# Patient Record
Sex: MALE | Race: WHITE | NOT HISPANIC OR LATINO | Employment: OTHER | URBAN - METROPOLITAN AREA
[De-identification: names, ages, dates, MRNs, and addresses within clinical notes are randomized per-mention and may not be internally consistent; named-entity substitution may affect disease eponyms.]

---

## 2018-04-04 LAB
BILIRUB UR QL STRIP: NEGATIVE
CLARITY UR: CLEAR
COLOR UR: YELLOW
GLUCOSE UR STRIP-MCNC: NEGATIVE MG/DL
HGB UR QL STRIP.AUTO: ABNORMAL
KETONES UR STRIP-MCNC: NEGATIVE MG/DL
LEUKOCYTE ESTERASE UR QL STRIP: NEGATIVE
NITRITE UR QL STRIP: NEGATIVE
PH UR STRIP.AUTO: 7.5 [PH] (ref 4.5–8)
PROT UR STRIP-MCNC: NEGATIVE MG/DL
PSA (HISTORICAL): 0.08 NG/ML (ref 0–4)
RBC #/AREA URNS AUTO: ABNORMAL /HPF
SP GR UR STRIP.AUTO: 1.01 (ref 1–1.03)
UROBILINOGEN UR QL STRIP.AUTO: 0.2 EU/DL (ref 0.2–8)
WBC #/AREA URNS AUTO: ABNORMAL /HPF

## 2019-04-03 ENCOUNTER — TRANSCRIBE ORDERS (OUTPATIENT)
Dept: ADMINISTRATIVE | Facility: HOSPITAL | Age: 84
End: 2019-04-03

## 2019-04-03 ENCOUNTER — LAB (OUTPATIENT)
Dept: LAB | Facility: HOSPITAL | Age: 84
End: 2019-04-03
Attending: UROLOGY
Payer: MEDICARE

## 2019-04-03 DIAGNOSIS — C61 PROSTATE CANCER (HCC): Primary | ICD-10-CM

## 2019-04-03 DIAGNOSIS — C61 PROSTATE CANCER (HCC): ICD-10-CM

## 2019-04-03 LAB
BACTERIA UR QL AUTO: ABNORMAL /HPF
BILIRUB UR QL STRIP: NEGATIVE
CLARITY UR: CLEAR
COLOR UR: ABNORMAL
GLUCOSE UR STRIP-MCNC: NEGATIVE MG/DL
HGB UR QL STRIP.AUTO: NEGATIVE
KETONES UR STRIP-MCNC: NEGATIVE MG/DL
LEUKOCYTE ESTERASE UR QL STRIP: NEGATIVE
NITRITE UR QL STRIP: NEGATIVE
NON-SQ EPI CELLS URNS QL MICRO: ABNORMAL /HPF
PH UR STRIP.AUTO: 7 [PH]
PROT UR STRIP-MCNC: NEGATIVE MG/DL
PSA SERPL-MCNC: <0.1 NG/ML (ref 0–4)
RBC #/AREA URNS AUTO: ABNORMAL /HPF
SP GR UR STRIP.AUTO: 1.01 (ref 1–1.03)
UROBILINOGEN UR QL STRIP.AUTO: 0.2 E.U./DL
WBC #/AREA URNS AUTO: ABNORMAL /HPF

## 2019-04-03 PROCEDURE — 81001 URINALYSIS AUTO W/SCOPE: CPT | Performed by: UROLOGY

## 2019-04-03 PROCEDURE — 84153 ASSAY OF PSA TOTAL: CPT

## 2019-12-14 ENCOUNTER — HOSPITAL ENCOUNTER (OUTPATIENT)
Facility: HOSPITAL | Age: 84
Setting detail: OBSERVATION
Discharge: HOME | End: 2019-12-15
Attending: EMERGENCY MEDICINE | Admitting: INTERNAL MEDICINE
Payer: MEDICARE

## 2019-12-14 ENCOUNTER — APPOINTMENT (OUTPATIENT)
Dept: RADIOLOGY | Facility: HOSPITAL | Age: 84
End: 2019-12-14
Payer: MEDICARE

## 2019-12-14 ENCOUNTER — OFFICE VISIT (OUTPATIENT)
Dept: URGENT CARE | Facility: HOSPITAL | Age: 84
End: 2019-12-14
Payer: MEDICARE

## 2019-12-14 ENCOUNTER — APPOINTMENT (EMERGENCY)
Dept: CT IMAGING | Facility: HOSPITAL | Age: 84
End: 2019-12-14
Payer: MEDICARE

## 2019-12-14 VITALS
HEART RATE: 71 BPM | OXYGEN SATURATION: 97 % | SYSTOLIC BLOOD PRESSURE: 202 MMHG | BODY MASS INDEX: 26.49 KG/M2 | DIASTOLIC BLOOD PRESSURE: 98 MMHG | HEIGHT: 75 IN | WEIGHT: 213 LBS | TEMPERATURE: 98.4 F | RESPIRATION RATE: 18 BRPM

## 2019-12-14 DIAGNOSIS — R53.1 WEAKNESS: ICD-10-CM

## 2019-12-14 DIAGNOSIS — R03.0 ELEVATED BLOOD PRESSURE READING: ICD-10-CM

## 2019-12-14 DIAGNOSIS — R50.9 FEVER, UNSPECIFIED FEVER CAUSE: ICD-10-CM

## 2019-12-14 DIAGNOSIS — R50.9 FEVER, UNSPECIFIED FEVER CAUSE: Primary | ICD-10-CM

## 2019-12-14 DIAGNOSIS — R50.9 FEVER: ICD-10-CM

## 2019-12-14 DIAGNOSIS — I16.0 HYPERTENSIVE URGENCY: Primary | ICD-10-CM

## 2019-12-14 LAB
ALBUMIN SERPL BCP-MCNC: 4.4 G/DL (ref 3.5–5.7)
ALP SERPL-CCNC: 42 U/L (ref 55–165)
ALT SERPL W P-5'-P-CCNC: 21 U/L (ref 7–52)
ANION GAP SERPL CALCULATED.3IONS-SCNC: 8 MMOL/L (ref 4–13)
APTT PPP: 34 SECONDS (ref 23–37)
AST SERPL W P-5'-P-CCNC: 31 U/L (ref 13–39)
BASOPHILS # BLD AUTO: 0.1 THOUSANDS/ΜL (ref 0–0.1)
BASOPHILS NFR BLD AUTO: 2 % (ref 0–2)
BILIRUB SERPL-MCNC: 0.7 MG/DL (ref 0.2–1)
BILIRUB UR QL STRIP: NEGATIVE
BUN SERPL-MCNC: 12 MG/DL (ref 7–25)
CALCIUM SERPL-MCNC: 9.6 MG/DL (ref 8.6–10.5)
CHLORIDE SERPL-SCNC: 99 MMOL/L (ref 98–107)
CLARITY UR: CLEAR
CO2 SERPL-SCNC: 27 MMOL/L (ref 21–31)
COLOR UR: YELLOW
CREAT SERPL-MCNC: 1.01 MG/DL (ref 0.7–1.3)
EOSINOPHIL # BLD AUTO: 0 THOUSAND/ΜL (ref 0–0.61)
EOSINOPHIL NFR BLD AUTO: 1 % (ref 0–5)
ERYTHROCYTE [DISTWIDTH] IN BLOOD BY AUTOMATED COUNT: 13 % (ref 11.5–14.5)
FLUAV RNA NPH QL NAA+PROBE: NORMAL
FLUBV RNA NPH QL NAA+PROBE: NORMAL
GFR SERPL CREATININE-BSD FRML MDRD: 69 ML/MIN/1.73SQ M
GLUCOSE SERPL-MCNC: 115 MG/DL (ref 65–99)
GLUCOSE UR STRIP-MCNC: NEGATIVE MG/DL
HCT VFR BLD AUTO: 39.1 % (ref 42–47)
HGB BLD-MCNC: 14.1 G/DL (ref 14–18)
HGB UR QL STRIP.AUTO: NEGATIVE
INR PPP: 0.98 (ref 0.9–1.5)
KETONES UR STRIP-MCNC: NEGATIVE MG/DL
LACTATE SERPL-SCNC: 1.2 MMOL/L (ref 0.5–2)
LEUKOCYTE ESTERASE UR QL STRIP: NEGATIVE
LYMPHOCYTES # BLD AUTO: 0.4 THOUSANDS/ΜL (ref 0.6–4.47)
LYMPHOCYTES NFR BLD AUTO: 13 % (ref 21–51)
MAGNESIUM SERPL-MCNC: 2.1 MG/DL (ref 1.9–2.7)
MCH RBC QN AUTO: 33.3 PG (ref 26–34)
MCHC RBC AUTO-ENTMCNC: 36 G/DL (ref 31–37)
MCV RBC AUTO: 93 FL (ref 81–99)
MONOCYTES # BLD AUTO: 0.3 THOUSAND/ΜL (ref 0.17–1.22)
MONOCYTES NFR BLD AUTO: 10 % (ref 2–12)
NEUTROPHILS # BLD AUTO: 2.3 THOUSANDS/ΜL (ref 1.4–6.5)
NEUTS SEG NFR BLD AUTO: 75 % (ref 42–75)
NITRITE UR QL STRIP: NEGATIVE
PH UR STRIP.AUTO: 6.5 [PH]
PLATELET # BLD AUTO: 212 THOUSANDS/UL (ref 149–390)
PMV BLD AUTO: 6.7 FL (ref 8.6–11.7)
POTASSIUM SERPL-SCNC: 3.9 MMOL/L (ref 3.5–5.5)
PROT SERPL-MCNC: 6.6 G/DL (ref 6.4–8.9)
PROT UR STRIP-MCNC: NEGATIVE MG/DL
PROTHROMBIN TIME: 11.4 SECONDS (ref 10.2–13)
RBC # BLD AUTO: 4.22 MILLION/UL (ref 4.3–5.9)
RSV RNA NPH QL NAA+PROBE: NORMAL
SODIUM SERPL-SCNC: 134 MMOL/L (ref 134–143)
SP GR UR STRIP.AUTO: 1.01 (ref 1–1.03)
TROPONIN I SERPL-MCNC: <0.03 NG/ML
TSH SERPL DL<=0.05 MIU/L-ACNC: 2.56 UIU/ML (ref 0.45–5.33)
UROBILINOGEN UR QL STRIP.AUTO: 0.2 E.U./DL
WBC # BLD AUTO: 3.1 THOUSAND/UL (ref 4.8–10.8)

## 2019-12-14 PROCEDURE — 85610 PROTHROMBIN TIME: CPT | Performed by: NURSE PRACTITIONER

## 2019-12-14 PROCEDURE — 84484 ASSAY OF TROPONIN QUANT: CPT | Performed by: NURSE PRACTITIONER

## 2019-12-14 PROCEDURE — 80053 COMPREHEN METABOLIC PANEL: CPT | Performed by: NURSE PRACTITIONER

## 2019-12-14 PROCEDURE — 99220 PR INITIAL OBSERVATION CARE/DAY 70 MINUTES: CPT | Performed by: NURSE PRACTITIONER

## 2019-12-14 PROCEDURE — 85730 THROMBOPLASTIN TIME PARTIAL: CPT | Performed by: NURSE PRACTITIONER

## 2019-12-14 PROCEDURE — 96374 THER/PROPH/DIAG INJ IV PUSH: CPT

## 2019-12-14 PROCEDURE — 83735 ASSAY OF MAGNESIUM: CPT | Performed by: NURSE PRACTITIONER

## 2019-12-14 PROCEDURE — 84443 ASSAY THYROID STIM HORMONE: CPT | Performed by: NURSE PRACTITIONER

## 2019-12-14 PROCEDURE — 85025 COMPLETE CBC W/AUTO DIFF WBC: CPT | Performed by: NURSE PRACTITIONER

## 2019-12-14 PROCEDURE — 99203 OFFICE O/P NEW LOW 30 MIN: CPT | Performed by: NURSE PRACTITIONER

## 2019-12-14 PROCEDURE — 83605 ASSAY OF LACTIC ACID: CPT | Performed by: NURSE PRACTITIONER

## 2019-12-14 PROCEDURE — 99285 EMERGENCY DEPT VISIT HI MDM: CPT | Performed by: NURSE PRACTITIONER

## 2019-12-14 PROCEDURE — 71046 X-RAY EXAM CHEST 2 VIEWS: CPT

## 2019-12-14 PROCEDURE — 93005 ELECTROCARDIOGRAM TRACING: CPT

## 2019-12-14 PROCEDURE — 81003 URINALYSIS AUTO W/O SCOPE: CPT | Performed by: NURSE PRACTITIONER

## 2019-12-14 PROCEDURE — 87631 RESP VIRUS 3-5 TARGETS: CPT | Performed by: NURSE PRACTITIONER

## 2019-12-14 PROCEDURE — 87040 BLOOD CULTURE FOR BACTERIA: CPT | Performed by: NURSE PRACTITIONER

## 2019-12-14 PROCEDURE — 99285 EMERGENCY DEPT VISIT HI MDM: CPT

## 2019-12-14 PROCEDURE — 1123F ACP DISCUSS/DSCN MKR DOCD: CPT | Performed by: NURSE PRACTITIONER

## 2019-12-14 PROCEDURE — G0463 HOSPITAL OUTPT CLINIC VISIT: HCPCS | Performed by: NURSE PRACTITIONER

## 2019-12-14 PROCEDURE — 71250 CT THORAX DX C-: CPT

## 2019-12-14 PROCEDURE — 36415 COLL VENOUS BLD VENIPUNCTURE: CPT | Performed by: NURSE PRACTITIONER

## 2019-12-14 RX ORDER — HYDRALAZINE HYDROCHLORIDE 20 MG/ML
5 INJECTION INTRAMUSCULAR; INTRAVENOUS EVERY 6 HOURS PRN
Status: DISCONTINUED | OUTPATIENT
Start: 2019-12-14 | End: 2019-12-15 | Stop reason: HOSPADM

## 2019-12-14 RX ORDER — LABETALOL 20 MG/4 ML (5 MG/ML) INTRAVENOUS SYRINGE
10 ONCE
Status: COMPLETED | OUTPATIENT
Start: 2019-12-14 | End: 2019-12-14

## 2019-12-14 RX ORDER — ONDANSETRON 2 MG/ML
4 INJECTION INTRAMUSCULAR; INTRAVENOUS EVERY 6 HOURS PRN
Status: DISCONTINUED | OUTPATIENT
Start: 2019-12-14 | End: 2019-12-15 | Stop reason: HOSPADM

## 2019-12-14 RX ORDER — LISINOPRIL 10 MG/1
10 TABLET ORAL DAILY
Status: DISCONTINUED | OUTPATIENT
Start: 2019-12-14 | End: 2019-12-15 | Stop reason: HOSPADM

## 2019-12-14 RX ORDER — ACETAMINOPHEN 325 MG/1
650 TABLET ORAL EVERY 6 HOURS PRN
Status: DISCONTINUED | OUTPATIENT
Start: 2019-12-14 | End: 2019-12-15 | Stop reason: HOSPADM

## 2019-12-14 RX ADMIN — LABETALOL 20 MG/4 ML (5 MG/ML) INTRAVENOUS SYRINGE 10 MG: at 10:13

## 2019-12-14 RX ADMIN — ENOXAPARIN SODIUM 40 MG: 40 INJECTION SUBCUTANEOUS at 13:18

## 2019-12-14 RX ADMIN — LISINOPRIL 10 MG: 10 TABLET ORAL at 13:17

## 2019-12-14 RX ADMIN — HYDRALAZINE HYDROCHLORIDE 5 MG: 20 INJECTION INTRAMUSCULAR; INTRAVENOUS at 21:46

## 2019-12-14 NOTE — SOCIAL WORK
Evaluated the pt at the bedside with the wife and daughter present  Pt was admitted to the hospital with weakness  Explained the role of CM with the pt and the options of discharge planning with the pt and family  Pt states he lives with his spouse in a 2 story home  Pt has 2 MAUREEN in the front and 21 steps in the back  Pt has a bathroom on the first floor and 18 steps to the upstairs  Pt states he has been independent and is able to drive  Discussed the Observation level of care with the pt  Pt verbalizes understanding of same  Pt daughter will transport selene upon discharge  Pt PCP is Dr Christen Reinoso  Pt goes to [facility] in [place] for medications  Patient/caregiver received discharge checklist   Content reviewed  Patient/caregiver encouraged to participate in discharge plan of care prior to discharge home  CM reviewed d/c planning process including the following: identifying help at home, patient preference for d/c planning needs, availability of treatment team to discuss questions or concerns patient and/or family may have regarding understanding medications and recognizing signs and symptoms once discharged  CM also encouraged patient to follow up with all recommended appointments after discharge  Patient advised of importance for patient and family to participate in managing patients medical well being

## 2019-12-14 NOTE — PLAN OF CARE
Problem: DISCHARGE PLANNING - CARE MANAGEMENT  Goal: Discharge to post-acute care or home with appropriate resources  Description  INTERVENTIONS:  - Conduct assessment to determine patient/family and health care team treatment goals, and need for post-acute services based on payer coverage, community resources, and patient preferences, and barriers to discharge  - Address psychosocial, clinical, and financial barriers to discharge as identified in assessment in conjunction with the patient/family and health care team  - Arrange appropriate level of post-acute services according to patient's   needs and preference and payer coverage in collaboration with the physician and health care team  - Communicate with and update the patient/family, physician, and health care team regarding progress on the discharge plan  - Arrange appropriate transportation to post-acute venues  Discharge home with spouse  Daughter will transport  Pt has no care needs     Outcome: Progressing

## 2019-12-14 NOTE — PLAN OF CARE
Pt admitted at this time  Problem: Potential for Falls  Goal: Patient will remain free of falls  Description  INTERVENTIONS:  - Assess patient frequently for physical needs  -  Identify cognitive and physical deficits and behaviors that affect risk of falls    -  [place] fall precautions as indicated by assessment   - Educate patient/family on patient safety including physical limitations  - Instruct patient to call for assistance with activity based on assessment  - Modify environment to reduce risk of injury  - Consider OT/PT consult to assist with strengthening/mobility  Outcome: Progressing     Problem: RESPIRATORY - ADULT  Goal: Achieves optimal ventilation and oxygenation  Description  INTERVENTIONS:  - Assess for changes in respiratory status  - Assess for changes in mentation and behavior  - Position to facilitate oxygenation and minimize respiratory effort  - Oxygen administered by appropriate delivery if ordered  - Initiate smoking cessation education as indicated  - Encourage broncho-pulmonary hygiene including cough, deep breathe, Incentive Spirometry  - Assess the need for suctioning and aspirate as needed  - Assess and instruct to report SOB or any respiratory difficulty  - Respiratory Therapy support as indicated  Outcome: Progressing     Problem: PAIN - ADULT  Goal: Verbalizes/displays adequate comfort level or baseline comfort level  Description  Interventions:  - Encourage patient to monitor pain and request assistance  - Assess pain using appropriate pain scale  - Administer analgesics based on type and severity of pain and evaluate response  - Implement non-pharmacological measures as appropriate and evaluate response  - Consider cultural and social influences on pain and pain management  - Notify physician/advanced practitioner if interventions unsuccessful or patient reports new pain  Outcome: Progressing     Problem: INFECTION - ADULT  Goal: Absence or prevention of progression during hospitalization  Description  INTERVENTIONS:  - Assess and monitor for signs and symptoms of infection  - Monitor lab/diagnostic results  - Monitor all insertion sites, i e  indwelling lines, tubes, and drains  - Monitor endotracheal if appropriate and nasal secretions for changes in amount and color  - [place] appropriate cooling/warming therapies per order  - Administer medications as ordered  - Instruct and encourage patient and family to use good hand hygiene technique  - Identify and instruct in appropriate isolation precautions for identified infection/condition  Outcome: Progressing  Goal: Absence of fever/infection during neutropenic period  Description  INTERVENTIONS:  - Monitor WBC    Outcome: Progressing     Problem: SAFETY ADULT  Goal: Maintain or return to baseline ADL function  Description  INTERVENTIONS:  -  Assess patient's ability to carry out ADLs; assess patient's baseline for ADL function and identify physical deficits which impact ability to perform ADLs (bathing, care of mouth/teeth, toileting, grooming, dressing, etc )  - Assess/evaluate cause of self-care deficits   - Assess range of motion  - Assess patient's mobility; develop plan if impaired  - Assess patient's need for assistive devices and provide as appropriate  - Encourage maximum independence but intervene and supervise when necessary  - Involve family in performance of ADLs  - Assess for home care needs following discharge   - Consider OT consult to assist with ADL evaluation and planning for discharge  - Provide patient education as appropriate  Outcome: Progressing  Goal: Maintain or return mobility status to optimal level  Description  INTERVENTIONS:  - Assess patient's baseline mobility status (ambulation, transfers, stairs, etc )    - Identify cognitive and physical deficits and behaviors that affect mobility  - Identify mobility aids required to assist with transfers and/or ambulation (gait belt, sit-to-stand, lift, walker, cane, etc )  - [place] fall precautions as indicated by assessment  - Record patient progress and toleration of activity level on Mobility SBAR; progress patient to next Phase/Stage  - Instruct patient to call for assistance with activity based on assessment  - Consider rehabilitation consult to assist with strengthening/weightbearing, etc   Outcome: Progressing     Problem: DISCHARGE PLANNING  Goal: Discharge to home or other facility with appropriate resources  Description  INTERVENTIONS:  - Identify barriers to discharge w/patient and caregiver  - Arrange for needed discharge resources and transportation as appropriate  - Identify discharge learning needs (meds, wound care, etc )  - Arrange for interpretive services to assist at discharge as needed  - Refer to Case Management Department for coordinating discharge planning if the patient needs post-hospital services based on physician/advanced practitioner order or complex needs related to functional status, cognitive ability, or social support system  Outcome: Progressing     Problem: Knowledge Deficit  Goal: Patient/family/caregiver demonstrates understanding of disease process, treatment plan, medications, and discharge instructions  Description  Complete learning assessment and assess knowledge base    Interventions:  - Provide teaching at level of understanding  - Provide teaching via preferred learning methods  Outcome: Progressing

## 2019-12-14 NOTE — ASSESSMENT & PLAN NOTE
· Noted to have SBP as high as 200s in ED  · Patient was symptomatic   · Received labetalol IV in ED with improvement of SBP to 170s  · The patient's PCP is retiring and he is looking for a new PCP  Wife states that they are considering which PCP to go with     · He is denies any prior history of hypertension   · I recommended that he should establish care with a PCP and have hypertension workup done- including renal artery duplex as outpatient   · Will start on lisinopril   · Monitor BP closely   · Low salt diet discussed

## 2019-12-14 NOTE — PROGRESS NOTES
[facility]        NAME: Jama Yip  is a 81 y o  male  : 1935    MRN: [id number]  DATE: 2019  TIME: 8:59 AM    Assessment and Plan   Fever, unspecified fever cause [R50 9]  1  Fever, unspecified fever cause  XR chest pa & lateral    Transfer to other facility   2  Elevated blood pressure reading  Transfer to other facility         Patient Instructions     Patient Instructions   As we discussed,  I would recommend going to ER for full evaluation of symptoms  You were agreeable and would like to go to [address]  Chief Complaint     Chief Complaint   Patient presents with    Fever     started a week ago was congested now has a fevers  History of Present Illness   Jama Yip  presents to the clinic c/o    This is a 81year old male here today with subjective fevers  He states symptoms started at the beginning of last week  He states he had mild cold symptoms with nasal congestion  He states he took 2 doses of decongestant and symptoms improved  He states at this time he has no nasal congestion, cough, sore throat, ear pain  He states yesterday he was generally feeling better  He states he woke up in the middle of the night feeling flushed and then got the chills  He states he took aspirin and symptoms resolved  He states he does feel more weak today  He states yesterday he was feeling better but symptoms had returned  He denies any headaches, chest pain, sob or difficulty breathing  He does not take any medication  No history of HTN  No fever here today  Wife does not he does not go to doctor unless he is not feeling well  He has not been seen by PCP in about 1 year  Review of Systems   Review of Systems   Constitutional: Positive for activity change, chills, fatigue and fever  HENT: Negative for congestion, ear pain, postnasal drip, rhinorrhea, sinus pressure, sinus pain, sore throat and tinnitus      Respiratory: Negative for cough, chest tightness, wheezing and stridor  Cardiovascular: Negative for chest pain  Genitourinary: Negative for dysuria, flank pain, frequency, hematuria and urgency  Skin: Negative  Neurological: Negative  Psychiatric/Behavioral: Negative  Current Medications     Long-Term Medications   Medication Sig Dispense Refill    aspirin 81 MG tablet Take 81 mg by mouth         Current Allergies     Allergies as of 12/14/2019    (No Known Allergies)            The following portions of the patient's history were reviewed and updated as appropriate: allergies, current medications, past family history, past medical history, past social history, past surgical history and problem list     Objective   BP (!) 202/98   Pulse 71   Temp 98 4 °F (36 9 °C)   Resp 18   Ht 6' 3" (1 905 m)   Wt 96 6 kg (213 lb)   SpO2 97%   BMI 26 62 kg/m²        Physical Exam     Physical Exam   Constitutional: He is oriented to person, place, and time  He appears well-developed and well-nourished  HENT:   Right Ear: External ear normal    Left Ear: External ear normal    Mouth/Throat: Oropharynx is clear and moist    Eyes: Pupils are equal, round, and reactive to light  Conjunctivae are normal    Neck: Normal range of motion  Neck supple  Cardiovascular: Normal rate and regular rhythm  Pulmonary/Chest: Effort normal and breath sounds normal    Musculoskeletal: Normal range of motion  Neurological: He is alert and oriented to person, place, and time  Skin: Skin is warm and dry  Psychiatric: He has a normal mood and affect   His behavior is normal        Chest xray: no acute abnormality

## 2019-12-14 NOTE — NURSING NOTE
Pt received from the er to room 109 1, oriented to the unit and the callbell, no cp no sob, placed on the monitor, sb to nsr noted, sarwat np was in to see and assess the pt, admit orders noted

## 2019-12-14 NOTE — H&P
H&P- Catracho Medina  1935, 81 y o  male MRN: [id number]    Unit/Bed#: -01 Encounter: [id number]    Primary Care Provider: Katherine Kowalski DO   Date and time admitted to hospital: 12/14/2019  9:14 AM        * Hypertensive urgency  Assessment & Plan  · Noted to have SBP as high as 200s in ED  · Patient was symptomatic   · Received labetalol IV in ED with improvement of SBP to 170s  · The patient's PCP is retiring and he is looking for a new PCP  Wife states that they are considering which PCP to go with  · He is denies any prior history of hypertension   · I recommended that he should establish care with a PCP and have hypertension workup done- including renal artery duplex as outpatient   · Will start on lisinopril   · Monitor BP closely   · Low salt diet discussed     Generalized weakness  Assessment & Plan  · Likely due to recent viral infection   · Continue supportive care     VTE Prophylaxis: Enoxaparin (Lovenox)  Code Status: full code   POLST: POLST is not applicable to this patient  Discussion with family: wife     Anticipated Length of Stay:  Patient will be admitted on an Observation basis with an anticipated length of stay of  < 2 midnights  Justification for Hospital Stay: hypertensive urgency     Chief Complaint:   Fever and generalized weakness    History of Present Illness:    Catracho Medina  is a 81 y o  male who presents with subjective fever and generalized weakness  The patient with past medical history of remote prostate cancer  Patient states that he developed a cold-like symptoms approximately 1 week ago with postnasal drip, nasal congestion, chills, and generalized malaise  He reported taking 2 doses Robitussin over-the-counter on Tuesday  He stated overall he is slowly improving however last night the patient woke up feeling feverish and 2 of aspirin with some improvement    He woke up this morning and the generalized weakness is progressively worse subsequently the patient went to the urgent care  At the urgent care patient was found to have elevated blood pressure and subsequently was directed to come to the ED for further evaluation  In the ED the patient was found to have systolic BP of 941D and subsequently treated with labetalol IV  Review of Systems:  Review of Systems   Constitutional: Positive for chills and fever  Negative for fatigue  HENT: Negative for congestion, ear pain, postnasal drip and sore throat  Eyes: Negative for discharge, itching and visual disturbance  Respiratory: Negative for cough, chest tightness and shortness of breath  Cardiovascular: Negative for chest pain, palpitations and leg swelling  Gastrointestinal: Negative for abdominal pain, nausea and vomiting  Endocrine: Negative for cold intolerance and heat intolerance  Genitourinary: Negative for difficulty urinating, dysuria and hematuria  Musculoskeletal: Negative for back pain, gait problem and neck pain  Skin: Negative for rash and wound  Neurological: Positive for weakness (generalized weakness)  Negative for dizziness, speech difficulty, light-headedness and headaches  Psychiatric/Behavioral: Negative for confusion, dysphoric mood and hallucinations  Past Medical and Surgical History:   Past Medical History:   Diagnosis Date    Cancer [facility])     prostate cancer     Hypertension        Past Surgical History:   Procedure Laterality Date    APPENDECTOMY         Meds/Allergies:  Prior to Admission medications    Medication Sig Start Date End Date Taking? Authorizing Provider   aspirin 81 MG tablet Take 81 mg by mouth  12/14/19  Historical Provider, MD     I have reviewed home medications with patient personally      Allergies: No Known Allergies    Social History:  Marital Status: /Civil Union   Occupation: retired   Patient Pre-hospital Living Situation: family   Patient Pre-hospital Level of Mobility: independent   Patient Pre-hospital Diet Restrictions: none   Substance Use History:   Social History     Substance and Sexual Activity   Alcohol Use Not Currently    Frequency: Monthly or less     Social History     Tobacco Use   Smoking Status Never Smoker   Smokeless Tobacco Never Used     Social History     Substance and Sexual Activity   Drug Use Not Currently       Family History:  I have reviewed the patients family history    Physical Exam:   Vitals:   Blood Pressure: (!) 177/78 (12/14/19 1236)  Pulse: 63 (12/14/19 1236)  Temperature: 98 °F (36 7 °C) (12/14/19 1236)  Temp Source: Temporal (12/14/19 1236)  Respirations: 18 (12/14/19 1236)  Height: 6' 3" (190 5 cm) (12/14/19 1241)  Weight - Scale: 94 9 kg (209 lb 3 5 oz) (12/14/19 1241)  SpO2: 98 % (12/14/19 1236)    Physical Exam    Additional Data:   Lab Results: I have personally reviewed pertinent reports  Results from last 7 days   Lab Units 12/14/19  0935   WBC Thousand/uL 3 10*   HEMOGLOBIN g/dL 14 1   HEMATOCRIT % 39 1*   PLATELETS Thousands/uL 212   NEUTROS PCT % 75   LYMPHS PCT % 13*   MONOS PCT % 10   EOS PCT % 1     Results from last 7 days   Lab Units 12/14/19  0935   POTASSIUM mmol/L 3 9   CHLORIDE mmol/L 99   CO2 mmol/L 27   BUN mg/dL 12   CREATININE mg/dL 1 01   CALCIUM mg/dL 9 6   ALK PHOS U/L 42*   ALT U/L 21   AST U/L 31     Results from last 7 days   Lab Units 12/14/19  0935   INR  0 98               Imaging: I have personally reviewed pertinent reports  CT chest without contrast   Final Result by Watson Avendaño MD (12/14 1122)      Mild subsegmental atelectasis in the left upper and lower lobes  No evidence of pneumonia or other acute abnormality in the chest                Workstation performed: [id number]             EKG, Pathology, and Other Studies Reviewed on Admission:   · EKG: NSR HR 65    NetAccess / Epic Records Reviewed: Yes     ** Please Note: This note has been constructed using a voice recognition system   **

## 2019-12-14 NOTE — ED PROVIDER NOTES
History  Chief Complaint   Patient presents with    Fever - 9 weeks to 74 years     on omnday had stuffy nose and sneezing with a fever, OTC cold medicine and was feeling better  Fever on and off all week relieved with aspiri  Woke up around 1 AM and felt like he was burning up     Weakness - Generalized     This is an 81year old male who states has had a subjective fever x 1 week with stuffy nose, myalagias  He denies sorethroat, n/v/d, CP, SOB  He states he has been taking OTC cold medications and aspirin with some relief  He states he woke up this am and felt like he was burning  He went to [facility], had a CXR and was sent to the ED for further evaluation  He states he does not see a doctor for much but does see Dr Nicki Hawkins for hx of prostate cancer  Denies DM, HTN or other PMH  Pt denies getting a flu shot this year  History provided by:  Medical records and patient   used: No        None       Past Medical History:   Diagnosis Date    Cancer [facility])     prostate cancer     Hypertension        Past Surgical History:   Procedure Laterality Date    APPENDECTOMY         History reviewed  No pertinent family history  I have reviewed and agree with the history as documented  Social History     Tobacco Use    Smoking status: Never Smoker    Smokeless tobacco: Never Used   Substance Use Topics    Alcohol use: Not Currently     Frequency: Monthly or less    Drug use: Never        Review of Systems   Constitutional: Positive for fatigue and fever  HENT: Negative  Eyes: Negative  Respiratory: Negative  Cardiovascular: Negative  Gastrointestinal: Negative  Endocrine: Negative  Genitourinary: Negative  Musculoskeletal: Positive for myalgias  Skin: Negative  Allergic/Immunologic: Negative  Neurological: Negative  Hematological: Negative  Psychiatric/Behavioral: Negative          Physical Exam  Physical Exam   Constitutional: He is oriented to person, place, and time  He appears well-developed and well-nourished  HENT:   Head: Normocephalic and atraumatic  Right Ear: External ear normal    Left Ear: External ear normal    Nose: Nose normal    Mouth/Throat: Oropharynx is clear and moist  No oropharyngeal exudate  Eyes: Pupils are equal, round, and reactive to light  EOM are normal    Neck: Normal range of motion  Neck supple  Cardiovascular: Normal rate, regular rhythm and normal heart sounds  Pulmonary/Chest: Effort normal and breath sounds normal  No stridor  No respiratory distress  He has no wheezes  He has no rales  Abdominal: Soft  Bowel sounds are normal  He exhibits no distension  There is no tenderness  Musculoskeletal: Normal range of motion  Neurological: He is alert and oriented to person, place, and time  Skin: Skin is warm and dry  Capillary refill takes less than 2 seconds  He is not diaphoretic  Psychiatric: He has a normal mood and affect  His behavior is normal  Judgment and thought content normal    Nursing note and vitals reviewed        Vital Signs  ED Triage Vitals   Temperature Pulse Respirations Blood Pressure SpO2   12/14/19 0917 12/14/19 0917 12/14/19 0917 12/14/19 0918 12/14/19 0917   (!) 97 °F (36 1 °C) 72 16 (!) 203/86 99 %      Temp Source Heart Rate Source Patient Position - Orthostatic VS BP Location FiO2 (%)   12/14/19 0917 12/14/19 0917 12/14/19 0918 12/14/19 0918 --   Temporal Monitor Sitting Left arm       Pain Score       12/14/19 0917       No Pain           Vitals:    12/14/19 1008 12/14/19 1019 12/14/19 1032 12/14/19 1236   BP: (!) 204/82 (!) 184/79 (!) 174/74 (!) 177/78   Pulse: 64 56 58 63   Patient Position - Orthostatic VS: Sitting Sitting Sitting Sitting         Visual Acuity      ED Medications  Medications   ondansetron (ZOFRAN) injection 4 mg (has no administration in time range)   enoxaparin (LOVENOX) subcutaneous injection 40 mg (has no administration in time range)   acetaminophen (TYLENOL) tablet 650 mg (has no administration in time range)   lisinopril (ZESTRIL) tablet 10 mg (has no administration in time range)   Labetalol HCl (NORMODYNE) injection 10 mg (10 mg Intravenous Given 12/14/19 1013)       Diagnostic Studies  Results Reviewed     Procedure Component Value Units Date/Time    UA (URINE) with reflex to Scope [[id number]]  (Normal) Collected:  12/14/19 1142    Lab Status:  Final result Specimen:  Urine, Clean Catch Updated:  12/14/19 1215     Color, UA Yellow     Clarity, UA Clear     Specific Gravity, UA 1 010     pH, UA 6 5     Leukocytes, UA Negative     Nitrite, UA Negative     Protein, UA Negative mg/dl      Glucose, UA Negative mg/dl      Ketones, UA Negative mg/dl      Urobilinogen, UA 0 2 E U /dl      Bilirubin, UA Negative     Blood, UA Negative    Troponin I [[id number]]  (Normal) Collected:  12/14/19 0935    Lab Status:  Final result Specimen:  Blood from Arm, Left Updated:  12/14/19 1054     Troponin I <0 03 ng/mL     Influenza A/B and RSV PCR [[id number]]  (Normal) Collected:  12/14/19 0943    Lab Status:  Final result Updated:  12/14/19 1027     INFLUENZA A PCR None Detected     INFLUENZA B PCR None Detected     RSV PCR None Detected    Lactic acid, plasma x2 [[id number]]  (Normal) Collected:  12/14/19 0935    Lab Status:  Final result Specimen:  Blood from Arm, Left Updated:  12/14/19 1012     LACTIC ACID 1 2 mmol/L     Narrative:       Result may be elevated if tourniquet was used during collection      Magnesium [[id number]]  (Normal) Collected:  12/14/19 0935    Lab Status:  Final result Specimen:  Blood from Arm, Left Updated:  12/14/19 1012     Magnesium 2 1 mg/dL     Comprehensive metabolic panel [[id number]]  (Abnormal) Collected:  12/14/19 0935    Lab Status:  Final result Specimen:  Blood from Arm, Left Updated:  12/14/19 1012     Sodium 134 mmol/L      Potassium 3 9 mmol/L      Chloride 99 mmol/L      CO2 27 mmol/L      ANION GAP 8 mmol/L      BUN 12 mg/dL Creatinine 1 01 mg/dL      Glucose 115 mg/dL      Calcium 9 6 mg/dL      AST 31 U/L      ALT 21 U/L      Alkaline Phosphatase 42 U/L      Total Protein 6 6 g/dL      Albumin 4 4 g/dL      Total Bilirubin 0 70 mg/dL      eGFR 69 ml/min/1 73sq m     Narrative:       [facility] guidelines for Chronic Kidney Disease (CKD):     Stage 1 with normal or high GFR (GFR > 90 mL/min/1 73 square meters)    Stage 2 Mild CKD (GFR = 60-89 mL/min/1 73 square meters)    Stage 3A Moderate CKD (GFR = 45-59 mL/min/1 73 square meters)    Stage 3B Moderate CKD (GFR = 30-44 mL/min/1 73 square meters)    Stage 4 Severe CKD (GFR = 15-29 mL/min/1 73 square meters)    Stage 5 End Stage CKD (GFR <15 mL/min/1 73 square meters)  Note: GFR calculation is accurate only with a steady state creatinine    Protime-INR [[id number]]  (Normal) Collected:  12/14/19 0935    Lab Status:  Final result Specimen:  Blood from Arm, Left Updated:  12/14/19 1005     Protime 11 4 seconds      INR 0 98    APTT [[id number]]  (Normal) Collected:  12/14/19 0935    Lab Status:  Final result Specimen:  Blood from Arm, Left Updated:  12/14/19 1005     PTT 34 seconds     CBC and differential [[id number]]  (Abnormal) Collected:  12/14/19 0935    Lab Status:  Final result Specimen:  Blood from Arm, Left Updated:  12/14/19 0951     WBC 3 10 Thousand/uL      RBC 4 22 Million/uL      Hemoglobin 14 1 g/dL      Hematocrit 39 1 %      MCV 93 fL      MCH 33 3 pg      MCHC 36 0 g/dL      RDW 13 0 %      MPV 6 7 fL      Platelets 723 Thousands/uL      Neutrophils Relative 75 %      Lymphocytes Relative 13 %      Monocytes Relative 10 %      Eosinophils Relative 1 %      Basophils Relative 2 %      Neutrophils Absolute 2 30 Thousands/µL      Lymphocytes Absolute 0 40 Thousands/µL      Monocytes Absolute 0 30 Thousand/µL      Eosinophils Absolute 0 00 Thousand/µL      Basophils Absolute 0 10 Thousands/µL     Blood culture #1 [[id number]] Collected:  12/14/19 5038    Lab Status: In process Specimen:  Blood from Hand, Left Updated:  12/14/19 0951    Blood culture #2 [[id number]] Collected:  12/14/19 0934    Lab Status: In process Specimen:  Blood from Arm, Left Updated:  12/14/19 0941                 CT chest without contrast   Final Result by Watson Avendaño MD (12/14 1122)      Mild subsegmental atelectasis in the left upper and lower lobes  No evidence of pneumonia or other acute abnormality in the chest                Workstation performed: [id number]                    Procedures  ECG 12 Lead Documentation Only  Date/Time: 12/14/2019 9:30 AM  Performed by: KIERA Farmer  Authorized by: KIERA Farmer     Indications / Diagnosis:  Fever elevated BP   ECG reviewed by me, the ED Provider: yes (Dr Silva Spangler )    Patient location:  ED  Previous ECG:     Previous ECG:  Unavailable    Comparison to cardiac monitor: No    Interpretation:     Interpretation: normal    Rate:     ECG rate:  65    ECG rate assessment: normal    Rhythm:     Rhythm: sinus rhythm    Ectopy:     Ectopy: none    QRS:     QRS axis:  Normal  ST segments:     ST segments:  Normal  T waves:     T waves: normal               ED Course  ED Course as of Dec 14 1247   Sat Dec 14, 2019   0958 BP improved per /87       1010 BP elevated 204/81 - will give 10 mg labetalol  1013 CMP unremarkable  LA 1 2   WBC 3 10  Mag 2 1   Will order CT chest R/o pneumonia       1020 BP after labetalol 184/79       1028 Influenza RSV negative  Rn states that pt HR had dropped to high 40's and is now 61 (it is noted that in January 2019 pt had syncope x 3 after taking cold medications and not drinking enough water)       1101 Trop< 0 03 EKG NSR waiting on CT results         1 IMPRESSION:   Mild subsegmental atelectasis in the left upper and lower lobes    No evidence of pneumonia or other acute abnormality in the chest             1131 All labs and radiology results reviewed and discussed with Pt and wife  I feel that since pt has not seen PCP in over 1 year and does not  follow up and pt has HTN urgency that pt should stay for blood pressure management  12 Pt and wife agree with POC  Page to [address] will accept for observation  Dr Clement Chiang           BP (!) 177/78 (BP Location: Right arm)   Pulse 63   Temp 98 °F (36 7 °C) (Temporal)   Resp 18   Ht 6' 3" (1 905 m)   Wt 94 9 kg (209 lb 3 5 oz)   SpO2 98%   BMI 26 15 kg/m²                           MDM  Number of Diagnoses or Management Options  Diagnosis management comments: Subjective fevers with myalgia and generalized weakness    Plan  EKG  Tele  Flu swab  Labs  Urine  IV  CT chest      CXR completed at [facility] and reviewed - ? Multifocal pneumonia          Amount and/or Complexity of Data Reviewed  Clinical lab tests: ordered and reviewed  Tests in the radiology section of CPT®: ordered and reviewed  Review and summarize past medical records: yes          Disposition  Final diagnoses:   Hypertensive urgency   Fever   Weakness     Time reflects when diagnosis was documented in both MDM as applicable and the Disposition within this note     Time User Action Codes Description Comment    12/14/2019 10:11 AM Rebbecca Shivers Add [I16 0] Hypertensive urgency     12/14/2019 11:42 AM Rebbecca Shivers Add [R50 9] Fever     12/14/2019 11:42 AM Rebbecca Shivers Add [R53 1] Weakness       ED Disposition     ED Disposition Condition Date/Time Comment    Admit Stable Sat Dec 14, 2019 11:42 AM Case was discussed with NAVYA and the patient's admission status was agreed to be Admission Status: observation status to the service of Dr Kellie Huber   Follow-up Information    None         There are no discharge medications for this patient  No discharge procedures on file      ED Provider  Electronically Signed by           KIERA Parry  12/14/19 3640

## 2019-12-14 NOTE — PATIENT INSTRUCTIONS
As we discussed,  I would recommend going to ER for full evaluation of symptoms  You were agreeable and would like to go to [address]

## 2019-12-15 VITALS
DIASTOLIC BLOOD PRESSURE: 82 MMHG | TEMPERATURE: 97.2 F | SYSTOLIC BLOOD PRESSURE: 170 MMHG | HEIGHT: 75 IN | OXYGEN SATURATION: 93 % | WEIGHT: 209.22 LBS | BODY MASS INDEX: 26.01 KG/M2 | RESPIRATION RATE: 18 BRPM | HEART RATE: 71 BPM

## 2019-12-15 PROBLEM — I16.0 HYPERTENSIVE URGENCY: Status: RESOLVED | Noted: 2019-12-14 | Resolved: 2019-12-15

## 2019-12-15 LAB
ALBUMIN SERPL BCP-MCNC: 4 G/DL (ref 3.5–5.7)
ALP SERPL-CCNC: 36 U/L (ref 55–165)
ALT SERPL W P-5'-P-CCNC: 16 U/L (ref 7–52)
ANION GAP SERPL CALCULATED.3IONS-SCNC: 10 MMOL/L (ref 4–13)
AST SERPL W P-5'-P-CCNC: 25 U/L (ref 13–39)
BASOPHILS # BLD AUTO: 0.1 THOUSANDS/ΜL (ref 0–0.1)
BASOPHILS NFR BLD AUTO: 1 % (ref 0–2)
BILIRUB SERPL-MCNC: 0.8 MG/DL (ref 0.2–1)
BUN SERPL-MCNC: 10 MG/DL (ref 7–25)
CALCIUM SERPL-MCNC: 9.2 MG/DL (ref 8.6–10.5)
CHLORIDE SERPL-SCNC: 99 MMOL/L (ref 98–107)
CO2 SERPL-SCNC: 23 MMOL/L (ref 21–31)
CREAT SERPL-MCNC: 0.93 MG/DL (ref 0.7–1.3)
EOSINOPHIL # BLD AUTO: 0 THOUSAND/ΜL (ref 0–0.61)
EOSINOPHIL NFR BLD AUTO: 1 % (ref 0–5)
ERYTHROCYTE [DISTWIDTH] IN BLOOD BY AUTOMATED COUNT: 13.1 % (ref 11.5–14.5)
GFR SERPL CREATININE-BSD FRML MDRD: 76 ML/MIN/1.73SQ M
GLUCOSE SERPL-MCNC: 114 MG/DL (ref 65–99)
HCT VFR BLD AUTO: 37.1 % (ref 42–47)
HGB BLD-MCNC: 13.3 G/DL (ref 14–18)
LYMPHOCYTES # BLD AUTO: 0.7 THOUSANDS/ΜL (ref 0.6–4.47)
LYMPHOCYTES NFR BLD AUTO: 16 % (ref 21–51)
MAGNESIUM SERPL-MCNC: 2 MG/DL (ref 1.9–2.7)
MCH RBC QN AUTO: 32.8 PG (ref 26–34)
MCHC RBC AUTO-ENTMCNC: 35.9 G/DL (ref 31–37)
MCV RBC AUTO: 91 FL (ref 81–99)
MONOCYTES # BLD AUTO: 0.4 THOUSAND/ΜL (ref 0.17–1.22)
MONOCYTES NFR BLD AUTO: 10 % (ref 2–12)
NEUTROPHILS # BLD AUTO: 3.2 THOUSANDS/ΜL (ref 1.4–6.5)
NEUTS SEG NFR BLD AUTO: 72 % (ref 42–75)
PHOSPHATE SERPL-MCNC: 2.9 MG/DL (ref 3–5.5)
PLATELET # BLD AUTO: 215 THOUSANDS/UL (ref 149–390)
PMV BLD AUTO: 7.2 FL (ref 8.6–11.7)
POTASSIUM SERPL-SCNC: 3.7 MMOL/L (ref 3.5–5.5)
PROT SERPL-MCNC: 6.1 G/DL (ref 6.4–8.9)
RBC # BLD AUTO: 4.07 MILLION/UL (ref 4.3–5.9)
SODIUM SERPL-SCNC: 132 MMOL/L (ref 134–143)
WBC # BLD AUTO: 4.5 THOUSAND/UL (ref 4.8–10.8)

## 2019-12-15 PROCEDURE — 82088 ASSAY OF ALDOSTERONE: CPT | Performed by: NURSE PRACTITIONER

## 2019-12-15 PROCEDURE — 80053 COMPREHEN METABOLIC PANEL: CPT | Performed by: NURSE PRACTITIONER

## 2019-12-15 PROCEDURE — 84100 ASSAY OF PHOSPHORUS: CPT | Performed by: NURSE PRACTITIONER

## 2019-12-15 PROCEDURE — 83735 ASSAY OF MAGNESIUM: CPT | Performed by: NURSE PRACTITIONER

## 2019-12-15 PROCEDURE — 85025 COMPLETE CBC W/AUTO DIFF WBC: CPT | Performed by: NURSE PRACTITIONER

## 2019-12-15 PROCEDURE — 99217 PR OBSERVATION CARE DISCHARGE MANAGEMENT: CPT | Performed by: NURSE PRACTITIONER

## 2019-12-15 RX ORDER — LISINOPRIL 10 MG/1
10 TABLET ORAL DAILY
Qty: 30 TABLET | Refills: 0 | Status: SHIPPED | OUTPATIENT
Start: 2019-12-16 | End: 2020-02-13 | Stop reason: SDUPTHER

## 2019-12-15 RX ADMIN — ENOXAPARIN SODIUM 40 MG: 40 INJECTION SUBCUTANEOUS at 08:25

## 2019-12-15 RX ADMIN — LISINOPRIL 10 MG: 10 TABLET ORAL at 08:25

## 2019-12-15 NOTE — ASSESSMENT & PLAN NOTE
· Noted to have SBP as high as 200s in ED  · Patient was symptomatic   · Received labetalol IV in ED with improvement of SBP to 170s  · The patient's PCP is retiring and he is looking for a new PCP  Wife states that they are considering which PCP to go with     · He is denies any prior history of hypertension   · I recommended that he should establish care with a PCP and have hypertension workup done- including renal artery duplex as outpatient   · Low salt diet discussed   · BP has improved   · Will discharge home with lisinopril and recommend follow up with PCP

## 2019-12-15 NOTE — NURSING NOTE
High blood pressure, 189/81  Pt asymptomatic except for 'warm legs'  Cynthia CARDONA made aware, new order for hydralazine 5 mg iv, given per order  Recheck was 154/69  Currently blood pressure 150/70  Pt resting comfortably in bed, call bell within reach

## 2019-12-15 NOTE — UTILIZATION REVIEW
Initial Clinical Review    Admission: Date/Time/Statement:     ADMIT  OBS  On  12/14 @  1143  Orders Placed This Encounter   Procedures    Place in Observation (expected length of stay for this patient is less than two midnights)     Standing Status:   Standing     Number of Occurrences:   1     Order Specific Question:   Admitting Physician     Answer:   Abril Mi [[id number]]     Order Specific Question:   Level of Care     Answer:   Med Surg [[id number]]     ED Arrival Information     Expected Arrival Acuity Means of Arrival Escorted By Service Admission Type    12/14/2019 12/14/2019 09:08 Urgent Walk-In Spouse General Medicine Urgent    Arrival Complaint    fever        Chief Complaint   Patient presents with    Fever - 9 weeks to 74 years     on omnday had stuffy nose and sneezing with a fever, OTC cold medicine and was feeling better  Fever on and off all week relieved with aspiri  Woke up around 1 AM and felt like he was burning up     Weakness - Generalized     Assessment/Plan:   On 12/14: IV labetalol 10 mg given @  1013 and IV apresoline given @  2146   12/14  @  1317  Initial dose of lisinopril 10 mg (daily)  Given  12/15  @  0825  2nd dose of lisinopril  Given  12/15/19 0737  98 5 °F (36 9 °C)  70  18  190/80Abnormal      Pt wants to go home today 12/15          ED Triage Vitals   Temperature Pulse Respirations Blood Pressure SpO2   12/14/19 0917 12/14/19 0917 12/14/19 0917 12/14/19 0918 12/14/19 0917   (!) 97 °F (36 1 °C) 72 16 (!) 203/86 99 %      Temp Source Heart Rate Source Patient Position - Orthostatic VS BP Location FiO2 (%)   12/14/19 0917 12/14/19 0917 12/14/19 0918 12/14/19 0918 --   Temporal Monitor Sitting Left arm       Pain Score       12/14/19 0917       No Pain        Wt Readings from Last 1 Encounters:   12/14/19 94 9 kg (209 lb 3 5 oz)     Additional Vital Signs:    12/15/19 0737 98 5 °F (36 9 °C)  70  18  190/80Abnormal   97 %  None (Room air)  Sitting   12/15/19 0330  96 8 °F (36 °C)Abnormal   65  18  160/80  96 %           Pertinent Labs/Diagnostic Test Results:   Results from last 7 days   Lab Units 12/15/19  0515 12/14/19  0935   WBC Thousand/uL 4 50* 3 10*   HEMOGLOBIN g/dL 13 3* 14 1   HEMATOCRIT % 37 1* 39 1*   PLATELETS Thousands/uL 215 212   NEUTROS ABS Thousands/µL 3 20 2 30         Results from last 7 days   Lab Units 12/15/19  0515 12/14/19  0935   SODIUM mmol/L 132* 134   POTASSIUM mmol/L 3 7 3 9   CHLORIDE mmol/L 99 99   CO2 mmol/L 23 27   ANION GAP mmol/L 10 8   BUN mg/dL 10 12   CREATININE mg/dL 0 93 1 01   EGFR ml/min/1 73sq m 76 69   CALCIUM mg/dL 9 2 9 6   MAGNESIUM mg/dL 2 0 2 1   PHOSPHORUS mg/dL 2 9*  --      Results from last 7 days   Lab Units 12/15/19  0515 12/14/19  0935   AST U/L 25 31   ALT U/L 16 21   ALK PHOS U/L 36* 42*   TOTAL PROTEIN g/dL 6 1* 6 6   ALBUMIN g/dL 4 0 4 4   TOTAL BILIRUBIN mg/dL 0 80 0 70         Results from last 7 days   Lab Units 12/15/19  0515 12/14/19  0935   GLUCOSE RANDOM mg/dL 114* 115*         Results from last 7 days   Lab Units 12/14/19  0935   TROPONIN I ng/mL <0 03         Results from last 7 days   Lab Units 12/14/19  0935   PROTIME seconds 11 4   INR  0 98   PTT seconds 34     Results from last 7 days   Lab Units 12/14/19  0935   TSH 3RD GENERATON uIU/mL 2 560         Results from last 7 days   Lab Units 12/14/19  0935   LACTIC ACID mmol/L 1 2     Results from last 7 days   Lab Units 12/14/19  1142   CLARITY UA  Clear   COLOR UA  Yellow   SPEC GRAV UA  1 010   PH UA  6 5   GLUCOSE UA mg/dl Negative   KETONES UA mg/dl Negative   BLOOD UA  Negative   PROTEIN UA mg/dl Negative   NITRITE UA  Negative   BILIRUBIN UA  Negative   UROBILINOGEN UA E U /dl 0 2   LEUKOCYTES UA  Negative     Results from last 7 days   Lab Units 12/14/19  0943   INFLUENZA A PCR  None Detected   RSV PCR  None Detected         Results from last 7 days   Lab Units 12/14/19  0948 12/14/19  0934   BLOOD CULTURE  Received in Microbiology Lab  Culture in Progress  Received in Microbiology Lab  Culture in Progress  ED Treatment:   Medication Administration from 12/14/2019 0852 to 12/14/2019 1204       Date/Time Order Dose Route Action Action by Comments     12/14/2019 1013 Labetalol HCl (NORMODYNE) injection 10 mg 10 mg Intravenous Given Shakira Parra RN         Past Medical History:   Diagnosis Date    Cancer [facility])     prostate cancer     Hypertension        Admitting Diagnosis: Weakness [R53 1]  Fever [R50 9]  Hypertensive urgency [I16 0]  Age/Sex: 81 y o  male  Admission Orders:  Scheduled Medications:    Medications:  enoxaparin 40 mg Subcutaneous Daily   lisinopril 10 mg Oral Daily     Continuous IV Infusions:     PRN Meds:    acetaminophen 650 mg Oral Q6H PRN   hydrALAZINE 5 mg Intravenous Q6H PRN   ondansetron 4 mg Intravenous Q6H PRN     Network Utilization Review Department  [email or web address]  ATTENTION: Please call with any questions or concerns to [phone] and carefully listen to the prompts so that you are directed to the right person  All voicemails are confidential   Daniel Li all requests for admission clinical reviews, approved or denied determinations and any other requests to dedicated fax number below belonging to the campus where the patient is receiving treatment   List of dedicated fax numbers for the Facilities:  FACILITY NAME UR FAX NUMBER   ADMISSION DENIALS (Administrative/Medical Necessity) [phone]   [address]  (Maternity/NICU/Pediatrics) [phone]   Siri Dominguez [phone]   Jean Claude Sue [phone]   [facility] [address] [facility] [address] [place] [address] [address] [facility] [phone]   [address] [address] [phone]

## 2019-12-15 NOTE — DISCHARGE INSTRUCTIONS
Fever in Adults   WHAT YOU NEED TO KNOW:   A fever is an increase in your body temperature  Normal body temperature is 98 6°F (37°C)  Fever is generally defined as greater than 100 4°F (38°C)  Common causes include an infection, injury, or disease such as arthritis  DISCHARGE INSTRUCTIONS:   Return to the emergency department if:   · Your fever does not go away or gets worse even after treatment  · You have a stiff neck and a bad headache  · You are confused  You may not be able to think clearly or remember things like you normally do  · Your heart beats faster than usual even after treatment  · You have shortness of breath or chest pain when you breathe  · You urinate small amounts or not at all  · Your skin, lips, or nails turn blue  Contact your healthcare provider if:   · You have abdominal pain or you feel bloated  · You have nausea or are vomiting  · You have pain or burning when you urinate, or you have pain in your back  · You have questions or concerns about your condition or care  Medicines: You may need any of the following:  · NSAIDs , such as ibuprofen, help decrease swelling, pain, and fever  This medicine is available with or without a doctor's order  NSAIDs can cause stomach bleeding or kidney problems in certain people  If you take blood thinner medicine, always ask if NSAIDs are safe for you  Always read the medicine label and follow directions  Do not give these medicines to children under 10months of age without direction from your child's healthcare provider  · Acetaminophen  decreases pain and fever  It is available without a doctor's order  Ask how much to take and how often to take it  Follow directions  Read the labels of all other medicines you are using to see if they also contain acetaminophen, or ask your doctor or pharmacist  Acetaminophen can cause liver damage if not taken correctly   Do not use more than 4 grams (4,000 milligrams) total of acetaminophen in one day  · Antibiotics  may be given if you have an infection caused by bacteria  · Take your medicine as directed  Contact your healthcare provider if you think your medicine is not helping or if you have side effects  Tell him of her if you are allergic to any medicine  Keep a list of the medicines, vitamins, and herbs you take  Include the amounts, and when and why you take them  Bring the list or the pill bottles to follow-up visits  Carry your medicine list with you in case of an emergency  Follow up with your healthcare provider as directed:  Write down your questions so you remember to ask them during your visits  Self-care:   · Drink more liquids as directed  A fever makes you sweat  This can increase your risk for dehydration  Liquids can help prevent dehydration  ¨ Drink at least 6 to 8 eight-ounce cups of clear liquids each day  Drink water, juice, or broth  Do not drink sports drinks  They may contain caffeine  ¨ Ask your healthcare provider if you should drink an oral rehydration solution (ORS)  An ORS has the right amounts of water, salts, and sugar you need to replace body fluids  · Dress in lightweight clothes  Shivers may be a sign that your fever is rising  Do not put extra blankets or clothes on  This may cause your fever to rise even higher  Dress in light, comfortable clothing  Use a lightweight blanket or sheet when you sleep  Change your clothes, blanket, or sheets if they get wet  · Cool yourself safely  Take a bath in cool or lukewarm water  Use an ice pack wrapped in a small towel or wet a washcloth with cool water  Place the ice pack or wet washcloth on your forehead or the back of your neck  © 2017 [employer]  Information is for End User's use only and may not be sold, redistributed or otherwise used for commercial purposes   All illustrations and images included in CareNotes® are the copyrighted property of [employer]  or Lenny Amaya  The above information is an  only  It is not intended as medical advice for individual conditions or treatments  Talk to your doctor, nurse or pharmacist before following any medical regimen to see if it is safe and effective for you  Hypertension in the Older Adult   WHAT YOU NEED TO KNOW:   Hypertension is high blood pressure (BP)  Your BP is the force of your blood moving against the walls of your arteries  Normal BP is less than 120/80  Prehypertension is between 120/80 and 139/89  Hypertension is 140/90 or higher  Hypertension causes your heart to work much harder than normal  This can damage your heart  Your blood pressure will increase as you get older  However, hypertension is not a normal part of aging  You can control hypertension with a healthy lifestyle or medicines  A controlled blood pressure helps protect your organs, such as your heart, lungs, brain, and kidneys  DISCHARGE INSTRUCTIONS:   Call 911 or have someone else call for any of the following:   · You have discomfort in your chest that feels like squeezing, pressure, fullness, or pain  · You become confused or have difficulty speaking  · You suddenly feel lightheaded or have trouble breathing  · You have pain or discomfort in your back, neck, jaw, stomach, or arm  · You faint or lose consciousness  Seek care immediately if:   · You have a severe headache or vision loss  · You have weakness in an arm or leg  · You get dizzy and fall  Contact your healthcare provider if:   · You feel faint, dizzy, confused, or drowsy  · You have been taking your BP medicine and your BP is still higher than your healthcare provider says it should be  · Your blood pressure is lower than your healthcare provider said it should be  · You have questions or concerns about your condition or care  Medicines:   You may  need any of the following:  · Medicine  may be used to help lower your BP  You may need more than one type of medicine  · Diuretics  help decrease extra fluid that collects in your body  This will help lower your BP  You may urinate more often while you take this medicine  · Take your medicine as directed  Contact your healthcare provider if you think your medicine is not helping or if you have side effects  Tell him or her if you are allergic to any medicine  Keep a list of the medicines, vitamins, and herbs you take  Include the amounts, and when and why you take them  Bring the list or the pill bottles to follow-up visits  Carry your medicine list with you in case of an emergency  What you need to know about BP medicine:   · Take your medicine at the same time every day  · Do not stop taking your medicine if your BP is at the target goal  A BP at the target goal means that the medicine is working correctly  · Know the name and dose of your medicine  · Refill your medicine before you run out  · Blood pressure medicine can make you feel dizzy  Stand up slowly from a sitting or lying position  This will help prevent dizziness or your risk of falls  · Ask your healthcare provider if you should take your blood pressure medicine on the day of a surgery or procedure  Follow up with your healthcare provider as directed: You will need to return to have your BP checked  Write down your questions so you remember to ask them during your visits  Manage hypertension:   · Check your BP at home 2 times a day or as directed  Take your BP at the same times each day, such as morning and evening  Take 2 readings each time  Ask your healthcare provider for more directions  Keep a record of your BP readings and bring it to your follow-up visits  Ask your healthcare provider what your BP should be  · Limit sodium as directed  Too much sodium can affect your fluid balance and make it hard to control your BP   Check labels to find low-sodium or no-salt-added foods  Some low-sodium foods use potassium salts for flavor  Too much potassium can also cause health problems  Your healthcare provider will tell you how much sodium and potassium are safe for you to have in a day  He or she may recommend that you limit sodium to 2,300 mg a day  · Follow the meal plan recommended by your healthcare provider  A dietitian or your provider can give you more information on low-sodium plans or the DASH (Dietary Approaches to Stop Hypertension) eating plan  The DASH plan is low in sodium, unhealthy fats, and total fat  It is high in potassium, calcium, and fiber  · Exercise to maintain a healthy weight  Exercise will also help decrease your BP  Exercise for 30 minutes a day on most days of the week  Start with 10 minutes at a time  Examples of exercise include brisk walking or riding a stationary bike  Talk to your healthcare provider before you start an exercise plan  He or she can make sure the exercise plan is safe for you  · Decrease stress  This can help lower your BP  Learn ways to relax, such as deep breathing or listening to music  Get plenty of sleep every night  A lack of sleep can increase your stress level  · Limit or do not drink alcohol  Ask your healthcare provider if it is safe for you to drink alcohol  Also ask how much is safe for you to drink  · Do not smoke  Nicotine and other chemicals in cigarettes and cigars can increase your BP and also cause lung damage  Ask your healthcare provider for information if you currently smoke and need help to quit  E-cigarettes or smokeless tobacco still contain nicotine  Talk to your healthcare provider before you use these products  · Manage any other health conditions you have  Health conditions such as diabetes can increase your risk for hypertension  Follow your healthcare provider's instructions and take all your medicines as directed    © 2017 [employer] Information is for End User's use only and may not be sold, redistributed or otherwise used for commercial purposes  All illustrations and images included in CareNotes® are the copyrighted property of [employer]  or Lenny Amaya  The above information is an  only  It is not intended as medical advice for individual conditions or treatments  Talk to your doctor, nurse or pharmacist before following any medical regimen to see if it is safe and effective for you  Lisinopril (By mouth)   Lisinopril (lye-SIN-oh-pril)  Treats high blood pressure and heart failure  Also given to reduce the risk of death after a heart attack  This medicine is an ACE inhibitor  Brand Name(s): Prinivil, Qbrelis, Zestril   There may be other brand names for this medicine  When This Medicine Should Not Be Used: This medicine is not right for everyone  Do not use it if you had an allergic reaction to lisinopril or another ACE inhibitor, or if you are pregnant  How to Use This Medicine:   Liquid, Tablet  · Take your medicine as directed  Your dose may need to be changed several times to find what works best for you  · Oral liquid: Measure the oral liquid medicine with a marked measuring spoon, oral syringe, or medicine cup  · Missed dose: Take a dose as soon as you remember  If it is almost time for your next dose, wait until then and take a regular dose  Do not take extra medicine to make up for a missed dose  · Store the medicine in a closed container at room temperature, away from heat, moisture, and direct light  Drugs and Foods to Avoid:   Ask your doctor or pharmacist before using any other medicine, including over-the-counter medicines, vitamins, and herbal products  · Do not use this medicine together with aliskiren if you have diabetes  · Some foods and medicines may affect how lisinopril works   Tell your doctor if you are using any of the following:   ¨ Aliskiren, everolimus, lithium, sirolimus, temsirolimus  ¨ Another blood pressure medicine, including an angiotensin receptor blocker (ARB)  ¨ Diuretic (water pill, including amiloride, spironolactone, triamterene)  ¨ Insulin or diabetes medicine  ¨ NSAID pain or arthritis medicine (including aspirin, celecoxib, diclofenac, ibuprofen, naproxen)  · Ask your doctor before you use any medicine, supplement, or salt substitute that contains potassium  Warnings While Using This Medicine:   · It is not safe to take this medicine during pregnancy  It could harm an unborn baby  Tell your doctor right away if you become pregnant  · Tell your doctor if you are breastfeeding, or if you have kidney disease, liver disease, diabetes, or heart or blood vessel disease  · This medicine may cause the following problems:  ¨ Angioedema (severe swelling)  ¨ Kidney problems  ¨ Serious liver problems  · This medicine could lower your blood pressure too much, especially when you first use it or if you are dehydrated  Stand or sit up slowly if you feel lightheaded or dizzy  · Do not stop using this medicine without asking your doctor, even if you feel well  This medicine will not cure your high blood pressure, but it will help keep it in a normal range  You may have to take blood pressure medicine for the rest of your life  · Tell any doctor or dentist who treats you that you are using this medicine  · Your doctor will do lab tests at regular visits to check on the effects of this medicine  Keep all appointments  · Keep all medicine out of the reach of children  Never share your medicine with anyone    Possible Side Effects While Using This Medicine:   Call your doctor right away if you notice any of these side effects:  · Allergic reaction: Itching or hives, swelling in your face or hands, swelling or tingling in your mouth or throat, chest tightness, trouble breathing  · Blistering, peeling, or red skin rash  · Change in how much or how often you urinate  · Confusion, weakness, uneven heartbeat, trouble breathing, numbness or tingling in your hands, feet, or lips  · Dark urine or pale stools, nausea, vomiting, loss of appetite, stomach pain, yellow skin or eyes  · Fever, chills, sore throat, body aches  · Lightheadedness, dizziness, fainting  · Severe stomach pain (with or without nausea or vomiting)  If you notice these less serious side effects, talk with your doctor:   · Dry cough  If you notice other side effects that you think are caused by this medicine, tell your doctor  Call your doctor for medical advice about side effects  You may report side effects to FDA at 9-358-FDA-5732  © 2017 [employer] Information is for End User's use only and may not be sold, redistributed or otherwise used for commercial purposes  The above information is an  only  It is not intended as medical advice for individual conditions or treatments  Talk to your doctor, nurse or pharmacist before following any medical regimen to see if it is safe and effective for you

## 2019-12-15 NOTE — DISCHARGE INSTR - AVS FIRST PAGE
· Patient will need a work-up for hypertension   · We were able to obtain renal arterial duplex during the hospital stay as it was over the weekend  Aldosterone level is currently pending  Would also recommend ECHO if none was done prior  · He was found to have SBP 200s in ED  Lisinopril started  Thank you

## 2019-12-15 NOTE — NURSING NOTE
sarwat olivarez made aware of pt want to be d/c'd this today, made aware of am bp, am meds given, will cont to monitor

## 2019-12-15 NOTE — PLAN OF CARE
Problem: Potential for Falls  Goal: Patient will remain free of falls  Description  INTERVENTIONS:  - Assess patient frequently for physical needs  -  Identify cognitive and physical deficits and behaviors that affect risk of falls    -  [place] fall precautions as indicated by assessment   - Educate patient/family on patient safety including physical limitations  - Instruct patient to call for assistance with activity based on assessment  - Modify environment to reduce risk of injury  - Consider OT/PT consult to assist with strengthening/mobility  12/15/2019 1049 by Nicki Perez RN  Outcome: Adequate for Discharge  12/15/2019 0852 by Nicki Perez RN  Outcome: Progressing     Problem: RESPIRATORY - ADULT  Goal: Achieves optimal ventilation and oxygenation  Description  INTERVENTIONS:  - Assess for changes in respiratory status  - Assess for changes in mentation and behavior  - Position to facilitate oxygenation and minimize respiratory effort  - Oxygen administered by appropriate delivery if ordered  - Initiate smoking cessation education as indicated  - Encourage broncho-pulmonary hygiene including cough, deep breathe, Incentive Spirometry  - Assess the need for suctioning and aspirate as needed  - Assess and instruct to report SOB or any respiratory difficulty  - Respiratory Therapy support as indicated  12/15/2019 1049 by Nicki Perez RN  Outcome: Adequate for Discharge  12/15/2019 0852 by Nicki Perez RN  Outcome: Progressing     Problem: PAIN - ADULT  Goal: Verbalizes/displays adequate comfort level or baseline comfort level  Description  Interventions:  - Encourage patient to monitor pain and request assistance  - Assess pain using appropriate pain scale  - Administer analgesics based on type and severity of pain and evaluate response  - Implement non-pharmacological measures as appropriate and evaluate response  - Consider cultural and social influences on pain and pain management  - Notify physician/advanced practitioner if interventions unsuccessful or patient reports new pain  12/15/2019 1049 by Radha Talbot RN  Outcome: Adequate for Discharge  12/15/2019 0222 by Radha Talbot RN  Outcome: Progressing     Problem: INFECTION - ADULT  Goal: Absence or prevention of progression during hospitalization  Description  INTERVENTIONS:  - Assess and monitor for signs and symptoms of infection  - Monitor lab/diagnostic results  - Monitor all insertion sites, i e  indwelling lines, tubes, and drains  - Monitor endotracheal if appropriate and nasal secretions for changes in amount and color  - [place] appropriate cooling/warming therapies per order  - Administer medications as ordered  - Instruct and encourage patient and family to use good hand hygiene technique  - Identify and instruct in appropriate isolation precautions for identified infection/condition  12/15/2019 1049 by Radha Talbot RN  Outcome: Adequate for Discharge  12/15/2019 0852 by Radha Talbot RN  Outcome: Progressing  Goal: Absence of fever/infection during neutropenic period  Description  INTERVENTIONS:  - Monitor WBC    12/15/2019 1049 by Radha Talbot RN  Outcome: Adequate for Discharge  12/15/2019 0852 by Radha Talbot RN  Outcome: Progressing     Problem: SAFETY ADULT  Goal: Maintain or return to baseline ADL function  Description  INTERVENTIONS:  -  Assess patient's ability to carry out ADLs; assess patient's baseline for ADL function and identify physical deficits which impact ability to perform ADLs (bathing, care of mouth/teeth, toileting, grooming, dressing, etc )  - Assess/evaluate cause of self-care deficits   - Assess range of motion  - Assess patient's mobility; develop plan if impaired  - Assess patient's need for assistive devices and provide as appropriate  - Encourage maximum independence but intervene and supervise when necessary  - Involve family in performance of ADLs  - Assess for home care needs following discharge   - Consider OT consult to assist with ADL evaluation and planning for discharge  - Provide patient education as appropriate  12/15/2019 1049 by Rand Begum RN  Outcome: Adequate for Discharge  12/15/2019 0852 by Rand Begum RN  Outcome: Progressing  Goal: Maintain or return mobility status to optimal level  Description  INTERVENTIONS:  - Assess patient's baseline mobility status (ambulation, transfers, stairs, etc )    - Identify cognitive and physical deficits and behaviors that affect mobility  - Identify mobility aids required to assist with transfers and/or ambulation (gait belt, sit-to-stand, lift, walker, cane, etc )  - [place] fall precautions as indicated by assessment  - Record patient progress and toleration of activity level on Mobility SBAR; progress patient to next Phase/Stage  - Instruct patient to call for assistance with activity based on assessment  - Consider rehabilitation consult to assist with strengthening/weightbearing, etc   12/15/2019 1049 by Rand Begum RN  Outcome: Adequate for Discharge  12/15/2019 0852 by Rand Begum RN  Outcome: Progressing     Problem: DISCHARGE PLANNING  Goal: Discharge to home or other facility with appropriate resources  Description  INTERVENTIONS:  - Identify barriers to discharge w/patient and caregiver  - Arrange for needed discharge resources and transportation as appropriate  - Identify discharge learning needs (meds, wound care, etc )  - Arrange for interpretive services to assist at discharge as needed  - Refer to Case Management Department for coordinating discharge planning if the patient needs post-hospital services based on physician/advanced practitioner order or complex needs related to functional status, cognitive ability, or social support system  12/15/2019 1049 by Rand Begum RN  Outcome: Adequate for Discharge  12/15/2019 0852 by Rand Begum RN  Outcome: Progressing     Problem: Knowledge Deficit  Goal: Patient/family/caregiver demonstrates understanding of disease process, treatment plan, medications, and discharge instructions  Description  Complete learning assessment and assess knowledge base  Interventions:  - Provide teaching at level of understanding  - Provide teaching via preferred learning methods  12/15/2019 1049 by Hallie Cho RN  Outcome: Adequate for Discharge  12/15/2019 2760 by Hallie Cho RN  Outcome: Progressing     Problem: DISCHARGE PLANNING - CARE MANAGEMENT  Goal: Discharge to post-acute care or home with appropriate resources  Description  INTERVENTIONS:  - Conduct assessment to determine patient/family and health care team treatment goals, and need for post-acute services based on payer coverage, community resources, and patient preferences, and barriers to discharge  - Address psychosocial, clinical, and financial barriers to discharge as identified in assessment in conjunction with the patient/family and health care team  - Arrange appropriate level of post-acute services according to patient's   needs and preference and payer coverage in collaboration with the physician and health care team  - Communicate with and update the patient/family, physician, and health care team regarding progress on the discharge plan  - Arrange appropriate transportation to post-acute venues  Discharge home with spouse  Daughter will transport  Pt has no care needs     12/15/2019 1049 by Hallie Cho RN  Outcome: Adequate for Discharge  12/15/2019 9526 by Hallie Cho RN  Outcome: Progressing

## 2019-12-15 NOTE — PLAN OF CARE
Problem: Potential for Falls  Goal: Patient will remain free of falls  Description  INTERVENTIONS:  - Assess patient frequently for physical needs  -  Identify cognitive and physical deficits and behaviors that affect risk of falls    -  Echo fall precautions as indicated by assessment   - Educate patient/family on patient safety including physical limitations  - Instruct patient to call for assistance with activity based on assessment  - Modify environment to reduce risk of injury  - Consider OT/PT consult to assist with strengthening/mobility  Outcome: Progressing     Problem: RESPIRATORY - ADULT  Goal: Achieves optimal ventilation and oxygenation  Description  INTERVENTIONS:  - Assess for changes in respiratory status  - Assess for changes in mentation and behavior  - Position to facilitate oxygenation and minimize respiratory effort  - Oxygen administered by appropriate delivery if ordered  - Initiate smoking cessation education as indicated  - Encourage broncho-pulmonary hygiene including cough, deep breathe, Incentive Spirometry  - Assess the need for suctioning and aspirate as needed  - Assess and instruct to report SOB or any respiratory difficulty  - Respiratory Therapy support as indicated  Outcome: Progressing     Problem: PAIN - ADULT  Goal: Verbalizes/displays adequate comfort level or baseline comfort level  Description  Interventions:  - Encourage patient to monitor pain and request assistance  - Assess pain using appropriate pain scale  - Administer analgesics based on type and severity of pain and evaluate response  - Implement non-pharmacological measures as appropriate and evaluate response  - Consider cultural and social influences on pain and pain management  - Notify physician/advanced practitioner if interventions unsuccessful or patient reports new pain  Outcome: Progressing     Problem: INFECTION - ADULT  Goal: Absence or prevention of progression during hospitalization  Description  INTERVENTIONS:  - Assess and monitor for signs and symptoms of infection  - Monitor lab/diagnostic results  - Monitor all insertion sites, i e  indwelling lines, tubes, and drains  - Monitor endotracheal if appropriate and nasal secretions for changes in amount and color  - [place] appropriate cooling/warming therapies per order  - Administer medications as ordered  - Instruct and encourage patient and family to use good hand hygiene technique  - Identify and instruct in appropriate isolation precautions for identified infection/condition  Outcome: Progressing  Goal: Absence of fever/infection during neutropenic period  Description  INTERVENTIONS:  - Monitor WBC    Outcome: Progressing     Problem: SAFETY ADULT  Goal: Maintain or return to baseline ADL function  Description  INTERVENTIONS:  -  Assess patient's ability to carry out ADLs; assess patient's baseline for ADL function and identify physical deficits which impact ability to perform ADLs (bathing, care of mouth/teeth, toileting, grooming, dressing, etc )  - Assess/evaluate cause of self-care deficits   - Assess range of motion  - Assess patient's mobility; develop plan if impaired  - Assess patient's need for assistive devices and provide as appropriate  - Encourage maximum independence but intervene and supervise when necessary  - Involve family in performance of ADLs  - Assess for home care needs following discharge   - Consider OT consult to assist with ADL evaluation and planning for discharge  - Provide patient education as appropriate  Outcome: Progressing  Goal: Maintain or return mobility status to optimal level  Description  INTERVENTIONS:  - Assess patient's baseline mobility status (ambulation, transfers, stairs, etc )    - Identify cognitive and physical deficits and behaviors that affect mobility  - Identify mobility aids required to assist with transfers and/or ambulation (gait belt, sit-to-stand, lift, walker, cane, etc )  - [place] fall precautions as indicated by assessment  - Record patient progress and toleration of activity level on Mobility SBAR; progress patient to next Phase/Stage  - Instruct patient to call for assistance with activity based on assessment  - Consider rehabilitation consult to assist with strengthening/weightbearing, etc   Outcome: Progressing     Problem: DISCHARGE PLANNING  Goal: Discharge to home or other facility with appropriate resources  Description  INTERVENTIONS:  - Identify barriers to discharge w/patient and caregiver  - Arrange for needed discharge resources and transportation as appropriate  - Identify discharge learning needs (meds, wound care, etc )  - Arrange for interpretive services to assist at discharge as needed  - Refer to Case Management Department for coordinating discharge planning if the patient needs post-hospital services based on physician/advanced practitioner order or complex needs related to functional status, cognitive ability, or social support system  Outcome: Progressing     Problem: Knowledge Deficit  Goal: Patient/family/caregiver demonstrates understanding of disease process, treatment plan, medications, and discharge instructions  Description  Complete learning assessment and assess knowledge base    Interventions:  - Provide teaching at level of understanding  - Provide teaching via preferred learning methods  Outcome: Progressing     Problem: DISCHARGE PLANNING - CARE MANAGEMENT  Goal: Discharge to post-acute care or home with appropriate resources  Description  INTERVENTIONS:  - Conduct assessment to determine patient/family and health care team treatment goals, and need for post-acute services based on payer coverage, community resources, and patient preferences, and barriers to discharge  - Address psychosocial, clinical, and financial barriers to discharge as identified in assessment in conjunction with the patient/family and health care team  - Arrange appropriate level of post-acute services according to patient's   needs and preference and payer coverage in collaboration with the physician and health care team  - Communicate with and update the patient/family, physician, and health care team regarding progress on the discharge plan  - Arrange appropriate transportation to post-acute venues  Discharge home with spouse  Daughter will transport  Pt has no care needs     Outcome: Progressing

## 2019-12-15 NOTE — NURSING NOTE
hospitalist was in to see and assess the pt, aware of am vitals as well as the repeat after am med given, pt is ok for d/c to home, iv site removed with the tip intact and the tele monitor was removed, nsr noted prior, pt taken to family car via w/c by the pca and the pt was d/c'd from the hospital

## 2019-12-15 NOTE — DISCHARGE SUMMARY
Discharge- Ariel Loser 1935, 81 y o  male MRN: [id number]    Unit/Bed#: -01 Encounter: [id number]    Primary Care Provider: Olga Lidia Crystal DO   Date and time admitted to hospital: 12/14/2019  9:14 AM        * Hypertensive urgency  Assessment & Plan  · Noted to have SBP as high as 200s in ED  · Patient was symptomatic   · Received labetalol IV in ED with improvement of SBP to 170s  · The patient's PCP is retiring and he is looking for a new PCP  Wife states that they are considering which PCP to go with  · He is denies any prior history of hypertension   · I recommended that he should establish care with a PCP and have hypertension workup done- including renal artery duplex as outpatient   · Low salt diet discussed   · BP has improved   · Will discharge home with lisinopril and recommend follow up with PCP     Generalized weakness  Assessment & Plan  · Likely due to recent viral infection   · Continue supportive care   · This is much improved           Discharging Physician / Practitioner: Abundio Salgado, [address] [place] [address]  PCP: Olga Lidia Crystal DO  Admission Date:   Admission Orders (From admission, onward)     Ordered        12/14/19 1143  Place in Observation (expected length of stay for this patient is less than two midnights)  Once                   Discharge Date: 12/15/19    Resolved Problems  Date Reviewed: 12/15/2019    None          Consultations During Hospital Stay:  · None     Procedures Performed:   · None     Significant Findings / Test Results:   · CT chest- Mild subsegmental atelectasis in the left upper and lower lobes  No evidence of pneumonia or other acute abnormality in the chest   · CXR- Mild infiltrative changes right lung apex and left lung base  No lobar consolidation or large effusion  Slight cardiomegaly without pulmonary edema  Please see subsequent CT report performed approximately 2 hours later      Incidental Findings:   · None      Test Results Pending at Discharge (will require follow up): · None      Outpatient Tests Requested:  · Follow up with PCP     Complications:     None     Reason for Admission: hypertensive urgency     Hospital Course:     Leighann Sharma  is a 81 y o  male patient who originally presented to the hospital on 12/14/2019 due to generalized weakness and hypertension  Please refer to H&P for initial presenting complaint  The patient presented with worsening generalized weakness, fever, and elevated blood pressure  He was found to have systolic BP of over 543 in the ED and subsequently was admitted for hypertensive urgency  The patient was treated with labetalol in the ED and subsequently started on lisinopril on the floor  The patient is asymptomatic  He will be discharged with lisinopril and amend follow-up with PCP for further hypertension management  Blood pressure is much improved with medications  Patient will need arterial renal duplex but he refesr to have this done as an outpatient  Discussed in detail with the patient and his wife regarding to starting blood pressure diary, low-salt diet, and follow up closely with PCP regarding his blood pressure  Additionally the patient was having increasing generalized weakness with chills  All the workup currently does not appears to be related to bacterial in etiology  Please refer to x-ray and CT scan of the chest noted above  It is likely that the patient had viral infection that is improving  Currently blood cultures are pending  The patient does not wait to get the results of the blood cultures and prefer to have us call him if the result is positive  He is worried that if he does not leave the hospital today he would get stuck as the weather might be bad tomorrow  Please see above list of diagnoses and related plan for additional information  Condition at Discharge: good     Discharge Day Visit / Exam:     Subjective:  Feeling better  Would like to go home  Vitals: Blood Pressure: (!) 190/80 (12/15/19 0737)  Pulse: 70 (12/15/19 0737)  Temperature: 98 5 °F (36 9 °C) (12/15/19 0737)  Temp Source: Temporal (12/15/19 0737)  Respirations: 18 (12/15/19 0737)  Height: 6' 3" (190 5 cm) (12/14/19 1241)  Weight - Scale: 94 9 kg (209 lb 3 5 oz) (12/14/19 1241)  SpO2: 97 % (12/15/19 0737)  Exam:   Physical Exam   Constitutional: He is oriented to person, place, and time  He appears well-developed and well-nourished  No distress  HENT:   Head: Normocephalic and atraumatic  Mouth/Throat: Oropharynx is clear and moist    Eyes: Pupils are equal, round, and reactive to light  Conjunctivae and EOM are normal    Neck: Normal range of motion  Neck supple  No thyromegaly present  Cardiovascular: Normal rate, regular rhythm, normal heart sounds and intact distal pulses  Pulmonary/Chest: Effort normal and breath sounds normal  No respiratory distress  He has no wheezes  Abdominal: Soft  Bowel sounds are normal  He exhibits no distension  There is no tenderness  Musculoskeletal: Normal range of motion  He exhibits no edema or deformity  Neurological: He is alert and oriented to person, place, and time  He has normal reflexes  No cranial nerve deficit  Skin: Skin is warm and dry  No erythema  Psychiatric: He has a normal mood and affect  His behavior is normal  Thought content normal    Vitals reviewed  Discussion with Family: wife     Discharge instructions/Information to patient and family:   See after visit summary for information provided to patient and family  Provisions for Follow-Up Care:  See after visit summary for information related to follow-up care and any pertinent home health orders  Disposition:     Home    For Discharges to [facility] SNF:   · Not Applicable to this Patient - Not Applicable to this Patient    Planned Readmission:    No      Discharge Statement:  I spent 37 minutes discharging the patient   This time was spent on the day of discharge  I had direct contact with the patient on the day of discharge  Greater than 50% of the total time was spent examining patient, answering all patient questions, arranging and discussing plan of care with patient as well as directly providing post-discharge instructions  Additional time then spent on discharge activities  Discharge Medications:  See after visit summary for reconciled discharge medications provided to patient and family        ** Please Note: This note has been constructed using a voice recognition system **

## 2019-12-15 NOTE — PLAN OF CARE
Problem: Potential for Falls  Goal: Patient will remain free of falls  Description  INTERVENTIONS:  - Assess patient frequently for physical needs  -  Identify cognitive and physical deficits and behaviors that affect risk of falls    -  [place] fall precautions as indicated by assessment   - Educate patient/family on patient safety including physical limitations  - Instruct patient to call for assistance with activity based on assessment  - Modify environment to reduce risk of injury  - Consider OT/PT consult to assist with strengthening/mobility  Outcome: Progressing     Problem: RESPIRATORY - ADULT  Goal: Achieves optimal ventilation and oxygenation  Description  INTERVENTIONS:  - Assess for changes in respiratory status  - Assess for changes in mentation and behavior  - Position to facilitate oxygenation and minimize respiratory effort  - Oxygen administered by appropriate delivery if ordered  - Initiate smoking cessation education as indicated  - Encourage broncho-pulmonary hygiene including cough, deep breathe, Incentive Spirometry  - Assess the need for suctioning and aspirate as needed  - Assess and instruct to report SOB or any respiratory difficulty  - Respiratory Therapy support as indicated  Outcome: Progressing     Problem: PAIN - ADULT  Goal: Verbalizes/displays adequate comfort level or baseline comfort level  Description  Interventions:  - Encourage patient to monitor pain and request assistance  - Assess pain using appropriate pain scale  - Administer analgesics based on type and severity of pain and evaluate response  - Implement non-pharmacological measures as appropriate and evaluate response  - Consider cultural and social influences on pain and pain management  - Notify physician/advanced practitioner if interventions unsuccessful or patient reports new pain  Outcome: Progressing     Problem: INFECTION - ADULT  Goal: Absence or prevention of progression during hospitalization  Description  INTERVENTIONS:  - Assess and monitor for signs and symptoms of infection  - Monitor lab/diagnostic results  - Monitor all insertion sites, i e  indwelling lines, tubes, and drains  - Monitor endotracheal if appropriate and nasal secretions for changes in amount and color  - [place] appropriate cooling/warming therapies per order  - Administer medications as ordered  - Instruct and encourage patient and family to use good hand hygiene technique  - Identify and instruct in appropriate isolation precautions for identified infection/condition  Outcome: Progressing  Goal: Absence of fever/infection during neutropenic period  Description  INTERVENTIONS:  - Monitor WBC    Outcome: Progressing     Problem: SAFETY ADULT  Goal: Maintain or return to baseline ADL function  Description  INTERVENTIONS:  -  Assess patient's ability to carry out ADLs; assess patient's baseline for ADL function and identify physical deficits which impact ability to perform ADLs (bathing, care of mouth/teeth, toileting, grooming, dressing, etc )  - Assess/evaluate cause of self-care deficits   - Assess range of motion  - Assess patient's mobility; develop plan if impaired  - Assess patient's need for assistive devices and provide as appropriate  - Encourage maximum independence but intervene and supervise when necessary  - Involve family in performance of ADLs  - Assess for home care needs following discharge   - Consider OT consult to assist with ADL evaluation and planning for discharge  - Provide patient education as appropriate  Outcome: Progressing  Goal: Maintain or return mobility status to optimal level  Description  INTERVENTIONS:  - Assess patient's baseline mobility status (ambulation, transfers, stairs, etc )    - Identify cognitive and physical deficits and behaviors that affect mobility  - Identify mobility aids required to assist with transfers and/or ambulation (gait belt, sit-to-stand, lift, walker, cane, etc )  - [place] fall precautions as indicated by assessment  - Record patient progress and toleration of activity level on Mobility SBAR; progress patient to next Phase/Stage  - Instruct patient to call for assistance with activity based on assessment  - Consider rehabilitation consult to assist with strengthening/weightbearing, etc   Outcome: Progressing     Problem: DISCHARGE PLANNING  Goal: Discharge to home or other facility with appropriate resources  Description  INTERVENTIONS:  - Identify barriers to discharge w/patient and caregiver  - Arrange for needed discharge resources and transportation as appropriate  - Identify discharge learning needs (meds, wound care, etc )  - Arrange for interpretive services to assist at discharge as needed  - Refer to Case Management Department for coordinating discharge planning if the patient needs post-hospital services based on physician/advanced practitioner order or complex needs related to functional status, cognitive ability, or social support system  Outcome: Progressing     Problem: Knowledge Deficit  Goal: Patient/family/caregiver demonstrates understanding of disease process, treatment plan, medications, and discharge instructions  Description  Complete learning assessment and assess knowledge base    Interventions:  - Provide teaching at level of understanding  - Provide teaching via preferred learning methods  Outcome: Progressing     Problem: DISCHARGE PLANNING - CARE MANAGEMENT  Goal: Discharge to post-acute care or home with appropriate resources  Description  INTERVENTIONS:  - Conduct assessment to determine patient/family and health care team treatment goals, and need for post-acute services based on payer coverage, community resources, and patient preferences, and barriers to discharge  - Address psychosocial, clinical, and financial barriers to discharge as identified in assessment in conjunction with the patient/family and health care team  - Arrange appropriate level of post-acute services according to patient's   needs and preference and payer coverage in collaboration with the physician and health care team  - Communicate with and update the patient/family, physician, and health care team regarding progress on the discharge plan  - Arrange appropriate transportation to post-acute venues  Discharge home with spouse  Daughter will transport  Pt has no care needs     Outcome: Progressing

## 2019-12-16 LAB
ATRIAL RATE: 65 BPM
P AXIS: 71 DEGREES
PR INTERVAL: 172 MS
QRS AXIS: 11 DEGREES
QRSD INTERVAL: 100 MS
QT INTERVAL: 404 MS
QTC INTERVAL: 420 MS
T WAVE AXIS: 71 DEGREES
VENTRICULAR RATE: 65 BPM

## 2019-12-16 PROCEDURE — 93010 ELECTROCARDIOGRAM REPORT: CPT | Performed by: INTERNAL MEDICINE

## 2019-12-19 LAB
ALDOST SERPL-MCNC: <1 NG/DL (ref 0–30)
BACTERIA BLD CULT: NORMAL
BACTERIA BLD CULT: NORMAL

## 2020-01-15 ENCOUNTER — OFFICE VISIT (OUTPATIENT)
Dept: INTERNAL MEDICINE CLINIC | Facility: CLINIC | Age: 85
End: 2020-01-15
Payer: MEDICARE

## 2020-01-15 VITALS
DIASTOLIC BLOOD PRESSURE: 74 MMHG | HEART RATE: 80 BPM | OXYGEN SATURATION: 98 % | SYSTOLIC BLOOD PRESSURE: 132 MMHG | HEIGHT: 75 IN | TEMPERATURE: 97.2 F | BODY MASS INDEX: 25.64 KG/M2 | WEIGHT: 206.2 LBS | RESPIRATION RATE: 16 BRPM

## 2020-01-15 DIAGNOSIS — I10 ESSENTIAL HYPERTENSION: ICD-10-CM

## 2020-01-15 DIAGNOSIS — C61 PROSTATE CA (HCC): ICD-10-CM

## 2020-01-15 DIAGNOSIS — Z00.00 MEDICARE ANNUAL WELLNESS VISIT, SUBSEQUENT: Primary | ICD-10-CM

## 2020-01-15 PROBLEM — R55 SYNCOPE: Status: ACTIVE | Noted: 2019-01-12

## 2020-01-15 PROBLEM — N28.9 RENAL INSUFFICIENCY: Status: ACTIVE | Noted: 2019-01-12

## 2020-01-15 PROCEDURE — 99213 OFFICE O/P EST LOW 20 MIN: CPT | Performed by: INTERNAL MEDICINE

## 2020-01-15 PROCEDURE — G0439 PPPS, SUBSEQ VISIT: HCPCS | Performed by: INTERNAL MEDICINE

## 2020-01-15 PROCEDURE — 1123F ACP DISCUSS/DSCN MKR DOCD: CPT | Performed by: INTERNAL MEDICINE

## 2020-01-15 NOTE — PROGRESS NOTES
Assessment/Plan:    Problem List Items Addressed This Visit        Cardiovascular and Mediastinum    Essential hypertension       Genitourinary    Prostate [facility]) - Primary           Diagnoses and all orders for this visit:    Prostate [facility])    Essential hypertension        No problem-specific Assessment & Plan notes found for this encounter  The patient will follow up in 6 months regarding his BP and see how things go  Subjective:      Patient ID: Leighann Sharma  is a 81 y o  male  The patient was admitted for a hypertensive urgency in December  The patient is on Lisinopril and he has been checking his BP 4 times a day  The patient notes that his BP has been good and we will continue to follow this  He has a history of prostate CA and is being followed by Dr My Swan for this and is noted to be clear of CA for 6 years  The patient notes some issues with either BBPV or orstostatic symptoms  We discussed this and the patient noted that this is mild and he is not interested in any further evaluation  The following portions of the patient's history were reviewed and updated as appropriate:   He has a past medical history of Cancer ([facility] [address] )  ,  does not have any pertinent problems on file  ,   has a past surgical history that includes Appendectomy  ,  family history includes Hypertension in his mother  ,   reports that he has never smoked  He has never used smokeless tobacco  He reports that he drank alcohol  He reports that he has current or past drug history  ,  has No Known Allergies     Current Outpatient Medications   Medication Sig Dispense Refill    lisinopril (ZESTRIL) 10 mg tablet Take 1 tablet (10 mg total) by mouth daily 30 tablet 0     No current facility-administered medications for this visit  Review of Systems   Constitutional: Negative for chills, fatigue and fever  HENT: Negative  Respiratory: Negative for cough, chest tightness and shortness of breath      Cardiovascular: Negative for chest pain and palpitations  Gastrointestinal: Negative for abdominal pain, constipation, diarrhea, nausea and vomiting  Genitourinary: Negative  Musculoskeletal: Negative for back pain and myalgias  Skin: Negative  Neurological: Negative  Psychiatric/Behavioral: Negative for dysphoric mood  The patient is not nervous/anxious  Objective:  Vitals:    01/15/20 0701 01/15/20 0840   BP: (!) 174/78 132/74   BP Location: Right arm    Patient Position: Sitting    Cuff Size: Large    Pulse: 80    Resp: 16    Temp: (!) 97 2 °F (36 2 °C)    SpO2: 98%    Weight: 93 5 kg (206 lb 3 2 oz)    Height: 6' 3" (1 905 m)      Body mass index is 25 77 kg/m²  Physical Exam   Constitutional: He is oriented to person, place, and time  He appears well-developed and well-nourished  HENT:   Head: Normocephalic and atraumatic  Eyes: Pupils are equal, round, and reactive to light  EOM are normal    Neck: Normal range of motion  Neck supple  Cardiovascular: Normal rate, regular rhythm, normal heart sounds and intact distal pulses  No murmur heard  Pulmonary/Chest: Effort normal and breath sounds normal  No stridor  He has no rales  Abdominal: Soft  Bowel sounds are normal  He exhibits no distension  There is no tenderness  Musculoskeletal: Normal range of motion  He exhibits no edema or deformity  Neurological: He is alert and oriented to person, place, and time  Skin: Skin is warm and dry  Psychiatric: He has a normal mood and affect  Nursing note and vitals reviewed         PHQ-9 Depression Screening    PHQ-9:    Frequency of the following problems over the past two weeks:       Little interest or pleasure in doing things:  0 - not at all  Feeling down, depressed, or hopeless:  0 - not at all  PHQ-2 Score:  0

## 2020-01-15 NOTE — PATIENT INSTRUCTIONS

## 2020-01-15 NOTE — PROGRESS NOTES
Assessment and Plan:     Problem List Items Addressed This Visit     None           Preventive health issues were discussed with patient, and age appropriate screening tests were ordered as noted in patient's After Visit Summary  Personalized health advice and appropriate referrals for health education or preventive services given if needed, as noted in patient's After Visit Summary       History of Present Illness:     Patient presents for Medicare Annual Wellness visit    Patient Care Team:  Farzana Brown DO as PCP - General (Internal Medicine)     Problem List:     Patient Active Problem List   Diagnosis    Generalized weakness      Past Medical and Surgical History:     Past Medical History:   Diagnosis Date    Cancer [facility])     prostate cancer     Hypertension      Past Surgical History:   Procedure Laterality Date    APPENDECTOMY        Family History:     Family History   Problem Relation Age of Onset    Hypertension Mother       Social History:     Social History     Socioeconomic History    Marital status: /Civil Union     Spouse name: None    Number of children: None    Years of education: None    Highest education level: None   Occupational History    Occupation: RETIRED   Social Needs    Financial resource strain: None    Food insecurity:     Worry: None     Inability: None    Transportation needs:     Medical: None     Non-medical: None   Tobacco Use    Smoking status: Never Smoker    Smokeless tobacco: Never Used   Substance and Sexual Activity    Alcohol use: Not Currently     Frequency: Monthly or less    Drug use: Not Currently    Sexual activity: None   Lifestyle    Physical activity:     Days per week: None     Minutes per session: None    Stress: None   Relationships    Social connections:     Talks on phone: None     Gets together: None     Attends Buddhist service: None     Active member of club or organization: None     Attends meetings of clubs or organizations: None     Relationship status: None    Intimate partner violence:     Fear of current or ex partner: None     Emotionally abused: None     Physically abused: None     Forced sexual activity: None   Other Topics Concern    None   Social History Narrative        RETIRED       Medications and Allergies:     Current Outpatient Medications   Medication Sig Dispense Refill    lisinopril (ZESTRIL) 10 mg tablet Take 1 tablet (10 mg total) by mouth daily 30 tablet 0     No current facility-administered medications for this visit  No Known Allergies   Immunizations:     Immunization History   Administered Date(s) Administered    INFLUENZA 01/04/2013    Tdap 01/04/2013      Health Maintenance: There are no preventive care reminders to display for this patient  Topic Date Due    Pneumococcal Vaccine: 65+ Years (1 of 2 - PCV13) 12/19/2001      Medicare Health Risk Assessment:     BP (!) 174/78 (BP Location: Right arm, Patient Position: Sitting, Cuff Size: Large)   Pulse 80   Temp (!) 97 2 °F (36 2 °C)   Resp 16   Ht 6' 3" (1 905 m)   Wt 93 5 kg (206 lb 3 2 oz)   SpO2 98%   BMI 25 77 kg/m²      Tania Dalton is here for his Subsequent Wellness visit  Health Risk Assessment:   Patient rates overall health as excellent  Patient feels that their physical health rating is same  Eyesight was rated as same  Hearing was rated as same  Patient feels that their emotional and mental health rating is same  Pain experienced in the last 7 days has been none  Patient states that he has experienced no weight loss or gain in last 6 months  Depression Screening:   PHQ-2 Score: 0      Fall Risk Screening: In the past year, patient has experienced: no history of falling in past year      Home Safety:  Patient does not have trouble with stairs inside or outside of their home  Patient has working smoke alarms and has working carbon monoxide detector  Home safety hazards include: none       Nutrition:   Current diet is Regular  Medications:   Patient is not currently taking any over-the-counter supplements  Patient is able to manage medications  Activities of Daily Living (ADLs)/Instrumental Activities of Daily Living (IADLs):   Walk and transfer into and out of bed and chair?: Yes  Dress and groom yourself?: Yes    Bathe or shower yourself?: Yes    Feed yourself? Yes  Do your laundry/housekeeping?: Yes  Manage your money, pay your bills and track your expenses?: Yes  Make your own meals?: Yes    Do your own shopping?: Yes    Previous Hospitalizations:   Any hospitalizations or ED visits within the last 12 months?: Yes      Advance Care Planning:   Living will: Yes    Durable POA for healthcare:  Yes    Advanced directive: Yes    Advanced directive counseling given: No    Five wishes given: No    Patient declined ACP directive: No    End of Life Decisions reviewed with patient: Yes    Provider agrees with end of life decisions: Yes      Cognitive Screening:   Provider or family/friend/caregiver concerned regarding cognition?: No    PREVENTIVE SCREENINGS      Cardiovascular Screening:    General: Screening Not Indicated      Diabetes Screening:     General: Screening Current      Colorectal Cancer Screening:     General: Screening Not Indicated      Prostate Cancer Screening:    General: History Prostate Cancer and Screening Not Indicated      Osteoporosis Screening:    General: Screening Not Indicated      Abdominal Aortic Aneurysm (AAA) Screening:        General: Screening Not Indicated      Lung Cancer Screening:     General: Screening Current      Hepatitis C Screening:    General: Screening Not Indicated      Amy Chin DO

## 2020-02-13 DIAGNOSIS — I16.0 HYPERTENSIVE URGENCY: ICD-10-CM

## 2020-02-13 RX ORDER — LISINOPRIL 10 MG/1
10 TABLET ORAL DAILY
Qty: 90 TABLET | Refills: 1 | Status: SHIPPED | OUTPATIENT
Start: 2020-02-13 | End: 2020-02-17 | Stop reason: SDUPTHER

## 2020-02-17 ENCOUNTER — TELEPHONE (OUTPATIENT)
Dept: INTERNAL MEDICINE CLINIC | Facility: CLINIC | Age: 85
End: 2020-02-17

## 2020-02-17 DIAGNOSIS — I16.0 HYPERTENSIVE URGENCY: ICD-10-CM

## 2020-02-17 RX ORDER — LISINOPRIL 10 MG/1
15 TABLET ORAL DAILY
Qty: 135 TABLET | Refills: 1 | Status: SHIPPED | COMMUNITY
Start: 2020-02-17 | End: 2020-03-04 | Stop reason: SDUPTHER

## 2020-02-17 NOTE — TELEPHONE ENCOUNTER
Pt stopped in office to see if his rx for lisinopril was sent to the pharmacy  I did tell him it was, however, he would like if you can give him a call because he would like to talk to you about the rx  He will be home today from 12:00 pm on   Thanks

## 2020-03-04 ENCOUNTER — OFFICE VISIT (OUTPATIENT)
Dept: INTERNAL MEDICINE CLINIC | Facility: CLINIC | Age: 85
End: 2020-03-04
Payer: MEDICARE

## 2020-03-04 VITALS
DIASTOLIC BLOOD PRESSURE: 78 MMHG | BODY MASS INDEX: 25.66 KG/M2 | TEMPERATURE: 97.7 F | HEIGHT: 75 IN | OXYGEN SATURATION: 98 % | RESPIRATION RATE: 16 BRPM | WEIGHT: 206.4 LBS | SYSTOLIC BLOOD PRESSURE: 138 MMHG | HEART RATE: 72 BPM

## 2020-03-04 DIAGNOSIS — I16.0 HYPERTENSIVE URGENCY: ICD-10-CM

## 2020-03-04 PROCEDURE — 3078F DIAST BP <80 MM HG: CPT | Performed by: INTERNAL MEDICINE

## 2020-03-04 PROCEDURE — 3075F SYST BP GE 130 - 139MM HG: CPT | Performed by: INTERNAL MEDICINE

## 2020-03-04 PROCEDURE — 1036F TOBACCO NON-USER: CPT | Performed by: INTERNAL MEDICINE

## 2020-03-04 PROCEDURE — 99213 OFFICE O/P EST LOW 20 MIN: CPT | Performed by: INTERNAL MEDICINE

## 2020-03-04 PROCEDURE — 3008F BODY MASS INDEX DOCD: CPT | Performed by: INTERNAL MEDICINE

## 2020-03-04 PROCEDURE — 1160F RVW MEDS BY RX/DR IN RCRD: CPT | Performed by: INTERNAL MEDICINE

## 2020-03-04 RX ORDER — LISINOPRIL 20 MG/1
20 TABLET ORAL DAILY
Qty: 90 TABLET | Refills: 1 | Status: SHIPPED | COMMUNITY
Start: 2020-03-04 | End: 2020-03-12 | Stop reason: SDUPTHER

## 2020-03-04 NOTE — PROGRESS NOTES
Assessment/Plan:    Problem List Items Addressed This Visit     None      Visit Diagnoses     Hypertensive urgency        Relevant Medications    lisinopril (ZESTRIL) 20 mg tablet           Diagnoses and all orders for this visit:    Hypertensive urgency  -     lisinopril (ZESTRIL) 20 mg tablet; Take 1 tablet (20 mg total) by mouth daily        No problem-specific Assessment & Plan notes found for this encounter  The patient was seen and examined and noted to have reactive HTN  I requested that he average the BP readings and over time at home and use the average as his BP  Subjective:      Patient ID: Mk Hughes  is a 81 y o  male  Hypertension   This is a chronic problem  The current episode started more than 1 month ago  The problem has been gradually improving since onset  The problem is controlled  Associated symptoms include anxiety  Pertinent negatives include no blurred vision, chest pain, headaches, malaise/fatigue, neck pain, orthopnea, palpitations, peripheral edema, PND, shortness of breath or sweats  There are no associated agents to hypertension  Risk factors for coronary artery disease include male gender  Past treatments include ACE inhibitors  The current treatment provides significant improvement  There are no compliance problems  There is no history of angina, kidney disease, CAD/MI, CVA, heart failure, left ventricular hypertrophy, PVD or retinopathy  The following portions of the patient's history were reviewed and updated as appropriate:   He has a past medical history of Cancer ([address] ) and Hypertension  ,  does not have any pertinent problems on file  ,   has a past surgical history that includes Appendectomy  ,  family history includes Hypertension in his mother  ,   reports that he has never smoked  He has never used smokeless tobacco  He reports that he drank alcohol  He reports that he has current or past drug history  ,  has No Known Allergies     Current Outpatient Medications   Medication Sig Dispense Refill    lisinopril (ZESTRIL) 20 mg tablet Take 1 tablet (20 mg total) by mouth daily 90 tablet 1     No current facility-administered medications for this visit  Review of Systems   Constitutional: Negative for chills, fatigue, fever and malaise/fatigue  HENT: Negative  Eyes: Negative for blurred vision  Respiratory: Negative for cough, chest tightness and shortness of breath  Cardiovascular: Negative for chest pain, palpitations, orthopnea and PND  Gastrointestinal: Negative for abdominal pain, constipation, diarrhea, nausea and vomiting  Genitourinary: Negative  Musculoskeletal: Negative for back pain, myalgias and neck pain  Skin: Negative  Neurological: Negative  Negative for headaches  Psychiatric/Behavioral: Negative for dysphoric mood  The patient is not nervous/anxious  Objective:  Vitals:    03/04/20 0827 03/04/20 0951   BP: (!) 184/78 138/78   BP Location: Right arm    Patient Position: Sitting    Cuff Size: Large    Pulse: 72    Resp: 16    Temp: 97 7 °F (36 5 °C)    SpO2: 98%    Weight: 93 6 kg (206 lb 6 4 oz)    Height: 6' 3" (1 905 m)      Body mass index is 25 8 kg/m²  Physical Exam   Constitutional: He is oriented to person, place, and time  He appears well-developed and well-nourished  HENT:   Head: Normocephalic and atraumatic  Eyes: Pupils are equal, round, and reactive to light  EOM are normal    Neck: Normal range of motion  Neck supple  Cardiovascular: Normal rate, regular rhythm, normal heart sounds and intact distal pulses  No murmur heard  Pulmonary/Chest: Effort normal and breath sounds normal  No stridor  He has no rales  Abdominal: Soft  Bowel sounds are normal  He exhibits no distension  There is no tenderness  Musculoskeletal: Normal range of motion  He exhibits no edema or deformity  Neurological: He is alert and oriented to person, place, and time  Skin: Skin is warm and dry  Psychiatric: He has a normal mood and affect  Nursing note and vitals reviewed         PHQ-9 Depression Screening    PHQ-9:    Frequency of the following problems over the past two weeks:

## 2020-03-10 RX ORDER — LISINOPRIL AND HYDROCHLOROTHIAZIDE 25; 20 MG/1; MG/1
1 TABLET ORAL DAILY
Qty: 30 TABLET | Refills: 3 | OUTPATIENT
Start: 2020-03-10 | End: 2020-04-09

## 2020-03-10 RX ORDER — TRAZODONE HYDROCHLORIDE 50 MG/1
50 TABLET ORAL
Qty: 30 TABLET | Refills: 0 | OUTPATIENT
Start: 2020-03-10

## 2020-03-10 NOTE — TELEPHONE ENCOUNTER
Has checked his BP over the past week as advised   its has averaged around 148/77    But the past two days it has been around   150/84    Also has had difficulty falling asleep the past two nights      Please advise if he should continue on the same dose and continue to track BP results  Rolando Palencia

## 2020-03-10 NOTE — TELEPHONE ENCOUNTER
Correct he did not due to your last message I thought you wanted me to put order in for you   I did call patient and he stated he will wait for a week as directed by you and see how his BP is doing

## 2020-03-10 NOTE — TELEPHONE ENCOUNTER
Trial of Trazodone to help with sleep  This not a controlled substance (not addictive)  Can we try prinzide for his BP  (lisinopril/hydroclorothiazide)  He is on Lisinopril now

## 2020-03-12 DIAGNOSIS — I16.0 HYPERTENSIVE URGENCY: ICD-10-CM

## 2020-03-12 RX ORDER — LISINOPRIL 20 MG/1
20 TABLET ORAL DAILY
Qty: 90 TABLET | Refills: 1 | Status: SHIPPED | OUTPATIENT
Start: 2020-03-12 | End: 2020-07-17

## 2020-03-13 ENCOUNTER — APPOINTMENT (EMERGENCY)
Dept: RADIOLOGY | Facility: HOSPITAL | Age: 85
End: 2020-03-13
Payer: MEDICARE

## 2020-03-13 ENCOUNTER — HOSPITAL ENCOUNTER (EMERGENCY)
Facility: HOSPITAL | Age: 85
Discharge: HOME | End: 2020-03-13
Attending: EMERGENCY MEDICINE | Admitting: EMERGENCY MEDICINE
Payer: MEDICARE

## 2020-03-13 VITALS
OXYGEN SATURATION: 96 % | DIASTOLIC BLOOD PRESSURE: 71 MMHG | RESPIRATION RATE: 18 BRPM | HEART RATE: 63 BPM | SYSTOLIC BLOOD PRESSURE: 150 MMHG | HEIGHT: 75 IN | BODY MASS INDEX: 25.49 KG/M2 | TEMPERATURE: 97 F | WEIGHT: 205 LBS

## 2020-03-13 DIAGNOSIS — R55 SYNCOPE: Primary | ICD-10-CM

## 2020-03-13 LAB
ALBUMIN SERPL BCP-MCNC: 3.9 G/DL (ref 3.5–5.7)
ALP SERPL-CCNC: 36 U/L (ref 55–165)
ALT SERPL W P-5'-P-CCNC: 16 U/L (ref 7–52)
ANION GAP SERPL CALCULATED.3IONS-SCNC: 4 MMOL/L (ref 4–13)
AST SERPL W P-5'-P-CCNC: 28 U/L (ref 13–39)
ATRIAL RATE: 62 BPM
BILIRUB SERPL-MCNC: 0.8 MG/DL (ref 0.2–1)
BUN SERPL-MCNC: 17 MG/DL (ref 7–25)
CALCIUM SERPL-MCNC: 8.8 MG/DL (ref 8.6–10.5)
CHLORIDE SERPL-SCNC: 97 MMOL/L (ref 98–107)
CO2 SERPL-SCNC: 29 MMOL/L (ref 21–31)
CREAT SERPL-MCNC: 1.02 MG/DL (ref 0.7–1.3)
EOSINOPHIL # BLD AUTO: 0.08 THOUSAND/UL (ref 0–0.61)
EOSINOPHIL NFR BLD MANUAL: 3 % (ref 0–6)
ERYTHROCYTE [DISTWIDTH] IN BLOOD BY AUTOMATED COUNT: 13.3 % (ref 11.5–14.5)
GFR SERPL CREATININE-BSD FRML MDRD: 68 ML/MIN/1.73SQ M
GLUCOSE SERPL-MCNC: 195 MG/DL (ref 65–99)
HCT VFR BLD AUTO: 37.6 % (ref 42–47)
HGB BLD-MCNC: 13.1 G/DL (ref 14–18)
LYMPHOCYTES # BLD AUTO: 0.59 THOUSAND/UL (ref 0.6–4.47)
LYMPHOCYTES # BLD AUTO: 22 % (ref 20–51)
MCH RBC QN AUTO: 33.2 PG (ref 26–34)
MCHC RBC AUTO-ENTMCNC: 34.8 G/DL (ref 31–37)
MCV RBC AUTO: 96 FL (ref 81–99)
MONOCYTES # BLD AUTO: 0.27 THOUSAND/UL (ref 0–1.22)
MONOCYTES NFR BLD AUTO: 10 % (ref 4–12)
NEUTS SEG # BLD: 1.76 THOUSAND/UL (ref 1.81–6.82)
NEUTS SEG NFR BLD AUTO: 65 % (ref 42–75)
P AXIS: 71 DEGREES
PLATELET # BLD AUTO: 211 THOUSANDS/UL (ref 149–390)
PLATELET BLD QL SMEAR: ADEQUATE
PMV BLD AUTO: 6.8 FL (ref 8.6–11.7)
POTASSIUM SERPL-SCNC: 4.3 MMOL/L (ref 3.5–5.5)
PR INTERVAL: 188 MS
PROT SERPL-MCNC: 6 G/DL (ref 6.4–8.9)
QRS AXIS: 9 DEGREES
QRSD INTERVAL: 100 MS
QT INTERVAL: 416 MS
QTC INTERVAL: 422 MS
RBC # BLD AUTO: 3.94 MILLION/UL (ref 4.3–5.9)
RBC MORPH BLD: NORMAL
SODIUM SERPL-SCNC: 130 MMOL/L (ref 134–143)
T WAVE AXIS: 70 DEGREES
TOTAL CELLS COUNTED SPEC: 100
TROPONIN I SERPL-MCNC: <0.03 NG/ML
VENTRICULAR RATE: 62 BPM
WBC # BLD AUTO: 2.7 THOUSAND/UL (ref 4.8–10.8)

## 2020-03-13 PROCEDURE — 93005 ELECTROCARDIOGRAM TRACING: CPT

## 2020-03-13 PROCEDURE — 85007 BL SMEAR W/DIFF WBC COUNT: CPT

## 2020-03-13 PROCEDURE — 99284 EMERGENCY DEPT VISIT MOD MDM: CPT | Performed by: PHYSICIAN ASSISTANT

## 2020-03-13 PROCEDURE — 36415 COLL VENOUS BLD VENIPUNCTURE: CPT

## 2020-03-13 PROCEDURE — 99285 EMERGENCY DEPT VISIT HI MDM: CPT

## 2020-03-13 PROCEDURE — 80053 COMPREHEN METABOLIC PANEL: CPT

## 2020-03-13 PROCEDURE — 85027 COMPLETE CBC AUTOMATED: CPT

## 2020-03-13 PROCEDURE — 96360 HYDRATION IV INFUSION INIT: CPT

## 2020-03-13 PROCEDURE — 93010 ELECTROCARDIOGRAM REPORT: CPT | Performed by: INTERNAL MEDICINE

## 2020-03-13 PROCEDURE — 84484 ASSAY OF TROPONIN QUANT: CPT

## 2020-03-13 PROCEDURE — 71046 X-RAY EXAM CHEST 2 VIEWS: CPT

## 2020-03-13 RX ADMIN — SODIUM CHLORIDE 1000 ML: 0.9 INJECTION, SOLUTION INTRAVENOUS at 11:27

## 2020-03-13 NOTE — ED PROVIDER NOTES
History  Chief Complaint   Patient presents with    Syncope     had two syncopal episodes this morning which lasted a few seconds, first time caught by daughter, second by EMS  One week ago his Lisinopril was changed from 10 mg to 20 mg daily      27-year-old male presents emergency department following 2 syncopal episodes this morning which lasted less than 15 seconds each  Patient recently had his blood pressure medication and adjusted from 10 mg of lisinopril up to 20 mg approximately 1 week ago  This morning he was having breakfast with his daughter and stood up from the table stating that he was not feeling well  The daughter reports that he did not look right and did not appear to be focused on a conversation  After standing for a brief  The daughter went behind the patient to support him as he stated I am having an episode  The patient syncopized briefly and was supported by the daughter  She assisted him to the floor  The patient did not strike his head or fall from standing  EMS was called and the patient remained leg on the floor  When they attempted to sit him upright his blood pressure was noted to be in the 76H systolic and he had another syncopal episode with EMS present  During these episodes he did not fall or sustain any injury  He denies any chest pain shortness of breath nausea vomiting abdominal pain constipation diarrhea  Overall at this time the patient states he feels well and he is well-appearing  He denies any other complaints or symptoms  No known allergies          Prior to Admission Medications   Prescriptions Last Dose Informant Patient Reported?  Taking?   lisinopril (ZESTRIL) 20 mg tablet   No No   Sig: Take 1 tablet (20 mg total) by mouth daily      Facility-Administered Medications: None       Past Medical History:   Diagnosis Date    Cancer [facility])     prostate cancer     Hypertension        Past Surgical History:   Procedure Laterality Date    APPENDECTOMY Family History   Problem Relation Age of Onset    Hypertension Mother      I have reviewed and agree with the history as documented  E-Cigarette/Vaping    E-Cigarette Use Never User      E-Cigarette/Vaping Substances     Social History     Tobacco Use    Smoking status: Never Smoker    Smokeless tobacco: Never Used   Substance Use Topics    Alcohol use: Not Currently     Frequency: Monthly or less    Drug use: Not Currently       Review of Systems   Neurological: Positive for syncope  All other systems reviewed and are negative  Physical Exam  Physical Exam   Constitutional: He is oriented to person, place, and time  He appears well-developed and well-nourished  He is cooperative  He does not appear ill  No distress  HENT:   Head: Normocephalic and atraumatic  Right Ear: External ear normal    Left Ear: External ear normal    Nose: Nose normal    Mouth/Throat: Oropharynx is clear and moist    Eyes: Pupils are equal, round, and reactive to light  EOM are normal    Neck: Normal range of motion  Neck supple  Cardiovascular: Normal rate, regular rhythm, normal heart sounds and intact distal pulses  Exam reveals no gallop and no friction rub  No murmur heard  Pulmonary/Chest: Effort normal and breath sounds normal  No stridor  No respiratory distress  He has no wheezes  He has no rales  Abdominal: Soft  Bowel sounds are normal  He exhibits no distension  There is no tenderness  There is no guarding  Musculoskeletal: Normal range of motion  He exhibits no tenderness  Neurological: He is alert and oriented to person, place, and time  Skin: Skin is warm  Capillary refill takes less than 2 seconds  Nursing note and vitals reviewed        Vital Signs  ED Triage Vitals   Temperature Pulse Respirations Blood Pressure SpO2   03/13/20 1100 03/13/20 1100 03/13/20 1100 03/13/20 1100 03/13/20 1100   (!) 97 °F (36 1 °C) 60 16 135/63 98 %      Temp Source Heart Rate Source Patient Position - Orthostatic VS BP Location FiO2 (%)   03/13/20 1100 03/13/20 1100 03/13/20 1100 03/13/20 1100 --   Temporal Monitor Sitting Left arm       Pain Score       03/13/20 1424       No Pain           Vitals:    03/13/20 1100 03/13/20 1130 03/13/20 1200 03/13/20 1424   BP: 135/63 126/60 134/62 150/71   Pulse: 60 61 62 63   Patient Position - Orthostatic VS: Sitting   Lying         Visual Acuity      ED Medications  Medications   sodium chloride 0 9 % bolus 1,000 mL (0 mL Intravenous Stopped 3/13/20 1224)       Diagnostic Studies  Results Reviewed     Procedure Component Value Units Date/Time    Troponin I [[id number]]  (Normal) Collected:  03/13/20 1105    Lab Status:  Final result Specimen:  Blood from Arm, Left Updated:  03/13/20 1126     Troponin I <0 03 ng/mL     Comprehensive metabolic panel [[id number]]  (Abnormal) Collected:  03/13/20 1105    Lab Status:  Final result Specimen:  Blood from Arm, Left Updated:  03/13/20 1124     Sodium 130 mmol/L      Potassium 4 3 mmol/L      Chloride 97 mmol/L      CO2 29 mmol/L      ANION GAP 4 mmol/L      BUN 17 mg/dL      Creatinine 1 02 mg/dL      Glucose 195 mg/dL      Calcium 8 8 mg/dL      AST 28 U/L      ALT 16 U/L      Alkaline Phosphatase 36 U/L      Total Protein 6 0 g/dL      Albumin 3 9 g/dL      Total Bilirubin 0 80 mg/dL      eGFR 68 ml/min/1 73sq m     Narrative:       Becky guidelines for Chronic Kidney Disease (CKD):     Stage 1 with normal or high GFR (GFR > 90 mL/min/1 73 square meters)    Stage 2 Mild CKD (GFR = 60-89 mL/min/1 73 square meters)    Stage 3A Moderate CKD (GFR = 45-59 mL/min/1 73 square meters)    Stage 3B Moderate CKD (GFR = 30-44 mL/min/1 73 square meters)    Stage 4 Severe CKD (GFR = 15-29 mL/min/1 73 square meters)    Stage 5 End Stage CKD (GFR <15 mL/min/1 73 square meters)  Note: GFR calculation is accurate only with a steady state creatinine    CBC and differential [[id number]]  (Abnormal) Collected:  03/13/20 1105    Lab Status:  Final result Specimen:  Blood from Arm, Left Updated:  03/13/20 1111     WBC 2 70 Thousand/uL      RBC 3 94 Million/uL      Hemoglobin 13 1 g/dL      Hematocrit 37 6 %      MCV 96 fL      MCH 33 2 pg      MCHC 34 8 g/dL      RDW 13 3 %      MPV 6 8 fL      Platelets 788 Thousands/uL                  XR chest 2 views   Final Result by Kelsey Miranda DO (03/13 1335)      No acute cardiopulmonary disease  Workstation performed: [id number]                    Procedures  Procedures         ED Course                                 MDM  Number of Diagnoses or Management Options  Syncope: new and requires workup  Diagnosis management comments: Case discussed with the patient's PCP Dr Leticia King  Per Dr Leticia King, patient is to have his lisinopril taken back to 10 mg per day and is considered safe to be discharged home with close follow-up  Patient is well-appearing in no acute distress  His cardiac evaluation was unremarkable  His vitals have been stable in the emergency department during his stay  Patient was provided with IV hydration  Patient educated regarding their diagnosis and given return and follow-up instructions  Patient is understanding and in agreement with the treatment plan  There are no questions at the time of discharge         Amount and/or Complexity of Data Reviewed  Clinical lab tests: ordered and reviewed  Tests in the radiology section of CPT®: ordered and reviewed    Risk of Complications, Morbidity, and/or Mortality  Presenting problems: moderate  Diagnostic procedures: low  Management options: low    Patient Progress  Patient progress: stable        Disposition  Final diagnoses:   Syncope     Time reflects when diagnosis was documented in both MDM as applicable and the Disposition within this note     Time User Action Codes Description Comment    3/13/2020  2:26 PM Octavia Wood Add [R55] Syncope       ED Disposition     ED Disposition Condition Date/Time Comment    Discharge Stable Fri Mar 13, 2020  2:26 PM [facility]  discharge to home/self care  Follow-up Information     Follow up With Specialties Details Why [address], DO Internal Medicine Schedule an appointment as soon as possible for a visit   [address]  [phone]            Discharge Medication List as of 3/13/2020  2:26 PM      CONTINUE these medications which have NOT CHANGED    Details   lisinopril (ZESTRIL) 20 mg tablet Take 1 tablet (20 mg total) by mouth daily, Starting Thu 3/12/2020, Until Tue 9/8/2020, Normal           No discharge procedures on file      PDMP Review     None          ED Provider  Electronically Signed by           Rudolph Livingston PA-C  03/13/20 2005

## 2020-03-13 NOTE — ED NOTES
EKG done 11:00 by Ed tech  And shown to ED doctor       Chase Ludwig  03/13/20 1105 [facility] Carol Moya  03/13/20 1100

## 2020-03-16 ENCOUNTER — OFFICE VISIT (OUTPATIENT)
Dept: INTERNAL MEDICINE CLINIC | Facility: CLINIC | Age: 85
End: 2020-03-16
Payer: MEDICARE

## 2020-03-16 VITALS
WEIGHT: 206 LBS | OXYGEN SATURATION: 98 % | HEART RATE: 67 BPM | BODY MASS INDEX: 25.61 KG/M2 | DIASTOLIC BLOOD PRESSURE: 72 MMHG | RESPIRATION RATE: 16 BRPM | TEMPERATURE: 98.2 F | SYSTOLIC BLOOD PRESSURE: 164 MMHG | HEIGHT: 75 IN

## 2020-03-16 DIAGNOSIS — I10 ESSENTIAL HYPERTENSION: Primary | ICD-10-CM

## 2020-03-16 PROCEDURE — 1160F RVW MEDS BY RX/DR IN RCRD: CPT | Performed by: INTERNAL MEDICINE

## 2020-03-16 PROCEDURE — 3078F DIAST BP <80 MM HG: CPT | Performed by: INTERNAL MEDICINE

## 2020-03-16 PROCEDURE — 3077F SYST BP >= 140 MM HG: CPT | Performed by: INTERNAL MEDICINE

## 2020-03-16 PROCEDURE — 99214 OFFICE O/P EST MOD 30 MIN: CPT | Performed by: INTERNAL MEDICINE

## 2020-03-16 PROCEDURE — 1036F TOBACCO NON-USER: CPT | Performed by: INTERNAL MEDICINE

## 2020-03-16 PROCEDURE — 3008F BODY MASS INDEX DOCD: CPT | Performed by: INTERNAL MEDICINE

## 2020-03-16 RX ORDER — METOPROLOL SUCCINATE 25 MG/1
25 TABLET, EXTENDED RELEASE ORAL DAILY
Qty: 30 TABLET | Refills: 5 | Status: SHIPPED | OUTPATIENT
Start: 2020-03-16 | End: 2020-03-18 | Stop reason: SDUPTHER

## 2020-03-16 NOTE — PROGRESS NOTES
Assessment/Plan:    Problem List Items Addressed This Visit        Cardiovascular and Mediastinum    Essential hypertension - Primary    Relevant Medications    metoprolol succinate (TOPROL-XL) 25 mg 24 hr tablet           Diagnoses and all orders for this visit:    Essential hypertension  -     metoprolol succinate (TOPROL-XL) 25 mg 24 hr tablet; Take 1 tablet (25 mg total) by mouth daily        No problem-specific Assessment & Plan notes found for this encounter  The patient will follow up in a month and we will continue to follow his BP  Subjective:      Patient ID: Jud Sicard  is a 81 y o  male  The patient was in the ER after a syncopal type episode and he notes continued dizziness  Hypertension   This is a new problem  The current episode started more than 1 month ago  The problem has been gradually improving since onset  The problem is uncontrolled  Associated symptoms include anxiety  Pertinent negatives include no blurred vision, chest pain, headaches, malaise/fatigue, neck pain, orthopnea, palpitations, peripheral edema, PND, shortness of breath or sweats  There are no associated agents to hypertension  Risk factors for coronary artery disease include male gender  Past treatments include ACE inhibitors  The current treatment provides moderate improvement  Compliance problems include medication side effects  There is no history of angina, kidney disease, CAD/MI, CVA, heart failure, left ventricular hypertrophy, PVD or retinopathy  The following portions of the patient's history were reviewed and updated as appropriate:   He has a past medical history of Cancer ([address] ) and Hypertension  ,  does not have any pertinent problems on file  ,   has a past surgical history that includes Appendectomy  ,  family history includes Hypertension in his mother  ,   reports that he has never smoked  He has never used smokeless tobacco  He reports that he drank alcohol   He reports that he has current or past drug history  ,  has No Known Allergies     Current Outpatient Medications   Medication Sig Dispense Refill    lisinopril (ZESTRIL) 20 mg tablet Take 1 tablet (20 mg total) by mouth daily (Patient taking differently: Take 10 mg by mouth daily ) 90 tablet 1    metoprolol succinate (TOPROL-XL) 25 mg 24 hr tablet Take 1 tablet (25 mg total) by mouth daily 30 tablet 5     No current facility-administered medications for this visit  Review of Systems   Constitutional: Negative for chills, fatigue, fever and malaise/fatigue  HENT: Negative  Eyes: Negative for blurred vision  Respiratory: Negative for cough, chest tightness and shortness of breath  Cardiovascular: Negative for chest pain, palpitations, orthopnea and PND  Gastrointestinal: Negative for abdominal pain, constipation, diarrhea, nausea and vomiting  Genitourinary: Negative  Musculoskeletal: Negative for back pain, myalgias and neck pain  Skin: Negative  Neurological: Negative  Negative for headaches  Psychiatric/Behavioral: Negative for dysphoric mood  The patient is not nervous/anxious  Objective:  Vitals:    03/16/20 1431   BP: 164/72   BP Location: Left arm   Patient Position: Sitting   Cuff Size: Large   Pulse: 67   Resp: 16   Temp: 98 2 °F (36 8 °C)   SpO2: 98%   Weight: 93 4 kg (206 lb)   Height: 6' 3" (1 905 m)     Body mass index is 25 75 kg/m²  Physical Exam   Constitutional: He is oriented to person, place, and time  He appears well-developed and well-nourished  HENT:   Head: Normocephalic and atraumatic  Eyes: Pupils are equal, round, and reactive to light  EOM are normal    Neck: Normal range of motion  Neck supple  Cardiovascular: Normal rate, regular rhythm, normal heart sounds and intact distal pulses  No murmur heard  Pulmonary/Chest: Effort normal and breath sounds normal  No stridor  He has no rales  Abdominal: Soft  Bowel sounds are normal  He exhibits no distension   There is no tenderness  Musculoskeletal: Normal range of motion  He exhibits no edema or deformity  Neurological: He is alert and oriented to person, place, and time  Skin: Skin is warm and dry  Psychiatric: He has a normal mood and affect  Nursing note and vitals reviewed         PHQ-9 Depression Screening    PHQ-9:    Frequency of the following problems over the past two weeks:

## 2020-03-18 DIAGNOSIS — I10 ESSENTIAL HYPERTENSION: ICD-10-CM

## 2020-03-18 RX ORDER — METOPROLOL SUCCINATE 25 MG/1
25 TABLET, EXTENDED RELEASE ORAL DAILY
Qty: 90 TABLET | Refills: 1 | Status: SHIPPED | OUTPATIENT
Start: 2020-03-18 | End: 2020-08-04

## 2020-05-06 ENCOUNTER — APPOINTMENT (OUTPATIENT)
Dept: LAB | Facility: CLINIC | Age: 85
End: 2020-05-06
Payer: MEDICARE

## 2020-05-06 ENCOUNTER — TRANSCRIBE ORDERS (OUTPATIENT)
Dept: URGENT CARE | Facility: CLINIC | Age: 85
End: 2020-05-06

## 2020-05-06 DIAGNOSIS — C61 MALIGNANT NEOPLASM OF PROSTATE (HCC): ICD-10-CM

## 2020-05-06 DIAGNOSIS — C61 MALIGNANT NEOPLASM OF PROSTATE (HCC): Primary | ICD-10-CM

## 2020-05-06 LAB
BACTERIA UR QL AUTO: NORMAL /HPF
BILIRUB UR QL STRIP: NEGATIVE
CLARITY UR: CLEAR
COLOR UR: YELLOW
GLUCOSE UR STRIP-MCNC: NEGATIVE MG/DL
HGB UR QL STRIP.AUTO: NEGATIVE
KETONES UR STRIP-MCNC: NEGATIVE MG/DL
LEUKOCYTE ESTERASE UR QL STRIP: NEGATIVE
NITRITE UR QL STRIP: NEGATIVE
NON-SQ EPI CELLS URNS QL MICRO: NORMAL /HPF
PH UR STRIP.AUTO: 6.5 [PH]
PROT UR STRIP-MCNC: NEGATIVE MG/DL
PSA SERPL-MCNC: 0.1 NG/ML (ref 0–4)
RBC #/AREA URNS AUTO: NORMAL /HPF
SP GR UR STRIP.AUTO: 1.01 (ref 1–1.03)
UROBILINOGEN UR QL STRIP.AUTO: 1 E.U./DL
WBC #/AREA URNS AUTO: NORMAL /HPF

## 2020-05-06 PROCEDURE — 81001 URINALYSIS AUTO W/SCOPE: CPT | Performed by: UROLOGY

## 2020-05-06 PROCEDURE — G0103 PSA SCREENING: HCPCS

## 2020-05-06 PROCEDURE — 36415 COLL VENOUS BLD VENIPUNCTURE: CPT

## 2020-07-13 ENCOUNTER — TELEPHONE (OUTPATIENT)
Dept: INTERNAL MEDICINE CLINIC | Facility: CLINIC | Age: 85
End: 2020-07-13

## 2020-07-13 NOTE — TELEPHONE ENCOUNTER
Pt would like to know if possible for you to give him a call at [phone]  He has questions about his b/p he would like to discuss with you before his appt on Friday

## 2020-07-17 ENCOUNTER — OFFICE VISIT (OUTPATIENT)
Dept: INTERNAL MEDICINE CLINIC | Facility: CLINIC | Age: 85
End: 2020-07-17
Payer: MEDICARE

## 2020-07-17 DIAGNOSIS — N18.30 ANEMIA DUE TO STAGE 3 CHRONIC KIDNEY DISEASE (HCC): Primary | ICD-10-CM

## 2020-07-17 DIAGNOSIS — D63.1 ANEMIA DUE TO STAGE 3 CHRONIC KIDNEY DISEASE (HCC): Primary | ICD-10-CM

## 2020-07-17 DIAGNOSIS — I10 ESSENTIAL HYPERTENSION: ICD-10-CM

## 2020-07-17 DIAGNOSIS — N28.9 RENAL INSUFFICIENCY: ICD-10-CM

## 2020-07-17 DIAGNOSIS — E53.8 B12 DEFICIENCY: ICD-10-CM

## 2020-07-17 PROCEDURE — 99214 OFFICE O/P EST MOD 30 MIN: CPT | Performed by: INTERNAL MEDICINE

## 2020-07-17 PROCEDURE — 3078F DIAST BP <80 MM HG: CPT | Performed by: INTERNAL MEDICINE

## 2020-07-17 PROCEDURE — 1160F RVW MEDS BY RX/DR IN RCRD: CPT | Performed by: INTERNAL MEDICINE

## 2020-07-17 PROCEDURE — 1036F TOBACCO NON-USER: CPT | Performed by: INTERNAL MEDICINE

## 2020-07-17 PROCEDURE — 3077F SYST BP >= 140 MM HG: CPT | Performed by: INTERNAL MEDICINE

## 2020-08-04 VITALS
BODY MASS INDEX: 25.71 KG/M2 | HEART RATE: 60 BPM | WEIGHT: 206.8 LBS | RESPIRATION RATE: 16 BRPM | SYSTOLIC BLOOD PRESSURE: 144 MMHG | OXYGEN SATURATION: 99 % | HEIGHT: 75 IN | DIASTOLIC BLOOD PRESSURE: 70 MMHG | TEMPERATURE: 96.9 F

## 2020-08-04 DIAGNOSIS — I10 ESSENTIAL HYPERTENSION: ICD-10-CM

## 2020-08-04 RX ORDER — METOPROLOL SUCCINATE 25 MG
TABLET, EXTENDED RELEASE 24 HR ORAL
Qty: 90 TABLET | Refills: 3 | Status: SHIPPED | OUTPATIENT
Start: 2020-08-04 | End: 2021-03-22 | Stop reason: SDUPTHER

## 2020-08-04 NOTE — PROGRESS NOTES
Assessment/Plan:    Problem List Items Addressed This Visit        Cardiovascular and Mediastinum    Essential hypertension       Genitourinary    Renal insufficiency      Other Visit Diagnoses     Anemia due to stage 3 chronic kidney disease ([facility] )    -  Primary    Relevant Orders    CBC and differential    Comprehensive metabolic panel    Iron    Ferritin    B12 deficiency        Relevant Orders    Vitamin B12    Folate           Diagnoses and all orders for this visit:    Anemia due to stage 3 chronic kidney disease (HCC)  -     CBC and differential; Future  -     Comprehensive metabolic panel; Future  -     Iron; Future  -     Ferritin; Future    B12 deficiency  -     Vitamin B12; Future  -     Folate; Future    Essential hypertension    Renal insufficiency        No problem-specific Assessment & Plan notes found for this encounter  Subjective: The patient is noted to have issues with an elevate BP     Patient ID: Janey Royal  is a 81 y o  male  The patient notes that his BP is elevated yesterday  The patient states that this has been going on for several days  He has noted that the syncopal symptoms resolved with the discontinuation of the Lisinopril  The patient states that there are no other issues at this time  We will follow up on his renal function  We will further evaluate his anemia with B12 and iron studies  The following portions of the patient's history were reviewed and updated as appropriate:   He has a past medical history of Cancer ([facility] )  ,  does not have any pertinent problems on file  ,   has a past surgical history that includes Appendectomy  ,  family history includes Hypertension in his mother  ,   reports that he has never smoked  He has never used smokeless tobacco  He reports previous alcohol use  He reports previous drug use ,  has No Known Allergies     Current Outpatient Medications   Medication Sig Dispense Refill    Toprol XL 25 MG 24 hr tablet TAKE 1 TABLET DAILY 90 tablet 3     No current facility-administered medications for this visit  Review of Systems   Constitutional: Negative for chills, fatigue and fever  HENT: Negative  Respiratory: Negative for cough, chest tightness and shortness of breath  Cardiovascular: Negative for chest pain and palpitations  Gastrointestinal: Negative for abdominal pain, constipation, diarrhea, nausea and vomiting  Genitourinary: Negative  Musculoskeletal: Negative for back pain and myalgias  Skin: Negative  Neurological: Negative  Psychiatric/Behavioral: Negative for dysphoric mood  The patient is not nervous/anxious  Objective:  Vitals:    07/17/20 1031 08/04/20 1420   BP:  144/70   BP Location:  Left arm   Patient Position:  Sitting   Cuff Size:  Standard   Pulse: 60    Resp: 16    Temp: (!) 96 9 °F (36 1 °C)    SpO2: 99%    Weight: 93 8 kg (206 lb 12 8 oz)    Height: 6' 3"      Body mass index is 25 85 kg/m²  Physical Exam   Constitutional: He is oriented to person, place, and time  He appears well-developed  HENT:   Head: Normocephalic and atraumatic  Eyes: Pupils are equal, round, and reactive to light  Neck: Normal range of motion  Neck supple  Cardiovascular: Normal rate, regular rhythm and normal heart sounds  No murmur heard  Pulmonary/Chest: Effort normal and breath sounds normal  No stridor  He has no rales  Abdominal: Soft  Bowel sounds are normal  He exhibits no distension  There is no abdominal tenderness  Musculoskeletal: Normal range of motion  General: No deformity  Neurological: He is alert and oriented to person, place, and time  Skin: Skin is warm and dry  Nursing note and vitals reviewed         PHQ-9 Depression Screening    PHQ-9:    Frequency of the following problems over the past two weeks:       Little interest or pleasure in doing things:  0 - not at all  Feeling down, depressed, or hopeless:  0 - not at all  PHQ-2 Score:  0

## 2020-10-07 ENCOUNTER — LAB (OUTPATIENT)
Dept: LAB | Facility: CLINIC | Age: 85
End: 2020-10-07
Payer: MEDICARE

## 2020-10-07 DIAGNOSIS — E53.8 B12 DEFICIENCY: ICD-10-CM

## 2020-10-07 DIAGNOSIS — D63.1 ANEMIA DUE TO STAGE 3 CHRONIC KIDNEY DISEASE (HCC): ICD-10-CM

## 2020-10-07 DIAGNOSIS — N18.30 ANEMIA DUE TO STAGE 3 CHRONIC KIDNEY DISEASE (HCC): ICD-10-CM

## 2020-10-07 LAB
ALBUMIN SERPL BCP-MCNC: 3.9 G/DL (ref 3.5–5)
ALP SERPL-CCNC: 49 U/L (ref 46–116)
ALT SERPL W P-5'-P-CCNC: 26 U/L (ref 12–78)
ANION GAP SERPL CALCULATED.3IONS-SCNC: 5 MMOL/L (ref 4–13)
AST SERPL W P-5'-P-CCNC: 28 U/L (ref 5–45)
BASOPHILS # BLD AUTO: 0.04 THOUSANDS/ΜL (ref 0–0.1)
BASOPHILS NFR BLD AUTO: 1 % (ref 0–1)
BILIRUB SERPL-MCNC: 0.95 MG/DL (ref 0.2–1)
BUN SERPL-MCNC: 15 MG/DL (ref 5–25)
CALCIUM SERPL-MCNC: 9.3 MG/DL (ref 8.3–10.1)
CHLORIDE SERPL-SCNC: 105 MMOL/L (ref 100–108)
CO2 SERPL-SCNC: 29 MMOL/L (ref 21–32)
CREAT SERPL-MCNC: 0.98 MG/DL (ref 0.6–1.3)
EOSINOPHIL # BLD AUTO: 0.1 THOUSAND/ΜL (ref 0–0.61)
EOSINOPHIL NFR BLD AUTO: 4 % (ref 0–6)
ERYTHROCYTE [DISTWIDTH] IN BLOOD BY AUTOMATED COUNT: 12.9 % (ref 11.6–15.1)
FERRITIN SERPL-MCNC: 85 NG/ML (ref 8–388)
FOLATE SERPL-MCNC: 11.1 NG/ML (ref 3.1–17.5)
GFR SERPL CREATININE-BSD FRML MDRD: 71 ML/MIN/1.73SQ M
GLUCOSE P FAST SERPL-MCNC: 84 MG/DL (ref 65–99)
HCT VFR BLD AUTO: 36.1 % (ref 36.5–49.3)
HGB BLD-MCNC: 13 G/DL (ref 12–17)
IMM GRANULOCYTES # BLD AUTO: 0.03 THOUSAND/UL (ref 0–0.2)
IMM GRANULOCYTES NFR BLD AUTO: 1 % (ref 0–2)
IRON SERPL-MCNC: 102 UG/DL (ref 65–175)
LYMPHOCYTES # BLD AUTO: 0.67 THOUSANDS/ΜL (ref 0.6–4.47)
LYMPHOCYTES NFR BLD AUTO: 24 % (ref 14–44)
MCH RBC QN AUTO: 33.9 PG (ref 26.8–34.3)
MCHC RBC AUTO-ENTMCNC: 36 G/DL (ref 31.4–37.4)
MCV RBC AUTO: 94 FL (ref 82–98)
MONOCYTES # BLD AUTO: 0.37 THOUSAND/ΜL (ref 0.17–1.22)
MONOCYTES NFR BLD AUTO: 13 % (ref 4–12)
NEUTROPHILS # BLD AUTO: 1.59 THOUSANDS/ΜL (ref 1.85–7.62)
NEUTS SEG NFR BLD AUTO: 57 % (ref 43–75)
NRBC BLD AUTO-RTO: 0 /100 WBCS
PLATELET # BLD AUTO: 194 THOUSANDS/UL (ref 149–390)
PMV BLD AUTO: 9.6 FL (ref 8.9–12.7)
POTASSIUM SERPL-SCNC: 3.9 MMOL/L (ref 3.5–5.3)
PROT SERPL-MCNC: 6.7 G/DL (ref 6.4–8.2)
RBC # BLD AUTO: 3.84 MILLION/UL (ref 3.88–5.62)
SODIUM SERPL-SCNC: 139 MMOL/L (ref 136–145)
VIT B12 SERPL-MCNC: 681 PG/ML (ref 100–900)
WBC # BLD AUTO: 2.8 THOUSAND/UL (ref 4.31–10.16)

## 2020-10-07 PROCEDURE — 82607 VITAMIN B-12: CPT

## 2020-10-07 PROCEDURE — 80053 COMPREHEN METABOLIC PANEL: CPT

## 2020-10-07 PROCEDURE — 85025 COMPLETE CBC W/AUTO DIFF WBC: CPT

## 2020-10-07 PROCEDURE — 82746 ASSAY OF FOLIC ACID SERUM: CPT

## 2020-10-07 PROCEDURE — 82728 ASSAY OF FERRITIN: CPT

## 2020-10-07 PROCEDURE — 83540 ASSAY OF IRON: CPT

## 2020-10-07 PROCEDURE — 36415 COLL VENOUS BLD VENIPUNCTURE: CPT

## 2020-10-19 ENCOUNTER — OFFICE VISIT (OUTPATIENT)
Dept: INTERNAL MEDICINE CLINIC | Facility: CLINIC | Age: 85
End: 2020-10-19
Payer: MEDICARE

## 2020-10-19 VITALS
HEART RATE: 62 BPM | BODY MASS INDEX: 25.55 KG/M2 | WEIGHT: 205.5 LBS | HEIGHT: 75 IN | OXYGEN SATURATION: 98 % | TEMPERATURE: 96.8 F

## 2020-10-19 DIAGNOSIS — Z23 NEED FOR VACCINATION: ICD-10-CM

## 2020-10-19 DIAGNOSIS — N28.9 RENAL INSUFFICIENCY: ICD-10-CM

## 2020-10-19 DIAGNOSIS — I10 ESSENTIAL HYPERTENSION: Primary | ICD-10-CM

## 2020-10-19 PROCEDURE — 90662 IIV NO PRSV INCREASED AG IM: CPT | Performed by: INTERNAL MEDICINE

## 2020-10-19 PROCEDURE — G0008 ADMIN INFLUENZA VIRUS VAC: HCPCS | Performed by: INTERNAL MEDICINE

## 2020-10-19 PROCEDURE — 99213 OFFICE O/P EST LOW 20 MIN: CPT | Performed by: INTERNAL MEDICINE

## 2021-01-18 ENCOUNTER — TELEPHONE (OUTPATIENT)
Dept: INTERNAL MEDICINE CLINIC | Facility: CLINIC | Age: 86
End: 2021-01-18

## 2021-01-18 NOTE — TELEPHONE ENCOUNTER
Use the medication as directed only  Take BP reading every other day for one week and report the findings

## 2021-01-18 NOTE — TELEPHONE ENCOUNTER
Pt called stating that he woke up around midnight not feeling right took BP and it was 159/93 so pt took BP medication at 1:00 am did come down to 145/85  BP this morning was 143/80 when he took it  Pt's question is when should he take his BP medication again since he took daily dose this morning at 1:00 am? Pt requesting call back from me or you

## 2021-01-20 DIAGNOSIS — Z23 ENCOUNTER FOR IMMUNIZATION: ICD-10-CM

## 2021-01-25 ENCOUNTER — IMMUNIZATIONS (OUTPATIENT)
Dept: FAMILY MEDICINE CLINIC | Facility: HOSPITAL | Age: 86
End: 2021-01-25

## 2021-01-25 DIAGNOSIS — Z23 ENCOUNTER FOR IMMUNIZATION: Primary | ICD-10-CM

## 2021-01-25 PROCEDURE — 91301 SARS-COV-2 / COVID-19 MRNA VACCINE (MODERNA) 100 MCG: CPT

## 2021-01-25 PROCEDURE — 0011A SARS-COV-2 / COVID-19 MRNA VACCINE (MODERNA) 100 MCG: CPT

## 2021-01-26 ENCOUNTER — TELEPHONE (OUTPATIENT)
Dept: INTERNAL MEDICINE CLINIC | Facility: CLINIC | Age: 86
End: 2021-01-26

## 2021-02-22 ENCOUNTER — IMMUNIZATIONS (OUTPATIENT)
Dept: FAMILY MEDICINE CLINIC | Facility: HOSPITAL | Age: 86
End: 2021-02-22

## 2021-02-22 DIAGNOSIS — Z23 ENCOUNTER FOR IMMUNIZATION: Primary | ICD-10-CM

## 2021-02-22 PROCEDURE — 91301 SARS-COV-2 / COVID-19 MRNA VACCINE (MODERNA) 100 MCG: CPT

## 2021-02-22 PROCEDURE — 0012A SARS-COV-2 / COVID-19 MRNA VACCINE (MODERNA) 100 MCG: CPT

## 2021-02-26 ENCOUNTER — TELEPHONE (OUTPATIENT)
Dept: INTERNAL MEDICINE CLINIC | Facility: CLINIC | Age: 86
End: 2021-02-26

## 2021-02-26 NOTE — TELEPHONE ENCOUNTER
Pt called, had 2nd covid vacc on Monday 2-22-21, on Wednesday, pt started with little red bumps on his abdomen, slight rash, yesterday had a couple more, asking if this could be a reaction?  Pt states they do not bother him, no itch or irritation, does not have any more today,pt feels fine, pls advise

## 2021-03-01 NOTE — TELEPHONE ENCOUNTER
I have heard of an itchy rash around the sight of the injection, but I am unsure is this is what you are experiencing  Regardless, watch it and if continues to expand over the next week follow up in the office

## 2021-03-22 DIAGNOSIS — I10 ESSENTIAL HYPERTENSION: ICD-10-CM

## 2021-03-22 RX ORDER — METOPROLOL SUCCINATE 25 MG/1
25 TABLET, EXTENDED RELEASE ORAL DAILY
Qty: 90 TABLET | Refills: 3 | Status: SHIPPED | OUTPATIENT
Start: 2021-03-22 | End: 2021-03-23 | Stop reason: SDUPTHER

## 2021-03-22 RX ORDER — METOPROLOL SUCCINATE 25 MG/1
25 TABLET, EXTENDED RELEASE ORAL DAILY
Qty: 90 TABLET | Refills: 0 | Status: CANCELLED | OUTPATIENT
Start: 2021-03-22

## 2021-03-22 NOTE — TELEPHONE ENCOUNTER
PT  States his dose was doubled, he needs it called in, he was told by pharmacy it was called in but it is not till for a refill, but they are unaware of the increase in dose   Please notify pharmacy and reach back out to PT

## 2021-03-23 DIAGNOSIS — I10 ESSENTIAL HYPERTENSION: ICD-10-CM

## 2021-03-23 RX ORDER — METOPROLOL SUCCINATE 25 MG/1
25 TABLET, EXTENDED RELEASE ORAL 2 TIMES DAILY
Qty: 180 TABLET | Refills: 3 | Status: SHIPPED | OUTPATIENT
Start: 2021-03-23 | End: 2022-02-25

## 2021-03-23 NOTE — TELEPHONE ENCOUNTER
The patient takes 2 x 25 mg of the Metoprolol Succinate  He would like to take just one 50 mg Metoprolol Succinate

## 2021-03-30 ENCOUNTER — DOCUMENTATION (OUTPATIENT)
Dept: INTERNAL MEDICINE CLINIC | Facility: CLINIC | Age: 86
End: 2021-03-30

## 2021-04-21 ENCOUNTER — OFFICE VISIT (OUTPATIENT)
Dept: INTERNAL MEDICINE CLINIC | Facility: CLINIC | Age: 86
End: 2021-04-21
Payer: MEDICARE

## 2021-04-21 VITALS
WEIGHT: 218.4 LBS | SYSTOLIC BLOOD PRESSURE: 132 MMHG | HEIGHT: 75 IN | DIASTOLIC BLOOD PRESSURE: 80 MMHG | HEART RATE: 75 BPM | RESPIRATION RATE: 14 BRPM | BODY MASS INDEX: 27.15 KG/M2 | TEMPERATURE: 97.9 F | OXYGEN SATURATION: 98 %

## 2021-04-21 DIAGNOSIS — I10 ESSENTIAL HYPERTENSION: ICD-10-CM

## 2021-04-21 DIAGNOSIS — Z00.00 MEDICARE ANNUAL WELLNESS VISIT, SUBSEQUENT: Primary | ICD-10-CM

## 2021-04-21 DIAGNOSIS — C61 PROSTATE CA (HCC): ICD-10-CM

## 2021-04-21 DIAGNOSIS — N18.31 STAGE 3A CHRONIC KIDNEY DISEASE (HCC): ICD-10-CM

## 2021-04-21 PROCEDURE — 99213 OFFICE O/P EST LOW 20 MIN: CPT | Performed by: INTERNAL MEDICINE

## 2021-04-21 PROCEDURE — 1123F ACP DISCUSS/DSCN MKR DOCD: CPT | Performed by: INTERNAL MEDICINE

## 2021-04-21 PROCEDURE — G0438 PPPS, INITIAL VISIT: HCPCS | Performed by: INTERNAL MEDICINE

## 2021-04-21 NOTE — PROGRESS NOTES
Assessment/Plan:  The patient is noted to have had issues with HTN that is controlled with Metoprolol  Problem List Items Addressed This Visit        Cardiovascular and Mediastinum    Essential hypertension - Primary    Relevant Orders    Lipid Panel with Direct LDL reflex    Comprehensive metabolic panel    CBC and differential       Genitourinary    Prostate CA ([facility] )    Stage 3a chronic kidney disease           Diagnoses and all orders for this visit:    Essential hypertension  -     Lipid Panel with Direct LDL reflex; Future  -     Comprehensive metabolic panel; Future  -     CBC and differential; Future    Prostate CA (HCC)    Stage 3a chronic kidney disease        No problem-specific Assessment & Plan notes found for this encounter  Subjective: No coerns     Patient ID: Jay Short  is a 81 y o  male  Hypertension  This is a chronic problem  The current episode started more than 1 year ago  The problem has been waxing and waning since onset  The problem is controlled  Pertinent negatives include no chest pain, palpitations or shortness of breath  There are no associated agents to hypertension  There are no known risk factors for coronary artery disease  Past treatments include beta blockers  The current treatment provides significant improvement  There are no compliance problems  There is no history of angina, kidney disease, CAD/MI, CVA, heart failure, left ventricular hypertrophy, PVD or retinopathy  The following portions of the patient's history were reviewed and updated as appropriate:   He has a past medical history of Cancer ([facility] )  ,  does not have any pertinent problems on file  ,   has a past surgical history that includes Appendectomy  ,  family history includes Hypertension in his mother  ,   reports that he has never smoked  He has never used smokeless tobacco  He reports previous alcohol use  He reports previous drug use ,  has No Known Allergies     Current Outpatient Medications   Medication Sig Dispense Refill    metoprolol succinate (Toprol XL) 25 mg 24 hr tablet Take 1 tablet (25 mg total) by mouth 2 (two) times a day (Patient taking differently: Take 25 mg by mouth Take 2 tablets together once daily  ) 180 tablet 3     No current facility-administered medications for this visit  Review of Systems   Constitutional: Negative for chills, fatigue and fever  HENT: Negative  Respiratory: Negative for cough, chest tightness and shortness of breath  Cardiovascular: Negative for chest pain and palpitations  Gastrointestinal: Negative for abdominal pain, constipation, diarrhea, nausea and vomiting  Genitourinary: Negative  Musculoskeletal: Negative for back pain and myalgias  Skin: Negative  Neurological: Negative  Psychiatric/Behavioral: Negative for dysphoric mood  The patient is not nervous/anxious  Objective:  Vitals:    04/21/21 1441   BP: 132/80   BP Location: Left arm   Patient Position: Sitting   Cuff Size: Large   Pulse: 75   Resp: 14   Temp: 97 9 °F (36 6 °C)   SpO2: 98%   Weight: 99 1 kg (218 lb 6 4 oz)   Height: 6' 3" (1 905 m)     Body mass index is 27 3 kg/m²  Physical Exam  Vitals signs and nursing note reviewed  Constitutional:       Appearance: He is well-developed  HENT:      Head: Normocephalic and atraumatic  Eyes:      Pupils: Pupils are equal, round, and reactive to light  Neck:      Musculoskeletal: Normal range of motion and neck supple  Cardiovascular:      Rate and Rhythm: Normal rate and regular rhythm  Heart sounds: Normal heart sounds  No murmur  Pulmonary:      Effort: Pulmonary effort is normal       Breath sounds: Normal breath sounds  No stridor  No rales  Abdominal:      General: Bowel sounds are normal  There is no distension  Palpations: Abdomen is soft  Tenderness: There is no abdominal tenderness  Musculoskeletal: Normal range of motion  General: No deformity  Skin:     General: Skin is warm and dry  Neurological:      Mental Status: He is alert and oriented to person, place, and time            PHQ-9 Depression Screening    PHQ-9:   Frequency of the following problems over the past two weeks:      Little interest or pleasure in doing things: 0 - not at all  Feeling down, depressed, or hopeless: 0 - not at all  PHQ-2 Score: 0

## 2021-04-21 NOTE — PROGRESS NOTES
Assessment and Plan:     Problem List Items Addressed This Visit     None        BMI Counseling: Body mass index is 27 3 kg/m²  The BMI is above normal  Exercise recommendations include exercising 3-5 times per week  No pharmacotherapy was ordered  Preventive health issues were discussed with patient, and age appropriate screening tests were ordered as noted in patient's After Visit Summary  Personalized health advice and appropriate referrals for health education or preventive services given if needed, as noted in patient's After Visit Summary       History of Present Illness:     Patient presents for Medicare Annual Wellness visit    Patient Care Team:  Chastity Maurer DO as PCP - General (Internal Medicine)     Problem List:     Patient Active Problem List   Diagnosis    Generalized weakness    Renal insufficiency    Syncope    Prostate CA [facility])    Essential hypertension      Past Medical and Surgical History:     Past Medical History:   Diagnosis Date    Cancer [facility])     prostate cancer      Past Surgical History:   Procedure Laterality Date    APPENDECTOMY        Family History:     Family History   Problem Relation Age of Onset    Hypertension Mother       Social History:     E-Cigarette/Vaping    E-Cigarette Use Never User      E-Cigarette/Vaping Substances    Nicotine No     THC No     CBD No     Flavoring No     Other No     Unknown No      Social History     Socioeconomic History    Marital status: /Civil Union     Spouse name: None    Number of children: None    Years of education: None    Highest education level: None   Occupational History    Occupation: RETIRED   Social Needs    Financial resource strain: None    Food insecurity     Worry: None     Inability: None    Transportation needs     Medical: None     Non-medical: None   Tobacco Use    Smoking status: Never Smoker    Smokeless tobacco: Never Used   Substance and Sexual Activity    Alcohol use: Not Currently     Frequency: Monthly or less    Drug use: Not Currently    Sexual activity: None   Lifestyle    Physical activity     Days per week: None     Minutes per session: None    Stress: None   Relationships    Social connections     Talks on phone: None     Gets together: None     Attends Hindu service: None     Active member of club or organization: None     Attends meetings of clubs or organizations: None     Relationship status: None    Intimate partner violence     Fear of current or ex partner: None     Emotionally abused: None     Physically abused: None     Forced sexual activity: None   Other Topics Concern    None   Social History Narrative        RETIRED      Medications and Allergies:     Current Outpatient Medications   Medication Sig Dispense Refill    metoprolol succinate (Toprol XL) 25 mg 24 hr tablet Take 1 tablet (25 mg total) by mouth 2 (two) times a day (Patient taking differently: Take 25 mg by mouth Take 2 tablets together once daily  ) 180 tablet 3     No current facility-administered medications for this visit  No Known Allergies   Immunizations:     Immunization History   Administered Date(s) Administered    INFLUENZA 01/04/2013, 01/24/2020    Influenza, high dose seasonal 0 7 mL 10/19/2020    SARS-CoV-2 / COVID-19 mRNA IM (Charna Paget) 01/25/2021, 02/22/2021    Tdap 01/04/2013      Health Maintenance: There are no preventive care reminders to display for this patient  Topic Date Due    Pneumococcal Vaccine: 65+ Years (1 of 1 - PPSV23) Never done      Medicare Health Risk Assessment:     /80 (BP Location: Left arm, Patient Position: Sitting, Cuff Size: Large)   Pulse 75   Temp 97 9 °F (36 6 °C)   Resp 14   Ht 6' 3" (1 905 m)   Wt 99 1 kg (218 lb 6 4 oz)   SpO2 98%   BMI 27 30 kg/m²      Debbie Comment is here for his Subsequent Wellness visit  Last Medicare Wellness visit information reviewed, patient interviewed and updates made to the record today  Health Risk Assessment:   Patient rates overall health as very good  Patient feels that their physical health rating is same  Patient is very satisfied with their life  Eyesight was rated as same  Hearing was rated as same  Patient feels that their emotional and mental health rating is same  Patients states they are never, rarely angry  Patient states they are never, rarely unusually tired/fatigued  Pain experienced in the last 7 days has been none  Patient states that he has experienced no weight loss or gain in last 6 months  Depression Screening:   PHQ-2 Score: 0      Fall Risk Screening: In the past year, patient has experienced: no history of falling in past year      Home Safety:  Patient does not have trouble with stairs inside or outside of their home  Patient has working smoke alarms and has working carbon monoxide detector  Home safety hazards include: loose rugs on the floor  Nutrition:   Current diet is Regular  Medications:   Patient is not currently taking any over-the-counter supplements  Patient is able to manage medications  Activities of Daily Living (ADLs)/Instrumental Activities of Daily Living (IADLs):   Walk and transfer into and out of bed and chair?: Yes  Dress and groom yourself?: Yes    Bathe or shower yourself?: Yes    Feed yourself? Yes  Do your laundry/housekeeping?: Yes  Manage your money, pay your bills and track your expenses?: Yes  Make your own meals?: Yes    Do your own shopping?: Yes    Previous Hospitalizations:   Any hospitalizations or ED visits within the last 12 months?: No      Advance Care Planning:   Living will: Yes    Durable POA for healthcare:  Yes    Advanced directive: Yes    Advanced directive counseling given: No    Five wishes given: No    Patient declined ACP directive: No    End of Life Decisions reviewed with patient: Yes    Provider agrees with end of life decisions: Yes      Cognitive Screening:   Provider or family/friend/caregiver concerned regarding cognition?: No    PREVENTIVE SCREENINGS      Cardiovascular Screening:      Due for: Lipid Panel      Diabetes Screening:     General: Screening Current      Colorectal Cancer Screening:     General: Screening Not Indicated      Prostate Cancer Screening:    General: History Prostate Cancer and Screening Not Indicated      Osteoporosis Screening:    General: Screening Not Indicated      Abdominal Aortic Aneurysm (AAA) Screening:        General: Screening Not Indicated      Lung Cancer Screening:     General: Screening Not Indicated      Hepatitis C Screening:    General: Screening Not Indicated    Screening, Brief Intervention, and Referral to Treatment (SBIRT)    Screening  Typical number of drinks in a day: 0  Typical number of drinks in a week: 3  Interpretation: Low risk drinking behavior      Single Item Drug Screening:  How often have you used an illegal drug (including marijuana) or a prescription medication for non-medical reasons in the past year? never    Single Item Drug Screen Score: 0  Interpretation: Negative screen for possible drug use disorder      Rama Wilson, DO

## 2021-04-21 NOTE — PATIENT INSTRUCTIONS

## 2021-05-03 ENCOUNTER — APPOINTMENT (OUTPATIENT)
Dept: LAB | Facility: CLINIC | Age: 86
End: 2021-05-03
Payer: MEDICARE

## 2021-05-03 ENCOUNTER — TRANSCRIBE ORDERS (OUTPATIENT)
Dept: URGENT CARE | Facility: CLINIC | Age: 86
End: 2021-05-03

## 2021-05-03 DIAGNOSIS — C61 MALIGNANT NEOPLASM OF PROSTATE (HCC): Primary | ICD-10-CM

## 2021-05-03 DIAGNOSIS — C61 MALIGNANT NEOPLASM OF PROSTATE (HCC): ICD-10-CM

## 2021-05-03 DIAGNOSIS — I10 ESSENTIAL HYPERTENSION: ICD-10-CM

## 2021-05-03 LAB
ALBUMIN SERPL BCP-MCNC: 3.7 G/DL (ref 3.5–5)
ALP SERPL-CCNC: 45 U/L (ref 46–116)
ALT SERPL W P-5'-P-CCNC: 29 U/L (ref 12–78)
ANION GAP SERPL CALCULATED.3IONS-SCNC: 1 MMOL/L (ref 4–13)
AST SERPL W P-5'-P-CCNC: 35 U/L (ref 5–45)
BASOPHILS # BLD AUTO: 0.07 THOUSANDS/ΜL (ref 0–0.1)
BASOPHILS NFR BLD AUTO: 2 % (ref 0–1)
BILIRUB SERPL-MCNC: 0.75 MG/DL (ref 0.2–1)
BUN SERPL-MCNC: 12 MG/DL (ref 5–25)
CALCIUM SERPL-MCNC: 9 MG/DL (ref 8.3–10.1)
CHLORIDE SERPL-SCNC: 105 MMOL/L (ref 100–108)
CHOLEST SERPL-MCNC: 162 MG/DL (ref 50–200)
CO2 SERPL-SCNC: 31 MMOL/L (ref 21–32)
CREAT SERPL-MCNC: 0.97 MG/DL (ref 0.6–1.3)
EOSINOPHIL # BLD AUTO: 0.11 THOUSAND/ΜL (ref 0–0.61)
EOSINOPHIL NFR BLD AUTO: 4 % (ref 0–6)
ERYTHROCYTE [DISTWIDTH] IN BLOOD BY AUTOMATED COUNT: 12.4 % (ref 11.6–15.1)
GFR SERPL CREATININE-BSD FRML MDRD: 71 ML/MIN/1.73SQ M
GLUCOSE P FAST SERPL-MCNC: 96 MG/DL (ref 65–99)
HCT VFR BLD AUTO: 38.3 % (ref 36.5–49.3)
HDLC SERPL-MCNC: 60 MG/DL
HGB BLD-MCNC: 13.5 G/DL (ref 12–17)
IMM GRANULOCYTES # BLD AUTO: 0.03 THOUSAND/UL (ref 0–0.2)
IMM GRANULOCYTES NFR BLD AUTO: 1 % (ref 0–2)
LDLC SERPL CALC-MCNC: 87 MG/DL (ref 0–100)
LYMPHOCYTES # BLD AUTO: 0.88 THOUSANDS/ΜL (ref 0.6–4.47)
LYMPHOCYTES NFR BLD AUTO: 28 % (ref 14–44)
MCH RBC QN AUTO: 32.6 PG (ref 26.8–34.3)
MCHC RBC AUTO-ENTMCNC: 35.2 G/DL (ref 31.4–37.4)
MCV RBC AUTO: 93 FL (ref 82–98)
MONOCYTES # BLD AUTO: 0.37 THOUSAND/ΜL (ref 0.17–1.22)
MONOCYTES NFR BLD AUTO: 12 % (ref 4–12)
NEUTROPHILS # BLD AUTO: 1.68 THOUSANDS/ΜL (ref 1.85–7.62)
NEUTS SEG NFR BLD AUTO: 53 % (ref 43–75)
NRBC BLD AUTO-RTO: 0 /100 WBCS
PLATELET # BLD AUTO: 174 THOUSANDS/UL (ref 149–390)
PMV BLD AUTO: 9.6 FL (ref 8.9–12.7)
POTASSIUM SERPL-SCNC: 4.1 MMOL/L (ref 3.5–5.3)
PROT SERPL-MCNC: 6.5 G/DL (ref 6.4–8.2)
PSA SERPL-MCNC: 0.1 NG/ML (ref 0–4)
RBC # BLD AUTO: 4.14 MILLION/UL (ref 3.88–5.62)
SODIUM SERPL-SCNC: 137 MMOL/L (ref 136–145)
TRIGL SERPL-MCNC: 76 MG/DL
WBC # BLD AUTO: 3.14 THOUSAND/UL (ref 4.31–10.16)

## 2021-05-03 PROCEDURE — 36415 COLL VENOUS BLD VENIPUNCTURE: CPT

## 2021-05-03 PROCEDURE — G0103 PSA SCREENING: HCPCS

## 2021-05-03 PROCEDURE — 80061 LIPID PANEL: CPT

## 2021-05-03 PROCEDURE — 85025 COMPLETE CBC W/AUTO DIFF WBC: CPT

## 2021-05-03 PROCEDURE — 80053 COMPREHEN METABOLIC PANEL: CPT

## 2021-10-22 ENCOUNTER — OFFICE VISIT (OUTPATIENT)
Dept: INTERNAL MEDICINE CLINIC | Facility: CLINIC | Age: 86
End: 2021-10-22
Payer: MEDICARE

## 2021-10-22 VITALS
WEIGHT: 210.4 LBS | OXYGEN SATURATION: 99 % | BODY MASS INDEX: 26.16 KG/M2 | HEART RATE: 74 BPM | SYSTOLIC BLOOD PRESSURE: 132 MMHG | DIASTOLIC BLOOD PRESSURE: 72 MMHG | RESPIRATION RATE: 14 BRPM | HEIGHT: 75 IN

## 2021-10-22 DIAGNOSIS — Z23 ENCOUNTER FOR VACCINATION: ICD-10-CM

## 2021-10-22 DIAGNOSIS — N18.31 STAGE 3A CHRONIC KIDNEY DISEASE (HCC): ICD-10-CM

## 2021-10-22 DIAGNOSIS — C61 PROSTATE CA (HCC): Primary | ICD-10-CM

## 2021-10-22 DIAGNOSIS — I10 ESSENTIAL HYPERTENSION: ICD-10-CM

## 2021-10-22 PROCEDURE — G0008 ADMIN INFLUENZA VIRUS VAC: HCPCS | Performed by: INTERNAL MEDICINE

## 2021-10-22 PROCEDURE — 90662 IIV NO PRSV INCREASED AG IM: CPT | Performed by: INTERNAL MEDICINE

## 2021-10-22 PROCEDURE — 99214 OFFICE O/P EST MOD 30 MIN: CPT | Performed by: INTERNAL MEDICINE

## 2022-02-25 DIAGNOSIS — I10 ESSENTIAL HYPERTENSION: ICD-10-CM

## 2022-02-25 RX ORDER — METOPROLOL SUCCINATE 25 MG/1
25 TABLET, EXTENDED RELEASE ORAL 2 TIMES DAILY
Qty: 180 TABLET | Refills: 3 | Status: SHIPPED | OUTPATIENT
Start: 2022-02-25

## 2022-05-19 ENCOUNTER — APPOINTMENT (OUTPATIENT)
Dept: LAB | Facility: CLINIC | Age: 87
End: 2022-05-19
Payer: MEDICARE

## 2022-05-19 DIAGNOSIS — C61 PROSTATE CANCER (HCC): ICD-10-CM

## 2022-05-19 LAB — PSA SERPL-MCNC: <0.1 NG/ML (ref 0–4)

## 2022-05-19 PROCEDURE — 84153 ASSAY OF PSA TOTAL: CPT

## 2022-05-24 ENCOUNTER — OFFICE VISIT (OUTPATIENT)
Dept: INTERNAL MEDICINE CLINIC | Facility: CLINIC | Age: 87
End: 2022-05-24
Payer: MEDICARE

## 2022-05-24 VITALS
HEART RATE: 77 BPM | OXYGEN SATURATION: 97 % | SYSTOLIC BLOOD PRESSURE: 132 MMHG | WEIGHT: 215.6 LBS | HEIGHT: 75 IN | RESPIRATION RATE: 14 BRPM | DIASTOLIC BLOOD PRESSURE: 70 MMHG | BODY MASS INDEX: 26.81 KG/M2 | TEMPERATURE: 96.6 F

## 2022-05-24 DIAGNOSIS — N18.31 STAGE 3A CHRONIC KIDNEY DISEASE (HCC): ICD-10-CM

## 2022-05-24 DIAGNOSIS — Z00.00 MEDICARE ANNUAL WELLNESS VISIT, SUBSEQUENT: ICD-10-CM

## 2022-05-24 DIAGNOSIS — I10 ESSENTIAL HYPERTENSION: Primary | ICD-10-CM

## 2022-05-24 PROCEDURE — 99213 OFFICE O/P EST LOW 20 MIN: CPT | Performed by: INTERNAL MEDICINE

## 2022-05-24 PROCEDURE — G0439 PPPS, SUBSEQ VISIT: HCPCS | Performed by: INTERNAL MEDICINE

## 2022-05-24 PROCEDURE — 1123F ACP DISCUSS/DSCN MKR DOCD: CPT | Performed by: INTERNAL MEDICINE

## 2022-05-24 NOTE — PROGRESS NOTES
Assessment/Plan:    Problem List Items Addressed This Visit        Cardiovascular and Mediastinum    Essential hypertension - Primary     The patient was seen and examined and noted to have a mildly elevated BP on recheck  We will continue the Metoprolol  Genitourinary    Stage 3a chronic kidney disease [facility])     Lab Results   Component Value Date    EGFR 71 05/03/2021    EGFR 71 10/07/2020    EGFR 68 03/13/2020    CREATININE 0 97 05/03/2021    CREATININE 0 98 10/07/2020    CREATININE 1 02 03/13/2020   Renal function stable and we will continue to watch  Other Visit Diagnoses     Medicare annual wellness visit, subsequent               Diagnoses and all orders for this visit:    Essential hypertension    Stage 3a chronic kidney disease ([facility] )    Medicare annual wellness visit, subsequent      Essential hypertension  The patient was seen and examined and noted to have a mildly elevated BP on recheck  We will continue the Metoprolol  Stage 3a chronic kidney disease [facility])  Lab Results   Component Value Date    EGFR 71 05/03/2021    EGFR 71 10/07/2020    EGFR 68 03/13/2020    CREATININE 0 97 05/03/2021    CREATININE 0 98 10/07/2020    CREATININE 1 02 03/13/2020   Renal function stable and we will continue to watch  Subjective:      Patient ID: Hayde Lamar  is a 81 y o  male  The patient has no concerns at this time  We discussed the COVID booster at length  The following portions of the patient's history were reviewed and updated as appropriate:   He has a past medical history of Cancer ([facility] )  ,  does not have any pertinent problems on file  ,   has a past surgical history that includes Appendectomy  ,  family history includes Hypertension in his mother  ,   reports that he has never smoked  He has never used smokeless tobacco  He reports previous alcohol use  He reports previous drug use ,  has No Known Allergies     Current Outpatient Medications   Medication Sig Dispense Refill    metoprolol succinate (TOPROL-XL) 25 mg 24 hr tablet Take 1 tablet (25 mg total) by mouth 2 (two) times a day (Patient taking differently: Take 25 mg by mouth Take 2 tablet once daily) 180 tablet 3     No current facility-administered medications for this visit  Review of Systems   Constitutional: Negative for chills, fatigue and fever  HENT: Negative  Respiratory: Negative for cough, chest tightness and shortness of breath  Cardiovascular: Negative for chest pain and palpitations  Gastrointestinal: Negative for abdominal pain, constipation, diarrhea, nausea and vomiting  Genitourinary: Negative  Musculoskeletal: Negative for back pain and myalgias  Skin: Negative  Neurological: Negative  Psychiatric/Behavioral: Negative for dysphoric mood  The patient is not nervous/anxious  Objective:  Vitals:    05/24/22 1355 05/24/22 2304   BP: 144/86 132/70   BP Location: Left arm    Patient Position: Sitting    Cuff Size: Standard    Pulse: 77    Resp: 14    Temp: (!) 96 6 °F (35 9 °C)    SpO2: 97%    Weight: 97 8 kg (215 lb 9 6 oz)    Height: 6' 3" (1 905 m)      Body mass index is 26 95 kg/m²  Physical Exam  Vitals and nursing note reviewed  Constitutional:       Appearance: He is well-developed  HENT:      Head: Normocephalic and atraumatic  Eyes:      Pupils: Pupils are equal, round, and reactive to light  Cardiovascular:      Rate and Rhythm: Normal rate and regular rhythm  Heart sounds: Normal heart sounds  No murmur heard  Pulmonary:      Effort: Pulmonary effort is normal       Breath sounds: Normal breath sounds  No stridor  No rales  Abdominal:      General: Bowel sounds are normal  There is no distension  Palpations: Abdomen is soft  Tenderness: There is no abdominal tenderness  Musculoskeletal:         General: No deformity  Normal range of motion  Cervical back: Normal range of motion and neck supple     Skin:     General: Skin is warm and dry  Neurological:      Mental Status: He is alert and oriented to person, place, and time  PHQ-2/9 Depression Screening    Little interest or pleasure in doing things: 0 - not at all  Feeling down, depressed, or hopeless: 0 - not at all  PHQ-2 Score: 0  PHQ-2 Interpretation: Negative depression screen        Assessment and Plan:     Problem List Items Addressed This Visit        Cardiovascular and Mediastinum    Essential hypertension - Primary     The patient was seen and examined and noted to have a mildly elevated BP on recheck  We will continue the Metoprolol  Genitourinary    Stage 3a chronic kidney disease [facility])     Lab Results   Component Value Date    EGFR 71 05/03/2021    EGFR 71 10/07/2020    EGFR 68 03/13/2020    CREATININE 0 97 05/03/2021    CREATININE 0 98 10/07/2020    CREATININE 1 02 03/13/2020   Renal function stable and we will continue to watch  Other Visit Diagnoses     Medicare annual wellness visit, subsequent               Preventive health issues were discussed with patient, and age appropriate screening tests were ordered as noted in patient's After Visit Summary  Personalized health advice and appropriate referrals for health education or preventive services given if needed, as noted in patient's After Visit Summary       History of Present Illness:     Patient presents for Medicare Annual Wellness visit    Patient Care Team:  Ynes Noguera DO as PCP - General (Internal Medicine)     Problem List:     Patient Active Problem List   Diagnosis    Generalized weakness    Renal insufficiency    Syncope    Prostate CA [facility])    Essential hypertension    Stage 3a chronic kidney disease ([facility] )      Past Medical and Surgical History:     Past Medical History:   Diagnosis Date    Cancer [facility])     prostate cancer      Past Surgical History:   Procedure Laterality Date    APPENDECTOMY        Family History:     Family History   Problem Relation Age of Onset    Hypertension Mother       Social History:     Social History     Socioeconomic History    Marital status: /Civil Union     Spouse name: None    Number of children: None    Years of education: None    Highest education level: None   Occupational History    Occupation: RETIRED   Tobacco Use    Smoking status: Never Smoker    Smokeless tobacco: Never Used   Vaping Use    Vaping Use: Never used   Substance and Sexual Activity    Alcohol use: Not Currently    Drug use: Not Currently    Sexual activity: None   Other Topics Concern    None   Social History Narrative        RETIRED     Social Determinants of Health     Financial Resource Strain: Not on file   Food Insecurity: Not on file   Transportation Needs: Not on file   Physical Activity: Not on file   Stress: Not on file   Social Connections: Not on file   Intimate Partner Violence: Not on file   Housing Stability: Not on file      Medications and Allergies:     Current Outpatient Medications   Medication Sig Dispense Refill    metoprolol succinate (TOPROL-XL) 25 mg 24 hr tablet Take 1 tablet (25 mg total) by mouth 2 (two) times a day (Patient taking differently: Take 25 mg by mouth Take 2 tablet once daily) 180 tablet 3     No current facility-administered medications for this visit  No Known Allergies   Immunizations:     Immunization History   Administered Date(s) Administered    COVID-19 MODERNA VACC 0 5 ML IM 01/25/2021, 02/22/2021    INFLUENZA 01/04/2013, 01/24/2020, 10/22/2021    Influenza, high dose seasonal 0 7 mL 10/19/2020, 10/22/2021    Tdap 01/04/2013      Health Maintenance: There are no preventive care reminders to display for this patient        Topic Date Due    Pneumococcal Vaccine: 65+ Years (1 - PCV) Never done    COVID-19 Vaccine (3 - Booster for Moderna series) 07/22/2021      Medicare Health Risk Assessment:     /70   Pulse 77   Temp (!) 96 6 °F (35 9 °C)   Resp 14   Ht 6' 3" (1 905 m)   Wt 97 8 kg (215 lb 9 6 oz)   SpO2 97%   BMI 26 95 kg/m²      Fabienne Carrel is here for his Subsequent Wellness visit  Last Medicare Wellness visit information reviewed, patient interviewed and updates made to the record today  Health Risk Assessment:   Patient rates overall health as very good  Patient feels that their physical health rating is same  Patient is very satisfied with their life  Eyesight was rated as same  Hearing was rated as same  Patient feels that their emotional and mental health rating is same  Patients states they are never, rarely angry  Patient states they are never, rarely unusually tired/fatigued  Pain experienced in the last 7 days has been none  Patient states that he has experienced no weight loss or gain in last 6 months  Depression Screening:   PHQ-2 Score: 0      Fall Risk Screening: In the past year, patient has experienced: no history of falling in past year      Home Safety:  Patient does not have trouble with stairs inside or outside of their home  Patient has working smoke alarms and has working carbon monoxide detector  Home safety hazards include: none  Nutrition:   Current diet is Regular  Medications:   Patient is currently taking over-the-counter supplements  OTC medications include: see medication list  Patient is not able to manage medications  Activities of Daily Living (ADLs)/Instrumental Activities of Daily Living (IADLs):   Walk and transfer into and out of bed and chair?: Yes  Dress and groom yourself?: Yes    Bathe or shower yourself?: Yes    Feed yourself? Yes  Do your laundry/housekeeping?: Yes  Manage your money, pay your bills and track your expenses?: Yes  Make your own meals?: Yes    Do your own shopping?: Yes    Previous Hospitalizations:   Any hospitalizations or ED visits within the last 12 months?: No      Advance Care Planning:   Living will: Yes    Durable POA for healthcare:  Yes    Advanced directive: Yes    Five wishes given: No Cognitive Screening:   Provider or family/friend/caregiver concerned regarding cognition?: No    PREVENTIVE SCREENINGS      Cardiovascular Screening:    General: Screening Current      Colorectal Cancer Screening:     General: Screening Not Indicated      Prostate Cancer Screening:    General: History Prostate Cancer and Screening Not Indicated      Lung Cancer Screening:     General: Screening Not Indicated    Screening, Brief Intervention, and Referral to Treatment (SBIRT)    Screening  Typical number of drinks in a day: 0  Typical number of drinks in a week: 3  Interpretation: Low risk drinking behavior      Single Item Drug Screening:  How often have you used an illegal drug (including marijuana) or a prescription medication for non-medical reasons in the past year? never    Single Item Drug Screen Score: 0  Interpretation: Negative screen for possible drug use disorder      Renata Cheatham, DO

## 2022-05-24 NOTE — PATIENT INSTRUCTIONS

## 2022-05-25 ENCOUNTER — LAB REQUISITION (OUTPATIENT)
Dept: LAB | Facility: HOSPITAL | Age: 87
End: 2022-05-25
Payer: MEDICARE

## 2022-05-25 DIAGNOSIS — R31.29 OTHER MICROSCOPIC HEMATURIA: ICD-10-CM

## 2022-05-25 LAB
BILIRUB UR QL STRIP: NEGATIVE
CLARITY UR: CLEAR
COLOR UR: YELLOW
GLUCOSE UR STRIP-MCNC: NEGATIVE MG/DL
HGB UR QL STRIP.AUTO: NEGATIVE
KETONES UR STRIP-MCNC: NEGATIVE MG/DL
LEUKOCYTE ESTERASE UR QL STRIP: NEGATIVE
NITRITE UR QL STRIP: NEGATIVE
PH UR STRIP.AUTO: 6.5 [PH]
PROT UR STRIP-MCNC: NEGATIVE MG/DL
SP GR UR STRIP.AUTO: 1.02 (ref 1–1.03)
UROBILINOGEN UR STRIP-ACNC: <2 MG/DL

## 2022-05-25 PROCEDURE — 81003 URINALYSIS AUTO W/O SCOPE: CPT | Performed by: UROLOGY

## 2022-05-25 NOTE — ASSESSMENT & PLAN NOTE
Lab Results   Component Value Date    EGFR 71 05/03/2021    EGFR 71 10/07/2020    EGFR 68 03/13/2020    CREATININE 0 97 05/03/2021    CREATININE 0 98 10/07/2020    CREATININE 1 02 03/13/2020   Renal function stable and we will continue to watch

## 2022-05-25 NOTE — ASSESSMENT & PLAN NOTE
The patient was seen and examined and noted to have a mildly elevated BP on recheck  We will continue the Metoprolol

## 2022-11-22 ENCOUNTER — OFFICE VISIT (OUTPATIENT)
Dept: INTERNAL MEDICINE CLINIC | Facility: CLINIC | Age: 87
End: 2022-11-22

## 2022-11-22 VITALS
HEIGHT: 75 IN | DIASTOLIC BLOOD PRESSURE: 80 MMHG | OXYGEN SATURATION: 99 % | TEMPERATURE: 98.1 F | RESPIRATION RATE: 12 BRPM | HEART RATE: 77 BPM | WEIGHT: 214.2 LBS | BODY MASS INDEX: 26.63 KG/M2 | SYSTOLIC BLOOD PRESSURE: 130 MMHG

## 2022-11-22 DIAGNOSIS — N18.31 STAGE 3A CHRONIC KIDNEY DISEASE (HCC): ICD-10-CM

## 2022-11-22 DIAGNOSIS — Z23 ENCOUNTER FOR VACCINATION: Primary | ICD-10-CM

## 2022-11-22 DIAGNOSIS — C61 PROSTATE CA (HCC): ICD-10-CM

## 2022-11-22 DIAGNOSIS — I10 ESSENTIAL HYPERTENSION: ICD-10-CM

## 2022-11-22 RX ORDER — METOPROLOL SUCCINATE 25 MG/1
25 TABLET, EXTENDED RELEASE ORAL 2 TIMES DAILY
Qty: 180 TABLET | Refills: 3 | Status: SHIPPED | OUTPATIENT
Start: 2022-11-22

## 2022-11-22 NOTE — PROGRESS NOTES
Assessment/Plan:    Problem List Items Addressed This Visit        Cardiovascular and Mediastinum    Essential hypertension     Continue the Metoprolol         Relevant Medications    metoprolol succinate (TOPROL-XL) 25 mg 24 hr tablet    Other Relevant Orders    Comprehensive metabolic panel    Lipid Panel with Direct LDL reflex    CBC and differential       Genitourinary    Prostate CA (HCC)     PSA reviewed from Dr Criss Hernandez and noted to be less than the measurable limit         Stage 3a chronic kidney disease [facility])     Lab Results   Component Value Date    EGFR 71 05/03/2021    EGFR 71 10/07/2020    EGFR 68 03/13/2020    CREATININE 0 97 05/03/2021    CREATININE 0 98 10/07/2020    CREATININE 1 02 03/13/2020   Follow up in 2023 for kidney studies and will continue to follow         Relevant Orders    Comprehensive metabolic panel    Lipid Panel with Direct LDL reflex    CBC and differential   Other Visit Diagnoses     Encounter for vaccination    -  Primary    Relevant Orders    influenza vaccine, high-dose, PF 0 7 mL (FLUZONE HIGH-DOSE) (Completed)           Diagnoses and all orders for this visit:    Encounter for vaccination  -     influenza vaccine, high-dose, PF 0 7 mL (FLUZONE HIGH-DOSE)    Essential hypertension  -     metoprolol succinate (TOPROL-XL) 25 mg 24 hr tablet; Take 1 tablet (25 mg total) by mouth 2 (two) times a day  -     Comprehensive metabolic panel; Future  -     Lipid Panel with Direct LDL reflex; Future  -     CBC and differential; Future    Stage 3a chronic kidney disease (HCC)  -     Comprehensive metabolic panel; Future  -     Lipid Panel with Direct LDL reflex;  Future  -     CBC and differential; Future    Prostate CA [facility])      Essential hypertension  Continue the Metoprolol    Stage 3a chronic kidney disease [facility])  Lab Results   Component Value Date    EGFR 71 05/03/2021    EGFR 71 10/07/2020    EGFR 68 03/13/2020    CREATININE 0 97 05/03/2021    CREATININE 0 98 10/07/2020    CREATININE 1 02 03/13/2020   Follow up in 2023 for kidney studies and will continue to follow    Prostate CA [facility])  PSA reviewed from Dr Onel Box and noted to be less than the measurable limit        Subjective:      Patient ID: Lee Ely  is a 81 y o  male  Only one episode of orthostatic symptoms noted in the past month  The following portions of the patient's history were reviewed and updated as appropriate:   He has a past medical history of Cancer ([address] )  ,  does not have any pertinent problems on file  ,   has a past surgical history that includes Appendectomy  ,  family history includes Hypertension in his mother  ,   reports that he has never smoked  He has never used smokeless tobacco  He reports that he does not currently use alcohol  He reports that he does not currently use drugs  ,  has No Known Allergies     Current Outpatient Medications   Medication Sig Dispense Refill   • metoprolol succinate (TOPROL-XL) 25 mg 24 hr tablet Take 1 tablet (25 mg total) by mouth 2 (two) times a day 180 tablet 3     No current facility-administered medications for this visit  Review of Systems   Constitutional: Negative for chills, fatigue and fever  HENT: Negative  Respiratory: Negative for cough, chest tightness and shortness of breath  Cardiovascular: Negative for chest pain and palpitations  Gastrointestinal: Negative for abdominal pain, constipation, diarrhea, nausea and vomiting  Genitourinary: Negative  Musculoskeletal: Negative for back pain and myalgias  Skin: Negative  Neurological: Negative  Psychiatric/Behavioral: Negative for dysphoric mood  The patient is not nervous/anxious  Objective:  Vitals:    11/22/22 1404 11/22/22 1514   BP: 148/76 130/80   Pulse: 77    Resp: 12    Temp: 98 1 °F (36 7 °C)    SpO2: 99%    Weight: 97 2 kg (214 lb 3 2 oz)    Height: 6' 3" (1 905 m)      Body mass index is 26 77 kg/m²  Physical Exam  Vitals and nursing note reviewed  Constitutional:       Appearance: He is well-developed and well-nourished  HENT:      Head: Normocephalic and atraumatic  Eyes:      Extraocular Movements: EOM normal       Pupils: Pupils are equal, round, and reactive to light  Cardiovascular:      Rate and Rhythm: Normal rate and regular rhythm  Pulses: Intact distal pulses  Heart sounds: Normal heart sounds  No murmur heard  Pulmonary:      Effort: Pulmonary effort is normal       Breath sounds: Normal breath sounds  No stridor  No rales  Abdominal:      General: Bowel sounds are normal  There is no distension  Palpations: Abdomen is soft  Tenderness: There is no abdominal tenderness  Musculoskeletal:         General: No deformity or edema  Normal range of motion  Cervical back: Normal range of motion and neck supple  Skin:     General: Skin is warm and dry  Neurological:      Mental Status: He is alert and oriented to person, place, and time     Psychiatric:         Mood and Affect: Mood and affect normal           PHQ-2/9 Depression Screening

## 2022-11-22 NOTE — ASSESSMENT & PLAN NOTE
Lab Results   Component Value Date    EGFR 71 05/03/2021    EGFR 71 10/07/2020    EGFR 68 03/13/2020    CREATININE 0 97 05/03/2021    CREATININE 0 98 10/07/2020    CREATININE 1 02 03/13/2020   Follow up in 2023 for kidney studies and will continue to follow

## 2022-12-28 ENCOUNTER — OFFICE VISIT (OUTPATIENT)
Dept: URGENT CARE | Facility: CLINIC | Age: 87
End: 2022-12-28

## 2022-12-28 VITALS
RESPIRATION RATE: 18 BRPM | WEIGHT: 214 LBS | HEIGHT: 75 IN | DIASTOLIC BLOOD PRESSURE: 100 MMHG | SYSTOLIC BLOOD PRESSURE: 160 MMHG | TEMPERATURE: 98 F | OXYGEN SATURATION: 97 % | BODY MASS INDEX: 26.61 KG/M2 | HEART RATE: 86 BPM

## 2022-12-28 DIAGNOSIS — R21 RASH: Primary | ICD-10-CM

## 2022-12-28 RX ORDER — MOMETASONE FUROATE 1 MG/G
CREAM TOPICAL DAILY
Qty: 45 G | Refills: 0 | Status: SHIPPED | OUTPATIENT
Start: 2022-12-28

## 2022-12-28 NOTE — PATIENT INSTRUCTIONS
Apply Elocon cream as prescribed (Do not use for longer than 4 weeks)  Wash hands following administration  Avoid scratching area  Topical benadryl cream during the day  Cool compresses  Keep area clean and dry  Watch for signs of infection  Follow up with PCP in 3-5 days  Proceed to  ER if symptoms worsen

## 2022-12-28 NOTE — PROGRESS NOTES
[facility]        NAME: Deborah Winter  is a 81 y o  male  : 1935    MRN: [id number]  DATE: 2022  TIME: 10:35 AM    Assessment and Plan   Rash [R21]  1  Rash  mometasone (ELOCON) 0 1 % cream            Patient Instructions     Apply Elocon cream as prescribed (Do not use for longer than 4 weeks)  Wash hands following administration  Avoid scratching area  Topical benadryl cream during the day  Cool compresses  Keep area clean and dry  Watch for signs of infection  Follow up with PCP in 3-5 days  Proceed to  ER if symptoms worsen  Chief Complaint     Chief Complaint   Patient presents with   • Rash     Patient c/o of itchy rash located on his back that appeared Monday night  History of Present Illness       Rash  This is a new problem  Episode onset: Monday  The problem has been gradually worsening since onset  Location: back  The rash is characterized by redness and itchiness  He was exposed to nothing  Pertinent negatives include no cough, fever, shortness of breath or sore throat  Past treatments include nothing  Review of Systems   Review of Systems   Constitutional: Negative for chills and fever  HENT: Negative  Negative for sore throat and trouble swallowing  Respiratory: Negative for cough and shortness of breath  Skin: Positive for rash  Negative for wound           Current Medications       Current Outpatient Medications:   •  metoprolol succinate (TOPROL-XL) 25 mg 24 hr tablet, Take 1 tablet (25 mg total) by mouth 2 (two) times a day, Disp: 180 tablet, Rfl: 3  •  mometasone (ELOCON) 0 1 % cream, Apply topically daily, Disp: 45 g, Rfl: 0    Current Allergies     Allergies as of 2022   • (No Known Allergies)            The following portions of the patient's history were reviewed and updated as appropriate: allergies, current medications, past family history, past medical history, past social history, past surgical history and problem list  Past Medical History:   Diagnosis Date   • Cancer [facility])     prostate cancer        Past Surgical History:   Procedure Laterality Date   • APPENDECTOMY         Family History   Problem Relation Age of Onset   • Hypertension Mother          Medications have been verified  Objective   /100   Pulse 86   Temp 98 °F (36 7 °C) (Temporal)   Resp 18   Ht 6' 3" (1 905 m)   Wt 97 1 kg (214 lb)   SpO2 97%   BMI 26 75 kg/m²   No LMP for male patient  Physical Exam     Physical Exam  Constitutional:       General: He is not in acute distress  Appearance: He is well-developed  He is not diaphoretic  Cardiovascular:      Rate and Rhythm: Normal rate and regular rhythm  Heart sounds: Normal heart sounds  No murmur heard  No friction rub  No gallop  Pulmonary:      Effort: Pulmonary effort is normal  No respiratory distress  Breath sounds: Normal breath sounds  No wheezing, rhonchi or rales  Lymphadenopathy:      Cervical: No cervical adenopathy  Skin:     General: Skin is warm  Findings: Rash (scattered erythematous papular rash over b/l aspect of back and inferior aspect of abdomen) present  No erythema  Neurological:      Mental Status: He is alert

## 2023-01-01 ENCOUNTER — DOCUMENTATION (OUTPATIENT)
Dept: HEMATOLOGY ONCOLOGY | Facility: CLINIC | Age: 88
End: 2023-01-01

## 2023-05-08 ENCOUNTER — LAB (OUTPATIENT)
Dept: LAB | Facility: CLINIC | Age: 88
End: 2023-05-08

## 2023-05-08 DIAGNOSIS — N18.31 STAGE 3A CHRONIC KIDNEY DISEASE (HCC): ICD-10-CM

## 2023-05-08 DIAGNOSIS — I10 ESSENTIAL HYPERTENSION: ICD-10-CM

## 2023-05-08 DIAGNOSIS — C61 PROSTATE CA (HCC): ICD-10-CM

## 2023-05-08 LAB — PSA SERPL-MCNC: <0.1 NG/ML (ref 0–4)

## 2023-05-15 ENCOUNTER — APPOINTMENT (OUTPATIENT)
Dept: LAB | Facility: CLINIC | Age: 88
End: 2023-05-15

## 2023-05-15 LAB
ALBUMIN SERPL BCP-MCNC: 3.3 G/DL (ref 3.5–5)
ALP SERPL-CCNC: 58 U/L (ref 46–116)
ALT SERPL W P-5'-P-CCNC: 26 U/L (ref 12–78)
ANION GAP SERPL CALCULATED.3IONS-SCNC: 2 MMOL/L (ref 4–13)
AST SERPL W P-5'-P-CCNC: 33 U/L (ref 5–45)
BASOPHILS # BLD AUTO: 0.06 THOUSANDS/ÂΜL (ref 0–0.1)
BASOPHILS NFR BLD AUTO: 1 % (ref 0–1)
BILIRUB SERPL-MCNC: 0.64 MG/DL (ref 0.2–1)
BUN SERPL-MCNC: 13 MG/DL (ref 5–25)
CALCIUM ALBUM COR SERPL-MCNC: 9.4 MG/DL (ref 8.3–10.1)
CALCIUM SERPL-MCNC: 8.8 MG/DL (ref 8.3–10.1)
CHLORIDE SERPL-SCNC: 100 MMOL/L (ref 96–108)
CHOLEST SERPL-MCNC: 132 MG/DL
CO2 SERPL-SCNC: 27 MMOL/L (ref 21–32)
CREAT SERPL-MCNC: 0.91 MG/DL (ref 0.6–1.3)
EOSINOPHIL # BLD AUTO: 0.08 THOUSAND/ÂΜL (ref 0–0.61)
EOSINOPHIL NFR BLD AUTO: 2 % (ref 0–6)
ERYTHROCYTE [DISTWIDTH] IN BLOOD BY AUTOMATED COUNT: 13.3 % (ref 11.6–15.1)
GFR SERPL CREATININE-BSD FRML MDRD: 76 ML/MIN/1.73SQ M
GLUCOSE P FAST SERPL-MCNC: 99 MG/DL (ref 65–99)
HCT VFR BLD AUTO: 35.5 % (ref 36.5–49.3)
HDLC SERPL-MCNC: 66 MG/DL
HGB BLD-MCNC: 11.9 G/DL (ref 12–17)
IMM GRANULOCYTES # BLD AUTO: 0.03 THOUSAND/UL (ref 0–0.2)
IMM GRANULOCYTES NFR BLD AUTO: 1 % (ref 0–2)
LDLC SERPL CALC-MCNC: 55 MG/DL (ref 0–100)
LYMPHOCYTES # BLD AUTO: 0.7 THOUSANDS/ÂΜL (ref 0.6–4.47)
LYMPHOCYTES NFR BLD AUTO: 16 % (ref 14–44)
MCH RBC QN AUTO: 30.9 PG (ref 26.8–34.3)
MCHC RBC AUTO-ENTMCNC: 33.5 G/DL (ref 31.4–37.4)
MCV RBC AUTO: 92 FL (ref 82–98)
MONOCYTES # BLD AUTO: 0.45 THOUSAND/ÂΜL (ref 0.17–1.22)
MONOCYTES NFR BLD AUTO: 10 % (ref 4–12)
NEUTROPHILS # BLD AUTO: 3.03 THOUSANDS/ÂΜL (ref 1.85–7.62)
NEUTS SEG NFR BLD AUTO: 70 % (ref 43–75)
NRBC BLD AUTO-RTO: 0 /100 WBCS
PLATELET # BLD AUTO: 282 THOUSANDS/UL (ref 149–390)
PMV BLD AUTO: 8.8 FL (ref 8.9–12.7)
POTASSIUM SERPL-SCNC: 4 MMOL/L (ref 3.5–5.3)
PROT SERPL-MCNC: 7.1 G/DL (ref 6.4–8.4)
RBC # BLD AUTO: 3.85 MILLION/UL (ref 3.88–5.62)
SODIUM SERPL-SCNC: 129 MMOL/L (ref 135–147)
TRIGL SERPL-MCNC: 56 MG/DL
WBC # BLD AUTO: 4.35 THOUSAND/UL (ref 4.31–10.16)

## 2023-05-16 NOTE — PROGRESS NOTES
Assessment/Plan:    Problem List Items Addressed This Visit    None       There are no diagnoses linked to this encounter. No problem-specific Assessment & Plan notes found for this encounter. Subjective:      Patient ID: Leanna Diggs is a 81 y.o. male. HPI    The following portions of the patient's history were reviewed and updated as appropriate:   He has a past medical history of Cancer ([address]). ,  does not have any pertinent problems on file. ,   has a past surgical history that includes Appendectomy. ,  family history includes Hypertension in his mother. ,   reports that he has never smoked. He has never used smokeless tobacco. He reports that he does not currently use alcohol. He reports that he does not currently use drugs. ,  has No Known Allergies. .  Current Outpatient Medications   Medication Sig Dispense Refill   • metoprolol succinate (TOPROL-XL) 25 mg 24 hr tablet Take 1 tablet (25 mg total) by mouth 2 (two) times a day 180 tablet 3   • mometasone (ELOCON) 0.1 % cream Apply topically daily 45 g 0     No current facility-administered medications for this visit. Review of Systems      Objective: There were no vitals filed for this visit. There is no height or weight on file to calculate BMI.      Physical Exam     PHQ-2/9 Depression Screening

## 2023-05-17 ENCOUNTER — RA CDI HCC (OUTPATIENT)
Dept: OTHER | Facility: HOSPITAL | Age: 88
End: 2023-05-17

## 2023-05-17 NOTE — PROGRESS NOTES
[address]  coding opportunities       Chart reviewed, no opportunity found: CHART REVIEWED, NO OPPORTUNITY FOUND        Patients Insurance     Medicare Insurance: Medicare

## 2023-05-23 ENCOUNTER — OFFICE VISIT (OUTPATIENT)
Dept: INTERNAL MEDICINE CLINIC | Facility: CLINIC | Age: 88
End: 2023-05-23

## 2023-05-23 VITALS
BODY MASS INDEX: 26.53 KG/M2 | WEIGHT: 213.4 LBS | TEMPERATURE: 97 F | RESPIRATION RATE: 14 BRPM | HEART RATE: 99 BPM | OXYGEN SATURATION: 98 % | HEIGHT: 75 IN | DIASTOLIC BLOOD PRESSURE: 72 MMHG | SYSTOLIC BLOOD PRESSURE: 134 MMHG

## 2023-05-23 DIAGNOSIS — N28.9 RENAL INSUFFICIENCY: ICD-10-CM

## 2023-05-23 DIAGNOSIS — I10 ESSENTIAL HYPERTENSION: ICD-10-CM

## 2023-05-23 DIAGNOSIS — D64.9 ANEMIA, UNSPECIFIED TYPE: ICD-10-CM

## 2023-05-23 DIAGNOSIS — E87.1 HYPONATREMIA: Primary | ICD-10-CM

## 2023-05-23 DIAGNOSIS — N18.2 STAGE 2 CHRONIC KIDNEY DISEASE: ICD-10-CM

## 2023-05-23 DIAGNOSIS — C61 PROSTATE CA (HCC): ICD-10-CM

## 2023-05-23 RX ORDER — TAMSULOSIN HYDROCHLORIDE 0.4 MG/1
0.4 CAPSULE ORAL DAILY
COMMUNITY
Start: 2023-05-11

## 2023-05-23 NOTE — PROGRESS NOTES
Assessment/Plan:    Problem List Items Addressed This Visit        Cardiovascular and Mediastinum    Essential hypertension     The patient's BP is mildly elevated with current medication  Continue the Metoprolol XL 25 mg            Genitourinary    Renal insufficiency     Stable renal function and no change at this time         [facility])     Reviewed the patient's PSA from DR Ronnell Carolina and no concerns at this time         Stage 2 chronic kidney disease     Lab Results   Component Value Date    EGFR 76 05/15/2023    EGFR 71 05/03/2021    EGFR 71 10/07/2020    CREATININE 0 91 05/15/2023    CREATININE 0 97 05/03/2021    CREATININE 0 98 10/07/2020   Renal function is stable and no concerns at this time        Other Visit Diagnoses     Hyponatremia    -  Primary    Check the Urine osm  Check urine Na+    Relevant Orders    Osmolality, urine    Sodium, urine, random    Anemia, unspecified type        Check CBC and iron panel  Consider follow up with GI    Relevant Orders    Basic metabolic panel    CBC and differential    Iron Panel (Includes Ferritin, Iron Sat%, Iron, and TIBC)           Diagnoses and all orders for this visit:    Hyponatremia  Comments:  Check the Urine osm  Check urine Na+  Orders:  -     Osmolality, urine  -     Sodium, urine, random    Anemia, unspecified type  Comments:  Check CBC and iron panel  Consider follow up with GI  Orders:  -     Basic metabolic panel; Future  -     CBC and differential; Future  -     Iron Panel (Includes Ferritin, Iron Sat%, Iron, and TIBC); Future    Prostate CA ([address] )    Stage 2 chronic kidney disease    Essential hypertension    Renal insufficiency    Other orders  -     tamsulosin (FLOMAX) 0 4 mg;  Take 0 4 mg by mouth daily      Essential hypertension  The patient's BP is mildly elevated with current medication  Continue the Metoprolol XL 25 mg    Renal insufficiency  Stable renal function and no change at this time    [facility])  Reviewed the patient's PSA from DR Shawna Mays and no concerns at this time    Stage 2 chronic kidney disease  Lab Results   Component Value Date    EGFR 76 05/15/2023    EGFR 71 05/03/2021    EGFR 71 10/07/2020    CREATININE 0 91 05/15/2023    CREATININE 0 97 05/03/2021    CREATININE 0 98 10/07/2020   Renal function is stable and no concerns at this time        Subjective:      Patient ID: Francisco Temple  is a 81 y o  male  The patient is noted to have edema and notes polyuria at night  Discussed control of edema and the patient want to explore the further with Dr Shawna Mays  The patient's labs note a low Na+ and anemia  Reviewed meds and there is no obvious reason for this  The following portions of the patient's history were reviewed and updated as appropriate:   He has a past medical history of Cancer ([facility] [address] )  ,  does not have any pertinent problems on file  ,   has a past surgical history that includes Appendectomy  ,  family history includes Hypertension in his mother  ,   reports that he has never smoked  He has never used smokeless tobacco  He reports that he does not currently use alcohol  He reports that he does not currently use drugs  ,  has No Known Allergies     Current Outpatient Medications   Medication Sig Dispense Refill   • metoprolol succinate (TOPROL-XL) 25 mg 24 hr tablet Take 1 tablet (25 mg total) by mouth 2 (two) times a day 180 tablet 3   • tamsulosin (FLOMAX) 0 4 mg Take 0 4 mg by mouth daily       No current facility-administered medications for this visit  Review of Systems   Constitutional: Negative for chills, fatigue and fever  HENT: Negative  Respiratory: Negative for cough, chest tightness and shortness of breath  Cardiovascular: Negative for chest pain and palpitations  Gastrointestinal: Negative for abdominal pain, constipation, diarrhea, nausea and vomiting  Genitourinary: Negative  Musculoskeletal: Negative for back pain and myalgias  Skin: Negative  Neurological: Negative  "  Psychiatric/Behavioral: Negative for dysphoric mood  The patient is not nervous/anxious  Objective:  Vitals:    05/23/23 1221   BP: 134/72   Pulse: 99   Resp: 14   Temp: (!) 97 °F (36 1 °C)   SpO2: 98%   Weight: 96 8 kg (213 lb 6 4 oz)   Height: 6' 3\" (1 905 m)     Body mass index is 26 67 kg/m²  Physical Exam  Vitals and nursing note reviewed  Constitutional:       Appearance: He is well-developed  HENT:      Head: Normocephalic and atraumatic  Eyes:      Pupils: Pupils are equal, round, and reactive to light  Cardiovascular:      Rate and Rhythm: Normal rate and regular rhythm  Heart sounds: Normal heart sounds  No murmur heard  Pulmonary:      Effort: Pulmonary effort is normal       Breath sounds: Normal breath sounds  No stridor  No rales  Abdominal:      General: Bowel sounds are normal  There is no distension  Palpations: Abdomen is soft  Tenderness: There is no abdominal tenderness  Musculoskeletal:         General: No deformity  Normal range of motion  Cervical back: Normal range of motion and neck supple  Skin:     General: Skin is warm and dry  Neurological:      Mental Status: He is alert and oriented to person, place, and time            PHQ-2/9 Depression Screening    Little interest or pleasure in doing things: 0 - not at all  Feeling down, depressed, or hopeless: 0 - not at all  PHQ-2 Score: 0  PHQ-2 Interpretation: Negative depression screen         "

## 2023-05-23 NOTE — ASSESSMENT & PLAN NOTE
Lab Results   Component Value Date    EGFR 76 05/15/2023    EGFR 71 05/03/2021    EGFR 71 10/07/2020    CREATININE 0 91 05/15/2023    CREATININE 0 97 05/03/2021    CREATININE 0 98 10/07/2020   Renal function is stable and no concerns at this time

## 2023-05-30 ENCOUNTER — HOSPITAL ENCOUNTER (OUTPATIENT)
Facility: MEDICAL CENTER | Age: 88
Discharge: HOME | End: 2023-05-30

## 2023-05-30 DIAGNOSIS — C61 MALIGNANT NEOPLASM OF PROSTATE (HCC): ICD-10-CM

## 2023-05-30 RX ADMIN — GADOBUTROL 9 ML: 604.72 INJECTION INTRAVENOUS at 12:03

## 2023-05-31 ENCOUNTER — APPOINTMENT (OUTPATIENT)
Dept: LAB | Facility: CLINIC | Age: 88
End: 2023-05-31
Payer: MEDICARE

## 2023-05-31 DIAGNOSIS — D64.9 ANEMIA, UNSPECIFIED TYPE: ICD-10-CM

## 2023-05-31 LAB
ANION GAP SERPL CALCULATED.3IONS-SCNC: 3 MMOL/L (ref 4–13)
BASOPHILS # BLD AUTO: 0.06 THOUSANDS/ÂΜL (ref 0–0.1)
BASOPHILS NFR BLD AUTO: 1 % (ref 0–1)
BUN SERPL-MCNC: 10 MG/DL (ref 5–25)
CALCIUM SERPL-MCNC: 8.9 MG/DL (ref 8.3–10.1)
CHLORIDE SERPL-SCNC: 100 MMOL/L (ref 96–108)
CO2 SERPL-SCNC: 24 MMOL/L (ref 21–32)
CREAT SERPL-MCNC: 0.92 MG/DL (ref 0.6–1.3)
EOSINOPHIL # BLD AUTO: 0.08 THOUSAND/ÂΜL (ref 0–0.61)
EOSINOPHIL NFR BLD AUTO: 2 % (ref 0–6)
ERYTHROCYTE [DISTWIDTH] IN BLOOD BY AUTOMATED COUNT: 13 % (ref 11.6–15.1)
FERRITIN SERPL-MCNC: 121 NG/ML (ref 24–336)
GFR SERPL CREATININE-BSD FRML MDRD: 75 ML/MIN/1.73SQ M
GLUCOSE P FAST SERPL-MCNC: 108 MG/DL (ref 65–99)
HCT VFR BLD AUTO: 34.2 % (ref 36.5–49.3)
HGB BLD-MCNC: 12 G/DL (ref 12–17)
IMM GRANULOCYTES # BLD AUTO: 0.02 THOUSAND/UL (ref 0–0.2)
IMM GRANULOCYTES NFR BLD AUTO: 0 % (ref 0–2)
IRON SATN MFR SERPL: 13 % (ref 20–50)
IRON SERPL-MCNC: 34 UG/DL (ref 65–175)
LYMPHOCYTES # BLD AUTO: 0.57 THOUSANDS/ÂΜL (ref 0.6–4.47)
LYMPHOCYTES NFR BLD AUTO: 12 % (ref 14–44)
MCH RBC QN AUTO: 31.2 PG (ref 26.8–34.3)
MCHC RBC AUTO-ENTMCNC: 35.1 G/DL (ref 31.4–37.4)
MCV RBC AUTO: 89 FL (ref 82–98)
MONOCYTES # BLD AUTO: 0.57 THOUSAND/ÂΜL (ref 0.17–1.22)
MONOCYTES NFR BLD AUTO: 12 % (ref 4–12)
NEUTROPHILS # BLD AUTO: 3.51 THOUSANDS/ÂΜL (ref 1.85–7.62)
NEUTS SEG NFR BLD AUTO: 73 % (ref 43–75)
NRBC BLD AUTO-RTO: 0 /100 WBCS
OSMOLALITY UR: 409 MMOL/KG
PLATELET # BLD AUTO: 299 THOUSANDS/UL (ref 149–390)
PMV BLD AUTO: 8.7 FL (ref 8.9–12.7)
POTASSIUM SERPL-SCNC: 4 MMOL/L (ref 3.5–5.3)
RBC # BLD AUTO: 3.85 MILLION/UL (ref 3.88–5.62)
SODIUM 24H UR-SCNC: 80 MOL/L
SODIUM SERPL-SCNC: 127 MMOL/L (ref 135–147)
TIBC SERPL-MCNC: 260 UG/DL (ref 250–450)
WBC # BLD AUTO: 4.81 THOUSAND/UL (ref 4.31–10.16)

## 2023-05-31 PROCEDURE — 83540 ASSAY OF IRON: CPT

## 2023-05-31 PROCEDURE — 82728 ASSAY OF FERRITIN: CPT

## 2023-05-31 PROCEDURE — 83550 IRON BINDING TEST: CPT

## 2023-05-31 PROCEDURE — 85025 COMPLETE CBC W/AUTO DIFF WBC: CPT

## 2023-05-31 PROCEDURE — 80048 BASIC METABOLIC PNL TOTAL CA: CPT

## 2023-05-31 PROCEDURE — 36415 COLL VENOUS BLD VENIPUNCTURE: CPT

## 2023-06-01 NOTE — RESULT ENCOUNTER NOTE
Your urine sodium is elevated and this would be inappropriate with you serum sodium being low  In addition  Your other urine urine test also appears normal when is should be low  Can we follow up to discuss

## 2023-06-07 ENCOUNTER — OFFICE VISIT (OUTPATIENT)
Dept: INTERNAL MEDICINE CLINIC | Facility: CLINIC | Age: 88
End: 2023-06-07
Payer: MEDICARE

## 2023-06-07 VITALS
SYSTOLIC BLOOD PRESSURE: 148 MMHG | HEART RATE: 119 BPM | RESPIRATION RATE: 12 BRPM | BODY MASS INDEX: 26.06 KG/M2 | HEIGHT: 75 IN | DIASTOLIC BLOOD PRESSURE: 74 MMHG | WEIGHT: 209.6 LBS | OXYGEN SATURATION: 96 % | TEMPERATURE: 97.6 F

## 2023-06-07 DIAGNOSIS — N18.31 STAGE 3A CHRONIC KIDNEY DISEASE (HCC): ICD-10-CM

## 2023-06-07 DIAGNOSIS — D50.9 IRON DEFICIENCY ANEMIA, UNSPECIFIED IRON DEFICIENCY ANEMIA TYPE: ICD-10-CM

## 2023-06-07 DIAGNOSIS — E87.1 HYPONATREMIA: Primary | ICD-10-CM

## 2023-06-07 PROCEDURE — 99213 OFFICE O/P EST LOW 20 MIN: CPT | Performed by: INTERNAL MEDICINE

## 2023-06-07 RX ORDER — FERROUS SULFATE TAB EC 324 MG (65 MG FE EQUIVALENT) 324 (65 FE) MG
324 TABLET DELAYED RESPONSE ORAL
Qty: 180 TABLET | Refills: 1 | Status: SHIPPED | OUTPATIENT
Start: 2023-06-07 | End: 2023-06-27 | Stop reason: ALTCHOICE

## 2023-06-08 NOTE — PROGRESS NOTES
Assessment/Plan:    Problem List Items Addressed This Visit        Genitourinary    Stage 3a chronic kidney disease ([facility] )   Other Visit Diagnoses     Hyponatremia    -  Primary    Relevant Orders    Cortisol Level, AM Specimen (Completed)    Arginine vasopressin hormone (Completed)    Basic metabolic panel (Completed)    TSH, 3rd generation (Completed)    Ambulatory Referral to Endocrinology    Iron deficiency anemia, unspecified iron deficiency anemia type        Relevant Medications    ferrous sulfate 324 (65 Fe) mg    Other Relevant Orders    Occult Blood, Fecal Immunochemical (Completed)           Diagnoses and all orders for this visit:    Hyponatremia  -     Cortisol Level, AM Specimen; Future  -     Arginine vasopressin hormone; Future  -     Basic metabolic panel; Future  -     TSH, 3rd generation; Future  -     Ambulatory Referral to Endocrinology; Future    Iron deficiency anemia, unspecified iron deficiency anemia type  -     ferrous sulfate 324 (65 Fe) mg; Take 1 tablet (324 mg total) by mouth 2 (two) times a day before meals  -     Occult Blood, Fecal Immunochemical; Future  -     Occult Blood, Fecal Immunochemical    Stage 3a chronic kidney disease (HCC)        No problem-specific Assessment & Plan notes found for this encounter  Subjective:      Patient ID: Romero Quijano  is a 81 y o  male  The patient was seen and examined and noted to have issues with hyponatremia  We reviewed the AVH and noted that that 0 8 was a normal level  This is inappropriate with the low Na+  Serum osmolarity is also noted to be low  We discussed the need for follow up with endocrine  The following portions of the patient's history were reviewed and updated as appropriate:   He has a past medical history of Cancer ([facility] )  ,  does not have any pertinent problems on file  ,   has a past surgical history that includes Appendectomy  ,  family history includes Hypertension in his mother  ,   reports that he has "never smoked  He has never used smokeless tobacco  He reports that he does not currently use alcohol  He reports that he does not currently use drugs  ,  has No Known Allergies     Current Outpatient Medications   Medication Sig Dispense Refill   • ferrous sulfate 324 (65 Fe) mg Take 1 tablet (324 mg total) by mouth 2 (two) times a day before meals 180 tablet 1   • metoprolol succinate (TOPROL-XL) 25 mg 24 hr tablet Take 1 tablet (25 mg total) by mouth 2 (two) times a day 180 tablet 3   • tamsulosin (FLOMAX) 0 4 mg Take 0 4 mg by mouth daily       No current facility-administered medications for this visit  Review of Systems   Constitutional: Negative for chills, fatigue and fever  HENT: Negative  Respiratory: Negative for cough, chest tightness and shortness of breath  Cardiovascular: Negative for chest pain and palpitations  Gastrointestinal: Negative for abdominal pain, constipation, diarrhea, nausea and vomiting  Genitourinary: Negative  Musculoskeletal: Negative for back pain and myalgias  Skin: Negative  Neurological: Negative  Psychiatric/Behavioral: Negative for dysphoric mood  The patient is not nervous/anxious  Objective:  Vitals:    06/07/23 0732   BP: 148/74   Pulse: (!) 119   Resp: 12   Temp: 97 6 °F (36 4 °C)   SpO2: 96%   Weight: 95 1 kg (209 lb 9 6 oz)   Height: 6' 3\" (1 905 m)     Body mass index is 26 2 kg/m²  Physical Exam  Vitals and nursing note reviewed  Constitutional:       Appearance: He is well-developed  HENT:      Head: Normocephalic and atraumatic  Eyes:      Pupils: Pupils are equal, round, and reactive to light  Cardiovascular:      Rate and Rhythm: Normal rate and regular rhythm  Heart sounds: Normal heart sounds  No murmur heard  Pulmonary:      Effort: Pulmonary effort is normal       Breath sounds: Normal breath sounds  No stridor  No rales  Abdominal:      General: Bowel sounds are normal  There is no distension        " Palpations: Abdomen is soft  Tenderness: There is no abdominal tenderness  Musculoskeletal:         General: No deformity  Normal range of motion  Cervical back: Normal range of motion and neck supple  Skin:     General: Skin is warm and dry  Neurological:      Mental Status: He is alert and oriented to person, place, and time            PHQ-2/9 Depression Screening

## 2023-06-09 ENCOUNTER — APPOINTMENT (OUTPATIENT)
Dept: LAB | Facility: HOSPITAL | Age: 88
End: 2023-06-09
Payer: MEDICARE

## 2023-06-09 DIAGNOSIS — E87.1 HYPONATREMIA: ICD-10-CM

## 2023-06-09 LAB
ANION GAP SERPL CALCULATED.3IONS-SCNC: 9 MMOL/L (ref 4–13)
BUN SERPL-MCNC: 9 MG/DL (ref 5–25)
CALCIUM SERPL-MCNC: 9.3 MG/DL (ref 8.4–10.2)
CHLORIDE SERPL-SCNC: 93 MMOL/L (ref 96–108)
CO2 SERPL-SCNC: 26 MMOL/L (ref 21–32)
CORTIS AM PEAK SERPL-MCNC: 21.6 UG/DL (ref 6.7–22.6)
CREAT SERPL-MCNC: 0.82 MG/DL (ref 0.6–1.3)
GFR SERPL CREATININE-BSD FRML MDRD: 80 ML/MIN/1.73SQ M
GLUCOSE P FAST SERPL-MCNC: 113 MG/DL (ref 65–99)
POTASSIUM SERPL-SCNC: 3.8 MMOL/L (ref 3.5–5.3)
SODIUM SERPL-SCNC: 128 MMOL/L (ref 135–147)
TSH SERPL DL<=0.05 MIU/L-ACNC: 2.11 UIU/ML (ref 0.45–4.5)

## 2023-06-09 PROCEDURE — 84588 ASSAY OF VASOPRESSIN: CPT

## 2023-06-09 PROCEDURE — 80048 BASIC METABOLIC PNL TOTAL CA: CPT

## 2023-06-09 PROCEDURE — 36415 COLL VENOUS BLD VENIPUNCTURE: CPT

## 2023-06-09 PROCEDURE — 82533 TOTAL CORTISOL: CPT

## 2023-06-09 PROCEDURE — 83930 ASSAY OF BLOOD OSMOLALITY: CPT

## 2023-06-09 PROCEDURE — 84443 ASSAY THYROID STIM HORMONE: CPT

## 2023-06-10 ENCOUNTER — LAB REQUISITION (OUTPATIENT)
Dept: LAB | Facility: HOSPITAL | Age: 88
End: 2023-06-10
Payer: MEDICARE

## 2023-06-10 DIAGNOSIS — D50.9 IRON DEFICIENCY ANEMIA, UNSPECIFIED: ICD-10-CM

## 2023-06-10 LAB — HEMOCCULT STL QL IA: NEGATIVE

## 2023-06-10 PROCEDURE — G0328 FECAL BLOOD SCRN IMMUNOASSAY: HCPCS | Performed by: INTERNAL MEDICINE

## 2023-06-15 LAB
OSMOLALITY SERPL: 263 MOSMOL/KG (ref 280–301)
VASOPRESSIN SERPL-MCNC: <0.8 PG/ML (ref 0–4.7)

## 2023-06-15 NOTE — RESULT ENCOUNTER NOTE
I would like you to follow up with Nephrology the serum anti-diuretic hormone (ADH) is low and your osmolarity of your serum is low  This might be a issue with your kidney  Can I refer you to Dr Nima Cosby? He is in [facility]

## 2023-06-16 ENCOUNTER — TELEPHONE (OUTPATIENT)
Dept: INTERNAL MEDICINE CLINIC | Facility: CLINIC | Age: 88
End: 2023-06-16

## 2023-06-16 DIAGNOSIS — E23.2 DIABETES INSIPIDUS (HCC): Primary | ICD-10-CM

## 2023-06-16 NOTE — TELEPHONE ENCOUNTER
Pt did call and ask if he should still see Endocrinology or just go to see Dr Nieves Wilson? Please advise  Thank you

## 2023-06-27 ENCOUNTER — TELEPHONE (OUTPATIENT)
Dept: INTERNAL MEDICINE CLINIC | Facility: CLINIC | Age: 88
End: 2023-06-27

## 2023-06-27 ENCOUNTER — APPOINTMENT (INPATIENT)
Dept: CT IMAGING | Facility: HOSPITAL | Age: 88
DRG: 644 | End: 2023-06-27
Payer: MEDICARE

## 2023-06-27 ENCOUNTER — HOSPITAL ENCOUNTER (INPATIENT)
Facility: HOSPITAL | Age: 88
LOS: 5 days | Discharge: HOME | DRG: 644 | End: 2023-07-02
Attending: EMERGENCY MEDICINE | Admitting: INTERNAL MEDICINE
Payer: MEDICARE

## 2023-06-27 ENCOUNTER — APPOINTMENT (EMERGENCY)
Dept: RADIOLOGY | Facility: HOSPITAL | Age: 88
DRG: 644 | End: 2023-06-27
Payer: MEDICARE

## 2023-06-27 DIAGNOSIS — N17.9 AKI (ACUTE KIDNEY INJURY) (HCC): ICD-10-CM

## 2023-06-27 DIAGNOSIS — I48.91 NEW ONSET A-FIB (HCC): ICD-10-CM

## 2023-06-27 DIAGNOSIS — N39.0 UTI (URINARY TRACT INFECTION): ICD-10-CM

## 2023-06-27 DIAGNOSIS — R53.1 WEAKNESS: ICD-10-CM

## 2023-06-27 DIAGNOSIS — E87.1 HYPONATREMIA: Primary | ICD-10-CM

## 2023-06-27 DIAGNOSIS — R33.9 URINARY RETENTION: ICD-10-CM

## 2023-06-27 DIAGNOSIS — D64.9 ANEMIA, UNSPECIFIED TYPE: Chronic | ICD-10-CM

## 2023-06-27 PROBLEM — I10 ESSENTIAL HYPERTENSION: Chronic | Status: ACTIVE | Noted: 2020-01-15

## 2023-06-27 PROBLEM — N18.2 STAGE 2 CHRONIC KIDNEY DISEASE: Status: RESOLVED | Noted: 2021-04-21 | Resolved: 2023-06-27

## 2023-06-27 PROBLEM — C61 PROSTATE CA (HCC): Chronic | Status: ACTIVE | Noted: 2020-01-15

## 2023-06-27 PROBLEM — N18.31 STAGE 3A CHRONIC KIDNEY DISEASE (HCC): Status: RESOLVED | Noted: 2023-06-07 | Resolved: 2023-06-27

## 2023-06-27 PROBLEM — R55 SYNCOPE: Status: RESOLVED | Noted: 2019-01-12 | Resolved: 2023-06-27

## 2023-06-27 PROBLEM — N28.9 RENAL INSUFFICIENCY: Status: RESOLVED | Noted: 2019-01-12 | Resolved: 2023-06-27

## 2023-06-27 LAB
ANION GAP SERPL CALCULATED.3IONS-SCNC: 10 MMOL/L
ANION GAP SERPL CALCULATED.3IONS-SCNC: 12 MMOL/L
BACTERIA UR QL AUTO: ABNORMAL /HPF
BASOPHILS # BLD AUTO: 0.01 THOUSANDS/ÂΜL (ref 0–0.1)
BASOPHILS NFR BLD AUTO: 0 % (ref 0–1)
BILIRUB UR QL STRIP: NEGATIVE
BUN SERPL-MCNC: 22 MG/DL (ref 5–25)
BUN SERPL-MCNC: 22 MG/DL (ref 5–25)
CALCIUM SERPL-MCNC: 9.2 MG/DL (ref 8.4–10.2)
CALCIUM SERPL-MCNC: 9.2 MG/DL (ref 8.4–10.2)
CHLORIDE SERPL-SCNC: 82 MMOL/L (ref 96–108)
CHLORIDE SERPL-SCNC: 83 MMOL/L (ref 96–108)
CK SERPL-CCNC: 1231 U/L (ref 39–308)
CLARITY UR: CLEAR
CO2 SERPL-SCNC: 18 MMOL/L (ref 21–32)
CO2 SERPL-SCNC: 21 MMOL/L (ref 21–32)
COLOR UR: YELLOW
CREAT SERPL-MCNC: 1.91 MG/DL (ref 0.6–1.3)
CREAT SERPL-MCNC: 1.95 MG/DL (ref 0.6–1.3)
EOSINOPHIL # BLD AUTO: 0.01 THOUSAND/ÂΜL (ref 0–0.61)
EOSINOPHIL NFR BLD AUTO: 0 % (ref 0–6)
ERYTHROCYTE [DISTWIDTH] IN BLOOD BY AUTOMATED COUNT: 13.1 % (ref 11.6–15.1)
GFR SERPL CREATININE-BSD FRML MDRD: 30 ML/MIN/1.73SQ M
GFR SERPL CREATININE-BSD FRML MDRD: 31 ML/MIN/1.73SQ M
GLUCOSE SERPL-MCNC: 127 MG/DL (ref 65–140)
GLUCOSE SERPL-MCNC: 127 MG/DL (ref 65–140)
GLUCOSE UR STRIP-MCNC: NEGATIVE MG/DL
HCT VFR BLD AUTO: 31.5 % (ref 36.5–49.3)
HGB BLD-MCNC: 11.2 G/DL (ref 12–17)
HGB UR QL STRIP.AUTO: ABNORMAL
IMM GRANULOCYTES # BLD AUTO: 0.06 THOUSAND/UL (ref 0–0.2)
IMM GRANULOCYTES NFR BLD AUTO: 1 % (ref 0–2)
KETONES UR STRIP-MCNC: ABNORMAL MG/DL
LEUKOCYTE ESTERASE UR QL STRIP: ABNORMAL
LYMPHOCYTES # BLD AUTO: 0.3 THOUSANDS/ÂΜL (ref 0.6–4.47)
LYMPHOCYTES NFR BLD AUTO: 4 % (ref 14–44)
MCH RBC QN AUTO: 30.3 PG (ref 26.8–34.3)
MCHC RBC AUTO-ENTMCNC: 35.6 G/DL (ref 31.4–37.4)
MCV RBC AUTO: 85 FL (ref 82–98)
MONOCYTES # BLD AUTO: 0.56 THOUSAND/ÂΜL (ref 0.17–1.22)
MONOCYTES NFR BLD AUTO: 7 % (ref 4–12)
NEUTROPHILS # BLD AUTO: 7.31 THOUSANDS/ÂΜL (ref 1.85–7.62)
NEUTS SEG NFR BLD AUTO: 88 % (ref 43–75)
NITRITE UR QL STRIP: NEGATIVE
NON-SQ EPI CELLS URNS QL MICRO: ABNORMAL /HPF
NRBC BLD AUTO-RTO: 0 /100 WBCS
PH UR STRIP.AUTO: 6 [PH]
PLATELET # BLD AUTO: 271 THOUSANDS/UL (ref 149–390)
PMV BLD AUTO: 7.8 FL (ref 8.9–12.7)
POTASSIUM SERPL-SCNC: 4.3 MMOL/L (ref 3.5–5.3)
POTASSIUM SERPL-SCNC: 4.6 MMOL/L (ref 3.5–5.3)
PROCALCITONIN SERPL-MCNC: 0.05 NG/ML
PROT UR STRIP-MCNC: NEGATIVE MG/DL
RBC # BLD AUTO: 3.7 MILLION/UL (ref 3.88–5.62)
RBC #/AREA URNS AUTO: ABNORMAL /HPF
SODIUM SERPL-SCNC: 113 MMOL/L (ref 135–147)
SODIUM SERPL-SCNC: 113 MMOL/L (ref 135–147)
SP GR UR STRIP.AUTO: 1.01
URATE SERPL-MCNC: 5.2 MG/DL (ref 3.5–8.5)
UROBILINOGEN UR QL STRIP.AUTO: 0.2 E.U./DL
WBC # BLD AUTO: 8.25 THOUSAND/UL (ref 4.31–10.16)
WBC #/AREA URNS AUTO: ABNORMAL /HPF

## 2023-06-27 PROCEDURE — 80048 BASIC METABOLIC PNL TOTAL CA: CPT | Performed by: EMERGENCY MEDICINE

## 2023-06-27 PROCEDURE — 93005 ELECTROCARDIOGRAM TRACING: CPT

## 2023-06-27 PROCEDURE — 82550 ASSAY OF CK (CPK): CPT | Performed by: NURSE PRACTITIONER

## 2023-06-27 PROCEDURE — 80048 BASIC METABOLIC PNL TOTAL CA: CPT | Performed by: NURSE PRACTITIONER

## 2023-06-27 PROCEDURE — 87040 BLOOD CULTURE FOR BACTERIA: CPT | Performed by: NURSE PRACTITIONER

## 2023-06-27 PROCEDURE — 84300 ASSAY OF URINE SODIUM: CPT | Performed by: NURSE PRACTITIONER

## 2023-06-27 PROCEDURE — 84550 ASSAY OF BLOOD/URIC ACID: CPT | Performed by: NURSE PRACTITIONER

## 2023-06-27 PROCEDURE — 72100 X-RAY EXAM L-S SPINE 2/3 VWS: CPT

## 2023-06-27 PROCEDURE — G1004 CDSM NDSC: HCPCS

## 2023-06-27 PROCEDURE — 81001 URINALYSIS AUTO W/SCOPE: CPT | Performed by: EMERGENCY MEDICINE

## 2023-06-27 PROCEDURE — 99291 CRITICAL CARE FIRST HOUR: CPT | Performed by: INTERNAL MEDICINE

## 2023-06-27 PROCEDURE — 1123F ACP DISCUSS/DSCN MKR DOCD: CPT | Performed by: INTERNAL MEDICINE

## 2023-06-27 PROCEDURE — 71250 CT THORAX DX C-: CPT

## 2023-06-27 PROCEDURE — 83930 ASSAY OF BLOOD OSMOLALITY: CPT | Performed by: NURSE PRACTITIONER

## 2023-06-27 PROCEDURE — 81003 URINALYSIS AUTO W/O SCOPE: CPT | Performed by: EMERGENCY MEDICINE

## 2023-06-27 PROCEDURE — 85025 COMPLETE CBC W/AUTO DIFF WBC: CPT | Performed by: EMERGENCY MEDICINE

## 2023-06-27 PROCEDURE — 84145 PROCALCITONIN (PCT): CPT | Performed by: NURSE PRACTITIONER

## 2023-06-27 PROCEDURE — 83935 ASSAY OF URINE OSMOLALITY: CPT | Performed by: NURSE PRACTITIONER

## 2023-06-27 PROCEDURE — 36415 COLL VENOUS BLD VENIPUNCTURE: CPT | Performed by: EMERGENCY MEDICINE

## 2023-06-27 PROCEDURE — 99284 EMERGENCY DEPT VISIT MOD MDM: CPT

## 2023-06-27 RX ORDER — CEFTRIAXONE 1 G/50ML
1000 INJECTION, SOLUTION INTRAVENOUS EVERY 24 HOURS
Status: DISCONTINUED | OUTPATIENT
Start: 2023-06-28 | End: 2023-06-28

## 2023-06-27 RX ORDER — TAMSULOSIN HYDROCHLORIDE 0.4 MG/1
0.4 CAPSULE ORAL DAILY
Status: DISCONTINUED | OUTPATIENT
Start: 2023-06-28 | End: 2023-06-30

## 2023-06-27 RX ORDER — METOPROLOL SUCCINATE 50 MG/1
50 TABLET, EXTENDED RELEASE ORAL DAILY
Status: DISCONTINUED | OUTPATIENT
Start: 2023-06-28 | End: 2023-07-02 | Stop reason: HOSPADM

## 2023-06-27 RX ORDER — SODIUM CHLORIDE 9 MG/ML
50 INJECTION, SOLUTION INTRAVENOUS CONTINUOUS
Status: DISCONTINUED | OUTPATIENT
Start: 2023-06-28 | End: 2023-06-28

## 2023-06-27 RX ORDER — HEPARIN SODIUM 5000 [USP'U]/ML
5000 INJECTION, SOLUTION INTRAVENOUS; SUBCUTANEOUS EVERY 8 HOURS SCHEDULED
Status: DISCONTINUED | OUTPATIENT
Start: 2023-06-27 | End: 2023-06-28

## 2023-06-27 RX ORDER — METOPROLOL SUCCINATE 25 MG/1
25 TABLET, EXTENDED RELEASE ORAL 2 TIMES DAILY
Status: DISCONTINUED | OUTPATIENT
Start: 2023-06-27 | End: 2023-06-27

## 2023-06-27 RX ORDER — CEFUROXIME AXETIL 250 MG/1
500 TABLET ORAL ONCE
Status: COMPLETED | OUTPATIENT
Start: 2023-06-27 | End: 2023-06-27

## 2023-06-27 RX ADMIN — SODIUM CHLORIDE 1000 ML: 0.9 INJECTION, SOLUTION INTRAVENOUS at 21:56

## 2023-06-27 RX ADMIN — HEPARIN SODIUM 5000 UNITS: 5000 INJECTION INTRAVENOUS; SUBCUTANEOUS at 23:58

## 2023-06-27 RX ADMIN — CEFUROXIME AXETIL 500 MG: 250 TABLET, FILM COATED ORAL at 21:54

## 2023-06-27 NOTE — ED PROVIDER NOTES
History  Chief Complaint   Patient presents with   • Fatigue     Patient reports lack of sleep over the past few months. 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 Patient presents today feeling dehydrated. • Back Pain     Patient reports intermittent lower back pain. Treating with Advil at home     82 yo male presenting to the ed for not feeling well and with increasing urination frequency and intermittent lower back pain. Patient being worked up by his outpatient provider for possible diabetes insipidus. Denies fevers, night sweats, chills, sore throat, cough, chest pain, shortness of breath, urinary, hematuria, diarrhea constipation. Prior to Admission Medications   Prescriptions Last Dose Informant Patient Reported? Taking?   metoprolol succinate (TOPROL-XL) 25 mg 24 hr tablet 6/27/2023  No Yes   Sig: Take 1 tablet (25 mg total) by mouth 2 (two) times a day   tamsulosin (FLOMAX) 0.4 mg 6/26/2023  Yes Yes   Sig: Take 0.4 mg by mouth daily      Facility-Administered Medications: None       Past Medical History:   Diagnosis Date   • Cancer ([facility])     prostate cancer    • Hypertension        Past Surgical History:   Procedure Laterality Date   • APPENDECTOMY         Family History   Problem Relation Age of Onset   • Hypertension Mother      I have reviewed and agree with the history as documented. E-Cigarette/Vaping   • E-Cigarette Use Never User      E-Cigarette/Vaping Substances   • Nicotine No    • THC No    • CBD No    • Flavoring No    • Other No    • Unknown No      Social History     Tobacco Use   • Smoking status: Never   • Smokeless tobacco: Never   Vaping Use   • Vaping Use: Never used   Substance Use Topics   • Alcohol use: Yes     Alcohol/week: 1.0 standard drink of alcohol     Types: 1 Cans of beer per week     Comment: monthly   • Drug use: Not Currently       Review of Systems   Constitutional: Positive for fatigue. Musculoskeletal: Positive for back pain. Neurological: Positive for weakness. All other systems reviewed and are negative. Physical Exam  Physical Exam  Vitals and nursing note reviewed. Constitutional:       General: He is not in acute distress. Appearance: He is well-developed. He is not diaphoretic. HENT:      Head: Normocephalic and atraumatic. Right Ear: External ear normal.      Left Ear: External ear normal.      Nose: Nose normal.   Eyes:      General: No scleral icterus. Right eye: No discharge. Left eye: No discharge. Conjunctiva/sclera: Conjunctivae normal.   Cardiovascular:      Rate and Rhythm: Normal rate and regular rhythm. Heart sounds: Normal heart sounds. No murmur heard. No friction rub. No gallop. Pulmonary:      Effort: Pulmonary effort is normal. No respiratory distress. Breath sounds: Normal breath sounds. No wheezing or rales. Abdominal:      General: Bowel sounds are normal. There is no distension. Palpations: Abdomen is soft. There is no mass. Tenderness: There is no abdominal tenderness. There is no guarding. Musculoskeletal:         General: No tenderness or deformity. Normal range of motion. Cervical back: Normal range of motion and neck supple. Right lower leg: No edema. Left lower leg: No edema. Skin:     General: Skin is warm and dry. Coloration: Skin is not pale. Findings: No erythema or rash. Neurological:      Mental Status: He is alert and oriented to person, place, and time. Psychiatric:         Behavior: Behavior normal.         Thought Content:  Thought content normal.         Judgment: Judgment normal.         Vital Signs  ED Triage Vitals   Temperature Pulse Respirations Blood Pressure SpO2   06/27/23 1838 06/27/23 1833 06/27/23 1833 06/27/23 1833 06/27/23 1833   (!) 96 °F (35.6 °C) 84 16 (!) 196/92 96 %      Temp Source Heart Rate Source Patient Position - Orthostatic VS BP Location FiO2 (%)   06/27/23 1838 06/27/23 1833 06/27/23 1833 06/27/23 2030 --   Temporal Monitor Sitting Right arm       Pain Score       06/27/23 1833       9           Vitals:    06/27/23 1833 06/27/23 2030 06/27/23 2200   BP: (!) 196/92 162/84 155/85   Pulse: 84 81 84   Patient Position - Orthostatic VS: Sitting Lying Lying         Visual Acuity      ED Medications  Medications   sodium chloride 0.9 % bolus 1,000 mL (1,000 mL Intravenous New Bag 6/27/23 2156)   cefTRIAXone (ROCEPHIN) IVPB (premix in dextrose) 1,000 mg 50 mL (has no administration in time range)   cefuroxime (CEFTIN) tablet 500 mg (500 mg Oral Given 6/27/23 2154)       Diagnostic Studies  Results Reviewed     Procedure Component Value Units Date/Time    CK [[id number]] Collected: 06/27/23 2027    Lab Status: In process Specimen: Blood from Arm, Left Updated: 06/27/23 2202    Osmolality, urine [[id number]] Collected: 06/27/23 1941    Lab Status: In process Specimen: Urine, Other Updated: 06/27/23 2159    Procalcitonin [[id number]] Collected: 06/27/23 2027    Lab Status: In process Specimen: Blood from Arm, Left Updated: 06/27/23 2159    Sodium, urine, random [[id number]] Collected: 06/27/23 1941    Lab Status: In process Specimen: Urine, Other Updated: 06/27/23 2159    Uric acid [[id number]] Collected: 06/27/23 2027    Lab Status: In process Specimen: Blood from Arm, Left Updated: 06/27/23 2159    Blood culture [[id number]]     Lab Status: No result Specimen: Blood     Blood culture [[id number]]     Lab Status: No result Specimen: Blood     Basic metabolic panel [[id number]]     Lab Status: No result Specimen: Blood     Osmolality-If this is regarding a toxic alcohol, STOP. Test is not routinely indicated. Please consult medical  on call for further guidance.  [[id number]]     Lab Status: No result Specimen: Blood     Basic metabolic panel [[id number]]  (Abnormal) Collected: 06/27/23 2027    Lab Status: Final result Specimen: Blood from Arm, Left Updated: 06/27/23 2058     Sodium 113 mmol/L      Potassium 4.6 mmol/L      Chloride 82 mmol/L      CO2 21 mmol/L      ANION GAP 10 mmol/L      BUN 22 mg/dL      Creatinine 1.95 mg/dL      Glucose 127 mg/dL      Calcium 9.2 mg/dL      eGFR 30 ml/min/1.73sq m     Narrative:      National Kidney Disease Foundation guidelines for Chronic Kidney Disease (CKD):   •  Stage 1 with normal or high GFR (GFR > 90 mL/min/1.73 square meters)  •  Stage 2 Mild CKD (GFR = 60-89 mL/min/1.73 square meters)  •  Stage 3A Moderate CKD (GFR = 45-59 mL/min/1.73 square meters)  •  Stage 3B Moderate CKD (GFR = 30-44 mL/min/1.73 square meters)  •  Stage 4 Severe CKD (GFR = 15-29 mL/min/1.73 square meters)  •  Stage 5 End Stage CKD (GFR <15 mL/min/1.73 square meters)  Note: GFR calculation is accurate only with a steady state creatinine    CBC and differential [[id number]]  (Abnormal) Collected: 06/27/23 2027    Lab Status: Final result Specimen: Blood from Arm, Left Updated: 06/27/23 2033     WBC 8.25 Thousand/uL      RBC 3.70 Million/uL      Hemoglobin 11.2 g/dL      Hematocrit 31.5 %      MCV 85 fL      MCH 30.3 pg      MCHC 35.6 g/dL      RDW 13.1 %      MPV 7.8 fL      Platelets 604 Thousands/uL      nRBC 0 /100 WBCs      Neutrophils Relative 88 %      Immat GRANS % 1 %      Lymphocytes Relative 4 %      Monocytes Relative 7 %      Eosinophils Relative 0 %      Basophils Relative 0 %      Neutrophils Absolute 7.31 Thousands/µL      Immature Grans Absolute 0.06 Thousand/uL      Lymphocytes Absolute 0.30 Thousands/µL      Monocytes Absolute 0.56 Thousand/µL      Eosinophils Absolute 0.01 Thousand/µL      Basophils Absolute 0.01 Thousands/µL     Urine Microscopic [[id number]]  (Abnormal) Collected: 06/27/23 1941    Lab Status: Final result Specimen: Urine, Other Updated: 06/27/23 1958     RBC, UA 1-2 /hpf      WBC, UA 4-10 /hpf      Epithelial Cells Occasional /hpf      Bacteria, UA Moderate /hpf     UA w Reflex to Microscopic w Reflex to Culture [[id number]]  (Abnormal) Collected: 06/27/23 1941    Lab Status: Final result Specimen: Urine, Other Updated: 06/27/23 1946     Color, UA Yellow     Clarity, UA Clear     Specific Gravity, UA 1.010     pH, UA 6.0     Leukocytes, UA 1+     Nitrite, UA Negative     Protein, UA Negative mg/dl      Glucose, UA Negative mg/dl      Ketones, UA Trace mg/dl      Urobilinogen, UA 0.2 E.U./dl      Bilirubin, UA Negative     Occult Blood, UA 2+                 XR spine lumbar 2 or 3 views injury   ED Interpretation by Rosalinda Carroll DO (06/27 2034)   No acute fracture or dislocation noted on my interpretation          CT chest wo contrast    (Results Pending)              Procedures  CriticalCare Time    Date/Time: 6/27/2023 9:09 PM    Performed by: Rosalinda Carroll DO  Authorized by: Rosalinda Carroll DO    Critical care provider statement:     Critical care time (minutes):  45    Critical care was necessary to treat or prevent imminent or life-threatening deterioration of the following conditions:  Metabolic crisis, renal failure and dehydration    Critical care was time spent personally by me on the following activities:  Blood draw for specimens, obtaining history from patient or surrogate, development of treatment plan with patient or surrogate, discussions with consultants, evaluation of patient's response to treatment, examination of patient, review of old charts, re-evaluation of patient's condition, ordering and review of radiographic studies, ordering and review of laboratory studies, ordering and performing treatments and interventions and interpretation of cardiac output measurements    I assumed direction of critical care for this patient from another provider in my specialty: no               ED Course  ED Course as of 06/27/23 2222   Tue Jun 27, 2023   1908 Has an appt with Dr. Theodora Dumont in 2 days   Endocrinologist in 3 days   2103 Sodium(!!): 113  Reached out to ICU for possible admission to the ICU at this time. SBIRT 22yo+    Flowsheet Row Most Recent Value   Initial Alcohol Screen: US AUDIT-C     1. How often do you have a drink containing alcohol? 2 Filed at: 06/27/2023 1835   2. How many drinks containing alcohol do you have on a typical day you are drinking? 0 Filed at: 06/27/2023 1835   3a. Male UNDER 65: How often do you have five or more drinks on one occasion? 0 Filed at: 06/27/2023 1835   3b. FEMALE Any Age, or MALE 65+: How often do you have 4 or more drinks on one occassion? 0 Filed at: 06/27/2023 1835   Audit-C Score 2 Filed at: 06/27/2023 9074   MICK: How many times in the past year have you. .. Used an illegal drug or used a prescription medication for non-medical reasons? Never Filed at: 06/27/2023 [facility] Making  28-year-old male being worked up for diabetes insipidus as an outpatient. With intermittent lower back pain. No back pain on examination. Lumbar x-ray ordered along with basic labs and urinalysis. Blood work showing profound hyponatremia with a sodium of 113 along with an EZIO with near doubling of creatinine. Patient also noted to have a likely UTI. Patient given 1 L normal saline and Ceftin for urinary tract infection admitted to stepdown level 1 under critical care service. Amount and/or Complexity of Data Reviewed  Labs: ordered. Decision-making details documented in ED Course. Radiology: ordered and independent interpretation performed. Risk  Prescription drug management. Decision regarding hospitalization.           Disposition  Final diagnoses:   Hyponatremia   EZIO (acute kidney injury) ([address] )   UTI (urinary tract infection)   Weakness     Time reflects when diagnosis was documented in both MDM as applicable and the Disposition within this note     Time User Action Codes Description Comment    6/27/2023  9:09 PM Delmis Valdez Add [E87.1] Hyponatremia     6/27/2023  9:09 PM Piero Valles Add [N17.9] EZIO (acute kidney injury) ([address])     6/27/2023  9:09 PM Willodean Nails Add [N39.0] UTI (urinary tract infection)     6/27/2023  9:09 PM Willodean Nails Add [R53.1] Weakness       ED Disposition     ED Disposition   Admit    Condition   Stable    Date/Time   Tue Jun 27, 2023  9:08 PM    Comment   Case was discussed with Critical Care and the patient's admission status was agreed to be Admission Status: inpatient status to the service of Dr. Jarred Easley. Follow-up Information    None         Patient's Medications   Discharge Prescriptions    No medications on file       No discharge procedures on file.     PDMP Review     None          ED Provider  Electronically Signed by           Fior Gilliam DO  06/27/23 2356

## 2023-06-28 ENCOUNTER — APPOINTMENT (INPATIENT)
Dept: NON INVASIVE DIAGNOSTICS | Facility: HOSPITAL | Age: 88
DRG: 644 | End: 2023-06-28
Payer: MEDICARE

## 2023-06-28 PROBLEM — I48.91 NEW ONSET A-FIB (HCC): Status: ACTIVE | Noted: 2023-06-28

## 2023-06-28 LAB
ALBUMIN SERPL BCP-MCNC: 3.2 G/DL (ref 3.5–5)
ALP SERPL-CCNC: 40 U/L (ref 34–104)
ALT SERPL W P-5'-P-CCNC: 42 U/L (ref 7–52)
ANION GAP SERPL CALCULATED.3IONS-SCNC: 7 MMOL/L
ANION GAP SERPL CALCULATED.3IONS-SCNC: 8 MMOL/L
ANION GAP SERPL CALCULATED.3IONS-SCNC: 8 MMOL/L
ANION GAP SERPL CALCULATED.3IONS-SCNC: 9 MMOL/L
AORTIC ROOT: 3.5 CM
APICAL FOUR CHAMBER EJECTION FRACTION: 61 %
APTT PPP: 105 SECONDS (ref 23–37)
APTT PPP: 107 SECONDS (ref 23–37)
APTT PPP: 32 SECONDS (ref 23–37)
ASCENDING AORTA: 3.3 CM
AST SERPL W P-5'-P-CCNC: 80 U/L (ref 13–39)
ATRIAL RATE: 110 BPM
ATRIAL RATE: 227 BPM
ATRIAL RATE: 246 BPM
ATRIAL RATE: 79 BPM
BACTERIA UR QL AUTO: ABNORMAL /HPF
BASE EX.OXY STD BLDV CALC-SCNC: 93.2 % (ref 60–80)
BASE EXCESS BLDV CALC-SCNC: -1.8 MMOL/L
BASOPHILS # BLD AUTO: 0.02 THOUSANDS/ÂΜL (ref 0–0.1)
BASOPHILS NFR BLD AUTO: 0 % (ref 0–1)
BILIRUB DIRECT SERPL-MCNC: 0.31 MG/DL (ref 0–0.2)
BILIRUB SERPL-MCNC: 0.89 MG/DL (ref 0.2–1)
BILIRUB UR QL STRIP: NEGATIVE
BUN SERPL-MCNC: 11 MG/DL (ref 5–25)
BUN SERPL-MCNC: 13 MG/DL (ref 5–25)
BUN SERPL-MCNC: 13 MG/DL (ref 5–25)
BUN SERPL-MCNC: 14 MG/DL (ref 5–25)
BUN SERPL-MCNC: 19 MG/DL (ref 5–25)
BUN SERPL-MCNC: 20 MG/DL (ref 5–25)
CALCIUM SERPL-MCNC: 7.9 MG/DL (ref 8.4–10.2)
CALCIUM SERPL-MCNC: 7.9 MG/DL (ref 8.4–10.2)
CALCIUM SERPL-MCNC: 8 MG/DL (ref 8.4–10.2)
CALCIUM SERPL-MCNC: 8.8 MG/DL (ref 8.4–10.2)
CALCIUM SERPL-MCNC: 8.8 MG/DL (ref 8.4–10.2)
CALCIUM SERPL-MCNC: 9.1 MG/DL (ref 8.4–10.2)
CHLORIDE SERPL-SCNC: 83 MMOL/L (ref 96–108)
CHLORIDE SERPL-SCNC: 87 MMOL/L (ref 96–108)
CHLORIDE SERPL-SCNC: 88 MMOL/L (ref 96–108)
CHLORIDE SERPL-SCNC: 89 MMOL/L (ref 96–108)
CHLORIDE SERPL-SCNC: 92 MMOL/L (ref 96–108)
CHLORIDE SERPL-SCNC: 93 MMOL/L (ref 96–108)
CK SERPL-CCNC: 968 U/L (ref 39–308)
CLARITY UR: CLEAR
CO2 SERPL-SCNC: 20 MMOL/L (ref 21–32)
CO2 SERPL-SCNC: 20 MMOL/L (ref 21–32)
CO2 SERPL-SCNC: 21 MMOL/L (ref 21–32)
CO2 SERPL-SCNC: 23 MMOL/L (ref 21–32)
COLOR UR: ABNORMAL
CORTIS AM PEAK SERPL-MCNC: 15.1 UG/DL (ref 6.7–22.6)
CREAT SERPL-MCNC: 0.83 MG/DL (ref 0.6–1.3)
CREAT SERPL-MCNC: 0.95 MG/DL (ref 0.6–1.3)
CREAT SERPL-MCNC: 1.01 MG/DL (ref 0.6–1.3)
CREAT SERPL-MCNC: 1.17 MG/DL (ref 0.6–1.3)
CREAT SERPL-MCNC: 1.43 MG/DL (ref 0.6–1.3)
CREAT SERPL-MCNC: 1.59 MG/DL (ref 0.6–1.3)
CREAT UR-MCNC: 13.3 MG/DL
E WAVE DECELERATION TIME: 184 MS
EOSINOPHIL # BLD AUTO: 0.02 THOUSAND/ÂΜL (ref 0–0.61)
EOSINOPHIL NFR BLD AUTO: 0 % (ref 0–6)
ERYTHROCYTE [DISTWIDTH] IN BLOOD BY AUTOMATED COUNT: 12.9 % (ref 11.6–15.1)
FOLATE SERPL-MCNC: 8.6 NG/ML
FRACTIONAL SHORTENING: 36 (ref 28–44)
GFR SERPL CREATININE-BSD FRML MDRD: 38 ML/MIN/1.73SQ M
GFR SERPL CREATININE-BSD FRML MDRD: 44 ML/MIN/1.73SQ M
GFR SERPL CREATININE-BSD FRML MDRD: 56 ML/MIN/1.73SQ M
GFR SERPL CREATININE-BSD FRML MDRD: 67 ML/MIN/1.73SQ M
GFR SERPL CREATININE-BSD FRML MDRD: 72 ML/MIN/1.73SQ M
GFR SERPL CREATININE-BSD FRML MDRD: 79 ML/MIN/1.73SQ M
GLUCOSE SERPL-MCNC: 120 MG/DL (ref 65–140)
GLUCOSE SERPL-MCNC: 148 MG/DL (ref 65–140)
GLUCOSE SERPL-MCNC: 187 MG/DL (ref 65–140)
GLUCOSE SERPL-MCNC: 189 MG/DL (ref 65–140)
GLUCOSE SERPL-MCNC: 190 MG/DL (ref 65–140)
GLUCOSE SERPL-MCNC: 212 MG/DL (ref 65–140)
GLUCOSE SERPL-MCNC: 271 MG/DL (ref 65–140)
GLUCOSE SERPL-MCNC: 497 MG/DL (ref 65–140)
GLUCOSE UR STRIP-MCNC: NEGATIVE MG/DL
HCO3 BLDV-SCNC: 21.3 MMOL/L (ref 24–30)
HCT VFR BLD AUTO: 30.2 % (ref 36.5–49.3)
HGB BLD-MCNC: 10.8 G/DL (ref 12–17)
HGB UR QL STRIP.AUTO: ABNORMAL
IMM GRANULOCYTES # BLD AUTO: 0.05 THOUSAND/UL (ref 0–0.2)
IMM GRANULOCYTES NFR BLD AUTO: 1 % (ref 0–2)
INR PPP: 1.1 (ref 0.84–1.19)
INTERVENTRICULAR SEPTUM IN DIASTOLE (PARASTERNAL SHORT AXIS VIEW): 1.3 CM
INTERVENTRICULAR SEPTUM: 1.3 CM (ref 0.6–1.1)
IVC: 13 MM
KETONES UR STRIP-MCNC: NEGATIVE MG/DL
LAAS-AP2: 16.9 CM2
LAAS-AP4: 14.7 CM2
LACTATE SERPL-SCNC: 0.7 MMOL/L (ref 0.5–2)
LEFT ATRIUM SIZE: 4.1 CM
LEFT ATRIUM VOLUME (MOD BIPLANE): 46 ML
LEFT INTERNAL DIMENSION IN SYSTOLE: 3.2 CM (ref 2.1–4)
LEFT VENTRICULAR INTERNAL DIMENSION IN DIASTOLE: 5 CM (ref 3.5–6)
LEFT VENTRICULAR POSTERIOR WALL IN END DIASTOLE: 1.3 CM
LEFT VENTRICULAR STROKE VOLUME: 78 ML
LEUKOCYTE ESTERASE UR QL STRIP: NEGATIVE
LVSV (TEICH): 78 ML
LYMPHOCYTES # BLD AUTO: 0.32 THOUSANDS/ÂΜL (ref 0.6–4.47)
LYMPHOCYTES NFR BLD AUTO: 5 % (ref 14–44)
MAGNESIUM SERPL-MCNC: 1.6 MG/DL (ref 1.9–2.7)
MCH RBC QN AUTO: 30.2 PG (ref 26.8–34.3)
MCHC RBC AUTO-ENTMCNC: 35.8 G/DL (ref 31.4–37.4)
MCV RBC AUTO: 84 FL (ref 82–98)
MONOCYTES # BLD AUTO: 0.67 THOUSAND/ÂΜL (ref 0.17–1.22)
MONOCYTES NFR BLD AUTO: 10 % (ref 4–12)
MV E'TISSUE VEL-SEP: 15 CM/S
MV PEAK A VEL: 0.55 M/S
MV PEAK E VEL: 98 CM/S
MV STENOSIS PRESSURE HALF TIME: 53 MS
MV VALVE AREA P 1/2 METHOD: 4.15
NEUTROPHILS # BLD AUTO: 5.99 THOUSANDS/ÂΜL (ref 1.85–7.62)
NEUTS SEG NFR BLD AUTO: 84 % (ref 43–75)
NITRITE UR QL STRIP: NEGATIVE
NON-SQ EPI CELLS URNS QL MICRO: ABNORMAL /HPF
NRBC BLD AUTO-RTO: 0 /100 WBCS
O2 CT BLDV-SCNC: 16.2 ML/DL
OSMOLALITY UR/SERPL-RTO: 246 MMOL/KG (ref 282–298)
OSMOLALITY UR: 115 MMOL/KG
OSMOLALITY UR: 296 MMOL/KG
P AXIS: -84 DEGREES
PCO2 BLDV: 31.2 MM HG (ref 42–50)
PH BLDV: 7.45 [PH] (ref 7.3–7.4)
PH UR STRIP.AUTO: 7 [PH]
PHOSPHATE SERPL-MCNC: 3.3 MG/DL (ref 2.3–4.1)
PLATELET # BLD AUTO: 280 THOUSANDS/UL (ref 149–390)
PMV BLD AUTO: 8.1 FL (ref 8.9–12.7)
PO2 BLDV: 74.6 MM HG (ref 35–45)
POTASSIUM SERPL-SCNC: 3.5 MMOL/L (ref 3.5–5.3)
POTASSIUM SERPL-SCNC: 3.8 MMOL/L (ref 3.5–5.3)
POTASSIUM SERPL-SCNC: 3.8 MMOL/L (ref 3.5–5.3)
POTASSIUM SERPL-SCNC: 3.9 MMOL/L (ref 3.5–5.3)
POTASSIUM SERPL-SCNC: 4.1 MMOL/L (ref 3.5–5.3)
POTASSIUM SERPL-SCNC: 4.3 MMOL/L (ref 3.5–5.3)
PROCALCITONIN SERPL-MCNC: 0.05 NG/ML
PROT SERPL-MCNC: 5.7 G/DL (ref 6.4–8.4)
PROT UR STRIP-MCNC: ABNORMAL MG/DL
PROTHROMBIN TIME: 14.2 SECONDS (ref 11.6–14.5)
QRS AXIS: -11 DEGREES
QRS AXIS: -27 DEGREES
QRS AXIS: -27 DEGREES
QRS AXIS: -45 DEGREES
QRSD INTERVAL: 86 MS
QRSD INTERVAL: 94 MS
QRSD INTERVAL: 96 MS
QRSD INTERVAL: 96 MS
QT INTERVAL: 340 MS
QT INTERVAL: 364 MS
QT INTERVAL: 370 MS
QT INTERVAL: 376 MS
QTC INTERVAL: 431 MS
QTC INTERVAL: 445 MS
QTC INTERVAL: 459 MS
QTC INTERVAL: 486 MS
RBC # BLD AUTO: 3.58 MILLION/UL (ref 3.88–5.62)
RBC #/AREA URNS AUTO: ABNORMAL /HPF
RIGHT ATRIUM AREA SYSTOLE A4C: 11.7 CM2
RIGHT VENTRICLE ID DIMENSION: 2.9 CM
SL CV LEFT ATRIUM LENGTH A2C: 4.9 CM
SL CV LV EF: 65
SL CV PED ECHO LEFT VENTRICLE DIASTOLIC VOLUME (MOD BIPLANE) 2D: 121 ML
SL CV PED ECHO LEFT VENTRICLE SYSTOLIC VOLUME (MOD BIPLANE) 2D: 42 ML
SODIUM 24H UR-SCNC: 31 MOL/L
SODIUM 24H UR-SCNC: 7 MOL/L
SODIUM SERPL-SCNC: 111 MMOL/L (ref 135–147)
SODIUM SERPL-SCNC: 116 MMOL/L (ref 135–147)
SODIUM SERPL-SCNC: 117 MMOL/L (ref 135–147)
SODIUM SERPL-SCNC: 119 MMOL/L (ref 135–147)
SODIUM SERPL-SCNC: 120 MMOL/L (ref 135–147)
SODIUM SERPL-SCNC: 121 MMOL/L (ref 135–147)
SP GR UR STRIP.AUTO: <=1.005
T WAVE AXIS: 37 DEGREES
T WAVE AXIS: 46 DEGREES
T WAVE AXIS: 68 DEGREES
T WAVE AXIS: 79 DEGREES
TR MAX PG: 34 MMHG
TR PEAK VELOCITY: 2.9 M/S
TRICUSPID ANNULAR PLANE SYSTOLIC EXCURSION: 1.3 CM
TRICUSPID VALVE PEAK REGURGITATION VELOCITY: 2.91 M/S
TSH SERPL DL<=0.05 MIU/L-ACNC: 1.25 UIU/ML (ref 0.45–4.5)
UROBILINOGEN UR QL STRIP.AUTO: 0.2 E.U./DL
UUN 24H UR-MCNC: 121 MG/DL
VENTRICULAR RATE: 123 BPM
VENTRICULAR RATE: 79 BPM
VENTRICULAR RATE: 87 BPM
VENTRICULAR RATE: 96 BPM
VIT B12 SERPL-MCNC: 1075 PG/ML (ref 180–914)
WBC # BLD AUTO: 7.07 THOUSAND/UL (ref 4.31–10.16)
WBC #/AREA URNS AUTO: ABNORMAL /HPF

## 2023-06-28 PROCEDURE — 99233 SBSQ HOSP IP/OBS HIGH 50: CPT | Performed by: INTERNAL MEDICINE

## 2023-06-28 PROCEDURE — 85610 PROTHROMBIN TIME: CPT | Performed by: NURSE PRACTITIONER

## 2023-06-28 PROCEDURE — 93306 TTE W/DOPPLER COMPLETE: CPT

## 2023-06-28 PROCEDURE — 83935 ASSAY OF URINE OSMOLALITY: CPT | Performed by: NURSE PRACTITIONER

## 2023-06-28 PROCEDURE — 82746 ASSAY OF FOLIC ACID SERUM: CPT | Performed by: NURSE PRACTITIONER

## 2023-06-28 PROCEDURE — 82533 TOTAL CORTISOL: CPT | Performed by: NURSE PRACTITIONER

## 2023-06-28 PROCEDURE — 85730 THROMBOPLASTIN TIME PARTIAL: CPT | Performed by: NURSE PRACTITIONER

## 2023-06-28 PROCEDURE — 82570 ASSAY OF URINE CREATININE: CPT | Performed by: NURSE PRACTITIONER

## 2023-06-28 PROCEDURE — 93306 TTE W/DOPPLER COMPLETE: CPT | Performed by: INTERNAL MEDICINE

## 2023-06-28 PROCEDURE — 82948 REAGENT STRIP/BLOOD GLUCOSE: CPT

## 2023-06-28 PROCEDURE — 82805 BLOOD GASES W/O2 SATURATION: CPT | Performed by: NURSE PRACTITIONER

## 2023-06-28 PROCEDURE — 80048 BASIC METABOLIC PNL TOTAL CA: CPT | Performed by: NURSE PRACTITIONER

## 2023-06-28 PROCEDURE — 81001 URINALYSIS AUTO W/SCOPE: CPT | Performed by: NURSE PRACTITIONER

## 2023-06-28 PROCEDURE — 85025 COMPLETE CBC W/AUTO DIFF WBC: CPT | Performed by: NURSE PRACTITIONER

## 2023-06-28 PROCEDURE — 0T9B70Z DRAINAGE OF BLADDER WITH DRAINAGE DEVICE, VIA NATURAL OR ARTIFICIAL OPENING: ICD-10-PCS | Performed by: INTERNAL MEDICINE

## 2023-06-28 PROCEDURE — 82550 ASSAY OF CK (CPK): CPT | Performed by: NURSE PRACTITIONER

## 2023-06-28 PROCEDURE — 97162 PT EVAL MOD COMPLEX 30 MIN: CPT

## 2023-06-28 PROCEDURE — 84443 ASSAY THYROID STIM HORMONE: CPT | Performed by: NURSE PRACTITIONER

## 2023-06-28 PROCEDURE — 85730 THROMBOPLASTIN TIME PARTIAL: CPT | Performed by: INTERNAL MEDICINE

## 2023-06-28 PROCEDURE — 93010 ELECTROCARDIOGRAM REPORT: CPT | Performed by: INTERNAL MEDICINE

## 2023-06-28 PROCEDURE — 80076 HEPATIC FUNCTION PANEL: CPT | Performed by: NURSE PRACTITIONER

## 2023-06-28 PROCEDURE — 93005 ELECTROCARDIOGRAM TRACING: CPT

## 2023-06-28 PROCEDURE — 84540 ASSAY OF URINE/UREA-N: CPT | Performed by: NURSE PRACTITIONER

## 2023-06-28 PROCEDURE — 99223 1ST HOSP IP/OBS HIGH 75: CPT | Performed by: INTERNAL MEDICINE

## 2023-06-28 PROCEDURE — 83605 ASSAY OF LACTIC ACID: CPT | Performed by: NURSE PRACTITIONER

## 2023-06-28 PROCEDURE — 84100 ASSAY OF PHOSPHORUS: CPT | Performed by: NURSE PRACTITIONER

## 2023-06-28 PROCEDURE — 97166 OT EVAL MOD COMPLEX 45 MIN: CPT

## 2023-06-28 PROCEDURE — 83735 ASSAY OF MAGNESIUM: CPT | Performed by: NURSE PRACTITIONER

## 2023-06-28 PROCEDURE — 82607 VITAMIN B-12: CPT | Performed by: NURSE PRACTITIONER

## 2023-06-28 PROCEDURE — 84300 ASSAY OF URINE SODIUM: CPT | Performed by: NURSE PRACTITIONER

## 2023-06-28 PROCEDURE — 84145 PROCALCITONIN (PCT): CPT | Performed by: NURSE PRACTITIONER

## 2023-06-28 RX ORDER — HEPARIN SODIUM 5000 [USP'U]/ML
5000 INJECTION, SOLUTION INTRAVENOUS; SUBCUTANEOUS EVERY 8 HOURS SCHEDULED
Status: DISCONTINUED | OUTPATIENT
Start: 2023-06-28 | End: 2023-06-29

## 2023-06-28 RX ORDER — HEPARIN SODIUM 10000 [USP'U]/100ML
3-20 INJECTION, SOLUTION INTRAVENOUS
Status: DISCONTINUED | OUTPATIENT
Start: 2023-06-28 | End: 2023-06-28

## 2023-06-28 RX ORDER — DEXTROSE AND SODIUM CHLORIDE 5; .2 G/100ML; G/100ML
50 INJECTION, SOLUTION INTRAVENOUS CONTINUOUS
Status: DISCONTINUED | OUTPATIENT
Start: 2023-06-28 | End: 2023-06-28

## 2023-06-28 RX ORDER — MAGNESIUM SULFATE HEPTAHYDRATE 40 MG/ML
2 INJECTION, SOLUTION INTRAVENOUS ONCE
Status: COMPLETED | OUTPATIENT
Start: 2023-06-28 | End: 2023-06-28

## 2023-06-28 RX ORDER — HEPARIN SODIUM 1000 [USP'U]/ML
2000 INJECTION, SOLUTION INTRAVENOUS; SUBCUTANEOUS EVERY 6 HOURS PRN
Status: DISCONTINUED | OUTPATIENT
Start: 2023-06-28 | End: 2023-06-28

## 2023-06-28 RX ORDER — DESMOPRESSIN ACETATE 0.1 MG/1
0.1 TABLET ORAL ONCE
Status: COMPLETED | OUTPATIENT
Start: 2023-06-28 | End: 2023-06-28

## 2023-06-28 RX ORDER — POLYETHYLENE GLYCOL 3350 17 G/17G
17 POWDER, FOR SOLUTION ORAL DAILY
Status: DISCONTINUED | OUTPATIENT
Start: 2023-06-28 | End: 2023-06-29

## 2023-06-28 RX ORDER — HEPARIN SODIUM 5000 [USP'U]/ML
5000 INJECTION, SOLUTION INTRAVENOUS; SUBCUTANEOUS EVERY 8 HOURS SCHEDULED
Status: DISCONTINUED | OUTPATIENT
Start: 2023-06-28 | End: 2023-06-28

## 2023-06-28 RX ORDER — HEPARIN SODIUM 1000 [USP'U]/ML
4000 INJECTION, SOLUTION INTRAVENOUS; SUBCUTANEOUS ONCE
Status: COMPLETED | OUTPATIENT
Start: 2023-06-28 | End: 2023-06-28

## 2023-06-28 RX ORDER — SIMETHICONE 80 MG
80 TABLET,CHEWABLE ORAL EVERY 6 HOURS PRN
Status: DISCONTINUED | OUTPATIENT
Start: 2023-06-28 | End: 2023-07-02 | Stop reason: HOSPADM

## 2023-06-28 RX ORDER — POTASSIUM CHLORIDE 14.9 MG/ML
20 INJECTION INTRAVENOUS ONCE
Status: COMPLETED | OUTPATIENT
Start: 2023-06-28 | End: 2023-06-28

## 2023-06-28 RX ORDER — HEPARIN SODIUM 1000 [USP'U]/ML
4000 INJECTION, SOLUTION INTRAVENOUS; SUBCUTANEOUS EVERY 6 HOURS PRN
Status: DISCONTINUED | OUTPATIENT
Start: 2023-06-28 | End: 2023-06-28

## 2023-06-28 RX ORDER — AMOXICILLIN 250 MG
2 CAPSULE ORAL
Status: DISCONTINUED | OUTPATIENT
Start: 2023-06-28 | End: 2023-07-02 | Stop reason: HOSPADM

## 2023-06-28 RX ORDER — ACETAMINOPHEN 325 MG/1
650 TABLET ORAL EVERY 6 HOURS PRN
Status: DISCONTINUED | OUTPATIENT
Start: 2023-06-28 | End: 2023-07-02 | Stop reason: HOSPADM

## 2023-06-28 RX ORDER — POTASSIUM CHLORIDE 20 MEQ/1
40 TABLET, EXTENDED RELEASE ORAL ONCE
Status: COMPLETED | OUTPATIENT
Start: 2023-06-28 | End: 2023-06-28

## 2023-06-28 RX ADMIN — POTASSIUM CHLORIDE 20 MEQ: 14.9 INJECTION, SOLUTION INTRAVENOUS at 08:42

## 2023-06-28 RX ADMIN — DEXTROSE 500 ML: 5 SOLUTION INTRAVENOUS at 02:47

## 2023-06-28 RX ADMIN — HEPARIN SODIUM 11.1 UNITS/KG/HR: 10000 INJECTION, SOLUTION INTRAVENOUS at 02:07

## 2023-06-28 RX ADMIN — DEXTROSE AND SODIUM CHLORIDE 200 ML/HR: 5; .2 INJECTION, SOLUTION INTRAVENOUS at 02:46

## 2023-06-28 RX ADMIN — METOPROLOL SUCCINATE 50 MG: 50 TABLET, EXTENDED RELEASE ORAL at 08:47

## 2023-06-28 RX ADMIN — HEPARIN SODIUM 4000 UNITS: 1000 INJECTION INTRAVENOUS; SUBCUTANEOUS at 02:08

## 2023-06-28 RX ADMIN — DESMOPRESSIN ACETATE 0.1 MG: 0.1 TABLET ORAL at 12:09

## 2023-06-28 RX ADMIN — HEPARIN SODIUM 5000 UNITS: 5000 INJECTION INTRAVENOUS; SUBCUTANEOUS at 16:16

## 2023-06-28 RX ADMIN — POLYETHYLENE GLYCOL 3350 17 G: 17 POWDER, FOR SOLUTION ORAL at 11:52

## 2023-06-28 RX ADMIN — HEPARIN SODIUM 5000 UNITS: 5000 INJECTION INTRAVENOUS; SUBCUTANEOUS at 21:02

## 2023-06-28 RX ADMIN — SODIUM CHLORIDE 50 ML/HR: 0.9 INJECTION, SOLUTION INTRAVENOUS at 00:11

## 2023-06-28 RX ADMIN — DEXTROSE 1000 ML: 5 SOLUTION INTRAVENOUS at 04:54

## 2023-06-28 RX ADMIN — POTASSIUM CHLORIDE 40 MEQ: 1500 TABLET, EXTENDED RELEASE ORAL at 08:47

## 2023-06-28 RX ADMIN — DEXTROSE AND SODIUM CHLORIDE 200 ML/HR: 5; .2 INJECTION, SOLUTION INTRAVENOUS at 08:33

## 2023-06-28 RX ADMIN — MAGNESIUM SULFATE HEPTAHYDRATE 2 G: 40 INJECTION, SOLUTION INTRAVENOUS at 08:28

## 2023-06-28 RX ADMIN — SENNOSIDES AND DOCUSATE SODIUM 2 TABLET: 50; 8.6 TABLET ORAL at 21:02

## 2023-06-28 NOTE — ASSESSMENT & PLAN NOTE
· In setting of poor oral intake less likely DI given vol deplete state and low na (would expect hypernatremia)  · 6/9 work up showed Sosm 263 Uosm 409 Yusra 80 tSH 2.11 AM danielle 21.6  · Repeat Sosm/Uosm/Yusra given drop in sodium  · CTC pending  · Post obstructive diuresis match I/O pending na keep positive  · Boluses D5 and D51/4 NSS 200cc/hr  · Consider DDAVP and nephro consult  · Serial bmp goal correct 6-8 meq in 24 hrs

## 2023-06-28 NOTE — CONSULTS
Consultation - Nephrology   Keyona Noel 81 y.o. male MRN: [id number]  Unit/Bed#: ICU 01-01 Encounter: [id number]      A/P:  1. Hyponatremia   Etiology most likely due to excess ADH secretion, however there is documentation that when the patient is overhydration, ADH can be shut off completely (which is physiologically appropriate). At this point, we will presume that he has no issues with central ADH secretion, as the patient had elevated urine osmolality, and less likely that there is nephrogenic diabetes insipidus given concentration of urine. However urine output is significantly increased and recommended providing the patient with 0.1 mg of DDAVP to reduce urine output. Follow-up serum sodium level at around 2 PM.  Additional free water infusion may be indicated depending on a progresses clinically. Patient currently not on a fluid restriction. We will look for serum sodium level to increase from 120 mmol/L up to 126-128 mmol/L by tomorrow morning. With respect to further work-up, it appears the patient has had a cortisol level which came back appropriate, although the patient's urine sodium was only 31 mmol/L, his urine creatinine was 13 mg/dL. Therefore hyponatremia due to a true sodium depleted state is not likely. 2.  Acute kidney injury   Resolved status post Haley catheter placement. Most likely etiology was urinary obstruction now status post Haley catheter placement by our urology colleague. Continue tamsulosin, follow-up in the outpatient setting according to urology recommendations. Kidney function nearly at baseline at this time. 3.  Hyperglycemia   Significantly improved status post reduction of dextrose infusion. Continue to monitor for now. 4.  New onset atrial fibrillation   Further work-up and evaluation according to critical care. 2D echocardiogram looked okay. Thank you for allowing us to participate in the care of your patient.      Please feel free to contact us regarding the care of this patient, or any other questions/concerns that may be applicable. Patient Active Problem List   Diagnosis   • Generalized weakness   • Prostate CA [facility])   • Essential hypertension   • Hyponatremia   • UTI (urinary tract infection)   • Urinary retention   • EZIO (acute kidney injury) ([address])   • Anemia   • New onset a-fib (HCC)       History of Present Illness   Physician Requesting Consult: Ovidio Dickey DO  Reason for Consult / Principal Problem: Hyponatremia  Hx and PE limited by:   HPI: Bria Ruiz is a 81y.o. year old male who presented to the emergency department due to severe weakness which began fairly abruptly for the patient. He has recently been evaluated by his primary care provider for electrolyte disorders specifically hyponatremia. There is concern for central diabetes insipidus due to vasopressin which was undetectable on blood work from June 9. Unfortunately there was not a urine osmolality available at the time, however, patient urine osmolality at presentation on June 27 was 296 mmol/KG, previously on May 31 it was 409 mmol/KG, and this morning it was 115 mmol/KG. Patient's serum sodium on June 9 was 128 mmol/L. At presentation was 113 mmol/L, and this morning is 120 mmol/L. Patient feels significantly improved at this time. Complicating matters slightly includes presenting with significant urinary retention, patient now status post Haley catheter placement. He had a substantial amount of urine output, about 5.2 L, followed by an additional 1.8 L this morning. Patient was transitioned to D5 0.2% saline at 200 mL/hr which was then reduced to 100 mL/hr and then 50 mL/hr. Patient claims to drink extra water on purpose as he was told that he was dehydrated. He was somewhat confused between volume status and hydration at presentation. Patient drinks coffee, tea, vitamin water.   He drinks so much fluid that he cannot recall having a thirst response at this time. History obtained from chart review and the patient    Constitutional ROS- Denies fatigue, fever, chills, night sweats, weight changes. HEENT ROS- Denies history of eye surgeries, glaucoma, headaches or history of trauma, blurred vision. .  Endocrine ROS- No history diabetes mellitus or thyroid disease. Cardiovascular ROS- Denies chest pain, palpitation, dyspnea exertion, orthopnea, claudication. Pulmonary ROS- Denies history of COPD, asthma. Denies cough, hemoptysis, shortness of breath. GI ROS- Denies abdominal pain, diarrhea, nausea, swallowing problems, vomiting, constipation, blood in stools, fecal incontinence. Hematological ROS- Denies history of easy bruising, blood clots, bleeding or blood transfusions. Genitourinary ROS- Denies recent hematuria, pyuria, flank pain, change in urinary stream, decreased urinary output, increased urinary frequency, nocturia, foamy urine, or urinary incontinence. Lymphatic ROS- Denies lymphadenopathy. Musculoskeletal ROS- Denies history of muscle weakness, joint pain. Dermatological ROS- Denies rash, wounds, ulcers, itching, jaundice. Psychiatric ROS- Denies anxiety, depression, hallucinations, disorientation. Neurological ROS- No stroke or TIA symptoms.  .    Historical Information   Past Medical History:   Diagnosis Date   • Cancer ([address])     prostate cancer    • Hypertension      Past Surgical History:   Procedure Laterality Date   • APPENDECTOMY       Social History   Social History     Substance and Sexual Activity   Alcohol Use Yes   • Alcohol/week: 1.0 standard drink of alcohol   • Types: 1 Cans of beer per week    Comment: monthly     Social History     Substance and Sexual Activity   Drug Use Not Currently     Social History     Tobacco Use   Smoking Status Never   Smokeless Tobacco Never     Family History   Problem Relation Age of Onset   • Hypertension Mother        Meds/Allergies   all current active meds have been reviewed, current meds: Current Facility-Administered Medications   Medication Dose Route Frequency   • metoprolol succinate (TOPROL-XL) 24 hr tablet 50 mg  50 mg Oral Daily   • polyethylene glycol (MIRALAX) packet 17 g  17 g Oral Daily   • senna-docusate sodium (SENOKOT S) 8.6-50 mg per tablet 2 tablet  2 tablet Oral HS   • simethicone (MYLICON) chewable tablet 80 mg  80 mg Oral Q6H PRN   • tamsulosin (FLOMAX) capsule 0.4 mg  0.4 mg Oral Daily    and PTA meds:    Medications Prior to Admission   Medication   • metoprolol succinate (TOPROL-XL) 25 mg 24 hr tablet   • tamsulosin (FLOMAX) 0.4 mg         No Known Allergies    Objective     Intake/Output Summary (Last 24 hours) at 6/28/2023 1351  Last data filed at 6/28/2023 1217  Gross per 24 hour   Intake 5376.66 ml   Output 6950 ml   Net -1573.34 ml       Invasive Devices:   Urethral Catheter 18 Fr. (Active)   Amt returned on insertion(mL) 800 mL 06/27/23 2337   Reasons to continue Urinary Catheter  Acute urinary retention/obstruction failing urinary retention protocol 06/28/23 0800   Goal for Removal Other (Comment) 06/28/23 0800   Site Assessment Clean;Skin intact 06/28/23 0800   Haley Care Done 06/28/23 0800   Collection Container Standard drainage bag 06/28/23 0800   Securement Method Securing device (Describe) 06/28/23 0800   Securing Device Change Date 07/04/23 06/28/23 0800   Output (mL) 250 mL 06/28/23 1217   CAUTI Time-out performed before Urine Culture Yes 06/27/23 2337       Physical Exam      I/O last 3 completed shifts: In: 3392.3 [P.O.:60; I.V.:832.3; IV Piggyback:2500]  Out: 1177 [Urine:5225]    Vitals:    06/28/23 1056   BP: 152/73   Pulse: 76   Resp: 22   Temp: 98.1 °F (36.7 °C)   SpO2: 95%       General Appearance:    No acute distress. Cooperative. Appears stated age. Head:    Normocephalic. Atraumatic. Normal jaw occlusion. Eyes:    Lids, conjunctiva normal. No scleral icterus. Ears:    Normal external ears. Nose:   Nares normal. No drainage.    Mouth:   Lips, tongue normal. Mucosa normal. Phonation normal.   Neck:   Supple. Symmetrical.   Back:     Symmetric. No CVA tenderness. Lungs:     Normal respiratory effort. Clear to auscultation bilaterally. Chest wall:    No tenderness or deformity. Heart:    Regular rate and rhythm. Normal S1 and S2. No murmur. No JVD. No edema. Abdomen:     Soft. Non-tender. Bowel sounds active. Genitourinary:   No Haley catheter present. Extremities:   Extremities normal. Atraumatic. No cyanosis. Skin:   Warm and dry. No pallor, jaundice, rash, ecchymoses. Neurologic:   Alert and oriented to person, place, time. No focal deficit. Current Weight: Weight - Scale: 93.4 kg (206 lb)  First Weight: Weight - Scale: 95 kg (209 lb 7 oz)    Lab Results:  I have personally reviewed pertinent labs.     CBC:   Lab Results   Component Value Date    WBC 7.07 06/28/2023    HGB 10.8 (L) 06/28/2023    HCT 30.2 (L) 06/28/2023    MCV 84 06/28/2023     06/28/2023    RBC 3.58 (L) 06/28/2023    MCH 30.2 06/28/2023    MCHC 35.8 06/28/2023    RDW 12.9 06/28/2023    MPV 8.1 (L) 06/28/2023    NRBC 0 06/28/2023     CMP:   Lab Results   Component Value Date    K 3.8 06/28/2023    CL 92 (L) 06/28/2023    CO2 21 06/28/2023    BUN 13 06/28/2023    CREATININE 0.95 06/28/2023    CALCIUM 7.9 (L) 06/28/2023    AST 80 (H) 06/28/2023    ALT 42 06/28/2023    ALKPHOS 40 06/28/2023    EGFR 72 06/28/2023     Phosphorus:   Lab Results   Component Value Date    PHOS 3.3 06/28/2023     Magnesium:   Lab Results   Component Value Date    MG 1.6 (L) 06/28/2023     Urinalysis:   Lab Results   Component Value Date    COLORU Red (A) 06/28/2023    CLARITYU Clear 06/28/2023    SPECGRAV <=1.005 (L) 06/28/2023    PHUR 7.0 06/28/2023    LEUKOCYTESUR Negative 06/28/2023    NITRITE Negative 06/28/2023    GLUCOSEU Negative 06/28/2023    KETONESU Negative 06/28/2023    BILIRUBINUR Negative 06/28/2023    BLOODU 3+ (A) 06/28/2023     Ionized Calcium: No results found for: "CAION"  Coagulation:   Lab Results   Component Value Date    INR 1.10 06/28/2023     Troponin: No results found for: "TROPONINI"  ABG: No results found for: "PHART", "FHT8CNW", "PO2ART", "CBA1SIG", "N4YLALAR", "BEART", "SOURCE"    Results from last 7 days   Lab Units 06/28/23  1001 06/28/23  0657 06/28/23  0417   POTASSIUM mmol/L 3.8 3.5 3.8   CHLORIDE mmol/L 92* 83* 88*   CO2 mmol/L 21 20* 21   BUN mg/dL 13 14 19   CREATININE mg/dL 0.95 1.17 1.43*   CALCIUM mg/dL 7.9* 8.0* 8.8   ALK PHOS U/L 40  --   --    ALT U/L 42  --   --    AST U/L 80*  --   --        Radiology review:  Procedure: CT chest wo contrast    Result Date: 6/28/2023  Narrative: CT CHEST WITHOUT IV CONTRAST INDICATION:   hyponatremia work up. Per my review of the medical record, the patient has a history of prostate cancer status post radiation therapy with urinary retention status post Haley catheter and mild hematuria after Haley placement. COMPARISON: Chest radiograph 3/13/2020 and chest CT 12/14/2019. TECHNIQUE: Chest CT without intravenous contrast.  Axial, sagittal, coronal 2D reformats and coronal MIPS from source data. Radiation dose length product (DLP):  537 mGy-cm . Radiation dose exposure minimized using iterative reconstruction and automated exposure control. FINDINGS: LUNGS: Nothing to indicate malignancy. Stable 6 mm right middle lobe nodule, benign (5/67). Stable benign intrapulmonary lymph node abutting the right middle fissure (5/72). AIRWAYS: No significant filling defects. PLEURA:  Unremarkable. HEART/GREAT VESSELS: Moderate cardiomegaly. Mild coronary artery calcification indicating atherosclerotic heart disease. Trace pericardial effusion. MEDIASTINUM AND ABDIAZIZ:  Unremarkable. CHEST WALL AND LOWER NECK: Mild gynecomastia. UPPER ABDOMEN: Partially imaged moderate stranding of the right perinephric fat. OSSEOUS STRUCTURES: Mild degenerative disease in the spine with mild curvature.      Impression: Nothing indicate malignancy with stable 6 mm right middle lobe nodule since December 2019, benign. Partially imaged moderate stranding in the right perinephric fat which can be seen with inflammation or acute obstruction although the small portion of the right renal calyces which are imaged are nondilated. Consider further evaluation with abdomen pelvis CT if clinically warranted. The study was marked in EPIC for significant notification. Workstation performed: [id number]     Procedure: XR spine lumbar 2 or 3 views injury    Result Date: 6/28/2023  Narrative: LUMBAR SPINE INDICATION:   lower back pain intermittently. COMPARISON:  None VIEWS:  XR SPINE LUMBAR 2 OR 3 VIEWS INJURY FINDINGS: There are 5 non rib bearing lumbar vertebral bodies. There is no evidence of acute fracture or destructive osseous lesion. Alignment is unremarkable. Degenerative spondylosis at multiple levels with osteophytes is noted. The space narrowing is most pronounced at L2-3, L3-4 and L5-S1. Schmorl's node formation is seen superiorly at L2. This appears to have progressed since an abdominal CT from 9/20/2011. The pedicles appear intact. Gas-filled loops of small bowel is seen without robert dilatation with also some gas in the colon. Impression: Degenerative spondylosis with Schmorl's node formation. Gas-filled loops of large and small bowel suggestive of ileus. Follow-up may be helpful if symptoms persist. The study was marked in [facility] for immediate notification. Workstation performed: [id number]     Procedure: Echo complete w/ contrast if indicated    Result Date: 6/28/2023  Narrative: •  Left Ventricle: Left ventricular cavity size is normal. Wall thickness is increased. There is mild concentric hypertrophy. The left ventricular ejection fraction is 65%. Systolic function is normal. Wall motion is normal. Diastolic function is normal. •  Right Ventricle: Systolic function is mildly reduced. Abnormal tricuspid annular plane systolic excursion (TAPSE) < 1.7 cm.  • Left Atrium: The atrium is dilated. Celine Mckee, DO      This consultation note was produced in part using a dictation device which may document imprecise wording from author's original intent.

## 2023-06-28 NOTE — ASSESSMENT & PLAN NOTE
· Pt noted to be in rate controlled afib on arrival to ICU  · [id number] score 3  · Start heparin gtt with EZIO--discuss with CM eliquis cost  · Cont Toprol home dose  · TSH2  · Check Echo for full w/u

## 2023-06-28 NOTE — ASSESSMENT & PLAN NOTE
· In setting of poor oral intake less likely DI given vol deplete state and low na (would expect hypernatremia)  · 6/9 work up showed Sosm 263 Uosm 409 Y[address] 80 tSH 2.11 AM danielle 21.6  · Repeat Sosm/Uosm/Yusra given drop in sodium  · CTC pending  · 1LNSS given in ER  · Hold additional IVF until repeat NA  · If stable start NSS 50cc/hr  · Serial bmp goal correct 6-8 meq in 24 hrs

## 2023-06-28 NOTE — OCCUPATIONAL THERAPY NOTE
Occupational Therapy Evaluation     Patient Name: Deon Martinez. Today's Date: 6/28/2023  Problem List  Principal Problem:    Hyponatremia  Active Problems:    Generalized weakness    Prostate CA ([address])    Essential hypertension    UTI (urinary tract infection)    Urinary retention    EZIO (acute kidney injury) ([address])    Anemia    New onset a-fib (HCC)    Past Medical History  Past Medical History:   Diagnosis Date    Cancer ([address])     prostate cancer     Hypertension      Past Surgical History  Past Surgical History:   Procedure Laterality Date    APPENDECTOMY           06/28/23 0920   OT Last Visit   OT Visit Date 06/28/23   Note Type   Note type Evaluation   Pain Assessment   Pain Assessment Tool 0-10   Pain Score No Pain   Restrictions/Precautions   Weight Bearing Precautions Per Order No   Braces or Orthoses   (none reported)   Other Precautions Fall Risk;Pain;Multiple lines;Telemetry   Home Living   Type of [address] [place] [address] Multi-level;Stairs to enter with rails;Bed/bath upstairs  (3 MAUREEN w/ HR in front, 10-12 stairs to enter through garage; FOS w/ landing to 2nd floor to bed/bath)   Bathroom Shower/Tub Tub/shower unit  (has second bath downstairs with walk-in shower)   Bathroom Toilet Standard   Bathroom Equipment Grab bars in shower;Built-in shower seat   Bathroom Accessibility Accessible   Home Equipment   (no AD usage at baseline or availability)   Prior Function   Level of [place] Independent with ADLs; Independent with functional mobility; Independent with IADLS   Lives With Spouse   Receives Help From Family   IADLs Family/Friend/Other provides meals; Independent with medication management; Independent with driving  (pt reports shared responsibility of IADLs with spouse)   Falls in the last 6 months 0  (pt denies)   Vocational Retired   Subjective   Subjective "I just retired 2 years ago"   ADL   Where Assessed Chair   Eating Assistance 7  Independent   Grooming Assistance 7  [facility] & [address] Bathing Assistance 6  Modified Independent   LB Bathing Assistance 5  Supervision/Setup   UB Dressing Assistance 6  Modified independent   LB Dressing Assistance 5  Supervision/Setup   LB Dressing Deficit Don/doff R sock; Don/doff L sock  (cross over leg technique)   Bed Mobility   Additional Comments DNT; pt seated in recliner upon arrival and conclusion of session   Transfers   Sit to Stand 5  Supervision   Additional items Assist x 1; Armrests; Increased time required   Stand to Sit 5  Supervision   Additional items Assist x 1; Armrests; Increased time required   Stand pivot 5  Supervision   Additional items Assist x 1; Increased time required   Additional Comments No AD used   Functional Mobility   Functional Mobility 5  Supervision   Additional Comments Pt completed long distance ADL mobility from room <> hallway w/out AD. No significant LOB observed, mild instability   Additional items   (none)   Balance   Static Sitting Normal   Dynamic Sitting Good   Static Standing Fair +   Dynamic Standing Fair   Ambulatory Fair   Activity Tolerance   Activity Tolerance Patient limited by fatigue   Medical Staff Made Aware Yes, CM made aware of session outcomes   Nurse Made Aware Yes, MEG Isbell aware of session outcomes   RUE Assessment   RUE Assessment WFL   RUE Strength   RUE Overall Strength   (4-/5)   LUE Assessment   LUE Assessment WFL   LUE Strength   LUE Overall Strength   (4-/5)   Hand Function   Gross Motor Coordination Functional   Fine Motor Coordination Functional   Vision-Basic Assessment   Current Vision Wears glasses all the time   Psychosocial   Psychosocial (WDL) WDL   Cognition   Overall Cognitive Status WFL   Arousal/Participation Alert; Responsive; Cooperative   Attention Within functional limits   Orientation Level Oriented X4   Memory Within functional limits   Following Commands Follows all commands and directions without difficulty   Comments Pt agreeable to OT evaluation, pleasant   Assessment   Limitation Decreased ADL status; Decreased UE strength;Decreased Safe judgement during ADL;Decreased endurance   Prognosis Good   Assessment Pt is a 81 y.o. male seen for OT evaluation s/p admit to [facility] on 6/27/2023 w/ Hyponatremia. Comorbidities affecting pt's functional performance at time of assessment include: generalized weakness, hx of prostate CA, HTN, urinary retention, EZIO, anemia. Personal factors affecting pt at time of IE include:difficulty performing IADLS , decreased initiation and engagement  and health management . Prior to admission, pt was (I) with ADLs and IADLs. Upon evaluation: the following deficits impact occupational performance: weakness, decreased balance, decreased tolerance and decreased safety awareness. Pt to benefit from continued skilled OT tx while in the hospital to address deficits as defined above and maximize level of functional independence w ADL's and functional mobility. Occupational Performance areas to address include: grooming, bathing/shower, toilet hygiene, dressing, functional mobility, community mobility and clothing management. From OT standpoint, recommendation at time of d/c would be Home with outpatient PT services. Goals   Patient Goals to go home   Plan   Treatment Interventions ADL retraining;Functional transfer training;UE strengthening/ROM; Endurance training;Patient/family training;Energy conservation; Activityengagement   Goal Expiration Date 07/08/23   OT Treatment Day 0   OT Frequency 2-3x/wk   Recommendation   OT Discharge Recommendation Home with outpatient rehabilitation  (pt would benefit from outpatient PT services)   Additional Comments  The patient's raw score on the AM-PAC Daily Activity inpatient short form is 21, standardized score is 44.27, greater than 39.4. Patients at this level are likely to benefit from discharge to home. Please refer to the recommendation of the Occupational Therapist for safe discharge planning.    Additional Comments 2 Pt seen as a co-eval with PT Ether Dy due to the patient's co-morbidities, clinically unstable presentation, and present impairments which are a regression from the patient's baseline.    AM-PAC Daily Activity Inpatient   Lower Body Dressing 3   Bathing 3   Toileting 3   Upper Body Dressing 4   Grooming 4   Eating 4   Daily Activity Raw Score 21   Daily Activity Standardized Score (Calc for Raw Score >=11) 44.27   AM-PAC Applied Cognition Inpatient   Following a Speech/Presentation 3   Understanding Ordinary Conversation 4   Taking Medications 3   Remembering Where Things Are Placed or Put Away 3   Remembering List of 4-5 Errands 3   Taking Care of Complicated Tasks 3   Applied Cognition Raw Score 19   Applied Cognition Standardized Score 39.77       GOALS:    Pt will achieve the following within specified time frame: LTG  Pt will achieve the following goals within 10 days    *ADL transfers with (I) for inc'd independence with ADLs/purposeful tasks    *UB ADL with (I) for inc'd independence with self cares    *LB ADL with (I) using AE prn for inc'd independence with self cares    *Toileting with (I) for clothing management and hygiene for return to PLOF with personal care    *Increase static stand balance and dyn stand balance to G for inc'd safety with standing purposeful tasks    *Increase stand tolerance x7 m for inc'd tolerance with standing purposeful tasks    *Bed mobility- (I) for inc'd independence to manage own comfort and initiate EOB & OOB purposeful tasks    *Participate in 10m UE therex to increase overall stamina/activity tolerance for purposeful tasks      John Nelson MS, OTR/L

## 2023-06-28 NOTE — PLAN OF CARE
Problem: PAIN - ADULT  Goal: Verbalizes/displays adequate comfort level or baseline comfort level  Description: Interventions:  - Encourage patient to monitor pain and request assistance  - Assess pain using appropriate pain scale  - Administer analgesics based on type and severity of pain and evaluate response  - Implement non-pharmacological measures as appropriate and evaluate response  - Consider cultural and social influences on pain and pain management  - Notify physician/advanced practitioner if interventions unsuccessful or patient reports new pain  Outcome: Progressing     Problem: INFECTION - ADULT  Goal: Absence or prevention of progression during hospitalization  Description: INTERVENTIONS:  - Assess and monitor for signs and symptoms of infection  - Monitor lab/diagnostic results  - Monitor all insertion sites, i.e. indwelling lines, tubes, and drains  - Monitor endotracheal if appropriate and nasal secretions for changes in amount and color  - [place] appropriate cooling/warming therapies per order  - Administer medications as ordered  - Instruct and encourage patient and family to use good hand hygiene technique  - Identify and instruct in appropriate isolation precautions for identified infection/condition  Outcome: Progressing  Goal: Absence of fever/infection during neutropenic period  Description: INTERVENTIONS:  - Monitor WBC    Outcome: Progressing     Problem: SAFETY ADULT  Goal: Patient will remain free of falls  Description: INTERVENTIONS:  - Educate patient/family on patient safety including physical limitations  - Instruct patient to call for assistance with activity   - Consult OT/PT to assist with strengthening/mobility   - Keep Call bell within reach  - Keep bed low and locked with side rails adjusted as appropriate  - Keep care items and personal belongings within reach  - Initiate and maintain comfort rounds  - Make Fall Risk Sign visible to staff  - Offer Toileting every 2 Hours, in advance of need  - Initiate/Maintain alarm  - Obtain necessary fall risk management equipment:   - Apply yellow socks and bracelet for high fall risk patients  - Consider moving patient to room near nurses station  Outcome: Progressing  Goal: Maintain or return to baseline ADL function  Description: INTERVENTIONS:  -  Assess patient's ability to carry out ADLs; assess patient's baseline for ADL function and identify physical deficits which impact ability to perform ADLs (bathing, care of mouth/teeth, toileting, grooming, dressing, etc.)  - Assess/evaluate cause of self-care deficits   - Assess range of motion  - Assess patient's mobility; develop plan if impaired  - Assess patient's need for assistive devices and provide as appropriate  - Encourage maximum independence but intervene and supervise when necessary  - Involve family in performance of ADLs  - Assess for home care needs following discharge   - Consider OT consult to assist with ADL evaluation and planning for discharge  - Provide patient education as appropriate  Outcome: Progressing  Goal: Maintains/Returns to pre admission functional level  Description: INTERVENTIONS:  - Perform BMAT or MOVE assessment daily.   - Set and communicate daily mobility goal to care team and patient/family/caregiver. - Collaborate with rehabilitation services on mobility goals if consulted  - Perform Range of Motion 3 times a day. - Reposition patient every 2 hours.   - Dangle patient 3 times a day  - Stand patient 3 times a day  - Ambulate patient 3 times a day  - Out of bed to chair 3 times a day   - Out of bed for meals 3 times a day  - Out of bed for toileting  - Record patient progress and toleration of activity level   Outcome: Progressing     Problem: DISCHARGE PLANNING  Goal: Discharge to home or other facility with appropriate resources  Description: INTERVENTIONS:  - Identify barriers to discharge w/patient and caregiver  - Arrange for needed discharge resources and transportation as appropriate  - Identify discharge learning needs (meds, wound care, etc.)  - Arrange for interpretive services to assist at discharge as needed  - Refer to Case Management Department for coordinating discharge planning if the patient needs post-hospital services based on physician/advanced practitioner order or complex needs related to functional status, cognitive ability, or social support system  Outcome: Progressing     Problem: Knowledge Deficit  Goal: Patient/family/caregiver demonstrates understanding of disease process, treatment plan, medications, and discharge instructions  Description: Complete learning assessment and assess knowledge base. Interventions:  - Provide teaching at level of understanding  - Provide teaching via preferred learning methods  Outcome: Progressing     Problem: Nutrition/Hydration-ADULT  Goal: Nutrient/Hydration intake appropriate for improving, restoring or maintaining nutritional needs  Description: Monitor and assess patient's nutrition/hydration status for malnutrition. Collaborate with interdisciplinary team and initiate plan and interventions as ordered. Monitor patient's weight and dietary intake as ordered or per policy. Utilize nutrition screening tool and intervene as necessary. Determine patient's food preferences and provide high-protein, high-caloric foods as appropriate.      INTERVENTIONS:  - Monitor oral intake, urinary output, labs, and treatment plans  - Assess nutrition and hydration status and recommend course of action  - Evaluate amount of meals eaten  - Assist patient with eating if necessary   - Allow adequate time for meals  - Recommend/ encourage appropriate diets, oral nutritional supplements, and vitamin/mineral supplements  - Order, calculate, and assess calorie counts as needed  - Recommend, monitor, and adjust tube feedings and TPN/PPN based on assessed needs  - Assess need for intravenous fluids  - Provide specific nutrition/hydration education as appropriate  - Include patient/family/caregiver in decisions related to nutrition  Outcome: Progressing

## 2023-06-28 NOTE — ASSESSMENT & PLAN NOTE
· PVR>500  · Cont home flomax  · Place dominique  · Urology consult  · MRI 5/30/23 mod prostatomegaly

## 2023-06-28 NOTE — PLAN OF CARE
Problem: OCCUPATIONAL THERAPY ADULT  Goal: Performs self-care activities at highest level of function for planned discharge setting. See evaluation for individualized goals. Description: Treatment Interventions: ADL retraining, Functional transfer training, UE strengthening/ROM, Endurance training, Patient/family training, Energy conservation, Activityengagement     See flowsheet documentation for full assessment, interventions and recommendations. Note: Limitation: Decreased ADL status, Decreased UE strength, Decreased Safe judgement during ADL, Decreased endurance  Prognosis: Good  Assessment: Pt is a 81 y.o. male seen for OT evaluation s/p admit to [facility] on 6/27/2023 w/ Hyponatremia. Comorbidities affecting pt's functional performance at time of assessment include: generalized weakness, hx of prostate CA, HTN, urinary retention, EZIO, anemia. Personal factors affecting pt at time of IE include:difficulty performing IADLS , decreased initiation and engagement  and health management . Prior to admission, pt was (I) with ADLs and IADLs. Upon evaluation: the following deficits impact occupational performance: weakness, decreased balance, decreased tolerance and decreased safety awareness. Pt to benefit from continued skilled OT tx while in the hospital to address deficits as defined above and maximize level of functional independence w ADL's and functional mobility. Occupational Performance areas to address include: grooming, bathing/shower, toilet hygiene, dressing, functional mobility, community mobility and clothing management. From OT standpoint, recommendation at time of d/c would be Home with outpatient PT services.      OT Discharge Recommendation: Home with outpatient rehabilitation (pt would benefit from outpatient PT services)     Giancarlo Gottlieb MS, OTR/L

## 2023-06-28 NOTE — H&P
[address]  H&P  Name: Pennie Rogers. 81 y.o. male I MRN: [id number]  Unit/Bed#: ICU 01-01 I Date of Admission: 6/27/2023   Date of Service: 6/27/2023 I Hospital Day: 0      Assessment/Plan   * Hyponatremia  Assessment & Plan  · In setting of poor oral intake less likely DI given vol deplete state and low na (would expect hypernatremia)  · 6/9 work up showed Sosm 263 Uosm 409 Ethna 80 tSH 2.11 AM danielle 21.6  · Repeat Sosm/Uosm/Ethan given drop in sodium  · CTC pending  · 1LNSS given in ER  · Hold additional IVF until repeat NA  · If stable start NSS 50cc/hr  · Serial bmp goal correct 6-8 meq in 24 hrs    EZIO (acute kidney injury) ([address])  Assessment & Plan  · 2/2 prerenal/uti/urinary retention  · CK 1200 trend  · PVR >500 after 50cc void  · Place dominique urology consult  · Bmp serial    Urinary retention  Assessment & Plan  · PVR>500  · Cont home flomax  · Place dominique  · Urology consult  · MRI 5/30/23 mod prostatomegaly    UTI (urinary tract infection)  Assessment & Plan  · Ceftriaxone  · F/u UC, check BC    Generalized weakness  Assessment & Plan  · 2/2 uti/hyponatremia  · PT/OT consult    Anemia  Assessment & Plan  · Iron 34/sat 13  · Would benefit for oral iron/ hold IV in setting of uti    Essential hypertension  Assessment & Plan  · Home lopressor    Prostate CA [facility])  Assessment & Plan  · S/p radiation         History of Present Illness     HPI: Pennie Rogers. is a 81 y.o. with past medical history of prostate cancer status post radiation, hypertension who presents with generalized weakness. Family states that patient has had urinary frequency greatest at night, progressive weakness, and change in mood. Recent lab work noted with hyponatremia in which PCP was working up and patient was to see endocrinology this week concern for DI. Vital signs were stable on arrival to the ER.   Lab work revealed a sodium of 113 down from 12 8 3 weeks prior and EZIO with a creatinine of 1.95. Critical care was called to admit the patient given hyponatremia. History obtained from spouse and child, chart review and the patient. Review of Systems   Constitutional: Positive for fatigue. Negative for chills and fever. Respiratory: Negative. Cardiovascular: Negative. Gastrointestinal: Negative. Genitourinary: Positive for frequency. Negative for decreased urine volume, difficulty urinating, dysuria, flank pain, hematuria and urgency. Musculoskeletal: Negative. Neurological: Positive for weakness. Negative for seizures, syncope, light-headedness and headaches. All other systems reviewed and are negative. Historical Information   Past Medical History:  No date: Cancer [facility])      Comment:  prostate cancer   No date: Hypertension Past Surgical History:  No date: APPENDECTOMY   Current Outpatient Medications   Medication Instructions   • metoprolol succinate (TOPROL-XL) 25 mg, Oral, 2 times daily   • tamsulosin (FLOMAX) 0.4 mg, Oral, Daily    No Known Allergies   Social History     Tobacco Use   • Smoking status: Never   • Smokeless tobacco: Never   Vaping Use   • Vaping Use: Never used   Substance Use Topics   • Alcohol use:  Yes     Alcohol/week: 1.0 standard drink of alcohol     Types: 1 Cans of beer per week     Comment: monthly   • Drug use: Not Currently    Family History   Problem Relation Age of Onset   • Hypertension Mother           Objective                            Vitals I/O      Most Recent Min/Max in 24hrs   Temp 98 °F (36.7 °C) Temp  Min: 96 °F (35.6 °C)  Max: 98 °F (36.7 °C)   Pulse 78 Pulse  Min: 78  Max: 84   Resp (!) 25 Resp  Min: 16  Max: 25   /77 BP  Min: 142/77  Max: 196/92   O2 Sat 95 % SpO2  Min: 95 %  Max: 96 %      Intake/Output Summary (Last 24 hours) at 6/27/2023 2340  Last data filed at 6/27/2023 2337  Gross per 24 hour   Intake --   Output 800 ml   Net -800 ml         Diet Regular; Regular House     Invasive Monitoring Physical exam    Physical Exam  Constitutional:       General: He is not in acute distress. Appearance: He is ill-appearing. HENT:      Head: Normocephalic and atraumatic. Eyes:      General: No scleral icterus. Pupils: Pupils are equal, round, and reactive to light. Neck:      Vascular: No JVD. Cardiovascular:      Rate and Rhythm: Normal rate and regular rhythm. Heart sounds: Normal heart sounds. No murmur heard. No friction rub. No gallop. Pulmonary:      Effort: Pulmonary effort is normal. No respiratory distress. Breath sounds: Normal breath sounds. No wheezing or rales. Abdominal:      General: Bowel sounds are normal. There is no distension. Palpations: Abdomen is soft. Tenderness: There is no abdominal tenderness. Musculoskeletal:         General: Normal range of motion. Cervical back: Neck supple. Skin:     General: Skin is warm and dry. Coloration: Skin is not pale. Findings: No erythema or rash. Neurological:      Mental Status: He is alert and oriented to person, place, and time. Cranial Nerves: No cranial nerve deficit. Diagnostic Studies      EKG: naImaging:  I have personally reviewed pertinent reports.    and I have personally reviewed pertinent films in PACS     Medications:  Scheduled PRN   [START ON 6/28/2023] cefTRIAXone, 1,000 mg, Q24H  heparin (porcine), 5,000 Units, Q8H [facility]  metoprolol succinate, 25 mg, BID  sodium chloride, 1,000 mL, Once  [START ON 6/28/2023] tamsulosin, 0.4 mg, Daily          Continuous          Labs:    CBC    Recent Labs     06/27/23 2027   WBC 8.25   HGB 11.2*   HCT 31.5*        BMP    Recent Labs     06/27/23 2027   SODIUM 113*   K 4.6   CL 82*   CO2 21   AGAP 10   BUN 22   CREATININE 1.95*   CALCIUM 9.2       Coags    No recent results     Additional Electrolytes  No recent results       Blood Gas    No recent results  No recent results LFTs  No recent results    Infectious  Recent Labs     06/27/23 2027 PROCALCITONI 0.05     Glucose  Recent Labs     06/27/23 2027   GLUC 127             Critical Care Time Delivered: Upon my evaluation, this patient had a high probability of imminent or life-threatening deterioration due to Hyponatremia, which required my direct attention, intervention, and personal management. I have personally provided 40 minutes of critical care time, exclusive of procedures, teaching, family meetings, and any prior time recorded by providers other than myself.    Anticipated Length of Stay is > 2 midnights  KIERA Uribe

## 2023-06-28 NOTE — PROGRESS NOTES
[address] [facility]  Progress Note  Name: Bettie Kawasaki. I  MRN: [id number]  Unit/Bed#: ICU 01-01 I Date of Admission: 6/27/2023   Date of Service: 6/28/2023 I Hospital Day: 1    Assessment/Plan   * Hyponatremia  Assessment & Plan  · In setting of poor oral intake less likely DI given vol deplete state and low na (would expect hypernatremia)  · 6/9 work up showed Sosm 263 Uosm 409 Ethan 80 tSH 2.11 AM danielle 21.6  · Repeat Sosm/Uosm/Ethan given drop in sodium  · CTC pending  · Post obstructive diuresis match I/O pending na keep positive  · Boluses D5 and D51/4 NSS 200cc/hr  · Consider DDAVP and nephro consult  · Serial bmp goal correct 6-8 meq in 24 hrs    EZIO (acute kidney injury) ([address])  Assessment & Plan  · 2/2 prerenal/uti/urinary retention  · CK 1200 trend  · PVR >500 after 50cc void  · Place dominique urology consult  · Bmp serial    Urinary retention  Assessment & Plan  · PVR>500  · Cont home flomax  · Place dominique  · Urology consult  · MRI 5/30/23 mod prostatomegaly    New onset a-fib [facility])  Assessment & Plan  · Pt noted to be in rate controlled afib on arrival to ICU  · [id number] score 3  · Start heparin gtt with EZIO--discuss with CM eliquis cost  · Cont Toprol home dose  · TSH2  · Check Echo for full w/u    UTI (urinary tract infection)  Assessment & Plan  · Ceftriaxone  · F/u UC, check BC    Generalized weakness  Assessment & Plan  · 2/2 uti/hyponatremia  · PT/OT consult    Anemia  Assessment & Plan  · Iron 34/sat 13  · Would benefit for oral iron/ hold IV in setting of uti    Essential hypertension  Assessment & Plan  · Home lopressor    Prostate CA [facility])  Assessment & Plan  · S/p radiation           ICU Core Measures     A: Assess, Prevent, and Manage Pain · Has pain been assessed? Yes  · Need for changes to pain regimen? NA   B: Both SAT/SAT  · N/A   C: Choice of Sedation · RASS Goal: 0 Alert and Calm  · Need for changes to sedation or analgesia regimen?  NA   D: Delirium · CAM-ICU: Negative E: Early Mobility  · Plan for early mobility? Yes   F: Family Engagement · Plan for family engagement today? Yes       Antibiotic Review: Awaiting culture results. Review of Invasive Devices: Dominique Plan: retention awaiting urology consult        Prophylaxis:  VTE VTE covered by:  heparin (porcine), Intravenous, 11.1 Units/kg/hr at 06/28/23 0207  heparin (porcine), Intravenous  heparin (porcine), Intravenous       Stress Ulcer  not ordered       Subjective   HPI/24hr events: dominique placed and post obstructive diuresis noted. Review of Systems   Neurological: Positive for weakness. Psychiatric/Behavioral: Positive for sleep disturbance. All other systems reviewed and are negative. Objective                            Vitals I/O      Most Recent Min/Max in 24hrs   Temp 98 °F (36.7 °C) Temp  Min: 96 °F (35.6 °C)  Max: 98 °F (36.7 °C)   Pulse 75 Pulse  Min: 75  Max: 103   Resp 21 Resp  Min: 16  Max: 36   /98 BP  Min: 142/77  Max: 196/92   O2 Sat 93 % SpO2  Min: 92 %  Max: 96 %      Intake/Output Summary (Last 24 hours) at 6/28/2023 0535  Last data filed at 6/28/2023 0455  Gross per 24 hour   Intake 2072.16 ml   Output 4225 ml   Net -2152.84 ml         Diet Regular; Regular House     Invasive Monitoring Physical exam    Physical Exam  Constitutional:       General: He is not in acute distress. HENT:      Head: Normocephalic and atraumatic. Eyes:      General: No scleral icterus. Pupils: Pupils are equal, round, and reactive to light. Neck:      Vascular: No JVD. Cardiovascular:      Rate and Rhythm: Normal rate and regular rhythm. Heart sounds: Normal heart sounds. No murmur heard. No friction rub. No gallop. Pulmonary:      Effort: Pulmonary effort is normal. No respiratory distress. Breath sounds: Normal breath sounds. No wheezing or rales. Abdominal:      General: Bowel sounds are normal. There is no distension. Palpations: Abdomen is soft.       Tenderness: There is no abdominal tenderness. Musculoskeletal:         General: Normal range of motion. Cervical back: Neck supple. Right lower leg: No edema. Left lower leg: No edema. Skin:     General: Skin is warm and dry. Coloration: Skin is not pale. Findings: No erythema or rash. Neurological:      Mental Status: He is alert and oriented to person, place, and time. Cranial Nerves: No cranial nerve deficit. Diagnostic Studies      EKG: tele  Imaging:  I have personally reviewed pertinent reports.    and I have personally reviewed pertinent films in PACS     Medications:  Scheduled PRN   cefTRIAXone, 1,000 mg, Q24H  dextrose, 1,000 mL, Once  metoprolol succinate, 50 mg, Daily  tamsulosin, 0.4 mg, Daily      heparin (porcine), 2,000 Units, Q6H PRN  heparin (porcine), 4,000 Units, Q6H PRN       Continuous    dextrose 5 % and sodium chloride 0.2 %, 200 mL/hr, Last Rate: 200 mL/hr (06/28/23 0246)  heparin (porcine), 3-20 Units/kg/hr (Order-Specific), Last Rate: 11.1 Units/kg/hr (06/28/23 0207)         Labs:    CBC    Recent Labs     06/27/23 2027 06/28/23 0417   WBC 8.25 7.07   HGB 11.2* 10.8*   HCT 31.5* 30.2*    280     BMP    Recent Labs     06/28/23 0153 06/28/23 0417   SODIUM 116* 117*   K 4.1 3.8   CL 87* 88*   CO2 20* 21   AGAP 9 8   BUN 20 19   CREATININE 1.59* 1.43*   CALCIUM 9.1 8.8       Coags    Recent Labs     06/28/23 0127 06/28/23 0417   INR 1.10  --    PTT 32 107*        Additional Electrolytes  No recent results       Blood Gas    No recent results  Recent Labs     06/28/23 0153   PHVEN 7.452*   MMU0QLD 31.2*   PO2VEN 74.6*   CVZ1SYT 21.3*   BEVEN -1.8    LFTs  No recent results    Infectious  Recent Labs     06/27/23 2027 06/28/23 0417   PROCALCITONI 0.05 0.05     Glucose  Recent Labs     06/27/23 2027 06/27/23  2312 06/28/23 0153 06/28/23 0417   GLUC 127 127 120 212*           No CC time        KIERA Guo

## 2023-06-28 NOTE — PHYSICAL THERAPY NOTE
Physical Therapy Evaluation     Patient's Name: Cierra Naik Admitting Diagnosis  Hyponatremia [E87.1]  UTI (urinary tract infection) [N39.0]  Fatigue [R53.83]  Weakness [R53.1]  EZIO (acute kidney injury) ([address]) [N17.9]    Problem List  Patient Active Problem List   Diagnosis    Generalized weakness    Prostate CA ([address])    Essential hypertension    Hyponatremia    UTI (urinary tract infection)    Urinary retention    EZIO (acute kidney injury) ([address])    Anemia    New onset a-fib [facility])       Past Medical History  Past Medical History:   Diagnosis Date    Cancer [facility])     prostate cancer     Hypertension        Past Surgical History  Past Surgical History:   Procedure Laterality Date    APPENDECTOMY          06/28/23 0921   PT Last Visit   PT Visit Date 06/28/23   Note Type   Note type Evaluation   Pain Assessment   Pain Assessment Tool 0-10   Pain Score No Pain  (denies at rest and with activity)   Restrictions/Precautions   Weight Bearing Precautions Per Order No   Other Precautions Multiple lines;Telemetry; Fall Risk;Bed Alarm; Chair Alarm  (dominique)   Home Living   Type of [address] [place] [address] Dr Multi-level;Bed/bath upstairs;Stairs to enter with rails  (3 MAUREEN front, 10-12 MAUREEN garage)   Bathroom Shower/Tub Tub/shower unit  (and additional bath downstairs with a walk in shower)   [address] [facility] bars in shower;Built-in [address] [place]   (no prior AD usage or availability)   Prior Function   Level of [place] Independent with ADLs; Independent with functional mobility; Independent with IADLS   Lives With Spouse   Receives Help From Family  (prn)   IADLs Family/Friend/Other provides meals; Independent with medication management; Independent with driving  (reports spouse cooks and he does the dishes)   Falls in the last 6 months 0  (denies)   Vocational Retired   General   Additional Pertinent History Silvia GREENBERG present for co-assessment due to medical complexity, required skilled interventions of 2 clinicians for care delivery. Family/Caregiver Present No   Cognition   Overall Cognitive Status WFL   Arousal/Participation Alert   Attention Within functional limits   Orientation Level Oriented X4   Memory Within functional limits   Following Commands Follows all commands and directions without difficulty   Comments Lupillo Del Toro was agreeable to PT assessment, pleasant. RLE Assessment   RLE Assessment X  (3+/5 gross musculature)   LLE Assessment   LLE Assessment X  (3+/5 gross musculature)   Vision-Basic Assessment   Current Vision Wears glasses all the time   Vestibular   Spontaneous Nystagmus (-) no evidence of nystagmus at rest in room light   Gaze Holding Nystagmus (-) no evidence of nystagmus   Coordination   Movements are Fluid and Coordinated 1   Sharp/Dull   RLE Sharp/Dull Grossly intact   LLE Sharp/Dull Grossly intact   Proprioception   RLE Proprioception Grossly intact   LLE Proprioception Grossly Intact   Bed Mobility   Additional Comments Bed mobility was not assessed as Lupillo Del Toro was sitting out of bed on the recliner prior and after assessment. Transfers   Sit to Stand 5  Supervision   Additional items Assist x 1; Armrests; Increased time required;Verbal cues   Stand to Sit 5  Supervision   Additional items Assist x 1; Armrests; Increased time required;Verbal cues   Stand pivot 5  Supervision   Additional items Assist x 1; Increased time required;Verbal cues   Additional Comments Verbal cues for proper BUE placement with transitional movements and base of support widening for stabilization. Ambulation/Elevation   Gait pattern Improper Weight shift;Narrow URSZULA; Forward Flexion;Decreased foot clearance; Short stride; Inconsistent tesfaye   Gait Assistance 5  Supervision  (close S<->CGA)   Additional items Assist x 1;Verbal cues   Assistive Device None   Distance 45 feet x 2   Stair Management Assistance Not tested Ambulation/Elevation Additional Comments Verbal cues to widen and maintain appropriate base of support for stabilization and environmental awareness with directional changes. Balance   Static Sitting Normal   Dynamic Sitting Good   Static Standing Fair +   Dynamic Standing Fair   Ambulatory Fair   Endurance Deficit   Endurance Deficit Yes   Activity Tolerance   Activity Tolerance Patient limited by fatigue   Medical Staff Made Aware Yes,CM was informed of d/c disposition recommendation. Nurse Made Aware yes, Debbie Infante RN   Assessment   Prognosis Good   Problem List Decreased strength;Decreased endurance; Impaired balance;Decreased mobility   Assessment Pt is 81 y.o. male seen for PT evaluation s/p admit to [address] [place] [address] [facility] on 6/27/2023 w/ Hyponatremia. PT consulted to assess pt's functional mobility and d/c needs. Order placed for PT eval and tx, w/ out of bed to the chair order. Comorbidities affecting pt's physical performance at time of assessment include:generalized weakness,hyponatremia,anemia, new onset A-fib,UTI,prostate CA. PTA, pt was independent w/ all functional mobility w/ o AD usage. Personal factors affecting pt at time of IE include: inability to navigate community distances, unable to perform dynamic tasks in community, inability to perform IADLs and inability to perform ADLs. Please find objective findings from PT assessment regarding body systems outlined above with impairments and limitations including weakness, impaired balance, decreased endurance, gait deviations, decreased activity tolerance and fall risk. From PT/mobility standpoint, recommendation at time of d/c would be home with outpatient rehabilitation pending progress in order to facilitate return to PLOF. Goals   Patient Goals to go home in the next day or two   LTG Expiration Date 07/08/23   Long Term Goal #1 Patient will complete transfers modified I to decrease risk of falls, facilitate upright standing posture. BLE strength to greater than/equal to 4/5 gross musculature to increase ability to safely transfer, control descent to chair. Patient will exhibit increase dynamic standing to Good 3-4 minutes without LOB distant supervision to improve activity tolerance. Patient will exhibit increase dynamic ambulatory balance to Fair 100-200 feet w/AD prn distant supervision  to improve ability to mobilize to toilet, chair and decrease risk for additional medical complications. Patient will exhibit good self monitoring and ability to follow 2 step commands to increase complexity of tasks and resume ADL's without LOB. PT Treatment Day 0   Plan   Treatment/Interventions Functional transfer training;LE strengthening/ROM; Therapeutic exercise;Elevations; Endurance training;Patient/family training;Equipment eval/education;Gait training;Spoke to nursing;Spoke to case management   PT Frequency 2-3x/wk   Recommendation   PT Discharge Recommendation Home with outpatient rehabilitation   Additional Comments Upon conclusion, Mp Oconnell was resting out of bed on the recliner. All of his needs were within reach.    AM-PAC Basic Mobility Inpatient   Turning in Flat Bed Without Bedrails 4   Lying on Back to Sitting on Edge of Flat Bed Without Bedrails 4   Moving Bed to Chair 3   Standing Up From Chair Using Arms 3   Walk in Room 3   Climb 3-5 Stairs With Railing 3   Basic Mobility Inpatient Raw Score 20   Basic Mobility Standardized Score 43.99   Highest Level Of Mobility   JH-HLM Goal 6: Walk 10 steps or more   JH-HLM Achieved 7: Walk 25 feet or more     History/Personal Factors/Comorbidities: generalized weakness,hyponatremia,anemia, new onset A-fib,UTI,prostate CA    # of body structures/limitations: muscle weakness, activity intolerance,decreased endurance, impaired balance, gait deviations    Clinical presentation: evolving as seen in the presence of fatigue,fall risk, UTI/urinary retention requiring dominique catheterization     Initial Assessment Time: 107 [address], PT

## 2023-06-28 NOTE — PROGRESS NOTES
Pt arrived to unit with daughter and wife. Pt. Alert, oriented and cooperative. He was able to move from the stretcher to the bed without assistance. Pt. Has no complaints. He did void and specimen collected. Full assessment to be documented in flowsheets.

## 2023-06-28 NOTE — ASSESSMENT & PLAN NOTE
· 2/2 prerenal/uti/urinary retention  · CK 1200 trend  · PVR >500 after 50cc void  · Place dominique urology consult  · Bmp serial

## 2023-06-28 NOTE — QUICK NOTE
Patient updated by myself and Dr. Ning Luna at bedside. I also updated patient's daughter via telephone. All questions/concerns were answered and addressed.

## 2023-06-28 NOTE — CONSULTS
Consultation - Urology   Jazmin Romo 81 y.o. male MRN: [id number]  Unit/Bed#: ICU 01-01 Encounter: [id number]      Assessment/Plan      Assessment:  Urinary retention status post Haley. Mild hematuria after Haley placement. Recent history of urinary frequency being evaluated for diabetes insipidus. History of prostate cancer status post radiation therapy. Plan:  Continue Haley catheter. Will await medical improvement and evaluation for DI prior to considering voiding trial.  Continue tamsulosin. I will consider outpatient cystoscopy for the hematuria, although it may be secondary to Haley catheter placement with history of prostate cancer. History of Present Illness   Attending: Justina Navas DO  Reason for Consult / Principal Problem: urinary retention    HPI: Jazmin Romo is a 81y.o. year old male who presents with weakness and fatigue. He is well-known to me with a history of prostate cancer treated with radiation therapy. PSA has remained undetectable. Recent prostate MRI with PI-RADS 1 and prostate measuring 77 cc. I recently saw him in the office with worsening urinary frequency that was presumed secondary to his recent diagnosis of DI. Bladder and at that time showed adequate bladder emptying. However, he had progressive weakness and presented to the emergency department yesterday where he was found to have profound hyponatremia as well as creatinine 1.9. Postvoid residual was over 500 L and Haley catheter was placed. Urine initially was draining clear but then became red-tinged. Urinalysis shows no sign of infection. Creatinine is 1.17 this morning. Inpatient consult to Urology  Consult performed by: Salomon Ortiz MD  Consult ordered by: KIERA Wang          Review of Systems   Gastrointestinal: Negative. Negative for abdominal distention and abdominal pain. Genitourinary: Positive for frequency and hematuria. Negative for flank pain.        Historical Information   Past Medical History:   Diagnosis Date   • Cancer [facility])     prostate cancer    • Hypertension      Past Surgical History:   Procedure Laterality Date   • APPENDECTOMY       Social History   Social History     Substance and Sexual Activity   Alcohol Use Yes   • Alcohol/week: 1.0 standard drink of alcohol   • Types: 1 Cans of beer per week    Comment: monthly     @[email or web address]  E-Cigarette/Vaping   • E-Cigarette Use Never User      E-Cigarette/Vaping Substances   • Nicotine No    • THC No    • CBD No    • Flavoring No    • Other No    • Unknown No      Social History     Tobacco Use   Smoking Status Never   Smokeless Tobacco Never     Family History: non-contributory    Meds/Allergies   current meds:   Current Facility-Administered Medications   Medication Dose Route Frequency   • cefTRIAXone (ROCEPHIN) IVPB (premix in dextrose) 1,000 mg 50 mL  1,000 mg Intravenous Q24H   • dextrose 5 % and sodium chloride 0.2 % infusion  200 mL/hr Intravenous Continuous   • insulin regular (HumuLIN R,NovoLIN R) 1 Units/mL in sodium chloride 0.9 % 100 mL infusion  0.3-21 Units/hr Intravenous Titrated   • magnesium sulfate 2 g/50 mL IVPB (premix) 2 g  2 g Intravenous Once   • metoprolol succinate (TOPROL-XL) 24 hr tablet 50 mg  50 mg Oral Daily   • tamsulosin (FLOMAX) capsule 0.4 mg  0.4 mg Oral Daily     No Known Allergies    Objective   Vitals: Blood pressure 140/66, pulse 88, temperature 98.3 °F (36.8 °C), temperature source Oral, resp. rate 19, height 6' 3" (1.905 m), weight 93.4 kg (206 lb), SpO2 95 %. I/O last 24 hours:   In: 3392.3 [P.O.:60; I.V.:832.3; IV Piggyback:2500]  Out: 2335 [Urine:6025]    Invasive Devices     Peripheral Intravenous Line  Duration           Peripheral IV 06/27/23 Distal;Left;Upper;Ventral (anterior) Arm <1 day    Peripheral IV 06/27/23 Right Antecubital <1 day    Peripheral IV 06/28/23 Right;Ventral (anterior) Forearm <1 day          Drain  Duration           Urethral Catheter 18 Fr. <1 day Physical Exam  Abdominal:      General: There is no distension. Palpations: Abdomen is soft. Tenderness: There is no abdominal tenderness. There is no right CVA tenderness or left CVA tenderness. Genitourinary:     Penis: Normal.       Testes: Normal.     Haley catheter in place draining faintly blood-tinged urine    Lab Results:   CBC:   Lab Results   Component Value Date    WBC 7.07 06/28/2023    HGB 10.8 (L) 06/28/2023    HCT 30.2 (L) 06/28/2023    MCV 84 06/28/2023     06/28/2023    RBC 3.58 (L) 06/28/2023    MCH 30.2 06/28/2023    MCHC 35.8 06/28/2023    RDW 12.9 06/28/2023    MPV 8.1 (L) 06/28/2023    NRBC 0 06/28/2023     CMP:   Lab Results   Component Value Date    SODIUM 111 (LL) 06/28/2023    CL 83 (L) 06/28/2023    CO2 20 (L) 06/28/2023    BUN 14 06/28/2023    CREATININE 1.17 06/28/2023    CALCIUM 8.0 (L) 06/28/2023    EGFR 56 06/28/2023     Urinalysis:   Lab Results   Component Value Date    COLORU Red (A) 06/28/2023    CLARITYU Clear 06/28/2023    SPECGRAV <=1.005 (L) 06/28/2023    PHUR 7.0 06/28/2023    LEUKOCYTESUR Negative 06/28/2023    NITRITE Negative 06/28/2023    GLUCOSEU Negative 06/28/2023    KETONESU Negative 06/28/2023    BILIRUBINUR Negative 06/28/2023    BLOODU 3+ (A) 06/28/2023     Urine Culture: No results found for: "Donnal Cosmo"  Imaging Studies: I have personally reviewed pertinent reports. EKG, Pathology, and Other Studies: I have personally reviewed pertinent reports. VTE Prophylaxis: Sequential compression device (Venodyne)     Code Status: Level 1 - Full Code  Advance Directive and Living Will: Yes    Power of :    POLST:      Counseling / Coordination of Care  Total floor / unit time spent today 30 minutes. Greater than 50% of total time was spent with the patient and / or family counseling and / or coordination of care.  A description of the counseling / coordination of care: I have discussed the case with the hospitalist.

## 2023-06-28 NOTE — CASE MANAGEMENT
Case Management Assessment & Discharge Planning Note    Patient name Cierra Robbins.   Location ICU /ICU  MRN [id number]  : 1935 Date 2023       Current Admission Date: 2023  Current Admission Diagnosis:Hyponatremia   Patient Active Problem List    Diagnosis Date Noted   • New onset a-fib ([address]) 2023   • Hyponatremia 2023   • UTI (urinary tract infection) 2023   • Urinary retention 2023   • EZIO (acute kidney injury) ([address]) 2023   • Anemia 2023   • Prostate CA ([address]) 01/15/2020   • Essential hypertension 01/15/2020   • Generalized weakness 2019      LOS (days): 1  Geometric Mean LOS (GMLOS) (days): 3.50  Days to GMLOS:2.6     OBJECTIVE:    Risk of Unplanned Readmission Score: 14.87         Current admission status: Inpatient  Referral Reason: Other (d/c planning)    Preferred Pharmacy:   [address] [place] [address], [address] [place], [place] [address]ann [facility],[address]  [address] [address]  Phone: [phone] Fax: [phone] [facility], [address], [address] [place] [address],[address]  [address] [facility] [place] [address]  Phone: [phone] Fax: [address] [address]  Phone: [phone] Fax: [phone]    Primary Care Provider: Stephania Jean DO    Primary Insurance: MEDICARE  Secondary Insurance:  FOR LIFE    ASSESSMENT:  [address],  [facility] Representative - Spouse   Primary Phone: [phone] (Home)               Advance Directives  Does patient have a [address]?: Yes  Does patient have Advance Directives?: Yes         Readmission Root Cause  30 Day Readmission: No    Patient Information  Admitted from[de-identified] Home  Mental Status: Alert  During Assessment patient was accompanied by: Not accompanied during assessment  Assessment information provided by[de-identified] Patient  Primary Caregiver: Self  Support Systems: Spouse/significant other  [facility] Residence: [facility] do you live in?: [address] entry access options.  Select all that apply.: Stairs  Number of steps to enter home.: 4  Do the steps have railings?: No  Type of Current Residence: 3 story home  Upon entering residence, is there a bedroom on the main floor (no further steps)?: No  A bedroom is located on the following floor levels of residence (select all that apply):: 2nd Floor  Upon entering residence, is there a bathroom on the main floor (no further steps)?: Yes  Number of steps to 2nd floor from main floor: One Flight  In the last 12 months, was there a time when you were not able to pay the mortgage or rent on time?: No  In the last 12 months, how many places have you lived?: 1  In the last 12 months, was there a time when you did not have a steady place to sleep or slept in a shelter (including now)?: No  Homeless/housing insecurity resource given?: N/A  Living Arrangements: Lives w/ Spouse/significant other  Is patient a ?: Yes ([employer]   pension and medical benefits)  Is patient active with [facility])?: No    Activities of Daily Living Prior to Admission  Functional Status: Independent  Completes ADLs independently?: Yes  Ambulates independently?: Yes  Does patient use assisted devices?: No  Does patient currently own DME?: Yes  What DME does the patient currently own?: Other (Comment) (bp cuff)  Does the patient have a history of Short-Term Rehab?: No  Does patient have a history of HHC?: No  Does patient currently have [facility]?: No         Patient Information Continued  Income Source: Pension/care home  Does patient have prescription coverage?: Yes (express rx & [facility] [place])  Within the past 12 months, you worried that your food would run out before you got the money to buy more.: Never true  Within the past 12 months, the food you bought just didn't last and you didn't have money to get more.: Never true  Food insecurity resource given?: N/A  Does patient have a history of substance abuse?: No  Does patient have a history of Mental Health Diagnosis?: No         Means of Transportation  Means of Transport to Appts[de-identified] Drives Self  In the past 12 months, has lack of transportation kept you from medical appointments or from getting medications?: No  In the past 12 months, has lack of transportation kept you from meetings, work, or from getting things needed for daily living?: No  Was application for public transport provided?: N/A        DISCHARGE DETAILS:    Discharge planning discussed with[de-identified] patient and spouse at the bedside  Freedom of Choice: Yes  Comments - Freedom of Choice: recommendation is for outpt rehab - list was given  CM contacted family/caregiver?: Yes             Contacts  Patient Contacts: Gilles Kerwin  Relationship to Patient[de-identified] Family (spouse)  Contact Method:  In Person  Reason/Outcome: Discharge [facility]         Is the patient interested in [address] [facility] at discharge?: No    DME Referral Provided  Referral made for DME?: No    Other Referral/Resources/Interventions Provided:  Interventions: Outpatient PT, Outpatient OT  Referral Comments: outpt rehab recommended- pt will need an order and list was given    Would you like to participate in our [facility] service program?  : No - Declined    Treatment Team Recommendation: Home (home with spouse & outpt rehab & outpt follow up- wife)

## 2023-06-28 NOTE — PLAN OF CARE
Problem: PHYSICAL THERAPY ADULT  Goal: Performs mobility at highest level of function for planned discharge setting. See evaluation for individualized goals. Description: Treatment/Interventions: Functional transfer training, LE strengthening/ROM, Therapeutic exercise, Elevations, Endurance training, Patient/family training, Equipment eval/education, Gait training, Spoke to nursing, Spoke to case management          See flowsheet documentation for full assessment, interventions and recommendations. Note: Prognosis: Good  Problem List: Decreased strength, Decreased endurance, Impaired balance, Decreased mobility  Assessment: Pt is 81 y.o. male seen for PT evaluation s/p admit to [address] [place] [address] [facility] on 6/27/2023 w/ Hyponatremia. PT consulted to assess pt's functional mobility and d/c needs. Order placed for PT eval and tx, w/ out of bed to the chair order. Comorbidities affecting pt's physical performance at time of assessment include:generalized weakness, hyponatremia,anemia, new onset A-fib,UTI,prostate CA. PTA, pt was independent w/ all functional mobility w/ o AD usage. Personal factors affecting pt at time of IE include: inability to navigate community distances, unable to perform dynamic tasks in community, inability to perform IADLs and inability to perform ADLs. Please find objective findings from PT assessment regarding body systems outlined above with impairments and limitations including weakness, impaired balance, decreased endurance, gait deviations, decreased activity tolerance and fall risk. From PT/mobility standpoint, recommendation at time of d/c would be home with outpatient rehabilitation pending progress in order to facilitate return to PLOF. PT Discharge Recommendation: Home with outpatient rehabilitation    See flowsheet documentation for full assessment.

## 2023-06-29 PROBLEM — N17.9 AKI (ACUTE KIDNEY INJURY) (HCC): Status: RESOLVED | Noted: 2023-06-27 | Resolved: 2023-06-29

## 2023-06-29 LAB
ANION GAP SERPL CALCULATED.3IONS-SCNC: 6 MMOL/L
ANION GAP SERPL CALCULATED.3IONS-SCNC: 7 MMOL/L
ANION GAP SERPL CALCULATED.3IONS-SCNC: 7 MMOL/L
ANION GAP SERPL CALCULATED.3IONS-SCNC: 8 MMOL/L
APTT PPP: 52 SECONDS (ref 23–37)
ATRIAL RATE: 249 BPM
BASOPHILS # BLD AUTO: 0.03 THOUSANDS/ÂΜL (ref 0–0.1)
BASOPHILS NFR BLD AUTO: 1 % (ref 0–1)
BUN SERPL-MCNC: 10 MG/DL (ref 5–25)
BUN SERPL-MCNC: 11 MG/DL (ref 5–25)
BUN SERPL-MCNC: 13 MG/DL (ref 5–25)
BUN SERPL-MCNC: 9 MG/DL (ref 5–25)
CA-I BLD-SCNC: 1.1 MMOL/L (ref 1.12–1.32)
CALCIUM SERPL-MCNC: 8.3 MG/DL (ref 8.4–10.2)
CALCIUM SERPL-MCNC: 8.3 MG/DL (ref 8.4–10.2)
CALCIUM SERPL-MCNC: 8.7 MG/DL (ref 8.4–10.2)
CALCIUM SERPL-MCNC: 9.1 MG/DL (ref 8.4–10.2)
CHLORIDE SERPL-SCNC: 88 MMOL/L (ref 96–108)
CHLORIDE SERPL-SCNC: 90 MMOL/L (ref 96–108)
CHLORIDE SERPL-SCNC: 93 MMOL/L (ref 96–108)
CHLORIDE SERPL-SCNC: 94 MMOL/L (ref 96–108)
CK SERPL-CCNC: 320 U/L (ref 39–308)
CO2 SERPL-SCNC: 21 MMOL/L (ref 21–32)
CO2 SERPL-SCNC: 24 MMOL/L (ref 21–32)
CO2 SERPL-SCNC: 25 MMOL/L (ref 21–32)
CO2 SERPL-SCNC: 26 MMOL/L (ref 21–32)
CREAT SERPL-MCNC: 0.79 MG/DL (ref 0.6–1.3)
CREAT SERPL-MCNC: 0.81 MG/DL (ref 0.6–1.3)
CREAT SERPL-MCNC: 0.89 MG/DL (ref 0.6–1.3)
CREAT SERPL-MCNC: 0.92 MG/DL (ref 0.6–1.3)
EOSINOPHIL # BLD AUTO: 0.08 THOUSAND/ÂΜL (ref 0–0.61)
EOSINOPHIL NFR BLD AUTO: 1 % (ref 0–6)
ERYTHROCYTE [DISTWIDTH] IN BLOOD BY AUTOMATED COUNT: 13.2 % (ref 11.6–15.1)
GFR SERPL CREATININE-BSD FRML MDRD: 75 ML/MIN/1.73SQ M
GFR SERPL CREATININE-BSD FRML MDRD: 77 ML/MIN/1.73SQ M
GFR SERPL CREATININE-BSD FRML MDRD: 80 ML/MIN/1.73SQ M
GFR SERPL CREATININE-BSD FRML MDRD: 81 ML/MIN/1.73SQ M
GLUCOSE SERPL-MCNC: 100 MG/DL (ref 65–140)
GLUCOSE SERPL-MCNC: 102 MG/DL (ref 65–140)
GLUCOSE SERPL-MCNC: 128 MG/DL (ref 65–140)
GLUCOSE SERPL-MCNC: 149 MG/DL (ref 65–140)
HCT VFR BLD AUTO: 31.9 % (ref 36.5–49.3)
HGB BLD-MCNC: 11.2 G/DL (ref 12–17)
IMM GRANULOCYTES # BLD AUTO: 0.07 THOUSAND/UL (ref 0–0.2)
IMM GRANULOCYTES NFR BLD AUTO: 1 % (ref 0–2)
LYMPHOCYTES # BLD AUTO: 0.46 THOUSANDS/ÂΜL (ref 0.6–4.47)
LYMPHOCYTES NFR BLD AUTO: 8 % (ref 14–44)
MAGNESIUM SERPL-MCNC: 1.8 MG/DL (ref 1.9–2.7)
MCH RBC QN AUTO: 30.7 PG (ref 26.8–34.3)
MCHC RBC AUTO-ENTMCNC: 35.1 G/DL (ref 31.4–37.4)
MCV RBC AUTO: 87 FL (ref 82–98)
MONOCYTES # BLD AUTO: 0.71 THOUSAND/ÂΜL (ref 0.17–1.22)
MONOCYTES NFR BLD AUTO: 12 % (ref 4–12)
NEUTROPHILS # BLD AUTO: 4.5 THOUSANDS/ÂΜL (ref 1.85–7.62)
NEUTS SEG NFR BLD AUTO: 77 % (ref 43–75)
NRBC BLD AUTO-RTO: 0 /100 WBCS
PHOSPHATE SERPL-MCNC: 3.4 MG/DL (ref 2.3–4.1)
PLATELET # BLD AUTO: 277 THOUSANDS/UL (ref 149–390)
PMV BLD AUTO: 8 FL (ref 8.9–12.7)
POTASSIUM SERPL-SCNC: 4 MMOL/L (ref 3.5–5.3)
POTASSIUM SERPL-SCNC: 4.2 MMOL/L (ref 3.5–5.3)
POTASSIUM SERPL-SCNC: 4.3 MMOL/L (ref 3.5–5.3)
POTASSIUM SERPL-SCNC: 4.3 MMOL/L (ref 3.5–5.3)
QRS AXIS: -22 DEGREES
QRSD INTERVAL: 88 MS
QT INTERVAL: 344 MS
QTC INTERVAL: 430 MS
RBC # BLD AUTO: 3.65 MILLION/UL (ref 3.88–5.62)
SODIUM SERPL-SCNC: 120 MMOL/L (ref 135–147)
SODIUM SERPL-SCNC: 121 MMOL/L (ref 135–147)
SODIUM SERPL-SCNC: 123 MMOL/L (ref 135–147)
SODIUM SERPL-SCNC: 125 MMOL/L (ref 135–147)
T WAVE AXIS: 69 DEGREES
VENTRICULAR RATE: 94 BPM
WBC # BLD AUTO: 5.85 THOUSAND/UL (ref 4.31–10.16)

## 2023-06-29 PROCEDURE — 85025 COMPLETE CBC W/AUTO DIFF WBC: CPT | Performed by: NURSE PRACTITIONER

## 2023-06-29 PROCEDURE — 85730 THROMBOPLASTIN TIME PARTIAL: CPT | Performed by: NURSE PRACTITIONER

## 2023-06-29 PROCEDURE — 83735 ASSAY OF MAGNESIUM: CPT | Performed by: NURSE PRACTITIONER

## 2023-06-29 PROCEDURE — 84100 ASSAY OF PHOSPHORUS: CPT | Performed by: NURSE PRACTITIONER

## 2023-06-29 PROCEDURE — 93005 ELECTROCARDIOGRAM TRACING: CPT

## 2023-06-29 PROCEDURE — 80048 BASIC METABOLIC PNL TOTAL CA: CPT | Performed by: NURSE PRACTITIONER

## 2023-06-29 PROCEDURE — 93010 ELECTROCARDIOGRAM REPORT: CPT | Performed by: INTERNAL MEDICINE

## 2023-06-29 PROCEDURE — 99232 SBSQ HOSP IP/OBS MODERATE 35: CPT | Performed by: INTERNAL MEDICINE

## 2023-06-29 PROCEDURE — 82550 ASSAY OF CK (CPK): CPT | Performed by: NURSE PRACTITIONER

## 2023-06-29 PROCEDURE — 99233 SBSQ HOSP IP/OBS HIGH 50: CPT | Performed by: INTERNAL MEDICINE

## 2023-06-29 PROCEDURE — 82330 ASSAY OF CALCIUM: CPT | Performed by: NURSE PRACTITIONER

## 2023-06-29 RX ORDER — HEPARIN SODIUM 10000 [USP'U]/100ML
3-20 INJECTION, SOLUTION INTRAVENOUS
Status: DISCONTINUED | OUTPATIENT
Start: 2023-06-29 | End: 2023-06-29

## 2023-06-29 RX ORDER — ACETAMINOPHEN 325 MG/1
975 TABLET ORAL ONCE
Status: COMPLETED | OUTPATIENT
Start: 2023-06-29 | End: 2023-06-29

## 2023-06-29 RX ORDER — SODIUM CHLORIDE 1 G/1
1 TABLET ORAL
Status: DISCONTINUED | OUTPATIENT
Start: 2023-06-30 | End: 2023-06-30

## 2023-06-29 RX ORDER — FERROUS SULFATE 325(65) MG
325 TABLET ORAL
Status: DISCONTINUED | OUTPATIENT
Start: 2023-06-29 | End: 2023-07-02 | Stop reason: HOSPADM

## 2023-06-29 RX ORDER — HEPARIN SODIUM 1000 [USP'U]/ML
4000 INJECTION, SOLUTION INTRAVENOUS; SUBCUTANEOUS EVERY 6 HOURS PRN
Status: DISCONTINUED | OUTPATIENT
Start: 2023-06-29 | End: 2023-06-29

## 2023-06-29 RX ORDER — LIDOCAINE HYDROCHLORIDE 20 MG/ML
1 JELLY TOPICAL ONCE
Status: COMPLETED | OUTPATIENT
Start: 2023-06-29 | End: 2023-06-29

## 2023-06-29 RX ORDER — MAGNESIUM SULFATE HEPTAHYDRATE 40 MG/ML
2 INJECTION, SOLUTION INTRAVENOUS ONCE
Status: COMPLETED | OUTPATIENT
Start: 2023-06-29 | End: 2023-06-29

## 2023-06-29 RX ORDER — HEPARIN SODIUM 1000 [USP'U]/ML
2000 INJECTION, SOLUTION INTRAVENOUS; SUBCUTANEOUS EVERY 6 HOURS PRN
Status: DISCONTINUED | OUTPATIENT
Start: 2023-06-29 | End: 2023-06-29

## 2023-06-29 RX ORDER — SODIUM CHLORIDE 1 G/1
2 TABLET ORAL ONCE
Status: COMPLETED | OUTPATIENT
Start: 2023-06-29 | End: 2023-06-29

## 2023-06-29 RX ADMIN — HEPARIN SODIUM 11.1 UNITS/KG/HR: 10000 INJECTION, SOLUTION INTRAVENOUS at 09:43

## 2023-06-29 RX ADMIN — TAMSULOSIN HYDROCHLORIDE 0.4 MG: 0.4 CAPSULE ORAL at 08:28

## 2023-06-29 RX ADMIN — ACETAMINOPHEN 975 MG: 325 TABLET ORAL at 17:57

## 2023-06-29 RX ADMIN — MAGNESIUM SULFATE HEPTAHYDRATE 2 G: 40 INJECTION, SOLUTION INTRAVENOUS at 07:35

## 2023-06-29 RX ADMIN — FERROUS SULFATE TAB 325 MG (65 MG ELEMENTAL FE) 325 MG: 325 (65 FE) TAB at 07:29

## 2023-06-29 RX ADMIN — METOPROLOL SUCCINATE 50 MG: 50 TABLET, EXTENDED RELEASE ORAL at 08:28

## 2023-06-29 RX ADMIN — POLYETHYLENE GLYCOL 3350 17 G: 17 POWDER, FOR SOLUTION ORAL at 08:28

## 2023-06-29 RX ADMIN — HEPARIN SODIUM 5000 UNITS: 5000 INJECTION INTRAVENOUS; SUBCUTANEOUS at 06:02

## 2023-06-29 RX ADMIN — SODIUM CHLORIDE 2 G: 1 TABLET ORAL at 20:12

## 2023-06-29 RX ADMIN — LIDOCAINE HYDROCHLORIDE 1 APPLICATION: 20 JELLY TOPICAL at 16:00

## 2023-06-29 RX ADMIN — HEPARIN SODIUM 2000 UNITS: 1000 INJECTION INTRAVENOUS; SUBCUTANEOUS at 13:18

## 2023-06-29 NOTE — ASSESSMENT & PLAN NOTE
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

## 2023-06-29 NOTE — PROGRESS NOTES
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 - then with post-obstructive diuresis   · CT Chest w/out evidence of pulmonary malignancy  · Osmo 246 on admission   · Urine studies as noted above  · TSH 1.249  · AM Cortisol 15.1  · Is s/p PO DDAVP 0.1 mg x1  · Nephrology following - appreciate recommendations  · Continue fluid restriction   · Strict I/O  · Serial BMPs - goal Na 127 - 129 by 2000 tonight    EZIO (acute kidney injury) (HCC)-resolved as of 6/29/2023  Assessment & Plan  · EZIO resolved  · 2/2 prerenal/uti/urinary retention, Cr on admission 1.95  · CK 1200, 960  · PVR >500 after 50cc void  · Placed dominique urology consult  · Bmp serial, Recent Cr 0.81    Urinary retention  Assessment & Plan  · PVR >500 on 06/28 requiring Dominique placement  · MRI from 05/30 w/moderate prostatomegaly   · Urology consulted - appreciate recommendations  · Continue home Flomax    New onset a-fib [facility])  Assessment & Plan  · Noted to be in rate-controlled AF on arrival to ICU - now in [address] [place] [address]  · Suspect this is 2/2 hyponatremia, acute illness state  · TTE from 06/28 revealed EF 65% w/mild concentric hypertrophy, mildly reduced RV systolic function, trace TR  · [id number] score 3 on admission  · Initially started on Heparin gtt, however developed significant hematuria - now off  · Continue home Toprol   · Continue cardiac monitoring  · Optimize electrolytes  · Consider cardiology consult as outpatient    Generalized weakness  Assessment & Plan  · In setting of hyponatremia  · Please see plan as noted above  · PT/OT consult  · OOB to chair as tolerated    Anemia  Assessment & Plan  · Hgb 11.2 on admission  · Baseline Hgb appears to be around 12 - 13  · Suspect this is 2/2 DEYA vs chronic disease  · Iron sat 13  · PO iron supplementation started this admission - continue  · Trend Hgb and transfuse for Hgb < 7    Stage 2 chronic kidney disease  Assessment & Plan  Lab Results   Component Value Date    EGFR 81 06/29/2023    EGFR 80 06/29/2023    EGFR 79 06/28/2023    CREATININE 0.79 06/29/2023    CREATININE 0.81 06/29/2023    CREATININE 0.83 06/28/2023     · Initially w/EZIO on admission in setting of obstruction/urinary retention  · Baseline creatinine appears to be around 0.9 - 1.0  · Avoid nephrotoxic agents and fluctuations in BP  · Trend renal indices  · Strict I/O  · Daily weights    Essential hypertension  Assessment & Plan  · Continue home Toprol XL    Prostate CA ([address])  Assessment & Plan  · Is s/p EBRT  · Follows w/Urology as outpatient           ICU Core Measures     A: Assess, Prevent, and Manage Pain · Has pain been assessed? Yes  · Need for changes to pain regimen? No   B: Both SAT/SAT  · N/A   C: Choice of Sedation · RASS Goal: 0 Alert and Calm  · Need for changes to sedation or analgesia regimen? No   D: Delirium · CAM-ICU: Negative   E: Early Mobility  · Plan for early mobility? Yes   F: Family Engagement · Plan for family engagement today? Yes       Review of Invasive Devices: Haley Plan: Continue for accurate I/O monitoring for 48 hours and Haley placed by urology. Removal plans per their team        Prophylaxis:  VTE VTE covered by:  heparin (porcine), Subcutaneous, 5,000 Units at 06/29/23 0602       Stress Ulcer  not ordered       Subjective   HPI/24hr events:     · No acute events overnight       Review of Systems   Constitutional: Negative for chills and fever. HENT: Negative for congestion.     Eyes: Negative for visual disturbance. Respiratory: Negative for cough and shortness of breath. Cardiovascular: Negative for chest pain and leg swelling. Gastrointestinal: Negative for abdominal pain, nausea and vomiting. Genitourinary: Haley cath intact for urinary retention   Musculoskeletal: Negative for back pain and neck pain. Neurological: Negative for dizziness, light-headedness and headaches. Psychiatric/Behavioral: Negative for confusion. Objective                            Vitals I/O      Most Recent Min/Max in 24hrs   Temp 98 °F (36.7 °C) Temp  Min: 97.1 °F (36.2 °C)  Max: 98.5 °F (36.9 °C)   Pulse 79 Pulse  Min: 75  Max: 124   Resp 19 Resp  Min: 13  Max: 30   /71 BP  Min: 104/75  Max: 155/73   O2 Sat 93 % SpO2  Min: 92 %  Max: 97 %      Intake/Output Summary (Last 24 hours) at 6/29/2023 4291  Last data filed at 6/29/2023 0400  Gross per 24 hour   Intake 2907.66 ml   Output 2480 ml   Net 427.66 ml         Diet Regular; Regular House; Fluid Restriction 1500 ML     Invasive Monitoring Physical exam     Physical Exam  Vitals and nursing note reviewed. Constitutional:       General: He is awake. He is not in acute distress. HENT:      Head: Normocephalic and atraumatic. Eyes:      General: No scleral icterus. Extraocular Movements: Extraocular movements intact. Pupils: Pupils are equal, round, and reactive to light. Cardiovascular:      Rate and Rhythm: Normal rate and regular rhythm. Pulses: Normal pulses. Heart sounds: Normal heart sounds. No murmur heard. Pulmonary:      Effort: Pulmonary effort is normal.      Breath sounds: Examination of the right-lower field reveals decreased breath sounds. Examination of the left-lower field reveals decreased breath sounds. Decreased breath sounds present. No wheezing. Abdominal:      General: Bowel sounds are normal. There is distension. Palpations: Abdomen is soft. Tenderness:  There is no abdominal tenderness. Comments: Mild abdominal distention, but soft/non-tender   Genitourinary:     Comments: Haley cath intact and draining urine  Musculoskeletal:         General: Normal range of motion. Cervical back: Normal range of motion and neck supple. Right lower leg: No edema. Left lower leg: No edema. Skin:     General: Skin is warm and dry. Capillary Refill: Capillary refill takes less than 2 seconds. Neurological:      General: No focal deficit present. Mental Status: He is alert and oriented to person, place, and time. Psychiatric:         Behavior: Behavior is cooperative. Diagnostic Studies      EKG: sinus rhythm  Imaging:  I have personally reviewed pertinent reports.    and I have personally reviewed pertinent films in PACS     Medications:  Scheduled PRN   ferrous sulfate, 325 mg, Daily With Breakfast  heparin (porcine), 5,000 Units, Q8H [facility]  metoprolol succinate, 50 mg, Daily  polyethylene glycol, 17 g, Daily  senna-docusate sodium, 2 tablet, HS  tamsulosin, 0.4 mg, Daily      acetaminophen, 650 mg, Q6H PRN  simethicone, 80 mg, Q6H PRN       Continuous          Labs:    CBC    Recent Labs     06/28/23  0417 06/29/23  0600   WBC 7.07 5.85   HGB 10.8* 11.2*   HCT 30.2* 31.9*    277     BMP    Recent Labs     06/29/23  0100 06/29/23  0600   SODIUM 121* 125*   K 4.2 4.0   CL 93* 94*   CO2 21 25   AGAP 7 6   BUN 11 9   CREATININE 0.81 0.79   CALCIUM 8.3* 8.3*       Coags    Recent Labs     06/28/23  0127 06/28/23  0417 06/28/23  0758   INR 1.10  --   --    PTT 32 107* 105*        Additional Electrolytes  Recent Labs     06/28/23  0657 06/29/23  0600   MG 1.6* 1.8*   PHOS 3.3 3.4   CAIONIZED  --  1.10*          Blood Gas    No recent results  Recent Labs     06/28/23  0153   PHVEN 7.452*   [id number] 31.2*   PO2VEN 74.6*   VNP8CHB 21.3*   BEVEN -1.8    LFTs  Recent Labs     06/28/23  1001   ALT 42   AST 80*   ALKPHOS 40   ALB 3.2*   TBILI 0.89 Infectious  Recent Labs     06/27/23  2027 06/28/23  0417   PROCALCITONI 0.05 0.05     Glucose  Recent Labs     06/28/23  1413 06/28/23  1754 06/29/23  0100 06/29/23  0600   GLUC 148* 190* 102 100                 [address] [facility], NP

## 2023-06-29 NOTE — PROGRESS NOTES
Progress Note - Nephrology   Keyona Watts. 81 y.o. male MRN: [id number]  Unit/Bed#: ICU 01-01 Encounter: [id number]    A/P:  1. Hyponatremia  Volume status: Euvolemic at present  Severity: Moderate at present, previously severe. Sodium improving appropriately, up to 125 today which is goal.  Likely previous symptomatic. Chronicity: acute on chronic. Goal in the next 24 hours is 4-6 increase  Goal for discharge 129-130. Etiology: suspect due to water loading on top SIADH release with Urinary retention. Workup not consistent with central DI. Once dominique placed would expect more rapid correction with release of ADH stimulus. Received DDAVP x1 to reduce UOP. His urine osmolality was elevated which would go against central DI and is consistent in setting of hyponatremia with ADH release. ADH should be suppressed in setting of hyponatremia, that would be a physiologic response. Urine osm >100 suggest presence of ADH release as urine is not maximally dilute. Discussed care with CC team. Recommend to follow chem q 6-8 hr and allow to autocorrect. Continue FR 1500ml. UOP not polyuric- 2.8 L in past 24 hr.    2. EZIO due to UR, sp dominique placement by urology. Defer dominique to urology. Would maintain for the next 24 hr at least.   Cr back to baseline, 0.79 this AM.  Appears euvolemic. UOP nonoliguric. 3. Elevated CK, not significantly elevated to cause EZIO. 4. Hypomagnesemia, mild, replace per CC.     5. Hypocalcemia, mild. No need to aggressively replace unless < 7.6.     6. Mild anemia, management per CC.     7. New onset afib, management per CC. Follow up reason for today's visit:     Hyponatremia      Subjective:   Patient seen and examined today. Denies chest pain, shortness of breath, nausea, vomiting, diarrhea. Tolerating diet. A complete 10 point review of systems was performed and is otherwise negative.     Objective:     Vitals: Blood pressure 149/81, pulse 87, temperature 97.5 °F (36.4 °C), temperature source Temporal, resp. rate 20, height 6' 3" (1.905 m), weight 94 kg (207 lb 3.7 oz), SpO2 91 %. ,Body mass index is 25.9 kg/m². Weight (last 2 days)     Date/Time Weight    06/29/23 0600 94 (207.23)    06/28/23 0743 93.4 (206)    06/28/23 0556 93.6 (206.35)    06/27/23 2249 95 (209.44)            Intake/Output Summary (Last 24 hours) at 6/29/2023 0923  Last data filed at 6/29/2023 0800  Gross per 24 hour   Intake 1917.08 ml   Output 2110 ml   Net -192.92 ml     I/O last 3 completed shifts: In: 8429 [P.O.:1290; I.V.:1860; IV Piggyback:3150]  Out: 8105 [Urine:8105]    Urethral Catheter 18 Fr.  (Active)   Amt returned on insertion(mL) 800 mL 06/27/23 2337   Reasons to continue Urinary Catheter  Acute urinary retention/obstruction failing urinary retention protocol 06/28/23 0800   Goal for Removal Other (Comment) 06/28/23 0800   Site Assessment Clean;Skin intact 06/29/23 0800   Haley Care Done 06/29/23 0800   Collection Container Standard drainage bag 06/29/23 0800   Securement Method Securing device (Describe) 06/29/23 0800   Securing Device Change Date 07/04/23 06/29/23 0800   Output (mL) 305 mL 06/29/23 0800   CAUTI Time-out performed before Urine Culture Yes 06/27/23 2337       Physical Exam: /81 (BP Location: Right arm)   Pulse 87   Temp 97.5 °F (36.4 °C) (Temporal)   Resp 20   Ht 6' 3" (1.905 m)   Wt 94 kg (207 lb 3.7 oz)   SpO2 91%   BMI 25.90 kg/m²     General Appearance:    Alert, cooperative, no distress, appears stated age   Head:    Normocephalic, without obvious abnormality, atraumatic   Eyes:    Conjunctiva/corneas clear   Ears:    Normal external ears   Nose:   Nares normal, septum midline, mucosa normal, no drainage    or sinus tenderness   Throat:   Lips, mucosa, and tongue normal; teeth and gums normal   Neck:    Back:      Lungs:     Clear to auscultation bilaterally, respirations unlabored   Chest wall:    No tenderness or deformity   Heart:    Regular rate and rhythm, S1 and S2 normal, no murmur, rub   or gallop   Abdomen:     Soft, non-tender, bowel sounds active   Extremities:   Extremities normal, atraumatic, no cyanosis or edema   Skin:   Skin color, texture, turgor normal, no rashes or lesions   Lymph nodes:   Cervical normal   Neurologic:   CNII-XII intact   + dominique, clear yellow urine            Lab, Imaging and other studies: I have personally reviewed pertinent labs. CBC:   Lab Results   Component Value Date    WBC 5.85 06/29/2023    HGB 11.2 (L) 06/29/2023    HCT 31.9 (L) 06/29/2023    MCV 87 06/29/2023     06/29/2023    RBC 3.65 (L) 06/29/2023    MCH 30.7 06/29/2023    MCHC 35.1 06/29/2023    RDW 13.2 06/29/2023    MPV 8.0 (L) 06/29/2023    NRBC 0 06/29/2023     CMP:   Lab Results   Component Value Date    K 4.0 06/29/2023    CL 94 (L) 06/29/2023    CO2 25 06/29/2023    BUN 9 06/29/2023    CREATININE 0.79 06/29/2023    CALCIUM 8.3 (L) 06/29/2023    AST 80 (H) 06/28/2023    ALT 42 06/28/2023    ALKPHOS 40 06/28/2023    EGFR 81 06/29/2023       . Results from last 7 days   Lab Units 06/29/23  0600 06/29/23  0100 06/28/23  1754 06/28/23  1413 06/28/23  1001   POTASSIUM mmol/L 4.0 4.2 3.9   < > 3.8   CHLORIDE mmol/L 94* 93* 93*   < > 92*   CO2 mmol/L 25 21 21   < > 21   BUN mg/dL 9 11 11   < > 13   CREATININE mg/dL 0.79 0.81 0.83   < > 0.95   CALCIUM mg/dL 8.3* 8.3* 7.9*   < > 7.9*   ALK PHOS U/L  --   --   --   --  40   ALT U/L  --   --   --   --  42   AST U/L  --   --   --   --  80*    < > = values in this interval not displayed.          Phosphorus:   Lab Results   Component Value Date    PHOS 3.4 06/29/2023     Magnesium:   Lab Results   Component Value Date    MG 1.8 (L) 06/29/2023     Urinalysis: No results found for: "COLORU", "CLARITYU", "Adilson Cheryl", "PHUR", "LEUKOCYTESUR", "NITRITE", "PROTEINUA", "GLUCOSEU", "Carina Risser", "BILIRUBINUR", "BLOODU"  Ionized Calcium: No results found for: "CAION"  Coagulation: No results found for: "PT", "INR", "APTT"  Troponin: No results found for: "TROPONINI"  ABG: No results found for: "PHART", "CTX1CRH", "PO2ART", "DBQ8GJL", "B0ZZZIGA", "BEART", "SOURCE"  Radiology review:     IMAGING  Procedure: CT chest wo contrast    Result Date: 6/28/2023  Narrative: CT CHEST WITHOUT IV CONTRAST INDICATION:   hyponatremia work up. Per my review of the medical record, the patient has a history of prostate cancer status post radiation therapy with urinary retention status post Haley catheter and mild hematuria after Haley placement. COMPARISON: Chest radiograph 3/13/2020 and chest CT 12/14/2019. TECHNIQUE: Chest CT without intravenous contrast.  Axial, sagittal, coronal 2D reformats and coronal MIPS from source data. Radiation dose length product (DLP):  537 mGy-cm . Radiation dose exposure minimized using iterative reconstruction and automated exposure control. FINDINGS: LUNGS: Nothing to indicate malignancy. Stable 6 mm right middle lobe nodule, benign (5/67). Stable benign intrapulmonary lymph node abutting the right middle fissure (5/72). AIRWAYS: No significant filling defects. PLEURA:  Unremarkable. HEART/GREAT VESSELS: Moderate cardiomegaly. Mild coronary artery calcification indicating atherosclerotic heart disease. Trace pericardial effusion. MEDIASTINUM AND ABDIAZIZ:  Unremarkable. CHEST WALL AND LOWER NECK: Mild gynecomastia. UPPER ABDOMEN: Partially imaged moderate stranding of the right perinephric fat. OSSEOUS STRUCTURES: Mild degenerative disease in the spine with mild curvature. Impression: Nothing indicate malignancy with stable 6 mm right middle lobe nodule since December 2019, benign. Partially imaged moderate stranding in the right perinephric fat which can be seen with inflammation or acute obstruction although the small portion of the right renal calyces which are imaged are nondilated. Consider further evaluation with abdomen pelvis CT if clinically warranted.  The study was marked in EPIC for significant notification. Workstation performed: [id number]     Procedure: XR spine lumbar 2 or 3 views injury    Result Date: 6/28/2023  Narrative: LUMBAR SPINE INDICATION:   lower back pain intermittently. COMPARISON:  None VIEWS:  XR SPINE LUMBAR 2 OR 3 VIEWS INJURY FINDINGS: There are 5 non rib bearing lumbar vertebral bodies. There is no evidence of acute fracture or destructive osseous lesion. Alignment is unremarkable. Degenerative spondylosis at multiple levels with osteophytes is noted. The space narrowing is most pronounced at L2-3, L3-4 and L5-S1. Schmorl's node formation is seen superiorly at L2. This appears to have progressed since an abdominal CT from 9/20/2011. The pedicles appear intact. Gas-filled loops of small bowel is seen without robert dilatation with also some gas in the colon. Impression: Degenerative spondylosis with Schmorl's node formation. Gas-filled loops of large and small bowel suggestive of ileus. Follow-up may be helpful if symptoms persist. The study was marked in [facility] for immediate notification. Workstation performed: [id number]     Procedure: Echo complete w/ contrast if indicated    Result Date: 6/28/2023  Narrative: •  Left Ventricle: Left ventricular cavity size is normal. Wall thickness is increased. There is mild concentric hypertrophy. The left ventricular ejection fraction is 65%. Systolic function is normal. Wall motion is normal. Diastolic function is normal. •  Right Ventricle: Systolic function is mildly reduced. Abnormal tricuspid annular plane systolic excursion (TAPSE) < 1.7 cm. •  Left Atrium: The atrium is dilated.        Current Facility-Administered Medications   Medication Dose Route Frequency   • acetaminophen (TYLENOL) tablet 650 mg  650 mg Oral Q6H PRN   • ferrous sulfate tablet 325 mg  325 mg Oral Daily With Breakfast   • heparin (porcine) 25,000 units in 0.45% NaCl 250 mL infusion (premix)  3-20 Units/kg/hr (Order-Specific) Intravenous Titrated   • heparin (porcine) injection 2,000 Units  2,000 Units Intravenous Q6H PRN   • heparin (porcine) injection 4,000 Units  4,000 Units Intravenous Q6H PRN   • magnesium sulfate 2 g/50 mL IVPB (premix) 2 g  2 g Intravenous Once   • metoprolol succinate (TOPROL-XL) 24 hr tablet 50 mg  50 mg Oral Daily   • polyethylene glycol (MIRALAX) packet 17 g  17 g Oral Daily   • senna-docusate sodium (SENOKOT S) 8.6-50 mg per tablet 2 tablet  2 tablet Oral HS   • simethicone (MYLICON) chewable tablet 80 mg  80 mg Oral Q6H PRN   • tamsulosin (FLOMAX) capsule 0.4 mg  0.4 mg Oral Daily     Medications Discontinued During This Encounter   Medication Reason   • ferrous sulfate 324 (65 Fe) mg Discontinued by another clinician   • metoprolol succinate (TOPROL-XL) 24 hr tablet 25 mg    • heparin (porcine) subcutaneous injection 5,000 Units    • heparin (ACS LOW) Duplicate order   • sodium chloride 0.9 % infusion    • dextrose 5 % bolus 500 mL    • heparin (porcine) 25,000 units in 0.45% NaCl 250 mL infusion (premix)    • heparin (porcine) injection 4,000 Units    • heparin (porcine) injection 2,000 Units    • heparin (porcine) subcutaneous injection 5,000 Units    • cefTRIAXone (ROCEPHIN) IVPB (premix in dextrose) 1,000 mg 50 mL    • insulin regular (HumuLIN R,NovoLIN R) 1 Units/mL in sodium chloride 0.9 % 100 mL infusion    • heparin (porcine) subcutaneous injection 5,000 Units    • dextrose 5 % and sodium chloride 0.2 % infusion    • heparin (porcine) subcutaneous injection 5,000 Units    • heparin (ACS LOW)        Thelma Hanley, DO      This progress note was produced in part using a dictation device which may document imprecise wording from author's original intent.

## 2023-06-29 NOTE — QUICK NOTE
I updated patient's daughter and wife extensively regarding plan of care and overall patient status. All questions/concerns were answered and addressed.

## 2023-06-29 NOTE — ASSESSMENT & PLAN NOTE
· Hgb 11.2 on admission  · Baseline Hgb appears to be around 12 - 13  · Suspect this is 2/2 DEYA vs chronic disease  · Iron sat 13  · PO iron supplementation started this admission - continue  · Trend Hgb and transfuse for Hgb < 7

## 2023-06-29 NOTE — ASSESSMENT & PLAN NOTE
· Noted to be in rate-controlled AF on arrival to ICU - now in [address] [place] [address] Street  · Suspect this is 2/2 hyponatremia, acute illness state  · TTE from 06/28 revealed EF 65% w/mild concentric hypertrophy, mildly reduced RV systolic function, trace TR  · [id number] score 3 on admission  · Initially started on Heparin gtt, however developed significant hematuria - now off  · Continue home Toprol   · Continue cardiac monitoring  · Optimize electrolytes  · Consider cardiology consult as outpatient

## 2023-06-29 NOTE — ASSESSMENT & PLAN NOTE
· EZIO resolved  · 2/2 prerenal/uti/urinary retention, Cr on admission 1.95  · CK 1200, 960  · PVR >500 after 50cc void  · Placed dominique urology consult  · Bmp serial, Recent Cr 0.81

## 2023-06-29 NOTE — QUICK NOTE
Patient updated extensively at bedside regarding plan of care and overall patient status. All questions/concerns were answered and addressed.

## 2023-06-29 NOTE — ASSESSMENT & PLAN NOTE
Lab Results   Component Value Date    EGFR 81 06/29/2023    EGFR 80 06/29/2023    EGFR 79 06/28/2023    CREATININE 0.79 06/29/2023    CREATININE 0.81 06/29/2023    CREATININE 0.83 06/28/2023     · Initially w/EZIO on admission in setting of obstruction/urinary retention  · Baseline creatinine appears to be around 0.9 - 1.0  · Avoid nephrotoxic agents and fluctuations in BP  · Trend renal indices  · Strict I/O  · Daily weights

## 2023-06-29 NOTE — ASSESSMENT & PLAN NOTE
· Noted to be in rate-controlled AF on arrival to ICU - appears to be paroxysmal  · Suspect this is 2/2 hyponatremia, acute illness state  · TTE from 06/28 revealed EF 65% w/mild concentric hypertrophy, mildly reduced RV systolic function, trace TR  · [id number] score 3 on admission  · Initially started on Heparin gtt, however developed significant hematuria and was stopped - restarted today   · Monitor for hematuria - if develops again, may not be able to tolerate AC  · Continue home Toprol   · Continue cardiac monitoring  · Optimize electrolytes  · Consider cardiology consult as outpatient

## 2023-06-29 NOTE — ASSESSMENT & PLAN NOTE
· PVR >500 on 06/28 requiring Haley placement  · MRI from 05/30 w/moderate prostatomegaly   · Urology consulted - appreciate recommendations  · Continue home Flomax

## 2023-06-29 NOTE — ASSESSMENT & PLAN NOTE
· In setting of hyponatremia  · Please see plan as noted above  · PT/OT consult  · OOB to chair as tolerated

## 2023-06-29 NOTE — PROGRESS NOTES
Progress Note - Nora Huang. 81 y.o. male MRN: [id number]    Unit/Bed#: ICU 01-01 Encounter: [id number]      Assessment:  Urinary retention. SIADH. Hyponatremia. Voiding trial today. Haley catheter removed this morning. He has not voided, but bladder scan shows only 56 mL. He has been on fluid restriction since yesterday. Plan:  I discussed management with the ICU physician. We will catheterize to be sure the bladder scan is accurate and make sure he is not retaining urine. Further management depends upon clinical course. I will continue to follow along with you. Subjective:   Feels better overall. Denies urge to void. Objective:     Vitals: Blood pressure 123/58, pulse 84, temperature (!) 97.2 °F (36.2 °C), temperature source Temporal, resp. rate 16, height 6' 3" (1.905 m), weight 94 kg (207 lb 3.7 oz), SpO2 94 %. ,Body mass index is 25.9 kg/m². Intake/Output Summary (Last 24 hours) at 6/29/2023 1557  Last data filed at 6/29/2023 1401  Gross per 24 hour   Intake 1494.68 ml   Output 1335 ml   Net 159.68 ml       Physical Exam: No abdominal or flank tenderness    Invasive Devices     Peripheral Intravenous Line  Duration           Peripheral IV 06/27/23 Distal;Left;Upper;Ventral (anterior) Arm 1 day    Peripheral IV 06/27/23 Right Antecubital 1 day    Peripheral IV 06/28/23 Right;Ventral (anterior) Forearm 1 day          Drain  Duration           Urethral Catheter 18 Fr. 1 day                Lab, Imaging and other studies: I have personally reviewed pertinent reports.     VTE Pharmacologic Prophylaxis: Sequential compression device (Venodyne)   VTE Mechanical Prophylaxis: sequential compression device

## 2023-06-30 LAB
ALBUMIN SERPL BCP-MCNC: 3 G/DL (ref 3.5–5)
ALP SERPL-CCNC: 37 U/L (ref 34–104)
ALT SERPL W P-5'-P-CCNC: 31 U/L (ref 7–52)
ANION GAP SERPL CALCULATED.3IONS-SCNC: 7 MMOL/L
ANION GAP SERPL CALCULATED.3IONS-SCNC: 7 MMOL/L
ANION GAP SERPL CALCULATED.3IONS-SCNC: 8 MMOL/L
ANION GAP SERPL CALCULATED.3IONS-SCNC: 9 MMOL/L
AST SERPL W P-5'-P-CCNC: 36 U/L (ref 13–39)
BILIRUB DIRECT SERPL-MCNC: 0.19 MG/DL (ref 0–0.2)
BILIRUB SERPL-MCNC: 0.53 MG/DL (ref 0.2–1)
BUN SERPL-MCNC: 10 MG/DL (ref 5–25)
BUN SERPL-MCNC: 11 MG/DL (ref 5–25)
BUN SERPL-MCNC: 13 MG/DL (ref 5–25)
BUN SERPL-MCNC: 13 MG/DL (ref 5–25)
CA-I BLD-SCNC: 1.07 MMOL/L (ref 1.12–1.32)
CALCIUM SERPL-MCNC: 8 MG/DL (ref 8.4–10.2)
CALCIUM SERPL-MCNC: 8.6 MG/DL (ref 8.4–10.2)
CALCIUM SERPL-MCNC: 8.6 MG/DL (ref 8.4–10.2)
CALCIUM SERPL-MCNC: 9.4 MG/DL (ref 8.4–10.2)
CHLORIDE SERPL-SCNC: 91 MMOL/L (ref 96–108)
CHLORIDE SERPL-SCNC: 92 MMOL/L (ref 96–108)
CHLORIDE SERPL-SCNC: 92 MMOL/L (ref 96–108)
CHLORIDE SERPL-SCNC: 94 MMOL/L (ref 96–108)
CO2 SERPL-SCNC: 22 MMOL/L (ref 21–32)
CO2 SERPL-SCNC: 22 MMOL/L (ref 21–32)
CO2 SERPL-SCNC: 25 MMOL/L (ref 21–32)
CO2 SERPL-SCNC: 25 MMOL/L (ref 21–32)
CREAT SERPL-MCNC: 0.77 MG/DL (ref 0.6–1.3)
CREAT SERPL-MCNC: 0.79 MG/DL (ref 0.6–1.3)
CREAT SERPL-MCNC: 0.8 MG/DL (ref 0.6–1.3)
CREAT SERPL-MCNC: 0.83 MG/DL (ref 0.6–1.3)
CREAT UR-MCNC: 108 MG/DL
ERYTHROCYTE [DISTWIDTH] IN BLOOD BY AUTOMATED COUNT: 13.2 % (ref 11.6–15.1)
EST. AVERAGE GLUCOSE BLD GHB EST-MCNC: 108 MG/DL
GFR SERPL CREATININE-BSD FRML MDRD: 79 ML/MIN/1.73SQ M
GFR SERPL CREATININE-BSD FRML MDRD: 80 ML/MIN/1.73SQ M
GFR SERPL CREATININE-BSD FRML MDRD: 81 ML/MIN/1.73SQ M
GFR SERPL CREATININE-BSD FRML MDRD: 82 ML/MIN/1.73SQ M
GLUCOSE SERPL-MCNC: 109 MG/DL (ref 65–140)
GLUCOSE SERPL-MCNC: 117 MG/DL (ref 65–140)
GLUCOSE SERPL-MCNC: 126 MG/DL (ref 65–140)
GLUCOSE SERPL-MCNC: 138 MG/DL (ref 65–140)
GLUCOSE SERPL-MCNC: 230 MG/DL (ref 65–140)
GLUCOSE SERPL-MCNC: 95 MG/DL (ref 65–140)
HBA1C MFR BLD: 5.4 %
HCT VFR BLD AUTO: 28.6 % (ref 36.5–49.3)
HGB BLD-MCNC: 10 G/DL (ref 12–17)
MAGNESIUM SERPL-MCNC: 1.9 MG/DL (ref 1.9–2.7)
MCH RBC QN AUTO: 30.9 PG (ref 26.8–34.3)
MCHC RBC AUTO-ENTMCNC: 35 G/DL (ref 31.4–37.4)
MCV RBC AUTO: 88 FL (ref 82–98)
OSMOLALITY UR: 478 MMOL/KG
PHOSPHATE SERPL-MCNC: 3.5 MG/DL (ref 2.3–4.1)
PLATELET # BLD AUTO: 269 THOUSANDS/UL (ref 149–390)
PMV BLD AUTO: 8.1 FL (ref 8.9–12.7)
POTASSIUM SERPL-SCNC: 4.4 MMOL/L (ref 3.5–5.3)
POTASSIUM SERPL-SCNC: 4.5 MMOL/L (ref 3.5–5.3)
POTASSIUM SERPL-SCNC: 4.5 MMOL/L (ref 3.5–5.3)
POTASSIUM SERPL-SCNC: 4.7 MMOL/L (ref 3.5–5.3)
PROT SERPL-MCNC: 5.5 G/DL (ref 6.4–8.4)
RBC # BLD AUTO: 3.24 MILLION/UL (ref 3.88–5.62)
SODIUM 24H UR-SCNC: 52 MOL/L
SODIUM SERPL-SCNC: 122 MMOL/L (ref 135–147)
SODIUM SERPL-SCNC: 122 MMOL/L (ref 135–147)
SODIUM SERPL-SCNC: 124 MMOL/L (ref 135–147)
SODIUM SERPL-SCNC: 126 MMOL/L (ref 135–147)
WBC # BLD AUTO: 5.81 THOUSAND/UL (ref 4.31–10.16)

## 2023-06-30 PROCEDURE — 82330 ASSAY OF CALCIUM: CPT | Performed by: NURSE PRACTITIONER

## 2023-06-30 PROCEDURE — 83036 HEMOGLOBIN GLYCOSYLATED A1C: CPT | Performed by: NURSE PRACTITIONER

## 2023-06-30 PROCEDURE — 99233 SBSQ HOSP IP/OBS HIGH 50: CPT | Performed by: INTERNAL MEDICINE

## 2023-06-30 PROCEDURE — 84100 ASSAY OF PHOSPHORUS: CPT | Performed by: NURSE PRACTITIONER

## 2023-06-30 PROCEDURE — 82948 REAGENT STRIP/BLOOD GLUCOSE: CPT

## 2023-06-30 PROCEDURE — 83735 ASSAY OF MAGNESIUM: CPT | Performed by: NURSE PRACTITIONER

## 2023-06-30 PROCEDURE — 80076 HEPATIC FUNCTION PANEL: CPT | Performed by: NURSE PRACTITIONER

## 2023-06-30 PROCEDURE — NC001 PR NO CHARGE: Performed by: INTERNAL MEDICINE

## 2023-06-30 PROCEDURE — 84300 ASSAY OF URINE SODIUM: CPT | Performed by: NURSE PRACTITIONER

## 2023-06-30 PROCEDURE — 99232 SBSQ HOSP IP/OBS MODERATE 35: CPT | Performed by: INTERNAL MEDICINE

## 2023-06-30 PROCEDURE — 85027 COMPLETE CBC AUTOMATED: CPT | Performed by: NURSE PRACTITIONER

## 2023-06-30 PROCEDURE — 83935 ASSAY OF URINE OSMOLALITY: CPT | Performed by: NURSE PRACTITIONER

## 2023-06-30 PROCEDURE — 82570 ASSAY OF URINE CREATININE: CPT | Performed by: NURSE PRACTITIONER

## 2023-06-30 PROCEDURE — 80048 BASIC METABOLIC PNL TOTAL CA: CPT | Performed by: NURSE PRACTITIONER

## 2023-06-30 RX ORDER — CALCIUM GLUCONATE 20 MG/ML
2 INJECTION, SOLUTION INTRAVENOUS ONCE
Status: COMPLETED | OUTPATIENT
Start: 2023-06-30 | End: 2023-06-30

## 2023-06-30 RX ORDER — INSULIN LISPRO 100 [IU]/ML
1-6 INJECTION, SOLUTION INTRAVENOUS; SUBCUTANEOUS
Status: DISCONTINUED | OUTPATIENT
Start: 2023-06-30 | End: 2023-07-02 | Stop reason: HOSPADM

## 2023-06-30 RX ORDER — SODIUM CHLORIDE 1 G/1
1 TABLET ORAL 2 TIMES DAILY WITH MEALS
Status: DISCONTINUED | OUTPATIENT
Start: 2023-06-30 | End: 2023-06-30

## 2023-06-30 RX ORDER — ASPIRIN 81 MG/1
81 TABLET, CHEWABLE ORAL DAILY
Status: DISCONTINUED | OUTPATIENT
Start: 2023-06-30 | End: 2023-07-02 | Stop reason: HOSPADM

## 2023-06-30 RX ORDER — TAMSULOSIN HYDROCHLORIDE 0.4 MG/1
0.8 CAPSULE ORAL
Status: DISCONTINUED | OUTPATIENT
Start: 2023-07-01 | End: 2023-07-02 | Stop reason: HOSPADM

## 2023-06-30 RX ORDER — HEPARIN SODIUM 5000 [USP'U]/ML
5000 INJECTION, SOLUTION INTRAVENOUS; SUBCUTANEOUS EVERY 8 HOURS SCHEDULED
Status: DISCONTINUED | OUTPATIENT
Start: 2023-06-30 | End: 2023-07-01

## 2023-06-30 RX ADMIN — HEPARIN SODIUM 5000 UNITS: 5000 INJECTION INTRAVENOUS; SUBCUTANEOUS at 14:18

## 2023-06-30 RX ADMIN — CALCIUM GLUCONATE 2 G: 20 INJECTION, SOLUTION INTRAVENOUS at 07:59

## 2023-06-30 RX ADMIN — HEPARIN SODIUM 5000 UNITS: 5000 INJECTION INTRAVENOUS; SUBCUTANEOUS at 22:38

## 2023-06-30 RX ADMIN — ASPIRIN 81 MG 81 MG: 81 TABLET ORAL at 09:46

## 2023-06-30 RX ADMIN — SODIUM CHLORIDE 1 G: 1 TABLET ORAL at 08:27

## 2023-06-30 RX ADMIN — METOPROLOL SUCCINATE 50 MG: 50 TABLET, EXTENDED RELEASE ORAL at 08:27

## 2023-06-30 RX ADMIN — HEPARIN SODIUM 5000 UNITS: 5000 INJECTION INTRAVENOUS; SUBCUTANEOUS at 09:46

## 2023-06-30 RX ADMIN — SENNOSIDES AND DOCUSATE SODIUM 2 TABLET: 50; 8.6 TABLET ORAL at 22:38

## 2023-06-30 RX ADMIN — FERROUS SULFATE TAB 325 MG (65 MG ELEMENTAL FE) 325 MG: 325 (65 FE) TAB at 08:27

## 2023-06-30 RX ADMIN — TAMSULOSIN HYDROCHLORIDE 0.4 MG: 0.4 CAPSULE ORAL at 08:27

## 2023-06-30 NOTE — PROGRESS NOTES
Progress Note - Nephrology   Marylen Guppy. 81 y.o. male MRN: [id number]  Unit/Bed#: ICU 01-01 Encounter: [id number]    A/P:  1. Hyponatremia              Serum sodium level improved up to 126 mmol/L from 122 mmol/L yesterday afternoon, and 120 mmol/L from yesterday morning. Patient has had somewhat inconsistent rise in his serum sodium level as initially he declined and then improved. In the meantime, the patient was given several grams of oral sodium chloride in the form of tablets. Urine output has been consistent, appearing to put out about 500 mL every 12 hours. Exception to this is this morning where he currently already has about 875 mL output. Decline in serum sodium most likely associated with decline in urine water excretion. Reassess urine osmolality, urine sodium and urine creatinine. Anticipate elevated urine osmolality given low urine output. However this morning, as noted above, patient has had an increase in urine output in general with respect to the previous 36 hours. We will discontinue oral sodium chloride supplementation at this time. We will continue to focus on reduced fluid intake, current fluid restriction at 1.2 L a day, which is quite severe but appears to be necessary given actual urine output. Urine output is accurate as the patient requires Haley catheter. Ultimately, patient may be suffering from SIADH. However, we have evidence of no ADH identified on blood work just earlier this month. Alternatively, patient may have another underlying disorder which may require additional work-up. 2.  Acute kidney injury              Resolved, patient unfortunately had Haley catheter reinserted. Continue to monitor. 3.  Hyperglycemia              Blood glucose levels have been appropriate. 4.   Urinary retention    Unfortunately, patient had Haley catheter reinserted due to failed voiding trial.  Continue care in the outpatient setting according to our urology colleagues. Continue tamsulosin. 5.  New onset atrial fibrillation   Patient developed hematuria with heparin infusion. This was discontinued and will likely transition to an oral aspirin. Follow up reason for today's visit: Hyponatremia/acute kidney injury    Hyponatremia    Patient Active Problem List   Diagnosis   • Generalized weakness   • Prostate CA ([address])   • Essential hypertension   • Stage 2 chronic kidney disease   • Hyponatremia   • UTI (urinary tract infection)   • Urinary retention   • Anemia   • New onset a-fib (HCC)         Subjective:   No acute events overnight, eating and drinking with no n-v-d    Objective:     Vitals: Blood pressure 137/71, pulse 77, temperature (!) 97.1 °F (36.2 °C), temperature source Tympanic, resp. rate 13, height 6' 3" (1.905 m), weight 95.2 kg (209 lb 14.1 oz), SpO2 94 %. ,Body mass index is 26.23 kg/m². Weight (last 2 days)     Date/Time Weight    06/30/23 0600 95.2 (209.88)    06/29/23 0600 94 (207.23)    06/28/23 0743 93.4 (206)    06/28/23 0556 93.6 (206.35)            Intake/Output Summary (Last 24 hours) at 6/30/2023 1155  Last data filed at 6/30/2023 0800  Gross per 24 hour   Intake 551.43 ml   Output 1600 ml   Net -1048.57 ml     I/O last 3 completed shifts: In: 1434.7 [P.O.:1330;  I.V.:44.3; IV Piggyback:60.4]  Out: 1585 [Urine:1585]    Urethral Catheter Coude (Active)   Reasons to continue Urinary Catheter  Acute urinary retention/obstruction failing urinary retention protocol 06/29/23 2000   Goal for Removal N/A- discharging with dominique 06/29/23 2000   Site Assessment Clean;Skin intact 06/29/23 2000   Dominique Care Done 06/29/23 2100   Collection Container Standard drainage bag 06/29/23 2000   Securement Method Securing device (Describe) 06/29/23 2000   Output (mL) 875 mL 06/30/23 0800       Physical Exam: /71   Pulse 77   Temp (!) 97.1 °F (36.2 °C) (Tympanic)   Resp 13   Ht 6' 3" (1.905 m)   Wt 95.2 kg (209 lb 14.1 oz)   SpO2 94%   BMI 26.23 kg/m²     General Appearance:    Alert, cooperative, no distress, appears stated age   Head:    Normocephalic, without obvious abnormality, atraumatic   Eyes:    Conjunctiva/corneas clear   Ears:    Normal external ears   Nose:   Nares normal, septum midline, mucosa normal, no drainage    or sinus tenderness   Throat:   Lips, mucosa, and tongue normal; teeth and gums normal   Neck:   Supple   Back:     Symmetric, no curvature, ROM normal, no CVA tenderness   Lungs:     Clear to auscultation bilaterally, respirations unlabored   Chest wall:    No tenderness or deformity   Heart:    Regular rate and rhythm, S1 and S2 normal, no murmur, rub   or gallop   Abdomen:     Soft, non-tender, bowel sounds active   Extremities:   Extremities normal, atraumatic, no cyanosis or edema   Skin:   Skin color, texture, turgor normal, no rashes or lesions   Lymph nodes:   Cervical normal   Neurologic:   CNII-XII intact            Lab, Imaging and other studies: I have personally reviewed pertinent labs. CBC:   Lab Results   Component Value Date    WBC 5.81 06/30/2023    HGB 10.0 (L) 06/30/2023    HCT 28.6 (L) 06/30/2023    MCV 88 06/30/2023     06/30/2023    RBC 3.24 (L) 06/30/2023    MCH 30.9 06/30/2023    MCHC 35.0 06/30/2023    RDW 13.2 06/30/2023    MPV 8.1 (L) 06/30/2023     CMP:   Lab Results   Component Value Date    K 4.4 06/30/2023    CL 94 (L) 06/30/2023    CO2 25 06/30/2023    BUN 10 06/30/2023    CREATININE 0.77 06/30/2023    CALCIUM 8.0 (L) 06/30/2023    AST 36 06/30/2023    ALT 31 06/30/2023    ALKPHOS 37 06/30/2023    EGFR 82 06/30/2023       .   Results from last 7 days   Lab Units 06/30/23  0626 06/30/23  0622 06/29/23  2352 06/29/23  1811 06/28/23  1413 06/28/23  1001   POTASSIUM mmol/L 4.4  --  4.5 4.3   < > 3.8   CHLORIDE mmol/L 94*  --  92* 88*   < > 92*   CO2 mmol/L 25  --  22 24   < > 21   BUN mg/dL 10  --  13 13   < > 13   CREATININE mg/dL 0.77  --  0.79 0.92   < > 0.95   CALCIUM mg/dL 8.0*  --  8.6 8.7   < > 7.9*   ALK PHOS U/L  --  37  --   --   --  40   ALT U/L  --  31  --   --   --  42   AST U/L  --  36  --   --   --  80*    < > = values in this interval not displayed. Phosphorus:   Lab Results   Component Value Date    PHOS 3.5 06/30/2023     Magnesium:   Lab Results   Component Value Date    MG 1.9 06/30/2023     Urinalysis: No results found for: "COLORU", "CLARITYU", "Marleni Major", "PHUR", "LEUKOCYTESUR", "NITRITE", "PROTEINUA", "GLUCOSEU", "Doreatha Khushi", "Shefali Travis", "BLOODU"  Ionized Calcium: No results found for: "CAION"  Coagulation: No results found for: "PT", "INR", "APTT"  Troponin: No results found for: "TROPONINI"  ABG: No results found for: "PHART", "UUD2RNY", "PO2ART", "FMG7IDJ", "M0UKYDAE", "BEART", "SOURCE"  Radiology review:     IMAGING  Procedure: CT chest wo contrast    Result Date: 6/28/2023  Narrative: CT CHEST WITHOUT IV CONTRAST INDICATION:   hyponatremia work up. Per my review of the medical record, the patient has a history of prostate cancer status post radiation therapy with urinary retention status post Haley catheter and mild hematuria after Haley placement. COMPARISON: Chest radiograph 3/13/2020 and chest CT 12/14/2019. TECHNIQUE: Chest CT without intravenous contrast.  Axial, sagittal, coronal 2D reformats and coronal MIPS from source data. Radiation dose length product (DLP):  537 mGy-cm . Radiation dose exposure minimized using iterative reconstruction and automated exposure control. FINDINGS: LUNGS: Nothing to indicate malignancy. Stable 6 mm right middle lobe nodule, benign (5/67). Stable benign intrapulmonary lymph node abutting the right middle fissure (5/72). AIRWAYS: No significant filling defects. PLEURA:  Unremarkable. HEART/GREAT VESSELS: Moderate cardiomegaly. Mild coronary artery calcification indicating atherosclerotic heart disease. Trace pericardial effusion. MEDIASTINUM AND ABDIAZIZ:  Unremarkable. CHEST WALL AND LOWER NECK: Mild gynecomastia.  UPPER ABDOMEN: Partially imaged moderate stranding of the right perinephric fat. OSSEOUS STRUCTURES: Mild degenerative disease in the spine with mild curvature. Impression: Nothing indicate malignancy with stable 6 mm right middle lobe nodule since December 2019, benign. Partially imaged moderate stranding in the right perinephric fat which can be seen with inflammation or acute obstruction although the small portion of the right renal calyces which are imaged are nondilated. Consider further evaluation with abdomen pelvis CT if clinically warranted. The study was marked in EPIC for significant notification. Workstation performed: [id number]     Procedure: XR spine lumbar 2 or 3 views injury    Result Date: 6/28/2023  Narrative: LUMBAR SPINE INDICATION:   lower back pain intermittently. COMPARISON:  None VIEWS:  XR SPINE LUMBAR 2 OR 3 VIEWS INJURY FINDINGS: There are 5 non rib bearing lumbar vertebral bodies. There is no evidence of acute fracture or destructive osseous lesion. Alignment is unremarkable. Degenerative spondylosis at multiple levels with osteophytes is noted. The space narrowing is most pronounced at L2-3, L3-4 and L5-S1. Schmorl's node formation is seen superiorly at L2. This appears to have progressed since an abdominal CT from 9/20/2011. The pedicles appear intact. Gas-filled loops of small bowel is seen without robert dilatation with also some gas in the colon. Impression: Degenerative spondylosis with Schmorl's node formation. Gas-filled loops of large and small bowel suggestive of ileus. Follow-up may be helpful if symptoms persist. The study was marked in [facility] for immediate notification. Workstation performed: [id number]     Procedure: Echo complete w/ contrast if indicated    Result Date: 6/28/2023  Narrative: •  Left Ventricle: Left ventricular cavity size is normal. Wall thickness is increased. There is mild concentric hypertrophy. The left ventricular ejection fraction is 65%.  Systolic function is normal. Wall motion is normal. Diastolic function is normal. •  Right Ventricle: Systolic function is mildly reduced. Abnormal tricuspid annular plane systolic excursion (TAPSE) < 1.7 cm. •  Left Atrium: The atrium is dilated.        Current Facility-Administered Medications   Medication Dose Route Frequency   • acetaminophen (TYLENOL) tablet 650 mg  650 mg Oral Q6H PRN   • aspirin chewable tablet 81 mg  81 mg Oral Daily   • ferrous sulfate tablet 325 mg  325 mg Oral Daily With Breakfast   • heparin (porcine) subcutaneous injection 5,000 Units  5,000 Units Subcutaneous Q8H [address]   • metoprolol succinate (TOPROL-XL) 24 hr tablet 50 mg  50 mg Oral Daily   • senna-docusate sodium (SENOKOT S) 8.6-50 mg per tablet 2 tablet  2 tablet Oral HS   • simethicone (MYLICON) chewable tablet 80 mg  80 mg Oral Q6H PRN   • tamsulosin (FLOMAX) capsule 0.4 mg  0.4 mg Oral Daily     Medications Discontinued During This Encounter   Medication Reason   • ferrous sulfate 324 (65 Fe) mg Discontinued by another clinician   • metoprolol succinate (TOPROL-XL) 24 hr tablet 25 mg    • heparin (porcine) subcutaneous injection 5,000 Units    • heparin (ACS LOW) Duplicate order   • sodium chloride 0.9 % infusion    • dextrose 5 % bolus 500 mL    • heparin (porcine) 25,000 units in 0.45% NaCl 250 mL infusion (premix)    • heparin (porcine) injection 4,000 Units    • heparin (porcine) injection 2,000 Units    • heparin (porcine) subcutaneous injection 5,000 Units    • cefTRIAXone (ROCEPHIN) IVPB (premix in dextrose) 1,000 mg 50 mL    • insulin regular (HumuLIN R,NovoLIN R) 1 Units/mL in sodium chloride 0.9 % 100 mL infusion    • heparin (porcine) subcutaneous injection 5,000 Units    • dextrose 5 % and sodium chloride 0.2 % infusion    • heparin (porcine) subcutaneous injection 5,000 Units    • heparin (ACS LOW)    • polyethylene glycol (MIRALAX) packet 17 g    • heparin (porcine) 25,000 units in 0.45% NaCl 250 mL infusion (premix)    • heparin (porcine) injection 4,000 Units    • heparin (porcine) injection 2,000 Units    • sodium chloride tablet 1 g    • sodium chloride tablet 1 g        Marie Barkley, DO      This progress note was produced in part using a dictation device which may document imprecise wording from author's original intent.

## 2023-06-30 NOTE — ASSESSMENT & PLAN NOTE
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

## 2023-06-30 NOTE — ASSESSMENT & PLAN NOTE
· PVR >500 on 06/28 requiring Haley placement - Failed voiding trial   · MRI from 05/30 w/moderate prostatomegaly   · Urology consulted - appreciate recommendations  · Continue home Flomax

## 2023-06-30 NOTE — ASSESSMENT & PLAN NOTE
Lab Results   Component Value Date    EGFR 82 06/30/2023    EGFR 81 06/29/2023    EGFR 75 06/29/2023    CREATININE 0.77 06/30/2023    CREATININE 0.79 06/29/2023    CREATININE 0.92 06/29/2023     · Initially w/EZIO on admission in setting of obstruction/urinary retention  · Baseline creatinine appears to be around 0.9 - 1.0  · Avoid nephrotoxic agents and fluctuations in BP  · Trend renal indices  · Strict I/O  · Daily weights

## 2023-06-30 NOTE — CASE MANAGEMENT
Case Management Discharge Planning Note    Patient name Prosper Mckeon. Location ICU /ICU  MRN [id number]  : 1935 Date 2023       Current Admission Date: 2023  Current Admission Diagnosis:Hyponatremia   Patient Active Problem List    Diagnosis Date Noted   • New onset a-fib ([address]) 2023   • Hyponatremia 2023   • UTI (urinary tract infection) 2023   • Urinary retention 2023   • Anemia 2023   • Stage 2 chronic kidney disease 2021   • Prostate CA ([address]) 01/15/2020   • Essential hypertension 01/15/2020   • Generalized weakness 2019      LOS (days): 3  Geometric Mean LOS (GMLOS) (days): 3.50  Days to GMLOS:0.7     OBJECTIVE:  Risk of Unplanned Readmission Score: 14.53         Current admission status: Inpatient   Preferred Pharmacy:   [address] [place] [address], [address] [place] [address],[address]  [address] [address]  Phone: [phone] Fax: [phone] for [facility], [address], [address] [facility] [address]  [facility] [address]  Phone: [phone] Fax: [phone] [address] [address]  Phone: [phone] Fax: [phone]    Primary Care Provider: Marichuy Stevens DO    Primary Insurance: MEDICARE  Secondary Insurance:  FOR LIFE    DISCHARGE DETAILS:    Discharge planning discussed with[de-identified] patient and spouse & daughter at the bedside  Freedom of Choice: Yes  Comments - Freedom of Choice: recommendation is for outpt rehab-  pt was jose castillo list-   pt has declined [facility] for his dominique care- pt stated he was given instruction on how to care for greg dominique  CM contacted family/caregiver?: Yes             Contacts  Patient Contacts: Lucero Monique  Relationship to Patient[de-identified] Family  Contact Method:  In Person  Reason/Outcome: Discharge  [facility]         Is the patient interested in [address] at discharge?: No    DME Referral Provided  Referral made for DME?: No    Other Referral/Resources/Interventions Provided:  Interventions: Outpatient OT, Outpatient PT  Referral Comments: outpt rehab- pt will need an order upon d/c    Would you like to participate in our [facility] service program?  : No - Declined    Treatment Team Recommendation: Home (home with spouse  & outpt rehab & outpt follow up- family)                                   IMM Given (Date):: 06/30/23  IMM Given to[de-identified] Patient

## 2023-06-30 NOTE — QUICK NOTE
Discussed care with CC earlier this evening, Na declining to 123. . Na had improved to 125 this AM and then dominique removed today. Dominique replaced and uop 550ml Na declined from 123->120. Started on sodium chloride tablet per CC. FR to 1.2 L. If Na decline further then would recommend HTS.

## 2023-06-30 NOTE — PLAN OF CARE
Problem: PAIN - ADULT  Goal: Verbalizes/displays adequate comfort level or baseline comfort level  Description: Interventions:  - Encourage patient to monitor pain and request assistance  - Assess pain using appropriate pain scale  - Administer analgesics based on type and severity of pain and evaluate response  - Implement non-pharmacological measures as appropriate and evaluate response  - Consider cultural and social influences on pain and pain management  - Notify physician/advanced practitioner if interventions unsuccessful or patient reports new pain  Outcome: Progressing     Problem: INFECTION - ADULT  Goal: Absence or prevention of progression during hospitalization  Description: INTERVENTIONS:  - Assess and monitor for signs and symptoms of infection  - Monitor lab/diagnostic results  - Monitor all insertion sites, i.e. indwelling lines, tubes, and drains  - Monitor endotracheal if appropriate and nasal secretions for changes in amount and color  - Gates appropriate cooling/warming therapies per order  - Administer medications as ordered  - Instruct and encourage patient and family to use good hand hygiene technique  - Identify and instruct in appropriate isolation precautions for identified infection/condition  Outcome: Progressing  Goal: Absence of fever/infection during neutropenic period  Description: INTERVENTIONS:  - Monitor WBC    Outcome: Progressing     Problem: SAFETY ADULT  Goal: Patient will remain free of falls  Description: INTERVENTIONS:  - Educate patient/family on patient safety including physical limitations  - Instruct patient to call for assistance with activity   - Consult OT/PT to assist with strengthening/mobility   - Keep Call bell within reach  - Keep bed low and locked with side rails adjusted as appropriate  - Keep care items and personal belongings within reach  - Initiate and maintain comfort rounds  - Make Fall Risk Sign visible to staff  - Apply yellow socks and bracelet for high fall risk patients  - Consider moving patient to room near nurses station  Outcome: Progressing  Goal: Maintain or return to baseline ADL function  Description: INTERVENTIONS:  -  Assess patient's ability to carry out ADLs; assess patient's baseline for ADL function and identify physical deficits which impact ability to perform ADLs (bathing, care of mouth/teeth, toileting, grooming, dressing, etc.)  - Assess/evaluate cause of self-care deficits   - Assess range of motion  - Assess patient's mobility; develop plan if impaired  - Assess patient's need for assistive devices and provide as appropriate  - Encourage maximum independence but intervene and supervise when necessary  - Involve family in performance of ADLs  - Assess for home care needs following discharge   - Consider OT consult to assist with ADL evaluation and planning for discharge  - Provide patient education as appropriate  Outcome: Progressing  Goal: Maintains/Returns to pre admission functional level  Description: INTERVENTIONS:  - Perform BMAT or MOVE assessment daily.   - Set and communicate daily mobility goal to care team and patient/family/caregiver. - Collaborate with rehabilitation services on mobility goals if consulted  - Perform Range of Motion 3 times a day. - Reposition patient every 2 hours.   - Dangle patient 3 times a day  - Stand patient 3 times a day  - Ambulate patient 3 times a day  - Out of bed to chair 3 times a day   - Out of bed for meals 3 times a day  - Out of bed for toileting  - Record patient progress and toleration of activity level   Outcome: Progressing     Problem: DISCHARGE PLANNING  Goal: Discharge to home or other facility with appropriate resources  Description: INTERVENTIONS:  - Identify barriers to discharge w/patient and caregiver  - Arrange for needed discharge resources and transportation as appropriate  - Identify discharge learning needs (meds, wound care, etc.)  - Arrange for interpretive services to assist at discharge as needed  - Refer to Case Management Department for coordinating discharge planning if the patient needs post-hospital services based on physician/advanced practitioner order or complex needs related to functional status, cognitive ability, or social support system  Outcome: Progressing     Problem: Knowledge Deficit  Goal: Patient/family/caregiver demonstrates understanding of disease process, treatment plan, medications, and discharge instructions  Description: Complete learning assessment and assess knowledge base. Interventions:  - Provide teaching at level of understanding  - Provide teaching via preferred learning methods  Outcome: Progressing     Problem: Nutrition/Hydration-ADULT  Goal: Nutrient/Hydration intake appropriate for improving, restoring or maintaining nutritional needs  Description: Monitor and assess patient's nutrition/hydration status for malnutrition. Collaborate with interdisciplinary team and initiate plan and interventions as ordered. Monitor patient's weight and dietary intake as ordered or per policy. Utilize nutrition screening tool and intervene as necessary. Determine patient's food preferences and provide high-protein, high-caloric foods as appropriate.      INTERVENTIONS:  - Monitor oral intake, urinary output, labs, and treatment plans  - Assess nutrition and hydration status and recommend course of action  - Evaluate amount of meals eaten  - Assist patient with eating if necessary   - Allow adequate time for meals  - Recommend/ encourage appropriate diets, oral nutritional supplements, and vitamin/mineral supplements  - Order, calculate, and assess calorie counts as needed  - Recommend, monitor, and adjust tube feedings and TPN/PPN based on assessed needs  - Assess need for intravenous fluids  - Provide specific nutrition/hydration education as appropriate  - Include patient/family/caregiver in decisions related to nutrition  Outcome: Progressing

## 2023-06-30 NOTE — PLAN OF CARE
Problem: PAIN - ADULT  Goal: Verbalizes/displays adequate comfort level or baseline comfort level  Description: Interventions:  - Encourage patient to monitor pain and request assistance  - Assess pain using appropriate pain scale  - Administer analgesics based on type and severity of pain and evaluate response  - Implement non-pharmacological measures as appropriate and evaluate response  - Consider cultural and social influences on pain and pain management  - Notify physician/advanced practitioner if interventions unsuccessful or patient reports new pain  Outcome: Progressing     Problem: INFECTION - ADULT  Goal: Absence or prevention of progression during hospitalization  Description: INTERVENTIONS:  - Assess and monitor for signs and symptoms of infection  - Monitor lab/diagnostic results  - Monitor all insertion sites, i.e. indwelling lines, tubes, and drains  - Monitor endotracheal if appropriate and nasal secretions for changes in amount and color  - [place] appropriate cooling/warming therapies per order  - Administer medications as ordered  - Instruct and encourage patient and family to use good hand hygiene technique  - Identify and instruct in appropriate isolation precautions for identified infection/condition  Outcome: Progressing  Goal: Absence of fever/infection during neutropenic period  Description: INTERVENTIONS:  - Monitor WBC    Outcome: Progressing     Problem: SAFETY ADULT  Goal: Patient will remain free of falls  Description: INTERVENTIONS:  - Educate patient/family on patient safety including physical limitations  - Instruct patient to call for assistance with activity   - Consult OT/PT to assist with strengthening/mobility   - Keep Call bell within reach  - Keep bed low and locked with side rails adjusted as appropriate  - Keep care items and personal belongings within reach  - Initiate and maintain comfort rounds  - Make Fall Risk Sign visible to staff  - Offer Toileting every 2 Hours, in advance of need  - Initiate/Maintain chair alarm  - Apply yellow socks and bracelet for high fall risk patients  - Consider moving patient to room near nurses station  Outcome: Progressing  Goal: Maintain or return to baseline ADL function  Description: INTERVENTIONS:  -  Assess patient's ability to carry out ADLs; assess patient's baseline for ADL function and identify physical deficits which impact ability to perform ADLs (bathing, care of mouth/teeth, toileting, grooming, dressing, etc.)  - Assess/evaluate cause of self-care deficits   - Assess range of motion  - Assess patient's mobility; develop plan if impaired  - Assess patient's need for assistive devices and provide as appropriate  - Encourage maximum independence but intervene and supervise when necessary  - Involve family in performance of ADLs  - Assess for home care needs following discharge   - Consider OT consult to assist with ADL evaluation and planning for discharge  - Provide patient education as appropriate  Outcome: Progressing  Goal: Maintains/Returns to pre admission functional level  Description: INTERVENTIONS:  - Perform BMAT or MOVE assessment daily.   - Set and communicate daily mobility goal to care team and patient/family/caregiver. - Collaborate with rehabilitation services on mobility goals if consulted  - Perform Range of Motion 4 times a day. - Reposition patient every 2 hours.   - Dangle patient 4 times a day  - Stand patient 4 times a day  - Ambulate patient 3 times a day  - Out of bed to chair 3 times a day   - Out of bed for meals 3 times a day  - Out of bed for toileting  - Record patient progress and toleration of activity level   Outcome: Progressing     Problem: DISCHARGE PLANNING  Goal: Discharge to home or other facility with appropriate resources  Description: INTERVENTIONS:  - Identify barriers to discharge w/patient and caregiver  - Arrange for needed discharge resources and transportation as appropriate  - Identify discharge learning needs (meds, wound care, etc.)  - Arrange for interpretive services to assist at discharge as needed  - Refer to Case Management Department for coordinating discharge planning if the patient needs post-hospital services based on physician/advanced practitioner order or complex needs related to functional status, cognitive ability, or social support system  Outcome: Progressing

## 2023-06-30 NOTE — PROGRESS NOTES
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 - then with post-obstructive diuresis   · CT Chest w/out evidence of pulmonary malignancy  · Osmo 246 on admission   · Urine studies as noted above  · TSH 1.249  · AM Cortisol 15.1  · Is s/p PO DDAVP 0.1 mg x1  · Nephrology following - appreciate recommendations  · Continue fluid restriction   · Strict I/O  · Serial BMPs     Urinary retention  Assessment & Plan  · PVR >500 on 06/28 requiring Haley placement - Failed voiding trial   · MRI from 05/30 w/moderate prostatomegaly   · Urology consulted - appreciate recommendations  · Continue home Flomax    New onset a-fib [facility])  Assessment & Plan  · Noted to be in rate-controlled AF on arrival to ICU - appears to be paroxysmal  · Suspect this is 2/2 hyponatremia, acute illness state  · TTE from 06/28 revealed EF 65% w/mild concentric hypertrophy, mildly reduced RV systolic function, trace TR  · [id number] score 3 on admission  · Initially started on Heparin gtt, however developed significant hematuria and was stopped - restarted today   · Monitor for hematuria - if develops again, may not be able to tolerate AC  · Continue home Toprol   · Continue cardiac monitoring  · Optimize electrolytes  · Consider cardiology consult as outpatient    Generalized weakness  Assessment & Plan  · In setting of hyponatremia  · Please see plan as noted above  · PT/OT consult  · OOB to chair as tolerated    Anemia  Assessment & Plan  · Hgb 11.2 on admission  · Baseline Hgb appears to be around 12 - 13  · Suspect this is 2/2 DEYA vs chronic disease  · Iron sat 13  · PO iron supplementation started this admission - continue  · Trend Hgb and transfuse for Hgb < 7    Stage 2 chronic kidney disease  Assessment & Plan  Lab Results   Component Value Date    EGFR 82 06/30/2023    EGFR 81 06/29/2023    EGFR 75 06/29/2023    CREATININE 0.77 06/30/2023    CREATININE 0.79 06/29/2023    CREATININE 0.92 06/29/2023     · Initially w/EZIO on admission in setting of obstruction/urinary retention  · Baseline creatinine appears to be around 0.9 - 1.0  · Avoid nephrotoxic agents and fluctuations in BP  · Trend renal indices  · Strict I/O  · Daily weights    Essential hypertension  Assessment & Plan  · Continue home Toprol XL    Prostate CA ([address])  Assessment & Plan  · Is s/p EBRT  · Follows w/Urology as outpatient         ICU Core Measures     A: Assess, Prevent, and Manage Pain · Has pain been assessed? Yes  · Need for changes to pain regimen? No   B: Both SAT/SAT  · N/A   C: Choice of Sedation · RASS Goal: 0 Alert and Calm  · Need for changes to sedation or analgesia regimen? No   D: Delirium · CAM-ICU: Negative   E: Early Mobility  · Plan for early mobility? Yes   F: Family Engagement · Plan for family engagement today? Yes       Review of Invasive Devices: Haley Plan: Haley placed by urology. Removal plans per their team        Prophylaxis:  VTE Contraindicated secondary to: - initially w/gross hematuria on heparin gtt - will restart today   Stress Ulcer  not ordered       Subjective   HPI/24hr events:     · Heparin gtt restarted for paroxysmal A-flutter and then stopped due to gross hematuria  · Failed voiding trial - now w/Haley cath again  · Salt tabs started for hyponatremia     Review of Systems   Constitutional: Negative for chills and fever. HENT: Negative for congestion. Eyes: Negative for visual disturbance.    Respiratory: Negative for cough and shortness of breath. Cardiovascular: Negative for chest pain, palpitations and leg swelling. Gastrointestinal: Negative for abdominal distention, abdominal pain, nausea and vomiting. Genitourinary: Haley cath intact and draining appropriately   Musculoskeletal: Negative for back pain and neck pain. Neurological: Negative for dizziness, light-headedness and headaches. Psychiatric/Behavioral: Negative for confusion. Objective                            Vitals I/O      Most Recent Min/Max in 24hrs   Temp (!) 97.1 °F (36.2 °C) Temp  Min: 97.1 °F (36.2 °C)  Max: 97.2 °F (36.2 °C)   Pulse 77 Pulse  Min: 75  Max: 89   Resp 13 Resp  Min: 13  Max: 27   /71 BP  Min: 104/66  Max: 140/70   O2 Sat 94 % SpO2  Min: 93 %  Max: 98 %      Intake/Output Summary (Last 24 hours) at 6/30/2023 0915  Last data filed at 6/30/2023 0200  Gross per 24 hour   Intake 764.26 ml   Output 725 ml   Net 39.26 ml         Diet Regular; Regular House; Fluid Restriction 1200 ML     Invasive Monitoring Physical exam     Physical Exam  Vitals and nursing note reviewed. Constitutional:       General: He is awake. He is not in acute distress. HENT:      Head: Normocephalic and atraumatic. Eyes:      General: No scleral icterus. Extraocular Movements: Extraocular movements intact. Pupils: Pupils are equal, round, and reactive to light. Cardiovascular:      Rate and Rhythm: Normal rate. Rhythm regularly irregular. Pulses: Normal pulses. Heart sounds: Normal heart sounds. No murmur heard. Comments: A-Flutter  Pulmonary:      Effort: Pulmonary effort is normal.      Breath sounds: Decreased breath sounds present. Abdominal:      General: Bowel sounds are normal. There is no distension. Palpations: Abdomen is soft. Tenderness: There is no abdominal tenderness. Genitourinary:     Comments: Haley cath intact  Musculoskeletal:         General: Normal range of motion.       Cervical back: Normal range of motion and neck supple. Right lower leg: No edema. Left lower leg: No edema. Skin:     General: Skin is warm and dry. Capillary Refill: Capillary refill takes less than 2 seconds. Neurological:      General: No focal deficit present. Mental Status: He is alert and oriented to person, place, and time. Psychiatric:         Behavior: Behavior is cooperative. Diagnostic Studies      EKG: A-Flutter  Imaging:  I have personally reviewed pertinent reports.    and I have personally reviewed pertinent films in PACS     Medications:  Scheduled PRN   ferrous sulfate, 325 mg, Daily With Breakfast  heparin (porcine), 5,000 Units, Q8H [address]  metoprolol succinate, 50 mg, Daily  senna-docusate sodium, 2 tablet, HS  sodium chloride, 1 g, BID With Meals  tamsulosin, 0.4 mg, Daily      acetaminophen, 650 mg, Q6H PRN  simethicone, 80 mg, Q6H PRN       Continuous          Labs:    CBC    Recent Labs     06/29/23  0600 06/30/23  0622   WBC 5.85 5.81   HGB 11.2* 10.0*   HCT 31.9* 28.6*    269     BMP    Recent Labs     06/29/23  2352 06/30/23  0626   SODIUM 122* 126*   K 4.5 4.4   CL 92* 94*   CO2 22 25   AGAP 8 7   BUN 13 10   CREATININE 0.79 0.77   CALCIUM 8.6 8.0*       Coags    Recent Labs     06/29/23  1225   PTT 52*        Additional Electrolytes  Recent Labs     06/29/23  0600 06/30/23  0622   MG 1.8* 1.9   PHOS 3.4 3.5   CAIONIZED 1.10* 1.07*          Blood Gas    No recent results  No recent results LFTs  Recent Labs     06/28/23  1001 06/30/23  0622   ALT 42 31   AST 80* 36   ALKPHOS 40 37   ALB 3.2* 3.0*   TBILI 0.89 0.53       Infectious  No recent results  Glucose  Recent Labs     06/29/23  1225 06/29/23  1811 06/29/23  2352 06/30/23  0626   GLUC 128 149* [address], CRNP

## 2023-06-30 NOTE — QUICK NOTE
Patient updated extensively at bedside by Dr. Eneida Bowens and myself regarding plan of care and overall patient status. All questions/concerns were answered and addressed.

## 2023-06-30 NOTE — PROGRESS NOTES
Progress Note - Bria Baires. 81 y.o. male MRN: [id number]    Unit/Bed#: ICU 01-01 Encounter: [id number]      Assessment:  Haley replaced yesterday for approximately 350 cc, although this was not a true postvoid residual.  Continues on tamsulosin 0.4 mg daily. Urine has cleared completely. Plan:  Options, risks and management were discussed with the patient as well as his wife and daughter at the bedside. We will increase his tamsulosin to 0.8 mg daily. We will then continue the Haley catheter after discharge with plan for outpatient voiding trial in 1 to 2 weeks. Subjective:   Feeling better. Tolerating Haley well. Objective:     Vitals: Blood pressure 137/71, pulse 77, temperature (!) 97.1 °F (36.2 °C), temperature source Tympanic, resp. rate 13, height 6' 3" (1.905 m), weight 95.2 kg (209 lb 14.1 oz), SpO2 94 %. ,Body mass index is 26.23 kg/m². Intake/Output Summary (Last 24 hours) at 6/30/2023 1249  Last data filed at 6/30/2023 0800  Gross per 24 hour   Intake 541.43 ml   Output 1600 ml   Net -1058.57 ml       Physical Exam: No abdominal or flank tenderness. Haley catheter draining clear yellow urine. Invasive Devices     Peripheral Intravenous Line  Duration           Peripheral IV 06/27/23 Distal;Left;Upper;Ventral (anterior) Arm 2 days    Peripheral IV 06/27/23 Right Antecubital 2 days    Peripheral IV 06/28/23 Right;Ventral (anterior) Forearm 2 days          Drain  Duration           Urethral Catheter Coude <1 day                Lab, Imaging and other studies: I have personally reviewed pertinent reports.     VTE Pharmacologic Prophylaxis: Sequential compression device (Venodyne)   VTE Mechanical Prophylaxis: sequential compression device

## 2023-07-01 LAB
ANION GAP SERPL CALCULATED.3IONS-SCNC: 7 MMOL/L
ANION GAP SERPL CALCULATED.3IONS-SCNC: 7 MMOL/L
ANION GAP SERPL CALCULATED.3IONS-SCNC: 8 MMOL/L
ATRIAL RATE: 249 BPM
BUN SERPL-MCNC: 12 MG/DL (ref 5–25)
BUN SERPL-MCNC: 13 MG/DL (ref 5–25)
BUN SERPL-MCNC: 13 MG/DL (ref 5–25)
CA-I BLD-SCNC: 1.12 MMOL/L (ref 1.12–1.32)
CALCIUM SERPL-MCNC: 8.6 MG/DL (ref 8.4–10.2)
CALCIUM SERPL-MCNC: 8.9 MG/DL (ref 8.4–10.2)
CALCIUM SERPL-MCNC: 9.3 MG/DL (ref 8.4–10.2)
CHLORIDE SERPL-SCNC: 91 MMOL/L (ref 96–108)
CHLORIDE SERPL-SCNC: 92 MMOL/L (ref 96–108)
CHLORIDE SERPL-SCNC: 94 MMOL/L (ref 96–108)
CO2 SERPL-SCNC: 24 MMOL/L (ref 21–32)
CO2 SERPL-SCNC: 25 MMOL/L (ref 21–32)
CO2 SERPL-SCNC: 26 MMOL/L (ref 21–32)
CREAT SERPL-MCNC: 0.73 MG/DL (ref 0.6–1.3)
CREAT SERPL-MCNC: 0.74 MG/DL (ref 0.6–1.3)
CREAT SERPL-MCNC: 0.81 MG/DL (ref 0.6–1.3)
ERYTHROCYTE [DISTWIDTH] IN BLOOD BY AUTOMATED COUNT: 13.3 % (ref 11.6–15.1)
GFR SERPL CREATININE-BSD FRML MDRD: 80 ML/MIN/1.73SQ M
GFR SERPL CREATININE-BSD FRML MDRD: 83 ML/MIN/1.73SQ M
GFR SERPL CREATININE-BSD FRML MDRD: 83 ML/MIN/1.73SQ M
GLUCOSE SERPL-MCNC: 104 MG/DL (ref 65–140)
GLUCOSE SERPL-MCNC: 108 MG/DL (ref 65–140)
GLUCOSE SERPL-MCNC: 125 MG/DL (ref 65–140)
GLUCOSE SERPL-MCNC: 128 MG/DL (ref 65–140)
GLUCOSE SERPL-MCNC: 156 MG/DL (ref 65–140)
GLUCOSE SERPL-MCNC: 90 MG/DL (ref 65–140)
GLUCOSE SERPL-MCNC: 98 MG/DL (ref 65–140)
HCT VFR BLD AUTO: 29.3 % (ref 36.5–49.3)
HGB BLD-MCNC: 10 G/DL (ref 12–17)
MAGNESIUM SERPL-MCNC: 1.6 MG/DL (ref 1.9–2.7)
MCH RBC QN AUTO: 30.1 PG (ref 26.8–34.3)
MCHC RBC AUTO-ENTMCNC: 34.1 G/DL (ref 31.4–37.4)
MCV RBC AUTO: 88 FL (ref 82–98)
PHOSPHATE SERPL-MCNC: 3.6 MG/DL (ref 2.3–4.1)
PLATELET # BLD AUTO: 267 THOUSANDS/UL (ref 149–390)
PMV BLD AUTO: 8.1 FL (ref 8.9–12.7)
POTASSIUM SERPL-SCNC: 4.3 MMOL/L (ref 3.5–5.3)
POTASSIUM SERPL-SCNC: 4.3 MMOL/L (ref 3.5–5.3)
POTASSIUM SERPL-SCNC: 4.6 MMOL/L (ref 3.5–5.3)
QRS AXIS: -14 DEGREES
QRSD INTERVAL: 88 MS
QT INTERVAL: 306 MS
QTC INTERVAL: 412 MS
RBC # BLD AUTO: 3.32 MILLION/UL (ref 3.88–5.62)
SODIUM SERPL-SCNC: 124 MMOL/L (ref 135–147)
SODIUM SERPL-SCNC: 124 MMOL/L (ref 135–147)
SODIUM SERPL-SCNC: 126 MMOL/L (ref 135–147)
T WAVE AXIS: 70 DEGREES
VENTRICULAR RATE: 109 BPM
WBC # BLD AUTO: 5.93 THOUSAND/UL (ref 4.31–10.16)

## 2023-07-01 PROCEDURE — 82330 ASSAY OF CALCIUM: CPT | Performed by: NURSE PRACTITIONER

## 2023-07-01 PROCEDURE — 83735 ASSAY OF MAGNESIUM: CPT | Performed by: NURSE PRACTITIONER

## 2023-07-01 PROCEDURE — 80048 BASIC METABOLIC PNL TOTAL CA: CPT | Performed by: NURSE PRACTITIONER

## 2023-07-01 PROCEDURE — 93005 ELECTROCARDIOGRAM TRACING: CPT

## 2023-07-01 PROCEDURE — 80048 BASIC METABOLIC PNL TOTAL CA: CPT | Performed by: INTERNAL MEDICINE

## 2023-07-01 PROCEDURE — 99232 SBSQ HOSP IP/OBS MODERATE 35: CPT | Performed by: INTERNAL MEDICINE

## 2023-07-01 PROCEDURE — 93010 ELECTROCARDIOGRAM REPORT: CPT | Performed by: INTERNAL MEDICINE

## 2023-07-01 PROCEDURE — 85027 COMPLETE CBC AUTOMATED: CPT | Performed by: NURSE PRACTITIONER

## 2023-07-01 PROCEDURE — 82948 REAGENT STRIP/BLOOD GLUCOSE: CPT

## 2023-07-01 PROCEDURE — 99232 SBSQ HOSP IP/OBS MODERATE 35: CPT | Performed by: NURSE PRACTITIONER

## 2023-07-01 PROCEDURE — 84100 ASSAY OF PHOSPHORUS: CPT | Performed by: NURSE PRACTITIONER

## 2023-07-01 RX ORDER — MAGNESIUM SULFATE HEPTAHYDRATE 40 MG/ML
4 INJECTION, SOLUTION INTRAVENOUS ONCE
Status: COMPLETED | OUTPATIENT
Start: 2023-07-01 | End: 2023-07-01

## 2023-07-01 RX ORDER — ENOXAPARIN SODIUM 100 MG/ML
40 INJECTION SUBCUTANEOUS
Status: DISCONTINUED | OUTPATIENT
Start: 2023-07-02 | End: 2023-07-02 | Stop reason: HOSPADM

## 2023-07-01 RX ADMIN — HEPARIN SODIUM 5000 UNITS: 5000 INJECTION INTRAVENOUS; SUBCUTANEOUS at 05:43

## 2023-07-01 RX ADMIN — Medication 7.5 MG: at 15:04

## 2023-07-01 RX ADMIN — METOPROLOL SUCCINATE 50 MG: 50 TABLET, EXTENDED RELEASE ORAL at 09:04

## 2023-07-01 RX ADMIN — FERROUS SULFATE TAB 325 MG (65 MG ELEMENTAL FE) 325 MG: 325 (65 FE) TAB at 07:34

## 2023-07-01 RX ADMIN — SENNOSIDES AND DOCUSATE SODIUM 2 TABLET: 50; 8.6 TABLET ORAL at 21:16

## 2023-07-01 RX ADMIN — HEPARIN SODIUM 5000 UNITS: 5000 INJECTION INTRAVENOUS; SUBCUTANEOUS at 13:54

## 2023-07-01 RX ADMIN — ASPIRIN 81 MG 81 MG: 81 TABLET ORAL at 09:04

## 2023-07-01 RX ADMIN — MAGNESIUM SULFATE HEPTAHYDRATE 4 G: 40 INJECTION, SOLUTION INTRAVENOUS at 09:05

## 2023-07-01 RX ADMIN — TAMSULOSIN HYDROCHLORIDE 0.8 MG: 0.4 CAPSULE ORAL at 13:54

## 2023-07-01 NOTE — ASSESSMENT & PLAN NOTE
Lab Results   Component Value Date    EGFR 83 07/01/2023    EGFR 79 06/30/2023    EGFR 80 06/30/2023    CREATININE 0.74 07/01/2023    CREATININE 0.83 06/30/2023    CREATININE 0.80 06/30/2023   · Presented with EZIO 2/2 obstructive uropathy   · Urology and nephrology input appreciated   · Now EZIO resolved   · Continue with Haley catheter, tamsulosin 0.8 mg daily. Will need to follow-up with urology outpatient for voiding trial in 1 to 2 weeks.    · Monitor renal function closely and avoid nephrotoxins

## 2023-07-01 NOTE — PROGRESS NOTES
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)  Assessment & Plan  Appears to be paroxysmal, rate is controlled   • TTE from 06/28 revealed EF 65% w/mild concentric hypertrophy, mildly reduced RV systolic function, trace TR  • [id number] score 3  • Initially started on Heparin gtt, however developed significant hematuria and was stopped   ? Started on ASA 81 mg   • Continue home Toprol 50 mg daily   • Continue cardiac monitoring  • Will need outpatient cardiology evaluation and further discussion on anticoagulation once hematuria resolves     Urinary retention  Assessment & Plan  · Urology input appreciated  · Continue with Haley catheter and tamsulosin  · Will need to follow-up with urology outpatient for voiding trial in 1 to 2 weeks    Stage 2 chronic kidney disease  Assessment & Plan  Lab Results   Component Value Date    EGFR 83 07/01/2023    EGFR 79 06/30/2023    EGFR 80 06/30/2023    CREATININE 0.74 07/01/2023    CREATININE 0.83 06/30/2023    CREATININE 0.80 06/30/2023   · Presented with EZIO 2/2 obstructive uropathy   · Urology and nephrology input appreciated   · Now EZIO resolved   · Continue with Haley catheter, tamsulosin 0.8 mg daily. Will need to follow-up with urology outpatient for voiding trial in 1 to 2 weeks.    · Monitor renal function closely and avoid nephrotoxins    Essential hypertension  Assessment & Plan  · BP appears to be well-controlled   · Continue with toprol 50 mg daily     Prostate CA [facility])  Assessment & Plan  · S/p EBRT  · Follow up with urology outpatient              VTE Prophylaxis:  Enoxaparin (Lovenox)    Patient Centered Rounds: I have performed bedside rounds with nursing staff today. Discussions with Specialists or Other Care Team Provider: nephrology   Education and Discussions with Family / Patient: patient, wife and daughter at bedside     Current Length of Stay: 4 day(s)    Current Patient Status: Inpatient   Certification Statement: The patient will continue to require additional inpatient hospital stay due to hyponatremia     Discharge Plan: pending hospital course. Hopeful discharge in AM.     Code Status: Level 1 - Full Code    Subjective:   Feeling okay. He is anxious to go home. Denies any n/v/abdominal pain. Objective:     Vitals:   Temp (24hrs), Av °F (36.7 °C), Min:97.1 °F (36.2 °C), Max:98.4 °F (36.9 °C)    Temp:  [97.1 °F (36.2 °C)-98.4 °F (36.9 °C)] 97.8 °F (36.6 °C)  HR:  [76-95] 81  Resp:  [13-24] 17  BP: (120-154)/(59-88) 120/60  SpO2:  [93 %-97 %] 96 %  Body mass index is 25.63 kg/m². Input and Output Summary (last 24 hours): Intake/Output Summary (Last 24 hours) at 2023 1406  Last data filed at 2023 1000  Gross per 24 hour   Intake 600 ml   Output 790 ml   Net -190 ml       Physical Exam:   Physical Exam  Vitals and nursing note reviewed. Constitutional:       General: He is not in acute distress. Appearance: Normal appearance. Comments: Frail and elderly      HENT:      Head: Normocephalic and atraumatic. Right Ear: External ear normal.      Left Ear: External ear normal.      Nose: Nose normal.      Mouth/Throat:      Mouth: Mucous membranes are moist.      Pharynx: Oropharynx is clear. Eyes:      General:         Right eye: No discharge. Left eye: No discharge. Extraocular Movements: Extraocular movements intact.       Pupils: Pupils are equal, round, and reactive to light. Cardiovascular:      Rate and Rhythm: Normal rate and regular rhythm. Pulses: Normal pulses. Heart sounds: Normal heart sounds. No murmur heard. Pulmonary:      Effort: Pulmonary effort is normal. No respiratory distress. Breath sounds: Normal breath sounds. No wheezing or rales. Abdominal:      General: Bowel sounds are normal. There is no distension. Palpations: Abdomen is soft. There is no mass. Tenderness: There is no abdominal tenderness. Musculoskeletal:         General: Swelling (trace BLEs ) present. No tenderness or deformity. Normal range of motion. Cervical back: Normal range of motion and neck supple. No rigidity. Skin:     General: Skin is warm and dry. Capillary Refill: Capillary refill takes less than 2 seconds. Coloration: Skin is not pale. Findings: No erythema. Neurological:      General: No focal deficit present. Mental Status: He is alert and oriented to person, place, and time. Mental status is at baseline. Psychiatric:         Mood and Affect: Mood normal.         Behavior: Behavior normal.         Thought Content: Thought content normal.         Judgment: Judgment normal.         Additional Data:     Labs:    Results from last 7 days   Lab Units 07/01/23  0548 06/30/23  0622 06/29/23  0600   WBC Thousand/uL 5.93   < > 5.85   HEMOGLOBIN g/dL 10.0*   < > 11.2*   HEMATOCRIT % 29.3*   < > 31.9*   PLATELETS Thousands/uL 267   < > 277   NEUTROS PCT %  --   --  77*   LYMPHS PCT %  --   --  8*   MONOS PCT %  --   --  12   EOS PCT %  --   --  1    < > = values in this interval not displayed.      Results from last 7 days   Lab Units 07/01/23  1115 06/30/23  0626 06/30/23  0622   SODIUM mmol/L 124*   < >  --    POTASSIUM mmol/L 4.6   < >  --    CHLORIDE mmol/L 92*   < >  --    CO2 mmol/L 24   < >  --    BUN mg/dL 13   < >  --    CREATININE mg/dL 0.73   < >  --    CALCIUM mg/dL 8.9   < >  --    ALK PHOS U/L --   --  37   ALT U/L  --   --  31   AST U/L  --   --  36    < > = values in this interval not displayed. Results from last 7 days   Lab Units 06/28/23  0127   INR  1.10     Results from last 7 days   Lab Units 07/01/23  1103 07/01/23  0711 06/30/23  2100 06/30/23  1839 06/28/23  1001 06/28/23  0904   POC GLUCOSE mg/dl 90 104 126 138 189* 271*     Results from last 7 days   Lab Units 06/30/23  0622   HEMOGLOBIN A1C % 5.4       * I Have Reviewed All Lab Data Listed Above. * Additional Pertinent Lab Tests Reviewed: [address] Admission  Reviewed    Imaging:  Imaging Reports Reviewed Today Include: n/a     Recent Cultures (last 7 days):     Results from last 7 days   Lab Units 06/27/23  2312   BLOOD CULTURE  No Growth at 72 hrs. No Growth at 72 hrs. Last 24 Hours Medication List:   Current Facility-Administered Medications   Medication Dose Route Frequency Provider Last Rate   • acetaminophen  650 mg Oral Q6H PRN KIERA Alcala     • aspirin  81 mg Oral Daily KIERA Hernandez     • ferrous sulfate  325 mg Oral Daily With Breakfast KIERA Hernandez     • heparin (porcine)  5,000 Units Subcutaneous Q8H [address] KIERA Hernandez     • insulin lispro  1-6 Units Subcutaneous TID AC KIERA Hernandez     • insulin lispro  1-6 Units Subcutaneous HS KIERA Hernandez     • metoprolol succinate  50 mg Oral Daily KIERA Hernandez     • senna-docusate sodium  2 tablet Oral HS KIERA Hernandez     • simethicone  80 mg Oral Q6H PRN KIERA Hernandez     • tamsulosin  0.8 mg Oral After Chance Velasco MD     • tolvaptan  7.5 mg Oral Once Majo Hirsch and DO [place]          Today, Patient Was Seen By: KIERA Farrar    ** Please Note: Dictation voice to text software may have been used in the creation of this document.  **

## 2023-07-01 NOTE — ASSESSMENT & PLAN NOTE
Appears to be paroxysmal, rate is controlled   • TTE from 06/28 revealed EF 65% w/mild concentric hypertrophy, mildly reduced RV systolic function, trace TR  • [id number] score 3  • Initially started on Heparin gtt, however developed significant hematuria and was stopped   ?  Started on ASA 81 mg   • Continue home Toprol 50 mg daily   • Continue cardiac monitoring  • Will need outpatient cardiology evaluation and further discussion on anticoagulation once hematuria resolves

## 2023-07-01 NOTE — ASSESSMENT & PLAN NOTE
· Urology input appreciated  · Continue with Haley catheter and tamsulosin  · Will need to follow-up with urology outpatient for voiding trial in 1 to 2 weeks

## 2023-07-01 NOTE — PROGRESS NOTES
Critical Care Interval Transfer Note:    Please refer to progress note from earlier today for full details. Barriers to discharge:   · Hyponatremia: continue fluid restriction and serial BMPs - Nephrology following  · Urinary retention: failed voiding trial - Haley cath to remain in place per Urology   · Newly diagnosed A-Flutter: continue BB - unable to be on anticoagulation as developed gross hematuria when initiated  · DEYA: iron supplementation initiated this admission  · PT/OT     Consults: IP CONSULT TO UROLOGY  IP CONSULT TO CASE MANAGEMENT  IP CONSULT TO NEPHROLOGY  IP CONSULT TO CASE MANAGEMENT     Discharge Plan: Anticipate discharge in 24-48 hrs to home. Haley Plan: Haley placed by urology. Removal plans per their team    PT Recommendations: Home with outpatient rehabilitation  OT Recommendations: Home with outpatient rehabilitation      Patient seen and evaluated by Critical Care today and deemed to be appropriate for transfer to Med Surg. Spoke to Dr. John Mendosa from [facility] to accept transfer. Critical care can be contacted via [employer] with any questions or concerns.

## 2023-07-01 NOTE — PROGRESS NOTES
Progress Note - Nephrology   Danyagiles Robbins. 81 y.o. male MRN: [id number]  Unit/Bed#: ICU 01-01 Encounter: [id number]    A/P:  1. Hyponatremia  Sodium trends of been a bit labile. Volume status: Appears mildly hypervolemic with lower extremity edema. Na 126 this AM, moderate, asymptomatic at present. Etiologies felt to be due to water load+ ADH release based on elevated urine Na/osm. Urine osm> 100 is not maximally dilute and implies presence of ADH.   TSH/Cortisol ok. Denies liver disease. Chest CT did not reveal malignancy. Consider further testing as op, patient very eager for DC. Oral Na CL discontinued yesterday. Maintained currently on FR 1.2 L per day. Repeat BMP now, if Na 126 or better can be DC. Ideally would be 128 or very high but he is very adamant about DC and I have explained risks. He does have mild LE edema and his BP if mildly hypertensive, taken off salt tablets yesterday. Salt tablets not ideal long term but may be considered low dose at DC until repeat chemistry on Monday. Will need close op follow up and lab monitoring. If Na declining, he cannot be DC and I would recommend tolvaptan 7.5mg x1 at that point with liberalzied fluid intake and BMP Q 8. Discussed care with primary and RN. 2. Hypomagnesemia, 4 gram IV mag ordered by primary today for mag 1.6.    3. EZIO, resolved, due to obstructive uropathy. 4. UR, dominique reinserted today. Continue flomax and void trial per urology in op setting. 5. New onset afib, tachycardia noted. Will defer management to PCP. Pt developed hematuria after heparin. This was DC and transitioned to aspirin. Follow up reason for today's visit:     Hyponatremia      Subjective:   Patient seen and examined today. Denies cp/sob/n/v/d. He is very anxious about being DC. A complete 10 point review of systems was performed and is otherwise negative.     Objective:     Vitals: Blood pressure 154/88, pulse 95, temperature 98.3 °F (36.8 °C), temperature source Temporal, resp. rate 13, height 6' 3" (1.905 m), weight 93 kg (205 lb 0.4 oz), SpO2 93 %. ,Body mass index is 25.63 kg/m². Weight (last 2 days)     Date/Time Weight    07/01/23 0554 93 (205.03)    06/30/23 0600 95.2 (209.88)    06/29/23 0600 94 (207.23)            Intake/Output Summary (Last 24 hours) at 7/1/2023 1042  Last data filed at 7/1/2023 0800  Gross per 24 hour   Intake 760 ml   Output 855 ml   Net -95 ml     I/O last 3 completed shifts:   In: 340 [P.O.:240; IV Piggyback:100]  Out: 1995 [Urine:1995]    Urethral Catheter Coude (Active)   Reasons to continue Urinary Catheter  Acute urinary retention/obstruction failing urinary retention protocol 06/29/23 2000   Goal for Removal N/A- discharging with dominique 06/29/23 2000   Site Assessment Clean;Skin intact 06/29/23 2000   Dominique Care Done 07/01/23 0900   Collection Container Standard drainage bag 06/29/23 2000   Securement Method Securing device (Describe) 06/29/23 2000   Output (mL) 160 mL 07/01/23 0800       Physical Exam: /88 (BP Location: Left arm)   Pulse 95   Temp 98.3 °F (36.8 °C) (Temporal)   Resp 13   Ht 6' 3" (1.905 m)   Wt 93 kg (205 lb 0.4 oz)   SpO2 93%   BMI 25.63 kg/m²     General Appearance:    Alert, cooperative, no distress, appears stated age   Head:    Normocephalic, without obvious abnormality, atraumatic   Eyes:    Conjunctiva/corneas clear   Ears:    Normal external ears   Nose:   Nares normal, septum midline, mucosa normal, no drainage    or sinus tenderness   Throat:   Lips, mucosa, and tongue normal; teeth and gums normal   Neck:    Back:      Lungs:     Clear to auscultation bilaterally, respirations unlabored   Chest wall:    No tenderness or deformity   Heart:   tachycardic, S1 and S2 normal, no murmur, rub   or gallop   Abdomen:     Soft, non-tender, bowel sounds active   Extremities:   MILD LE edema    Skin:   Skin color, texture, turgor normal, no rashes or lesions   Lymph nodes: Cervical normal   Neurologic:   CNII-XII intact  gu + dominique with pennie urine + sediment in tubing            Lab, Imaging and other studies: I have personally reviewed pertinent labs. CBC:   Lab Results   Component Value Date    WBC 5.93 07/01/2023    HGB 10.0 (L) 07/01/2023    HCT 29.3 (L) 07/01/2023    MCV 88 07/01/2023     07/01/2023    RBC 3.32 (L) 07/01/2023    MCH 30.1 07/01/2023    MCHC 34.1 07/01/2023    RDW 13.3 07/01/2023    MPV 8.1 (L) 07/01/2023     CMP:   Lab Results   Component Value Date    K 4.3 07/01/2023    CL 94 (L) 07/01/2023    CO2 25 07/01/2023    BUN 12 07/01/2023    CREATININE 0.74 07/01/2023    CALCIUM 8.6 07/01/2023    EGFR 83 07/01/2023       . Results from last 7 days   Lab Units 07/01/23  0550 06/30/23  2247 06/30/23  1317 06/30/23  0626 06/30/23  0622 06/28/23  1413 06/28/23  1001   POTASSIUM mmol/L 4.3 4.7 4.5   < >  --    < > 3.8   CHLORIDE mmol/L 94* 92* 91*   < >  --    < > 92*   CO2 mmol/L 25 25 22   < >  --    < > 21   BUN mg/dL 12 13 11   < >  --    < > 13   CREATININE mg/dL 0.74 0.83 0.80   < >  --    < > 0.95   CALCIUM mg/dL 8.6 8.6 9.4   < >  --    < > 7.9*   ALK PHOS U/L  --   --   --   --  37  --  40   ALT U/L  --   --   --   --  31  --  42   AST U/L  --   --   --   --  36  --  80*    < > = values in this interval not displayed. Phosphorus:   Lab Results   Component Value Date    PHOS 3.6 07/01/2023     Magnesium:   Lab Results   Component Value Date    MG 1.6 (L) 07/01/2023     Urinalysis: No results found for: "COLORU", "CLARITYU", "Winda Paci", "PHUR", "LEUKOCYTESUR", "NITRITE", "PROTEINUA", "GLUCOSEU", "[place]", "BILIRUBINUR", "BLOODU"  Ionized Calcium: No results found for: "CAION"  Coagulation: No results found for: "PT", "INR", "APTT"  Troponin: No results found for: "TROPONINI"  ABG: No results found for: "PHART", "CTF9XLB", "PO2ART", "BQQ3RVE", "E4VRMJRU", "BEART", "SOURCE"  Radiology review:     IMAGING  No results found.     Current Facility-Administered Medications   Medication Dose Route Frequency   • acetaminophen (TYLENOL) tablet 650 mg  650 mg Oral Q6H PRN   • aspirin chewable tablet 81 mg  81 mg Oral Daily   • ferrous sulfate tablet 325 mg  325 mg Oral Daily With Breakfast   • heparin (porcine) subcutaneous injection 5,000 Units  5,000 Units Subcutaneous Q8H [facility]   • insulin lispro (HumaLOG) 100 units/mL subcutaneous injection 1-6 Units  1-6 Units Subcutaneous TID AC   • insulin lispro (HumaLOG) 100 units/mL subcutaneous injection 1-6 Units  1-6 Units Subcutaneous HS   • magnesium sulfate 4 g/100 mL IVPB (premix) 4 g  4 g Intravenous Once   • metoprolol succinate (TOPROL-XL) 24 hr tablet 50 mg  50 mg Oral Daily   • senna-docusate sodium (SENOKOT S) 8.6-50 mg per tablet 2 tablet  2 tablet Oral HS   • simethicone (MYLICON) chewable tablet 80 mg  80 mg Oral Q6H PRN   • tamsulosin (FLOMAX) capsule 0.8 mg  0.8 mg Oral After Lunch     Medications Discontinued During This Encounter   Medication Reason   • ferrous sulfate 324 (65 Fe) mg Discontinued by another clinician   • metoprolol succinate (TOPROL-XL) 24 hr tablet 25 mg    • heparin (porcine) subcutaneous injection 5,000 Units    • heparin (ACS LOW) Duplicate order   • sodium chloride 0.9 % infusion    • dextrose 5 % bolus 500 mL    • heparin (porcine) 25,000 units in 0.45% NaCl 250 mL infusion (premix)    • heparin (porcine) injection 4,000 Units    • heparin (porcine) injection 2,000 Units    • heparin (porcine) subcutaneous injection 5,000 Units    • cefTRIAXone (ROCEPHIN) IVPB (premix in dextrose) 1,000 mg 50 mL    • insulin regular (HumuLIN R,NovoLIN R) 1 Units/mL in sodium chloride 0.9 % 100 mL infusion    • heparin (porcine) subcutaneous injection 5,000 Units    • dextrose 5 % and sodium chloride 0.2 % infusion    • heparin (porcine) subcutaneous injection 5,000 Units    • heparin (ACS LOW)    • polyethylene glycol (MIRALAX) packet 17 g    • heparin (porcine) 25,000 units in 0.45% NaCl 250 mL infusion (premix)    • heparin (porcine) injection 4,000 Units    • heparin (porcine) injection 2,000 Units    • sodium chloride tablet 1 g    • sodium chloride tablet 1 g    • tamsulosin (FLOMAX) capsule 0.4 mg        Majo Landry, DO      This progress note was produced in part using a dictation device which may document imprecise wording from author's original intent.

## 2023-07-01 NOTE — ASSESSMENT & PLAN NOTE
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

## 2023-07-01 NOTE — PLAN OF CARE
Problem: SAFETY ADULT  Goal: Maintain or return to baseline ADL function  Description: INTERVENTIONS:  -  Assess patient's ability to carry out ADLs; assess patient's baseline for ADL function and identify physical deficits which impact ability to perform ADLs (bathing, care of mouth/teeth, toileting, grooming, dressing, etc.)  - Assess/evaluate cause of self-care deficits   - Assess range of motion  - Assess patient's mobility; develop plan if impaired  - Assess patient's need for assistive devices and provide as appropriate  - Encourage maximum independence but intervene and supervise when necessary  - Involve family in performance of ADLs  - Assess for home care needs following discharge   - Consider OT consult to assist with ADL evaluation and planning for discharge  - Provide patient education as appropriate  Outcome: Progressing     Problem: DISCHARGE PLANNING  Goal: Discharge to home or other facility with appropriate resources  Description: INTERVENTIONS:  - Identify barriers to discharge w/patient and caregiver  - Arrange for needed discharge resources and transportation as appropriate  - Identify discharge learning needs (meds, wound care, etc.)  - Arrange for interpretive services to assist at discharge as needed  - Refer to Case Management Department for coordinating discharge planning if the patient needs post-hospital services based on physician/advanced practitioner order or complex needs related to functional status, cognitive ability, or social support system  Outcome: Progressing     Problem: Knowledge Deficit  Goal: Patient/family/caregiver demonstrates understanding of disease process, treatment plan, medications, and discharge instructions  Description: Complete learning assessment and assess knowledge base.   Interventions:  - Provide teaching at level of understanding  - Provide teaching via preferred learning methods  Outcome: Progressing     Problem: MOBILITY - ADULT  Goal: Maintain or return to baseline ADL function  Description: INTERVENTIONS:  -  Assess patient's ability to carry out ADLs; assess patient's baseline for ADL function and identify physical deficits which impact ability to perform ADLs (bathing, care of mouth/teeth, toileting, grooming, dressing, etc.)  - Assess/evaluate cause of self-care deficits   - Assess range of motion  - Assess patient's mobility; develop plan if impaired  - Assess patient's need for assistive devices and provide as appropriate  - Encourage maximum independence but intervene and supervise when necessary  - Involve family in performance of ADLs  - Assess for home care needs following discharge   - Consider OT consult to assist with ADL evaluation and planning for discharge  - Provide patient education as appropriate  Outcome: Progressing

## 2023-07-02 ENCOUNTER — TELEPHONE (OUTPATIENT)
Dept: OTHER | Facility: HOSPITAL | Age: 88
End: 2023-07-02

## 2023-07-02 VITALS
BODY MASS INDEX: 25.08 KG/M2 | HEART RATE: 85 BPM | DIASTOLIC BLOOD PRESSURE: 80 MMHG | OXYGEN SATURATION: 96 % | RESPIRATION RATE: 20 BRPM | TEMPERATURE: 97.5 F | WEIGHT: 201.72 LBS | SYSTOLIC BLOOD PRESSURE: 133 MMHG | HEIGHT: 75 IN

## 2023-07-02 LAB
ANION GAP SERPL CALCULATED.3IONS-SCNC: 6 MMOL/L
ANION GAP SERPL CALCULATED.3IONS-SCNC: 6 MMOL/L
BUN SERPL-MCNC: 11 MG/DL (ref 5–25)
BUN SERPL-MCNC: 13 MG/DL (ref 5–25)
CALCIUM SERPL-MCNC: 8.5 MG/DL (ref 8.4–10.2)
CALCIUM SERPL-MCNC: 8.6 MG/DL (ref 8.4–10.2)
CHLORIDE SERPL-SCNC: 94 MMOL/L (ref 96–108)
CHLORIDE SERPL-SCNC: 98 MMOL/L (ref 96–108)
CO2 SERPL-SCNC: 25 MMOL/L (ref 21–32)
CO2 SERPL-SCNC: 26 MMOL/L (ref 21–32)
CREAT SERPL-MCNC: 0.72 MG/DL (ref 0.6–1.3)
CREAT SERPL-MCNC: 0.74 MG/DL (ref 0.6–1.3)
ERYTHROCYTE [DISTWIDTH] IN BLOOD BY AUTOMATED COUNT: 13.2 % (ref 11.6–15.1)
GFR SERPL CREATININE-BSD FRML MDRD: 83 ML/MIN/1.73SQ M
GFR SERPL CREATININE-BSD FRML MDRD: 84 ML/MIN/1.73SQ M
GLUCOSE SERPL-MCNC: 104 MG/DL (ref 65–140)
GLUCOSE SERPL-MCNC: 120 MG/DL (ref 65–140)
GLUCOSE SERPL-MCNC: 123 MG/DL (ref 65–140)
GLUCOSE SERPL-MCNC: 96 MG/DL (ref 65–140)
HCT VFR BLD AUTO: 29.5 % (ref 36.5–49.3)
HGB BLD-MCNC: 10.2 G/DL (ref 12–17)
MAGNESIUM SERPL-MCNC: 2 MG/DL (ref 1.9–2.7)
MCH RBC QN AUTO: 30.9 PG (ref 26.8–34.3)
MCHC RBC AUTO-ENTMCNC: 34.6 G/DL (ref 31.4–37.4)
MCV RBC AUTO: 89 FL (ref 82–98)
PLATELET # BLD AUTO: 267 THOUSANDS/UL (ref 149–390)
PMV BLD AUTO: 7.9 FL (ref 8.9–12.7)
POTASSIUM SERPL-SCNC: 4.3 MMOL/L (ref 3.5–5.3)
POTASSIUM SERPL-SCNC: 4.7 MMOL/L (ref 3.5–5.3)
RBC # BLD AUTO: 3.3 MILLION/UL (ref 3.88–5.62)
SODIUM SERPL-SCNC: 125 MMOL/L (ref 135–147)
SODIUM SERPL-SCNC: 130 MMOL/L (ref 135–147)
WBC # BLD AUTO: 5.15 THOUSAND/UL (ref 4.31–10.16)

## 2023-07-02 PROCEDURE — 83735 ASSAY OF MAGNESIUM: CPT | Performed by: NURSE PRACTITIONER

## 2023-07-02 PROCEDURE — 82948 REAGENT STRIP/BLOOD GLUCOSE: CPT

## 2023-07-02 PROCEDURE — 85027 COMPLETE CBC AUTOMATED: CPT | Performed by: NURSE PRACTITIONER

## 2023-07-02 PROCEDURE — 80048 BASIC METABOLIC PNL TOTAL CA: CPT | Performed by: NURSE PRACTITIONER

## 2023-07-02 PROCEDURE — 99239 HOSP IP/OBS DSCHRG MGMT >30: CPT | Performed by: NURSE PRACTITIONER

## 2023-07-02 RX ORDER — ASPIRIN 81 MG/1
81 TABLET, CHEWABLE ORAL DAILY
Qty: 30 TABLET | Refills: 0 | Status: SHIPPED | OUTPATIENT
Start: 2023-07-03 | End: 2023-08-02

## 2023-07-02 RX ORDER — METOPROLOL SUCCINATE 50 MG/1
50 TABLET, EXTENDED RELEASE ORAL DAILY
Qty: 30 TABLET | Refills: 0 | Status: SHIPPED | OUTPATIENT
Start: 2023-07-03 | End: 2023-08-02

## 2023-07-02 RX ORDER — TAMSULOSIN HYDROCHLORIDE 0.4 MG/1
0.8 CAPSULE ORAL
Qty: 60 CAPSULE | Refills: 0 | Status: SHIPPED | OUTPATIENT
Start: 2023-07-02 | End: 2023-08-01

## 2023-07-02 RX ORDER — AMOXICILLIN 250 MG
2 CAPSULE ORAL
Qty: 60 TABLET | Refills: 0 | Status: SHIPPED | OUTPATIENT
Start: 2023-07-02 | End: 2023-08-01

## 2023-07-02 RX ORDER — FERROUS SULFATE 325(65) MG
325 TABLET ORAL
Qty: 30 TABLET | Refills: 0 | Status: SHIPPED | OUTPATIENT
Start: 2023-07-03 | End: 2023-08-02

## 2023-07-02 RX ADMIN — FERROUS SULFATE TAB 325 MG (65 MG ELEMENTAL FE) 325 MG: 325 (65 FE) TAB at 08:46

## 2023-07-02 RX ADMIN — METOPROLOL SUCCINATE 50 MG: 50 TABLET, EXTENDED RELEASE ORAL at 08:46

## 2023-07-02 RX ADMIN — ASPIRIN 81 MG 81 MG: 81 TABLET ORAL at 08:46

## 2023-07-02 NOTE — NURSING NOTE
pts family wanted an extension on the bed  But he was already discharged. I offered him the recliner but he refused.

## 2023-07-02 NOTE — PLAN OF CARE
Problem: PAIN - ADULT  Goal: Verbalizes/displays adequate comfort level or baseline comfort level  Description: Interventions:  - Encourage patient to monitor pain and request assistance  - Assess pain using appropriate pain scale  - Administer analgesics based on type and severity of pain and evaluate response  - Implement non-pharmacological measures as appropriate and evaluate response  - Consider cultural and social influences on pain and pain management  - Notify physician/advanced practitioner if interventions unsuccessful or patient reports new pain  7/2/2023 1140 by Maxwell Hogan RN  Outcome: Adequate for Discharge  7/2/2023 1140 by Maxwell Hogan RN  Outcome: Adequate for Discharge  7/2/2023 1018 by Maxwell Hogan RN  Outcome: Progressing     Problem: INFECTION - ADULT  Goal: Absence or prevention of progression during hospitalization  Description: INTERVENTIONS:  - Assess and monitor for signs and symptoms of infection  - Monitor lab/diagnostic results  - Monitor all insertion sites, i.e. indwelling lines, tubes, and drains  - Monitor endotracheal if appropriate and nasal secretions for changes in amount and color  - [place] appropriate cooling/warming therapies per order  - Administer medications as ordered  - Instruct and encourage patient and family to use good hand hygiene technique  - Identify and instruct in appropriate isolation precautions for identified infection/condition  7/2/2023 1140 by Maxwell Hogan RN  Outcome: Adequate for Discharge  7/2/2023 1140 by Maxwell Hogan RN  Outcome: Adequate for Discharge  7/2/2023 1018 by Maxwell Hogan RN  Outcome: Progressing  Goal: Absence of fever/infection during neutropenic period  Description: INTERVENTIONS:  - Monitor WBC    7/2/2023 1140 by Maxwell Hogan RN  Outcome: Adequate for Discharge  7/2/2023 1140 by Maxwell Hogan RN  Outcome: Adequate for Discharge  7/2/2023 1018 by Maxwell Hogan RN  Outcome: Progressing Problem: SAFETY ADULT  Goal: Patient will remain free of falls  Description: INTERVENTIONS:  - Educate patient/family on patient safety including physical limitations  - Instruct patient to call for assistance with activity   - Consult OT/PT to assist with strengthening/mobility   - Keep Call bell within reach  - Keep bed low and locked with side rails adjusted as appropriate  - Keep care items and personal belongings within reach  - Initiate and maintain comfort rounds  - Make Fall Risk Sign visible to staff  - Offer Toileting every  Hours, in advance of need  - Initiate/Maintain alarm  - Obtain necessary fall risk management equipment:   - Apply yellow socks and bracelet for high fall risk patients  - Consider moving patient to room near nurses station  7/2/2023 1140 by Sonya Juarez RN  Outcome: Adequate for Discharge  7/2/2023 1140 by Sonya Juarez RN  Outcome: Adequate for Discharge  7/2/2023 1018 by Sonya Juarez RN  Outcome: Progressing  Goal: Maintain or return to baseline ADL function  Description: INTERVENTIONS:  -  Assess patient's ability to carry out ADLs; assess patient's baseline for ADL function and identify physical deficits which impact ability to perform ADLs (bathing, care of mouth/teeth, toileting, grooming, dressing, etc.)  - Assess/evaluate cause of self-care deficits   - Assess range of motion  - Assess patient's mobility; develop plan if impaired  - Assess patient's need for assistive devices and provide as appropriate  - Encourage maximum independence but intervene and supervise when necessary  - Involve family in performance of ADLs  - Assess for home care needs following discharge   - Consider OT consult to assist with ADL evaluation and planning for discharge  - Provide patient education as appropriate  7/2/2023 1140 by Sonya Juarez RN  Outcome: Adequate for Discharge  7/2/2023 1140 by Sonya Juarez RN  Outcome: Adequate for Discharge  7/2/2023 1018 by Malorie Chambers MEG Griffin  Outcome: Progressing  Goal: Maintains/Returns to pre admission functional level  Description: INTERVENTIONS:  - Perform BMAT or MOVE assessment daily.   - Set and communicate daily mobility goal to care team and patient/family/caregiver. - Collaborate with rehabilitation services on mobility goals if consulted  - Perform Range of Motion  times a day. - Reposition patient every  hours.   - Dangle patient  times a day  - Stand patient  times a day  - Ambulate patient times a day  - Out of bed to chair  times a day   - Out of bed for meals  times a day  - Out of bed for toileting  - Record patient progress and toleration of activity level   7/2/2023 1140 by Paula Soler RN  Outcome: Adequate for Discharge  7/2/2023 1140 by Paula Soler RN  Outcome: Adequate for Discharge  7/2/2023 1018 by Paula Soler RN  Outcome: Progressing     Problem: DISCHARGE PLANNING  Goal: Discharge to home or other facility with appropriate resources  Description: INTERVENTIONS:  - Identify barriers to discharge w/patient and caregiver  - Arrange for needed discharge resources and transportation as appropriate  - Identify discharge learning needs (meds, wound care, etc.)  - Arrange for interpretive services to assist at discharge as needed  - Refer to Case Management Department for coordinating discharge planning if the patient needs post-hospital services based on physician/advanced practitioner order or complex needs related to functional status, cognitive ability, or social support system  7/2/2023 1140 by Paula Soler RN  Outcome: Adequate for Discharge  7/2/2023 1140 by Paula Soler RN  Outcome: Adequate for Discharge  7/2/2023 1018 by Paula Soler RN  Outcome: Progressing     Problem: Knowledge Deficit  Goal: Patient/family/caregiver demonstrates understanding of disease process, treatment plan, medications, and discharge instructions  Description: Complete learning assessment and assess knowledge base. Interventions:  - Provide teaching at level of understanding  - Provide teaching via preferred learning methods  7/2/2023 1140 by Marcello Neal RN  Outcome: Adequate for Discharge  7/2/2023 1140 by Marcello Neal RN  Outcome: Adequate for Discharge  7/2/2023 1018 by Marcello Neal RN  Outcome: Progressing     Problem: Nutrition/Hydration-ADULT  Goal: Nutrient/Hydration intake appropriate for improving, restoring or maintaining nutritional needs  Description: Monitor and assess patient's nutrition/hydration status for malnutrition. Collaborate with interdisciplinary team and initiate plan and interventions as ordered. Monitor patient's weight and dietary intake as ordered or per policy. Utilize nutrition screening tool and intervene as necessary. Determine patient's food preferences and provide high-protein, high-caloric foods as appropriate.      INTERVENTIONS:  - Monitor oral intake, urinary output, labs, and treatment plans  - Assess nutrition and hydration status and recommend course of action  - Evaluate amount of meals eaten  - Assist patient with eating if necessary   - Allow adequate time for meals  - Recommend/ encourage appropriate diets, oral nutritional supplements, and vitamin/mineral supplements  - Order, calculate, and assess calorie counts as needed  - Recommend, monitor, and adjust tube feedings and TPN/PPN based on assessed needs  - Assess need for intravenous fluids  - Provide specific nutrition/hydration education as appropriate  - Include patient/family/caregiver in decisions related to nutrition  7/2/2023 1140 by Marcello Neal RN  Outcome: Adequate for Discharge  7/2/2023 1140 by Marcello Neal RN  Outcome: Adequate for Discharge  7/2/2023 1018 by Marcello Neal RN  Outcome: Progressing     Problem: MOBILITY - ADULT  Goal: Maintain or return to baseline ADL function  Description: INTERVENTIONS:  -  Assess patient's ability to carry out ADLs; assess patient's baseline for ADL function and identify physical deficits which impact ability to perform ADLs (bathing, care of mouth/teeth, toileting, grooming, dressing, etc.)  - Assess/evaluate cause of self-care deficits   - Assess range of motion  - Assess patient's mobility; develop plan if impaired  - Assess patient's need for assistive devices and provide as appropriate  - Encourage maximum independence but intervene and supervise when necessary  - Involve family in performance of ADLs  - Assess for home care needs following discharge   - Consider OT consult to assist with ADL evaluation and planning for discharge  - Provide patient education as appropriate  7/2/2023 1140 by Kyle Barnard RN  Outcome: Adequate for Discharge  7/2/2023 1140 by Kyle Barnard RN  Outcome: Adequate for Discharge  7/2/2023 1018 by Kyle Barnard RN  Outcome: Progressing  Goal: Maintains/Returns to pre admission functional level  Description: INTERVENTIONS:  - Perform BMAT or MOVE assessment daily.   - Set and communicate daily mobility goal to care team and patient/family/caregiver. - Collaborate with rehabilitation services on mobility goals if consulted  - Perform Range of Motion  times a day.   - Reposition patient every s.  - Dangle patient times a day  - Stand patient  times a day  - Ambulate patient  times a day  - Out of bed to chair  times a day   - Out of bed for meals  times a day  - Out of bed for toileting  - Record patient progress and toleration of activity level   7/2/2023 1140 by Kyle Barnard RN  Outcome: Adequate for Discharge  7/2/2023 1140 by Kyle Barnard RN  Outcome: Adequate for Discharge  7/2/2023 1018 by Kyle Barnard RN  Outcome: Progressing

## 2023-07-02 NOTE — DISCHARGE INSTR - AVS FIRST PAGE
Dear Marylen Guppy.,     It was our pleasure to care for you here at [facility], [address] [facility]. It is our hope that we were always able to exceed the expected standards for your care during your stay. You were hospitalized due to low sodium level and generalized weakness. You were cared for on the 2nd floor by KIERA Caicedo with [facility] who covers for your primary care physician (PCP), Giselle Guillen DO, while you were hospitalized. If you have any questions or concerns related to this hospitalization, you may contact us at [phone]. For follow up as well as any medication refills, we recommend that you follow up with your primary care physician. A registered nurse will reach out to you by phone within a few days after your discharge to answer any additional questions that you may have after going home. However, at this time we provide for you here, the most important instructions / recommendations at discharge:     Notable Medication Adjustments -   Toprol 50 mg daily - heart medication   Aspirin 81 mg daily   Tamsulosion 0.8 mg daily   Ferrous sulfate 325 mg daily   Senna- stool softener   Testing Required after Discharge -   Repeat BMP in 2 days to check your sodium level   Important follow up information -   Follow up with PCP, cardiology, nephrology and urology   Other Instructions -   Please make an appointment with our outpatient cardiologist to have your atrial flutter which was found while you were in the hospital evaluated. We did not start you on the blood thinner because of blood in the urine. Please refer to discharge instructions   Please review this entire after visit summary as additional general instructions including medication list, appointments, activity, diet, any pertinent wound care, and other additional recommendations from your care team that may be provided for you.       Sincerely,     KIERA Caicedo

## 2023-07-02 NOTE — DISCHARGE SUMMARY
[address]  Discharge- Bala Bran. 1935, 81 y.o. 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   · Nephrology input appreciated   · Was started on salt tablets now is being monitored off as the patient has lower extremities edema   · Tolvaptan 7.5 mg x 1 (7/1)   · Na much improved 130   · Follow up with nephrology outpatient, repeat BMP in 2 days     New onset a-fib [facility])  Assessment & Plan  Appears to be paroxysmal, rate is controlled   • TTE from 06/28 revealed EF 65% w/mild concentric hypertrophy, mildly reduced RV systolic function, trace TR  • [id number] score 3  • Initially started on Heparin gtt, however developed significant hematuria and was stopped   ? Started on ASA 81 mg   • Continue home Toprol 50 mg daily    • Continue cardiac monitoring  • Will need outpatient cardiology evaluation and further discussion on anticoagulation once hematuria resolves     Urinary retention  Assessment & Plan  · Urology input appreciated  · Continue with Haley catheter and tamsulosin  · Will need to follow-up with urology outpatient for voiding trial in 1 to 2 weeks     Stage 2 chronic kidney disease  Assessment & Plan  Lab Results   Component Value Date    EGFR 83 07/02/2023    EGFR 84 07/02/2023    EGFR 80 07/01/2023    CREATININE 0.74 07/02/2023    CREATININE 0.72 07/02/2023    CREATININE 0.81 07/01/2023   · Presented with EZIO 2/2 obstructive uropathy   · Urology and nephrology input appreciated   · Now EZIO resolved    · Continue with Haley catheter, tamsulosin 0.8 mg daily. Will need to follow-up with urology outpatient for voiding trial in 1 to 2 weeks.    · Monitor renal function closely and avoid nephrotoxins    Essential hypertension  Assessment & Plan  · BP appears to be well-controlled   · Continue with toprol 50 mg daily      Prostate CA [facility])  Assessment & Plan  · S/p EBRT  · Follow up with urology outpatient           Discharging Physician / Practitioner: Estefani Velazquez, [address] [facility]  PCP: Jhoan Koroma DO  Admission Date:   Admission Orders (From admission, onward)     Ordered        06/27/23 2108  INPATIENT ADMISSION  Once                      Discharge Date: 07/02/23    Medical Problems     Resolved Problems  Date Reviewed: 7/2/2023          Resolved    EZIO (acute kidney injury) ([address]) 6/29/2023     Resolved by  KIERA Perez          Consultations During Hospital Stay:  · IP CONSULT TO UROLOGY  · IP CONSULT TO CASE MANAGEMENT  · IP CONSULT TO NEPHROLOGY  · IP CONSULT TO CASE MANAGEMENT      Procedures Performed:   CT chest wo contrast  Result Date: 6/28/2023  Nothing indicate malignancy with stable 6 mm right middle lobe nodule since December 2019, benign. Partially imaged moderate stranding in the right perinephric fat which can be seen with inflammation or acute obstruction although the small portion of the right renal calyces which are imaged are nondilated. Consider further evaluation with abdomen pelvis CT if clinically warranted. XR spine lumbar 2 or 3 views injury  Result Date: 6/28/2023  Degenerative spondylosis with Schmorl's node formation. Gas-filled loops of large and small bowel suggestive of ileus. Follow-up may be helpful if symptoms persist.     Echo complete w/ contrast if indicated  Result Date: 6/28/2023  •  Left Ventricle: Left ventricular cavity size is normal. Wall thickness is increased. There is mild concentric hypertrophy. The left ventricular ejection fraction is 65%. Systolic function is normal. Wall motion is normal. Diastolic function is normal. •  Right Ventricle: Systolic function is mildly reduced.  Abnormal tricuspid annular plane systolic excursion (TAPSE) < 1. 7 cm. •  Left Atrium: The atrium is dilated. Significant Findings / Test Results:   · Refer to above     Incidental Findings:   · None      Test Results Pending at Discharge (will require follow up): · Final blood cultures      Outpatient Tests Requested:  · Follow up with PCP, cardiology urology     Complications:  None     Reason for Admission: Fatigue (Patient reports lack of sleep over the past few months. 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 Patient presents today feeling dehydrated.) and Back Pain (Patient reports intermittent lower back pain. Treating with Advil at home)        Hospital Course:     Daniel Garza is a 81 y.o. male patient who originally presented to the hospital on 6/27/2023 due to urinary frequency with hyponatremia concerning for DI. He was sequently admitted under critical care service for symptomatic hyponatremia with sodium level 113 and EZIO. Nephrology evaluation done. Initially the patient sodium level was improving but dropping again. He eventually transitioned to internal medicine service 6/30. He received a dose of tolvaptan 7.5 mg on 7/1 with improvement of Na level to 130 on the discharge day. He will need to repeat BMP in 2 days and follow up with nephrology outpatient. Urology evaluation done for urinary retention. He will be discharged with a dominique catheter and follow up with urology outpatient for a voiding trial.  Was found to have a new onset of a flutter and the rate is controlled with current dose of beta-blocker. Heparin drip was initiated however was complicated with hematuria. He was started on aspirin 81 mg. Will need cardiology outpatient evaluation; referral placed. Please see above list of diagnoses and related plan for additional information. Condition at Discharge: good     Discharge Day Visit / Exam:     Subjective:  Feeling much better and would like to go home.    Vitals: Blood Pressure: 147/79 (07/02/23 0724)  Pulse: 91 (07/02/23 0724)  Temperature: 98.3 °F (36.8 °C) (07/02/23 0724)  Temp Source: Oral (07/01/23 1949)  Respirations: 19 (07/02/23 0724)  Height: 6' 3" (190.5 cm) (06/28/23 0743)  Weight - Scale: 91.5 kg (201 lb 11.5 oz) (07/02/23 0600)  SpO2: 95 % (07/02/23 0724)  Exam:   Physical Exam  Vitals and nursing note reviewed. Constitutional:       General: He is not in acute distress. Appearance: Normal appearance. Comments: frail and elderly    HENT:      Head: Normocephalic and atraumatic. Right Ear: External ear normal.      Left Ear: External ear normal.      Nose: Nose normal.      Mouth/Throat:      Mouth: Mucous membranes are moist.      Pharynx: Oropharynx is clear. Eyes:      General:         Right eye: No discharge. Left eye: No discharge. Extraocular Movements: Extraocular movements intact. Pupils: Pupils are equal, round, and reactive to light. Cardiovascular:      Rate and Rhythm: Normal rate and regular rhythm. Pulses: Normal pulses. Heart sounds: Normal heart sounds. No murmur heard. Pulmonary:      Effort: Pulmonary effort is normal. No respiratory distress. Breath sounds: Normal breath sounds. No wheezing or rales. Abdominal:      General: Bowel sounds are normal. There is no distension. Palpations: Abdomen is soft. There is no mass. Tenderness: There is no abdominal tenderness. Musculoskeletal:         General: No swelling, tenderness or deformity. Normal range of motion. Cervical back: Normal range of motion and neck supple. No rigidity. Right lower leg: Edema (trace) present. Left lower leg: Edema (trace) present. Skin:     General: Skin is warm and dry. Capillary Refill: Capillary refill takes less than 2 seconds. Coloration: Skin is not pale. Findings: No erythema. Neurological:      General: No focal deficit present.       Mental Status: He is alert and oriented to person, place, and time. Mental status is at baseline. Psychiatric:         Mood and Affect: Mood normal.         Behavior: Behavior normal.         Thought Content: Thought content normal.         Judgment: Judgment normal.       Discussion with Family: family is aware regarding discharge     Discharge instructions/Information to patient and family:   See after visit summary for information provided to patient and family. Provisions for Follow-Up Care:  See after visit summary for information related to follow-up care and any pertinent home health orders. Disposition:     Home  With outpatient rehab    Planned Readmission: no      Discharge Statement:  I spent 38 minutes discharging the patient. This time was spent on the day of discharge. I had direct contact with the patient on the day of discharge. Greater than 50% of the total time was spent examining patient, answering all patient questions, arranging and discussing plan of care with patient as well as directly providing post-discharge instructions. Additional time then spent on discharge activities. Discharge Medications:  See after visit summary for reconciled discharge medications provided to patient and family.       ** Please Note: This note has been constructed using a voice recognition system **

## 2023-07-02 NOTE — PROGRESS NOTES
Pt transferred via wheelchair to [address] [place] [address] with all belongings. Report given to receiving nurse, Apoorva Botello.

## 2023-07-02 NOTE — PLAN OF CARE
Problem: PAIN - ADULT  Goal: Verbalizes/displays adequate comfort level or baseline comfort level  Description: Interventions:  - Encourage patient to monitor pain and request assistance  - Assess pain using appropriate pain scale  - Administer analgesics based on type and severity of pain and evaluate response  - Implement non-pharmacological measures as appropriate and evaluate response  - Consider cultural and social influences on pain and pain management  - Notify physician/advanced practitioner if interventions unsuccessful or patient reports new pain  Outcome: Progressing     Problem: INFECTION - ADULT  Goal: Absence or prevention of progression during hospitalization  Description: INTERVENTIONS:  - Assess and monitor for signs and symptoms of infection  - Monitor lab/diagnostic results  - Monitor all insertion sites, i.e. indwelling lines, tubes, and drains  - Monitor endotracheal if appropriate and nasal secretions for changes in amount and color  - [place] appropriate cooling/warming therapies per order  - Administer medications as ordered  - Instruct and encourage patient and family to use good hand hygiene technique  - Identify and instruct in appropriate isolation precautions for identified infection/condition  Outcome: Progressing  Goal: Absence of fever/infection during neutropenic period  Description: INTERVENTIONS:  - Monitor WBC    Outcome: Progressing     Problem: SAFETY ADULT  Goal: Patient will remain free of falls  Description: INTERVENTIONS:  - Educate patient/family on patient safety including physical limitations  - Instruct patient to call for assistance with activity   - Consult OT/PT to assist with strengthening/mobility   - Keep Call bell within reach  - Keep bed low and locked with side rails adjusted as appropriate  - Keep care items and personal belongings within reach  - Initiate and maintain comfort rounds  - Make Fall Risk Sign visible to staff  - Offer Toileting every 2 Hours, in advance of need  - Initiate/Maintain 2alarm  - Obtain necessary fall risk management equipment: 2  - Apply yellow socks and bracelet for high fall risk patients  - Consider moving patient to room near nurses station  Outcome: Progressing  Goal: Maintain or return to baseline ADL function  Description: INTERVENTIONS:  -  Assess patient's ability to carry out ADLs; assess patient's baseline for ADL function and identify physical deficits which impact ability to perform ADLs (bathing, care of mouth/teeth, toileting, grooming, dressing, etc.)  - Assess/evaluate cause of self-care deficits   - Assess range of motion  - Assess patient's mobility; develop plan if impaired  - Assess patient's need for assistive devices and provide as appropriate  - Encourage maximum independence but intervene and supervise when necessary  - Involve family in performance of ADLs  - Assess for home care needs following discharge   - Consider OT consult to assist with ADL evaluation and planning for discharge  - Provide patient education as appropriate  Outcome: Progressing  Goal: Maintains/Returns to pre admission functional level  Description: INTERVENTIONS:  - Perform BMAT or MOVE assessment daily.   - Set and communicate daily mobility goal to care team and patient/family/caregiver. - Collaborate with rehabilitation services on mobility goals if consulted  - Perform Range of Motion 2 times a day. - Reposition patient every 2 hours.   - Dangle patient 2 times a day  - Stand patient 2 times a day  - Ambulate patient 2 times a day  - Out of bed to chair 2 times a day   - Out of bed for meals 2 times a day  - Out of bed for toileting  - Record patient progress and toleration of activity level   Outcome: Progressing     Problem: DISCHARGE PLANNING  Goal: Discharge to home or other facility with appropriate resources  Description: INTERVENTIONS:  - Identify barriers to discharge w/patient and caregiver  - Arrange for needed discharge resources and transportation as appropriate  - Identify discharge learning needs (meds, wound care, etc.)  - Arrange for interpretive services to assist at discharge as needed  - Refer to Case Management Department for coordinating discharge planning if the patient needs post-hospital services based on physician/advanced practitioner order or complex needs related to functional status, cognitive ability, or social support system  Outcome: Progressing     Problem: Knowledge Deficit  Goal: Patient/family/caregiver demonstrates understanding of disease process, treatment plan, medications, and discharge instructions  Description: Complete learning assessment and assess knowledge base. Interventions:  - Provide teaching at level of understanding  - Provide teaching via preferred learning methods  Outcome: Progressing     Problem: Nutrition/Hydration-ADULT  Goal: Nutrient/Hydration intake appropriate for improving, restoring or maintaining nutritional needs  Description: Monitor and assess patient's nutrition/hydration status for malnutrition. Collaborate with interdisciplinary team and initiate plan and interventions as ordered. Monitor patient's weight and dietary intake as ordered or per policy. Utilize nutrition screening tool and intervene as necessary. Determine patient's food preferences and provide high-protein, high-caloric foods as appropriate.      INTERVENTIONS:  - Monitor oral intake, urinary output, labs, and treatment plans  - Assess nutrition and hydration status and recommend course of action  - Evaluate amount of meals eaten  - Assist patient with eating if necessary   - Allow adequate time for meals  - Recommend/ encourage appropriate diets, oral nutritional supplements, and vitamin/mineral supplements  - Order, calculate, and assess calorie counts as needed  - Recommend, monitor, and adjust tube feedings and TPN/PPN based on assessed needs  - Assess need for intravenous fluids  - Provide specific nutrition/hydration education as appropriate  - Include patient/family/caregiver in decisions related to nutrition  Outcome: Progressing     Problem: MOBILITY - ADULT  Goal: Maintain or return to baseline ADL function  Description: INTERVENTIONS:  -  Assess patient's ability to carry out ADLs; assess patient's baseline for ADL function and identify physical deficits which impact ability to perform ADLs (bathing, care of mouth/teeth, toileting, grooming, dressing, etc.)  - Assess/evaluate cause of self-care deficits   - Assess range of motion  - Assess patient's mobility; develop plan if impaired  - Assess patient's need for assistive devices and provide as appropriate  - Encourage maximum independence but intervene and supervise when necessary  - Involve family in performance of ADLs  - Assess for home care needs following discharge   - Consider OT consult to assist with ADL evaluation and planning for discharge  - Provide patient education as appropriate  Outcome: Progressing  Goal: Maintains/Returns to pre admission functional level  Description: INTERVENTIONS:  - Perform BMAT or MOVE assessment daily.   - Set and communicate daily mobility goal to care team and patient/family/caregiver. - Collaborate with rehabilitation services on mobility goals if consulted  - Perform Range of Motion 2 times a day. - Reposition patient every 2 hours.   - Dangle patient 2 times a day  - Stand patient 2 times a day  - Ambulate patient 2 times a day  - Out of bed to chair 2 times a day   - Out of bed for meals 2 times a day  - Out of bed for toileting  - Record patient progress and toleration of activity level   Outcome: Progressing

## 2023-07-02 NOTE — ASSESSMENT & PLAN NOTE
Appears to be paroxysmal, rate is controlled   • TTE from 06/28 revealed EF 65% w/mild concentric hypertrophy, mildly reduced RV systolic function, trace TR  • [id number] score 3  • Initially started on Heparin gtt, however developed significant hematuria and was stopped   ?  Started on ASA 81 mg   • Continue home Toprol 50 mg daily    • Continue cardiac monitoring  • Will need outpatient cardiology evaluation and further discussion on anticoagulation once hematuria resolves

## 2023-07-02 NOTE — ASSESSMENT & PLAN NOTE
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

## 2023-07-02 NOTE — TELEPHONE ENCOUNTER
Call patient for follow up in 1 week. He has a bmp ordered for next couple days.  DC from [place] today

## 2023-07-02 NOTE — NURSING NOTE
hospitalist was in to see and eval and the pt ok for dc to home, iv site removed with the tips intact, avs reviewed with the pt, all questions answered to his satisfaction, pt switched to a leg bag for the dominique, teaching done and the pt verbalized understanding,, taken to the family car via [place] and was dcd from the hospital

## 2023-07-02 NOTE — PLAN OF CARE
Problem: PAIN - ADULT  Goal: Verbalizes/displays adequate comfort level or baseline comfort level  Description: Interventions:  - Encourage patient to monitor pain and request assistance  - Assess pain using appropriate pain scale  - Administer analgesics based on type and severity of pain and evaluate response  - Implement non-pharmacological measures as appropriate and evaluate response  - Consider cultural and social influences on pain and pain management  - Notify physician/advanced practitioner if interventions unsuccessful or patient reports new pain  Outcome: Progressing     Problem: INFECTION - ADULT  Goal: Absence or prevention of progression during hospitalization  Description: INTERVENTIONS:  - Assess and monitor for signs and symptoms of infection  - Monitor lab/diagnostic results  - Monitor all insertion sites, i.e. indwelling lines, tubes, and drains  - Monitor endotracheal if appropriate and nasal secretions for changes in amount and color  - [place] appropriate cooling/warming therapies per order  - Administer medications as ordered  - Instruct and encourage patient and family to use good hand hygiene technique  - Identify and instruct in appropriate isolation precautions for identified infection/condition  Outcome: Progressing  Goal: Absence of fever/infection during neutropenic period  Description: INTERVENTIONS:  - Monitor WBC    Outcome: Progressing     Problem: SAFETY ADULT  Goal: Patient will remain free of falls  Description: INTERVENTIONS:  - Educate patient/family on patient safety including physical limitations  - Instruct patient to call for assistance with activity   - Consult OT/PT to assist with strengthening/mobility   - Keep Call bell within reach  - Keep bed low and locked with side rails adjusted as appropriate  - Keep care items and personal belongings within reach  - Initiate and maintain comfort rounds  - Make Fall Risk Sign visible to staff  - Offer Toileting every 1 Hours, in advance of need  - Initiate/Maintain bed alarm  - Obtain necessary fall risk management equipment: bed   - Apply yellow socks and bracelet for high fall risk patients  - Consider moving patient to room near nurses station  Outcome: Progressing  Goal: Maintain or return to baseline ADL function  Description: INTERVENTIONS:  -  Assess patient's ability to carry out ADLs; assess patient's baseline for ADL function and identify physical deficits which impact ability to perform ADLs (bathing, care of mouth/teeth, toileting, grooming, dressing, etc.)  - Assess/evaluate cause of self-care deficits   - Assess range of motion  - Assess patient's mobility; develop plan if impaired  - Assess patient's need for assistive devices and provide as appropriate  - Encourage maximum independence but intervene and supervise when necessary  - Involve family in performance of ADLs  - Assess for home care needs following discharge   - Consider OT consult to assist with ADL evaluation and planning for discharge  - Provide patient education as appropriate  Outcome: Progressing  Goal: Maintains/Returns to pre admission functional level  Description: INTERVENTIONS:  - Perform BMAT or MOVE assessment daily.   - Set and communicate daily mobility goal to care team and patient/family/caregiver. - Collaborate with rehabilitation services on mobility goals if consulted  - Perform Range of Motion 3 times a day. - Reposition patient every 2 hours.   - Dangle patient 3 times a day  - Stand patient 3 times a day  - Ambulate patient 3 times a day  - Out of bed to chair 3 times a day   - Out of bed for meals 3 times a day  - Out of bed for toileting  - Record patient progress and toleration of activity level   Outcome: Progressing     Problem: DISCHARGE PLANNING  Goal: Discharge to home or other facility with appropriate resources  Description: INTERVENTIONS:  - Identify barriers to discharge w/patient and caregiver  - Arrange for needed discharge resources and transportation as appropriate  - Identify discharge learning needs (meds, wound care, etc.)  - Arrange for interpretive services to assist at discharge as needed  - Refer to Case Management Department for coordinating discharge planning if the patient needs post-hospital services based on physician/advanced practitioner order or complex needs related to functional status, cognitive ability, or social support system  Outcome: Progressing     Problem: Knowledge Deficit  Goal: Patient/family/caregiver demonstrates understanding of disease process, treatment plan, medications, and discharge instructions  Description: Complete learning assessment and assess knowledge base. Interventions:  - Provide teaching at level of understanding  - Provide teaching via preferred learning methods  Outcome: Progressing     Problem: Nutrition/Hydration-ADULT  Goal: Nutrient/Hydration intake appropriate for improving, restoring or maintaining nutritional needs  Description: Monitor and assess patient's nutrition/hydration status for malnutrition. Collaborate with interdisciplinary team and initiate plan and interventions as ordered. Monitor patient's weight and dietary intake as ordered or per policy. Utilize nutrition screening tool and intervene as necessary. Determine patient's food preferences and provide high-protein, high-caloric foods as appropriate.      INTERVENTIONS:  - Monitor oral intake, urinary output, labs, and treatment plans  - Assess nutrition and hydration status and recommend course of action  - Evaluate amount of meals eaten  - Assist patient with eating if necessary   - Allow adequate time for meals  - Recommend/ encourage appropriate diets, oral nutritional supplements, and vitamin/mineral supplements  - Order, calculate, and assess calorie counts as needed  - Recommend, monitor, and adjust tube feedings and TPN/PPN based on assessed needs  - Assess need for intravenous fluids  - Provide specific nutrition/hydration education as appropriate  - Include patient/family/caregiver in decisions related to nutrition  Outcome: Progressing     Problem: MOBILITY - ADULT  Goal: Maintain or return to baseline ADL function  Description: INTERVENTIONS:  -  Assess patient's ability to carry out ADLs; assess patient's baseline for ADL function and identify physical deficits which impact ability to perform ADLs (bathing, care of mouth/teeth, toileting, grooming, dressing, etc.)  - Assess/evaluate cause of self-care deficits   - Assess range of motion  - Assess patient's mobility; develop plan if impaired  - Assess patient's need for assistive devices and provide as appropriate  - Encourage maximum independence but intervene and supervise when necessary  - Involve family in performance of ADLs  - Assess for home care needs following discharge   - Consider OT consult to assist with ADL evaluation and planning for discharge  - Provide patient education as appropriate  Outcome: Progressing  Goal: Maintains/Returns to pre admission functional level  Description: INTERVENTIONS:  - Perform BMAT or MOVE assessment daily.   - Set and communicate daily mobility goal to care team and patient/family/caregiver. - Collaborate with rehabilitation services on mobility goals if consulted  - Perform Range of Motion 3 times a day. - Reposition patient every 2 hours.   - Dangle patient 3 times a day  - Stand patient 3 times a day  - Ambulate patient 3 times a day  - Out of bed to chair 3 times a day   - Out of bed for meals 3 times a day  - Out of bed for toileting  - Record patient progress and toleration of activity level   Outcome: Progressing

## 2023-07-02 NOTE — ASSESSMENT & PLAN NOTE
Lab Results   Component Value Date    EGFR 83 07/02/2023    EGFR 84 07/02/2023    EGFR 80 07/01/2023    CREATININE 0.74 07/02/2023    CREATININE 0.72 07/02/2023    CREATININE 0.81 07/01/2023   · Presented with EZIO 2/2 obstructive uropathy   · Urology and nephrology input appreciated   · Now EZIO resolved    · Continue with Haley catheter, tamsulosin 0.8 mg daily. Will need to follow-up with urology outpatient for voiding trial in 1 to 2 weeks.    · Monitor renal function closely and avoid nephrotoxins

## 2023-07-03 ENCOUNTER — TELEPHONE (OUTPATIENT)
Dept: INTERNAL MEDICINE CLINIC | Facility: CLINIC | Age: 88
End: 2023-07-03

## 2023-07-03 LAB
BACTERIA BLD CULT: NORMAL
BACTERIA BLD CULT: NORMAL

## 2023-07-05 ENCOUNTER — TRANSITIONAL CARE MANAGEMENT (OUTPATIENT)
Dept: INTERNAL MEDICINE CLINIC | Facility: CLINIC | Age: 88
End: 2023-07-05

## 2023-07-05 ENCOUNTER — APPOINTMENT (OUTPATIENT)
Dept: LAB | Facility: CLINIC | Age: 88
End: 2023-07-05
Payer: MEDICARE

## 2023-07-05 ENCOUNTER — TELEPHONE (OUTPATIENT)
Dept: NEPHROLOGY | Facility: CLINIC | Age: 88
End: 2023-07-05

## 2023-07-05 ENCOUNTER — OFFICE VISIT (OUTPATIENT)
Dept: NEPHROLOGY | Facility: CLINIC | Age: 88
End: 2023-07-05
Payer: MEDICARE

## 2023-07-05 VITALS
SYSTOLIC BLOOD PRESSURE: 156 MMHG | HEART RATE: 126 BPM | DIASTOLIC BLOOD PRESSURE: 84 MMHG | HEIGHT: 75 IN | OXYGEN SATURATION: 96 % | BODY MASS INDEX: 24.82 KG/M2 | WEIGHT: 199.6 LBS

## 2023-07-05 DIAGNOSIS — I48.91 NEW ONSET A-FIB (HCC): ICD-10-CM

## 2023-07-05 DIAGNOSIS — R33.9 URINARY RETENTION: ICD-10-CM

## 2023-07-05 DIAGNOSIS — I10 ESSENTIAL HYPERTENSION: Chronic | ICD-10-CM

## 2023-07-05 DIAGNOSIS — E87.1 HYPONATREMIA: Primary | ICD-10-CM

## 2023-07-05 DIAGNOSIS — N18.2 STAGE 2 CHRONIC KIDNEY DISEASE: ICD-10-CM

## 2023-07-05 DIAGNOSIS — N18.2 STAGE 2 CHRONIC KIDNEY DISEASE: Primary | ICD-10-CM

## 2023-07-05 DIAGNOSIS — E87.1 HYPONATREMIA: ICD-10-CM

## 2023-07-05 DIAGNOSIS — E22.2 SIADH (SYNDROME OF INAPPROPRIATE ADH PRODUCTION) (HCC): ICD-10-CM

## 2023-07-05 LAB
ANION GAP SERPL CALCULATED.3IONS-SCNC: 6 MMOL/L
BUN SERPL-MCNC: 11 MG/DL (ref 5–25)
CALCIUM SERPL-MCNC: 9.2 MG/DL (ref 8.3–10.1)
CHLORIDE SERPL-SCNC: 100 MMOL/L (ref 96–108)
CO2 SERPL-SCNC: 24 MMOL/L (ref 21–32)
CREAT SERPL-MCNC: 0.84 MG/DL (ref 0.6–1.3)
GFR SERPL CREATININE-BSD FRML MDRD: 79 ML/MIN/1.73SQ M
GLUCOSE P FAST SERPL-MCNC: 123 MG/DL (ref 65–99)
POTASSIUM SERPL-SCNC: 4.3 MMOL/L (ref 3.5–5.3)
SODIUM SERPL-SCNC: 130 MMOL/L (ref 135–147)

## 2023-07-05 PROCEDURE — 80048 BASIC METABOLIC PNL TOTAL CA: CPT

## 2023-07-05 PROCEDURE — 99213 OFFICE O/P EST LOW 20 MIN: CPT | Performed by: NURSE PRACTITIONER

## 2023-07-05 PROCEDURE — 36415 COLL VENOUS BLD VENIPUNCTURE: CPT

## 2023-07-05 NOTE — PROGRESS NOTES
Nephrology   Office [address] [facility] Sydnee Habermann. 81 y.o. male MRN: [id number]    Encounter: [id number]        [address] [facility] Sydnee Habermann. was seen in the [facility] office today. All diagnoses and orders for visit:     1. Hyponatremia  · Likely SIADH in the setting of urinary retention dispose indwelling urinary catheter and Flomax initiation. Updated lab work pending. Not currently on fluid restriction. 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 Await repeat labs. Encourage high solute diet. Avoid thiazide and thiazide like diuretics  3. Urinary retention  · Urinary catheter remains intact and has upcoming appointment with Dr. Cordell Smith  4. Stage 2 chronic kidney disease  · Baseline creatinine is 0.7-0.8 mg/dL. Etiology likely age-related nephron loss  5. New onset a-fib (HCC)  · Toprol 50 mg daily  6. Essential hypertension  · Blood pressure elevated in office. Has not taken his medication as he is n.p.o. for lab work  7. Generalized weakness  · Patient will be referred to physical therapy      HPI: Shai Parson is a 81 y.o. male who is here for hospital follow-up    Patient was hospitalized earlier this month for urinary frequency found to have severe hyponatremia in the setting of urinary retention status post Flomax initiation and placement of indwelling urinary catheter. He was treated with volume expansion, tolvaptan and sodium tablets. All intervention discontinued prior to discharge and he is not on fluid restriction nor sodium tablets. Indwelling urinary catheter remains intact draining large amounts of clear yellow urine per patient report.   He has not had a chance to have lab work done yet    Patient will have lab work done today and once recommended, further recommendations will be forthcoming          ROS:   Review of Systems Constitutional: Negative for chills and fever. HENT: Negative for ear pain and sore throat. Eyes: Negative for pain and visual disturbance. Respiratory: Negative for cough and shortness of breath. Cardiovascular: Negative for chest pain and palpitations. Gastrointestinal: Negative for abdominal pain and vomiting. Genitourinary: Negative for dysuria and hematuria. Musculoskeletal: Negative for arthralgias and back pain. Skin: Negative for color change and rash. Neurological: Positive for weakness. Negative for seizures and syncope. All other systems reviewed and are negative. Allergies: Patient has no known allergies. Medications:   Current Outpatient Medications:   •  aspirin 81 mg chewable tablet, Chew 1 tablet (81 mg total) daily Do not start before July 3, 2023., Disp: 30 tablet, Rfl: 0  •  ferrous sulfate 325 (65 Fe) mg tablet, Take 1 tablet (325 mg total) by mouth daily with breakfast Do not start before July 3, 2023., Disp: 30 tablet, Rfl: 0  •  metoprolol succinate (TOPROL-XL) 50 mg 24 hr tablet, Take 1 tablet (50 mg total) by mouth daily Do not start before July 3, 2023., Disp: 30 tablet, Rfl: 0  •  senna-docusate sodium (SENOKOT S) 8.6-50 mg per tablet, Take 2 tablets by mouth daily at bedtime May hold for loose stools, Disp: 60 tablet, Rfl: 0  •  tamsulosin (FLOMAX) 0.4 mg, Take 2 capsules (0.8 mg total) by mouth daily after lunch, Disp: 60 capsule, Rfl: 0    Past Medical History:   Diagnosis Date   • Cancer ([address])     prostate cancer    • Hypertension      Past Surgical History:   Procedure Laterality Date   • APPENDECTOMY       Family History   Problem Relation Age of Onset   • Hypertension Mother       reports that he has never smoked. He has never used smokeless tobacco. He reports current alcohol use of about 1.0 standard drink of alcohol per week. He reports that he does not currently use drugs.       Physical Exam:   Vitals:    07/05/23 0831   BP: 156/84   BP Location: Left arm   Patient Position: Sitting   Cuff Size: Standard   Pulse: (!) 126   SpO2: 96%   Weight: 90.5 kg (199 lb 9.6 oz)   Height: 6' 3" (1.905 m)     Body mass index is 24.95 kg/m². General: conscious, cooperative, in no acute distress, appears stated age  Eyes: conjunctivae pale, anicteric sclerae  ENT: lips and mucous membranes moist  Neck: supple, no JVD, no masses  Chest:  essentially clear breath sounds bilaterally, no crackles, ronchus or wheezings  CVS: S1 & S2, tachycardic rate, irregular rhythm  Abdomen: soft, non-tender, non-distended, normoactive bowel sounds, rounded  Extremities: no edema of both legs  Skin: no rash   Neuro: awake, alert, oriented       Diagnostic Data:  Lab: I have personally reviewed pertinent lab results. ,   CBC:  Results from last 7 days   Lab Units 07/02/23  0511   WBC Thousand/uL 5.15   HEMOGLOBIN g/dL 10.2*   HEMATOCRIT % 29.5*   PLATELETS Thousands/uL 267      CMP: No results found for: "SODIUM", "K", "CL", "CO2", "ANIONGAP", "BUN", "CREATININE", "GLUCOSE", "CALCIUM", "AST", "ALT", "ALKPHOS", "PROT", "BILITOT", "EGFR",   PT/INR: No results found for: "PT", "INR",   Magnesium: No components found for: "MAG",  Phosphorous: No results found for: "PHOS"    Patient Instructions   I will await your lab work and call you with results    See Dr. Musa Rg    Avoid blood pressure medications like hydrochlorothiazide and Chlorthalidone which could worsen your condition      Portions of the record may have been created with voice recognition software. Occasional wrong word or "sound a like" substitutions may have occurred due to the inherent limitations of voice recognition software. Read the chart carefully and recognize, using context, where substitutions have occurred. If you have any questions, please contact the dictating provider.

## 2023-07-05 NOTE — TELEPHONE ENCOUNTER
Patient was seen in our office today by our [address] [place] [address], I Called and spoke to physical therapy and cardiac rehab and neither of them will see patient with diagnoses on the physical therapy referral.  Can you please advise and contact the patient?

## 2023-07-05 NOTE — PATIENT INSTRUCTIONS
I will await your lab work and call you with results    See Dr. Howard Weathers    Avoid blood pressure medications like hydrochlorothiazide and Chlorthalidone which could worsen your condition

## 2023-07-11 ENCOUNTER — APPOINTMENT (OUTPATIENT)
Dept: LAB | Facility: CLINIC | Age: 88
End: 2023-07-11
Payer: MEDICARE

## 2023-07-11 ENCOUNTER — TELEPHONE (OUTPATIENT)
Dept: NEPHROLOGY | Facility: CLINIC | Age: 88
End: 2023-07-11

## 2023-07-11 DIAGNOSIS — N18.2 STAGE 2 CHRONIC KIDNEY DISEASE: ICD-10-CM

## 2023-07-11 LAB
ANION GAP SERPL CALCULATED.3IONS-SCNC: 7 MMOL/L
BUN SERPL-MCNC: 16 MG/DL (ref 5–25)
CALCIUM SERPL-MCNC: 9 MG/DL (ref 8.3–10.1)
CHLORIDE SERPL-SCNC: 101 MMOL/L (ref 96–108)
CO2 SERPL-SCNC: 24 MMOL/L (ref 21–32)
CREAT SERPL-MCNC: 0.95 MG/DL (ref 0.6–1.3)
GFR SERPL CREATININE-BSD FRML MDRD: 72 ML/MIN/1.73SQ M
GLUCOSE SERPL-MCNC: 122 MG/DL (ref 65–140)
POTASSIUM SERPL-SCNC: 3.8 MMOL/L (ref 3.5–5.3)
SODIUM SERPL-SCNC: 132 MMOL/L (ref 135–147)

## 2023-07-11 PROCEDURE — 80048 BASIC METABOLIC PNL TOTAL CA: CPT

## 2023-07-11 PROCEDURE — 36415 COLL VENOUS BLD VENIPUNCTURE: CPT

## 2023-07-11 NOTE — TELEPHONE ENCOUNTER
Pt daughter called, this morning pt was shaking, became stiff, and then passed out. They did call the ambulance but they did not take pt to hospital. Pt daughter is concerned, would like advice.  Pt daughter can be reached at [phone]

## 2023-07-11 NOTE — TELEPHONE ENCOUNTER
Sounds like he might need to be seen in the emergency room, I am not sure exactly what is going on. If they said that he is not sick enough to go to the hospital, he should be seen by his primary care provider today if possible. However, whenever anybody passes out, they should be evaluated the emergency room, that is my recommendation.

## 2023-07-11 NOTE — TELEPHONE ENCOUNTER
Spoke to pt daughter, is aware. Pt is seeing PCP this Thursday, but she will see if she can get pt to ER. I called pt to relay message, he states he did not pass out this morning.

## 2023-07-12 ENCOUNTER — TELEPHONE (OUTPATIENT)
Dept: NEPHROLOGY | Facility: CLINIC | Age: 88
End: 2023-07-12

## 2023-07-12 DIAGNOSIS — N18.2 STAGE 2 CHRONIC KIDNEY DISEASE: Primary | ICD-10-CM

## 2023-07-12 PROBLEM — Z92.89 HISTORY OF ECHOCARDIOGRAM: Status: ACTIVE | Noted: 2023-07-12

## 2023-07-12 PROBLEM — R93.89 ABNORMAL CT OF THE CHEST: Status: ACTIVE | Noted: 2023-07-12

## 2023-07-12 PROBLEM — Z09 HOSPITAL DISCHARGE FOLLOW-UP: Status: ACTIVE | Noted: 2023-07-12

## 2023-07-12 PROBLEM — E22.2 SIADH (SYNDROME OF INAPPROPRIATE ADH PRODUCTION) (HCC): Status: ACTIVE | Noted: 2023-07-12

## 2023-07-12 PROBLEM — R55 SYNCOPE AND COLLAPSE: Status: ACTIVE | Noted: 2023-07-12

## 2023-07-12 NOTE — ASSESSMENT & PLAN NOTE
Lab Results   Component Value Date    EGFR 72 07/11/2023    EGFR 79 07/05/2023    EGFR 83 07/02/2023    CREATININE 0.95 07/11/2023    CREATININE 0.84 07/05/2023    CREATININE 0.74 07/02/2023     · 7/2/2023 discharge summary  · Presented with EZIO 2/2 obstructive uropathy   · Urology and nephrology input appreciated   · Now EZIO resolved    · Continue with Haley catheter, tamsulosin 0.8 mg daily. · Will need to follow-up with urology outpatient for voiding trial in 1 to 2 weeks.    · Monitor renal function closely and avoid nephrotoxins

## 2023-07-12 NOTE — ASSESSMENT & PLAN NOTE
• The patient was hospitalized from 6/27/2023 to 7/2/2023. • ED Provider  Fatigue        Patient reports lack of sleep over the past few months. 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 Patient presents today feeling dehydrated. • Back Pain       Patient reports intermittent lower back pain. Treating with Advil at home   · 82 yo male presenting to the ed for not feeling well and with increasing urination frequency and intermittent lower back pain. Patient being worked up by his outpatient provider for possible diabetes insipidus. Denies fevers, night sweats, chills, sore throat, cough, chest pain, shortness of breath, urinary, hematuria, diarrhea constipation.   • BP: 196/92  • Na: 113  • Cl: 82  • Creat: 1.95  • H/H: 11.2/31.5  • UA: moderate bacteria, 1+ leukocytes, trace ketones, 2+ blood

## 2023-07-12 NOTE — ASSESSMENT & PLAN NOTE
· 7/2/2023 discharge summary  · Continue with toprol 50 mg daily      · 7/5/2023 Nephrology  · Avoid blood pressure medications like hydrochlorothiazide and Chlorthalidone which could worsen your condition

## 2023-07-12 NOTE — PROGRESS NOTES
Assessment & Plan     1. Hospital discharge follow-up  Assessment & Plan:  The patient was hospitalized from 6/27/2023 to 7/2/2023. ED Provider  Fatigue        Patient reports lack of sleep over the past few months. 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 Patient presents today feeling dehydrated. • Back Pain       Patient reports intermittent lower back pain. Treating with Advil at home   82 yo male presenting to the ed for not feeling well and with increasing urination frequency and intermittent lower back pain. Patient being worked up by his outpatient provider for possible diabetes insipidus. Denies fevers, night sweats, chills, sore throat, cough, chest pain, shortness of breath, urinary, hematuria, diarrhea constipation. BP: 196/92  Na: 113  Cl: 82  Creat: 1.95  H/H: 11.2/31.5  UA: moderate bacteria, 1+ leukocytes, trace ketones, 2+ blood        2. Hyponatremia  Assessment & Plan:  7/2/2023 discharge summary  Symptomatic hyponatremia  Sodium trends were labile   Suspect that this is related to possible SIADH   Nephrology input appreciated   Was started on salt tablets now is being monitored off as the patient has lower extremities edema   Tolvaptan 7.5 mg x 1 (7/1)   Na much improved 130   Follow up with nephrology outpatient, repeat BMP in 2 days     7/5/2023 Nephrology  Likely SIADH in the setting of urinary retention dispose indwelling urinary catheter and Flomax initiation. Updated lab work pending. Not currently on fluid restriction. Once lab work resulted, further recommendations will be forthcoming  Presumed diagnosis in the setting of new urinary retention. Await repeat labs. Encourage high solute diet. Avoid thiazide and thiazide like diuretics        3.  New onset a-fib [facility])  Assessment & Plan:  7/2/2023 discharge summary  TTE from 06/28 revealed EF 65% w/mild concentric hypertrophy, mildly reduced RV systolic function, trace TR  [id number] score 3  Initially started on Heparin gtt, however developed significant hematuria and was stopped   Started on ASA 81 mg   Continue home Toprol 50 mg daily    Continue cardiac monitoring  Will need outpatient cardiology evaluation and further discussion on anticoagulation once hematuria resolves       4. Urinary retention  Assessment & Plan:  7/2/2023 discharge summary  Urology input appreciated  Continue with Haley catheter and tamsulosin  Will need to follow-up with urology outpatient for voiding trial in 1 to 2 weeks    7/12/2023  Self catheterization at night with 300 ml out    7/13/2023  Following with urology      5. Stage 2 chronic kidney disease  Assessment & Plan:  Lab Results   Component Value Date    EGFR 72 07/11/2023    EGFR 79 07/05/2023    EGFR 83 07/02/2023    CREATININE 0.95 07/11/2023    CREATININE 0.84 07/05/2023    CREATININE 0.74 07/02/2023 7/2/2023 discharge summary  Presented with EZIO 2/2 obstructive uropathy   Urology and nephrology input appreciated   Now EZIO resolved    Continue with Haley catheter, tamsulosin 0.8 mg daily. Will need to follow-up with urology outpatient for voiding trial in 1 to 2 weeks. Monitor renal function closely and avoid nephrotoxins      6. Essential hypertension  Assessment & Plan:  7/2/2023 discharge summary  Continue with toprol 50 mg daily      7/5/2023 Nephrology  Avoid blood pressure medications like hydrochlorothiazide and Chlorthalidone which could worsen your condition      7. Prostate CA [facility])  Assessment & Plan:  7/2/2023 discharge summary  S/p EBRT  Follow up with urology outpatient        8. History of echocardiogram  Assessment & Plan:  6/28/2023  Interpretation Summary  •  Left Ventricle: Left ventricular cavity size is normal. Wall thickness is increased. There is mild concentric hypertrophy. The left ventricular ejection fraction is 65%.  Systolic function is normal. Wall motion is normal. Diastolic function is normal.  •  Right Ventricle: Systolic function is mildly reduced. Abnormal tricuspid annular plane systolic excursion (TAPSE) < 1.7 cm. 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 Updated lab work pending. Not currently on fluid restriction. Once lab work resulted, further recommendations will be forthcoming  Presumed diagnosis in the setting of new urinary retention. Await repeat labs. Encourage high solute diet. Avoid thiazide and thiazide like diuretics        10. Syncope and collapse  Assessment & Plan:  7/11/2023 Nephrology:  Pt daughter called, this morning pt was shaking, became stiff, and then passed out. They did call the ambulance but they did not take pt to hospital.  Nephrology response:  Sounds like he might need to be seen in the emergency room, I am not sure exactly what is going on. If they said that he is not sick enough to go to the hospital, he should be seen by his primary care provider today if possible. However, whenever anybody passes out, they should be evaluated the emergency room, that is my recommendation. 7/13/2023  No further issues at this time           Subjective     Transitional Care Management Review:   Chung Decker is a 81 y.o. male here for TCM follow up.      During the TCM phone call patient stated:  TCM Call     Date and time call was made  7/5/2023  8:12 AM    Hospital care reviewed  Other diagnostic studies pending    Patient was hospitialized at  [address] [facility]    Date of Admission  06/27/23    Date of discharge  07/02/23    Diagnosis  Hyponatremia    Disposition  Home    Were the patients medications reviewed and updated  Yes    Current Symptoms  None      TCM Call     Post hospital issues  None    Should patient be enrolled in anticoag monitoring? No    Scheduled for follow up? Yes    Did you obtain your prescribed medications  Yes    Do you need help managing your prescriptions or medications  No    Is transportation to your appointment needed  No    I have advised the patient to call PCP with any new or worsening symptoms  patrice [email or web address] ma        The patient is here today to discuss his recent hospitalization. Please continue to the [facility] section of the note for details of today's visit. Review of Systems   Constitutional: Negative for activity change, chills, fatigue and fever. HENT: Negative for rhinorrhea and sore throat. Eyes: Negative for pain. Respiratory: Negative for cough and shortness of breath. Cardiovascular: Negative for chest pain, palpitations and leg swelling. Gastrointestinal: Negative for abdominal pain, constipation, diarrhea, nausea and vomiting. Genitourinary: Negative for difficulty urinating, flank pain, frequency and urgency. Musculoskeletal: Negative for gait problem, joint swelling and myalgias. Skin: Negative for color change. Neurological: Negative for dizziness, weakness, light-headedness and headaches. Psychiatric/Behavioral: Negative for sleep disturbance. The patient is not nervous/anxious. All other systems reviewed and are negative. Objective     /80 (BP Location: Left arm, Patient Position: Sitting, Cuff Size: Standard)   Pulse 96   Temp 97.5 °F (36.4 °C)   Ht 6' 3" (1.905 m)   Wt 89.6 kg (197 lb 9.6 oz)   SpO2 98%   BMI 24.70 kg/m²      Physical Exam  Vitals and nursing note reviewed.    Constitutional:       General: He is awake. Appearance: Normal appearance. He is well-developed and normal weight. HENT:      Head: Normocephalic and atraumatic. Nose: Nose normal.      Mouth/Throat:      Mouth: Mucous membranes are moist.   Eyes:      Conjunctiva/sclera: Conjunctivae normal.   Cardiovascular:      Rate and Rhythm: Normal rate. Heart sounds: No murmur heard. Pulmonary:      Effort: Pulmonary effort is normal. No respiratory distress. Abdominal:      General: Bowel sounds are normal.      Palpations: Abdomen is soft. Tenderness: There is no abdominal tenderness. Musculoskeletal:      Cervical back: Neck supple. Right lower leg: No edema. Left lower leg: No edema. Skin:     General: Skin is warm and dry. Neurological:      Mental Status: He is alert and oriented to person, place, and time. Psychiatric:         Attention and Perception: Attention normal.         Mood and Affect: Mood normal.         Speech: Speech normal.         Behavior: Behavior normal. Behavior is cooperative.        Medications have been reviewed by provider in current encounter    Ligia Mckeon [address] [facility]

## 2023-07-12 NOTE — ASSESSMENT & PLAN NOTE
· 7/2/2023 discharge summary  · Urology input appreciated  · Continue with Haley catheter and tamsulosin  · Will need to follow-up with urology outpatient for voiding trial in 1 to 2 weeks    · 7/12/2023  · Self catheterization at night with 300 ml out    · 7/13/2023  · Following with urology

## 2023-07-12 NOTE — ASSESSMENT & PLAN NOTE
· 6/28/2023  Interpretation Summary  •  Left Ventricle: Left ventricular cavity size is normal. Wall thickness is increased. There is mild concentric hypertrophy. The left ventricular ejection fraction is 65%. Systolic function is normal. Wall motion is normal. Diastolic function is normal.  •  Right Ventricle: Systolic function is mildly reduced. Abnormal tricuspid annular plane systolic excursion (TAPSE) < 1.7 cm.   •  Left Atrium: The atrium is dilated

## 2023-07-12 NOTE — ASSESSMENT & PLAN NOTE
· 7/11/2023 Nephrology:  · Pt daughter called, this morning pt was shaking, became stiff, and then passed out. They did call the ambulance but they did not take pt to hospital.  · Nephrology response:  · Sounds like he might need to be seen in the emergency room, I am not sure exactly what is going on. If they said that he is not sick enough to go to the hospital, he should be seen by his primary care provider today if possible. However, whenever anybody passes out, they should be evaluated the emergency room, that is my recommendation.   · 7/13/2023  · No further issues at this time

## 2023-07-12 NOTE — ASSESSMENT & PLAN NOTE
• CT chest wo contrast  • Result Date: 6/28/2023  • Nothing indicate malignancy with stable 6 mm right middle lobe nodule since December 2019, benign. • Partially imaged moderate stranding in the right perinephric fat which can be seen with inflammation or acute obstruction although the small portion of the right renal calyces which are imaged are nondilated. • Consider further evaluation with abdomen pelvis CT if clinically warranted.

## 2023-07-12 NOTE — ASSESSMENT & PLAN NOTE
· 7/2/2023 discharge summary  · TTE from 06/28 revealed EF 65% w/mild concentric hypertrophy, mildly reduced RV systolic function, trace TR  · [id number] score 3  · Initially started on Heparin gtt, however developed significant hematuria and was stopped   · Started on ASA 81 mg   · Continue home Toprol 50 mg daily    · Continue cardiac monitoring  · Will need outpatient cardiology evaluation and further discussion on anticoagulation once hematuria resolves

## 2023-07-12 NOTE — ASSESSMENT & PLAN NOTE
· 7/2/2023 discharge summary  · Symptomatic hyponatremia  · Sodium trends were labile   · Suspect that this is related to possible SIADH   · Nephrology input appreciated   · Was started on salt tablets now is being monitored off as the patient has lower extremities edema   · Tolvaptan 7.5 mg x 1 (7/1)   · Na much improved 130   · Follow up with nephrology outpatient, repeat BMP in 2 days     · 7/5/2023 Nephrology  · Likely SIADH in the setting of urinary retention dispose indwelling urinary catheter and Flomax initiation. Updated lab work pending. Not currently on fluid restriction. Once lab work resulted, further recommendations will be forthcoming  · Presumed diagnosis in the setting of new urinary retention. Await repeat labs. · Encourage high solute diet.     · Avoid thiazide and thiazide like diuretics

## 2023-07-13 ENCOUNTER — OFFICE VISIT (OUTPATIENT)
Dept: INTERNAL MEDICINE CLINIC | Facility: CLINIC | Age: 88
End: 2023-07-13
Payer: MEDICARE

## 2023-07-13 VITALS
DIASTOLIC BLOOD PRESSURE: 80 MMHG | SYSTOLIC BLOOD PRESSURE: 122 MMHG | TEMPERATURE: 97.5 F | HEIGHT: 75 IN | WEIGHT: 197.6 LBS | OXYGEN SATURATION: 98 % | HEART RATE: 96 BPM | BODY MASS INDEX: 24.57 KG/M2

## 2023-07-13 DIAGNOSIS — N18.2 STAGE 2 CHRONIC KIDNEY DISEASE: ICD-10-CM

## 2023-07-13 DIAGNOSIS — E22.2 SIADH (SYNDROME OF INAPPROPRIATE ADH PRODUCTION) (HCC): ICD-10-CM

## 2023-07-13 DIAGNOSIS — E87.1 HYPONATREMIA: ICD-10-CM

## 2023-07-13 DIAGNOSIS — C61 PROSTATE CA (HCC): Chronic | ICD-10-CM

## 2023-07-13 DIAGNOSIS — R33.9 URINARY RETENTION: ICD-10-CM

## 2023-07-13 DIAGNOSIS — R55 SYNCOPE AND COLLAPSE: ICD-10-CM

## 2023-07-13 DIAGNOSIS — Z09 HOSPITAL DISCHARGE FOLLOW-UP: Primary | ICD-10-CM

## 2023-07-13 DIAGNOSIS — I10 ESSENTIAL HYPERTENSION: Chronic | ICD-10-CM

## 2023-07-13 DIAGNOSIS — Z92.89 HISTORY OF ECHOCARDIOGRAM: ICD-10-CM

## 2023-07-13 DIAGNOSIS — I48.91 NEW ONSET A-FIB (HCC): ICD-10-CM

## 2023-07-13 PROCEDURE — 99495 TRANSJ CARE MGMT MOD F2F 14D: CPT | Performed by: NURSE PRACTITIONER

## 2023-07-13 RX ORDER — CEPHALEXIN 500 MG/1
500 CAPSULE ORAL 2 TIMES DAILY
COMMUNITY
Start: 2023-07-12 | End: 2023-07-20 | Stop reason: ALTCHOICE

## 2023-07-13 NOTE — PATIENT INSTRUCTIONS
Problem List Items Addressed This Visit          Endocrine    SIADH (syndrome of inappropriate ADH production) ([address])     7/2/2023 discharge summary  Symptomatic hyponatremia  Sodium trends were labile   Suspect that this is related to possible SIADH   Nephrology input appreciated   Was started on salt tablets now is being monitored off as the patient has lower extremities edema   Tolvaptan 7.5 mg x 1 (7/1)   Na much improved 130   Follow up with nephrology outpatient, repeat BMP in 2 days     7/5/2023 Nephrology  Likely SIADH in the setting of urinary retention dispose indwelling urinary catheter and Flomax initiation. Updated lab work pending. Not currently on fluid restriction. Once lab work resulted, further recommendations will be forthcoming  Presumed diagnosis in the setting of new urinary retention. Await repeat labs. Encourage high solute diet.     Avoid thiazide and thiazide like diuretics              Cardiovascular and Mediastinum    Essential hypertension (Chronic)     7/2/2023 discharge summary  Continue with toprol 50 mg daily      7/5/2023 Nephrology  Avoid blood pressure medications like hydrochlorothiazide and Chlorthalidone which could worsen your condition         New onset a-fib ([address])     7/2/2023 discharge summary  TTE from 06/28 revealed EF 65% w/mild concentric hypertrophy, mildly reduced RV systolic function, trace TR  [id number] score 3  Initially started on Heparin gtt, however developed significant hematuria and was stopped   Started on ASA 81 mg   Continue home Toprol 50 mg daily    Continue cardiac monitoring  Will need outpatient cardiology evaluation and further discussion on anticoagulation once hematuria resolves             Genitourinary    Prostate CA [facility]) (Chronic)     7/2/2023 discharge summary  S/p EBRT  Follow up with urology outpatient           Stage 2 chronic kidney disease     Lab Results   Component Value Date    EGFR 72 07/11/2023    EGFR 79 07/05/2023    EGFR 83 07/02/2023    CREATININE 0.95 07/11/2023    CREATININE 0.84 07/05/2023    CREATININE 0.74 07/02/2023 7/2/2023 discharge summary  Presented with EZIO 2/2 obstructive uropathy   Urology and nephrology input appreciated   Now EZIO resolved    Continue with Haley catheter, tamsulosin 0.8 mg daily. Will need to follow-up with urology outpatient for voiding trial in 1 to 2 weeks. Monitor renal function closely and avoid nephrotoxins         Urinary retention     7/2/2023 discharge summary  Urology input appreciated  Continue with Haley catheter and tamsulosin  Will need to follow-up with urology outpatient for voiding trial in 1 to 2 weeks    7/12/2023  Self catheterization at night with 300 ml out    7/13/2023  Following with urology            Other    Hyponatremia     7/2/2023 discharge summary  Symptomatic hyponatremia  Sodium trends were labile   Suspect that this is related to possible SIADH   Nephrology input appreciated   Was started on salt tablets now is being monitored off as the patient has lower extremities edema   Tolvaptan 7.5 mg x 1 (7/1)   Na much improved 130   Follow up with nephrology outpatient, repeat BMP in 2 days     7/5/2023 Nephrology  Likely SIADH in the setting of urinary retention dispose indwelling urinary catheter and Flomax initiation. Updated lab work pending. Not currently on fluid restriction. Once lab work resulted, further recommendations will be forthcoming  Presumed diagnosis in the setting of new urinary retention. Await repeat labs. Encourage high solute diet. Avoid thiazide and thiazide like diuretics           Hospital discharge follow-up - Primary     The patient was hospitalized from 6/27/2023 to 7/2/2023. ED Provider  Fatigue        Patient reports lack of sleep over the past few months. Patient reports being treated with Dr. Miquel Leija for diabetes insipidus. Patient presents today feeling dehydrated.     Back Pain       Patient reports intermittent lower back pain. Treating with Advil at home   82 yo male presenting to the ed for not feeling well and with increasing urination frequency and intermittent lower back pain. Patient being worked up by his outpatient provider for possible diabetes insipidus. Denies fevers, night sweats, chills, sore throat, cough, chest pain, shortness of breath, urinary, hematuria, diarrhea constipation. BP: 196/92  Na: 113  Cl: 82  Creat: 1.95  H/H: 11.2/31.5  UA: moderate bacteria, 1+ leukocytes, trace ketones, 2+ blood           History of echocardiogram     6/28/2023  Interpretation Summary    Left Ventricle: Left ventricular cavity size is normal. Wall thickness is increased. There is mild concentric hypertrophy. The left ventricular ejection fraction is 65%. Systolic function is normal. Wall motion is normal. Diastolic function is normal.    Right Ventricle: Systolic function is mildly reduced. Abnormal tricuspid annular plane systolic excursion (TAPSE) < 1.7 cm. Left Atrium: The atrium is dilated           Syncope and collapse     7/11/2023 Nephrology:  Pt daughter called, this morning pt was shaking, became stiff, and then passed out. They did call the ambulance but they did not take pt to hospital.  Nephrology response:  Sounds like he might need to be seen in the emergency room, I am not sure exactly what is going on. If they said that he is not sick enough to go to the hospital, he should be seen by his primary care provider today if possible. However, whenever anybody passes out, they should be evaluated the emergency room, that is my recommendation.   7/13/2023  No further issues at this time

## 2023-07-20 ENCOUNTER — CLINICAL SUPPORT (OUTPATIENT)
Dept: CARDIOLOGY CLINIC | Facility: CLINIC | Age: 88
End: 2023-07-20
Payer: MEDICARE

## 2023-07-20 ENCOUNTER — OFFICE VISIT (OUTPATIENT)
Dept: CARDIOLOGY CLINIC | Facility: CLINIC | Age: 88
End: 2023-07-20
Payer: MEDICARE

## 2023-07-20 VITALS
WEIGHT: 193 LBS | SYSTOLIC BLOOD PRESSURE: 140 MMHG | DIASTOLIC BLOOD PRESSURE: 90 MMHG | BODY MASS INDEX: 24 KG/M2 | HEART RATE: 110 BPM | HEIGHT: 75 IN

## 2023-07-20 DIAGNOSIS — I48.92 ATRIAL FLUTTER, UNSPECIFIED TYPE (HCC): Primary | ICD-10-CM

## 2023-07-20 DIAGNOSIS — I48.91 NEW ONSET A-FIB (HCC): ICD-10-CM

## 2023-07-20 DIAGNOSIS — I48.92 ATRIAL FLUTTER, UNSPECIFIED TYPE (HCC): ICD-10-CM

## 2023-07-20 DIAGNOSIS — I10 PRIMARY HYPERTENSION: ICD-10-CM

## 2023-07-20 DIAGNOSIS — I49.3 PVC (PREMATURE VENTRICULAR CONTRACTION): ICD-10-CM

## 2023-07-20 DIAGNOSIS — Z01.810 PREOP CARDIOVASCULAR EXAM: ICD-10-CM

## 2023-07-20 DIAGNOSIS — I48.91 ATRIAL FIBRILLATION, UNSPECIFIED TYPE (HCC): ICD-10-CM

## 2023-07-20 PROCEDURE — 99204 OFFICE O/P NEW MOD 45 MIN: CPT | Performed by: INTERNAL MEDICINE

## 2023-07-20 PROCEDURE — 93242 EXT ECG>48HR<7D RECORDING: CPT | Performed by: INTERNAL MEDICINE

## 2023-07-20 PROCEDURE — 93000 ELECTROCARDIOGRAM COMPLETE: CPT | Performed by: INTERNAL MEDICINE

## 2023-07-20 RX ORDER — CEFPODOXIME PROXETIL 200 MG/1
TABLET, FILM COATED ORAL
COMMUNITY
Start: 2023-07-19

## 2023-07-20 RX ORDER — FINASTERIDE 5 MG/1
5 TABLET, FILM COATED ORAL DAILY
COMMUNITY
Start: 2023-07-19

## 2023-07-20 RX ORDER — METOPROLOL SUCCINATE 50 MG/1
75 TABLET, EXTENDED RELEASE ORAL DAILY
Qty: 45 TABLET | Refills: 6 | Status: SHIPPED | OUTPATIENT
Start: 2023-07-20

## 2023-07-20 NOTE — PROGRESS NOTES
Cardiology Consultation     La Nena Blandon  [id number]  1935  [facility], [facility] [address] [place] [address]      1. Atrial flutter, unspecified type ([address])  POCT ECG      2. Atrial fibrillation, unspecified type ([address])  POCT ECG      3. PVC (premature ventricular contraction)        4. Primary hypertension        5. Preop cardiovascular exam            Discussion/Summary:  1. Atrial fibrillation/atrial flutter with elevated [id number] score not on oral anticoagulation due to hematuria  2. Hyponatremia possibly secondary to SIADH on fluid restriction  3. Hypertension  4. Perioperative risk stratification  5. PVC       -ECG performed in the office today appears to show atrial flutter with variable AV block and occasional PVC  -Transthoracic echocardiogram 6/28/2023 showing left ventricular systolic function normal estimated LVEF 65% with mildly reduced right ventricular systolic function, trace tricuspid regurgitation and IVC that was normal in size  -We will increase patient's metoprolol succinate to 75 mg daily and patient can continue aspirin 81 mg daily. -According to revised cardiac risk index patient is intermediate risk for planned operative procedure. Patient fully understands and is excepting of these risks and wishes to proceed with surgery as planned.   In that setting along with adequate functional status and no anginal equivalent symptoms patient is appropriate risk to proceed with surgery as planned.  -We will check 1 week Zio patch monitor to evaluate overall rate control strategy with further testing and evaluation and even possibly electrophysiology evaluation pending results of that study  -Patient will continue fluid restricted diet per nephrology recommendations  -I will see patient in 3 months or sooner if necessary once testing is completed  -Patient counseled if he were to have any warning or alarm type symptoms he is to seek emergency medical care immediately. 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 He was started on aspirin therapy and recommended for cardiology and urology evaluation. He notes he was recently seen by his urologist Dr. Stanley Shin and will be undergoing cystoscopic procedure 8/2/2023 for further evaluation for hematuria. Patient did have history of prostate cancer and underwent radiation therapy in 2013. He remains an incredibly active individual and states he can easily walk 4 city blocks on flat surface or up 2 flights of stairs with no chest pain, shortness of breath or anginal equivalent symptoms. Denies any issues with anesthesia in the past.  -Currently in the office today patient denies any active symptoms and notes overall he feels well. He denies any chest pain, palpitations, lightness or dizziness, loss conscious, shortness of breath, lower extremity edema, orthopnea or bendopnea. He is following with nephrology team and on a fluid restriction to assist with his hyponatremia and overall feels well.   He does note some residual hematuria with straight catheterization for urination but has no gross hematuria that he is aware of.  -Patient denies any tobacco or illicit drug use and has occasional alcohol use however has not had anything since hospitalization  -Patient notes mother having heart disease passing at age 64 but is unsure of overall diagnosis. 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)   • Syncope and collapse     Past Medical History:   Diagnosis Date   • Atrial fibrillation (HCC)    • Cancer ([address] [facility])     prostate cancer    • Hypertension      Social History     Socioeconomic History   • Marital status: /Civil Union     Spouse name: Not on file   • Number of children: Not on file   • Years of education: Not on file   • Highest education level: Not on file   Occupational History   • Occupation: RETIRED   Tobacco Use   • Smoking status: Never   • Smokeless tobacco: Never   Vaping Use   • Vaping Use: Never used   Substance and Sexual Activity   • Alcohol use: Yes     Alcohol/week: 1.0 standard drink of alcohol     Types: 1 Cans of beer per week     Comment: monthly   • Drug use: Not Currently   • Sexual activity: Not Currently   Other Topics Concern   • Not on file   Social History Narrative        RETIRED     Social Determinants of Health     Financial Resource Strain: Not on file   Food Insecurity: No Food Insecurity (6/28/2023)    Hunger Vital Sign    • Worried About Running Out of Food in the Last Year: Never true    • Ran Out of Food in the Last Year: Never true   Transportation Needs: No Transportation Needs (6/28/2023)    PRAPARE - Transportation    • Lack of Transportation (Medical): No    • Lack of Transportation (Non-Medical):  No   Physical Activity: Not on file   Stress: Not on file   Social Connections: Not on file   Intimate Partner Violence: Not on file   Housing Stability: Low Risk  (6/28/2023)    Housing Stability Vital Sign    • Unable to Pay for Housing in the Last Year: No    • Number of Places Lived in the Last Year: 1    • Unstable Housing in the Last Year: No      Family History   Problem Relation Age of Onset   • Hypertension Mother      Past Surgical History:   Procedure Laterality Date   • APPENDECTOMY         Current Outpatient Medications:   •  aspirin 81 mg chewable tablet, Chew 1 tablet (81 mg total) daily Do not start before July 3, 2023., Disp: 30 tablet, Rfl: 0  •  ferrous sulfate 325 (65 Fe) mg tablet, Take 1 tablet (325 mg total) by mouth daily with breakfast Do not start before July 3, 2023., Disp: 30 tablet, Rfl: 0  •  metoprolol succinate (TOPROL-XL) 50 mg 24 hr tablet, Take 1 tablet (50 mg total) by mouth daily Do not start before July 3, 2023., Disp: 30 tablet, Rfl: 0  •  senna-docusate sodium (SENOKOT S) 8.6-50 mg per tablet, Take 2 tablets by mouth daily at bedtime May hold for loose stools, Disp: 60 tablet, Rfl: 0  •  tamsulosin (FLOMAX) 0.4 mg, Take 2 capsules (0.8 mg total) by mouth daily after lunch, Disp: 60 capsule, Rfl: 0  •  cefpodoxime (VANTIN) 200 mg tablet, , Disp: , Rfl:   •  finasteride (PROSCAR) 5 mg tablet, Take 5 mg by mouth in the morning (Patient not taking: Reported on 7/20/2023), Disp: , Rfl:   No Known Allergies      Labs:  Appointment on 07/11/2023   Component Date Value   • Sodium 07/11/2023 132 (L)    • Potassium 07/11/2023 3.8    • Chloride 07/11/2023 101    • CO2 07/11/2023 24    • ANION GAP 07/11/2023 7    • BUN 07/11/2023 16    • Creatinine 07/11/2023 0.95    • Glucose 07/11/2023 122    • Calcium 07/11/2023 9.0    • eGFR 07/11/2023 72    Appointment on 07/05/2023   Component Date Value   • Sodium 07/05/2023 130 (L)    • Potassium 07/05/2023 4.3    • Chloride 07/05/2023 100    • CO2 07/05/2023 24    • ANION GAP 07/05/2023 6    • BUN 07/05/2023 11    • Creatinine 07/05/2023 0.84    • Glucose, Fasting 07/05/2023 123 (H)    • Calcium 07/05/2023 9.2    • eGFR 07/05/2023 79    No results displayed because visit has over 200 results.       Lab on 06/09/2023   Component Date Value   • Cortisol - AM 06/09/2023 21.6    • ADH 06/09/2023 <0.8    • Osmolality Serum 06/09/2023 263 (L)    • Sodium 06/09/2023 128 (L)    • Potassium 06/09/2023 3.8    • Chloride 06/09/2023 93 (L)    • CO2 06/09/2023 26    • ANION GAP 06/09/2023 9    • BUN 06/09/2023 9    • Creatinine 06/09/2023 0.82    • Glucose, Fasting 06/09/2023 113 (H)    • Calcium 06/09/2023 9.3    • eGFR 06/09/2023 80    • TSH 3RD GENERATON 06/09/2023 2.110    Office Visit on 06/07/2023   Component Date Value   • OCCULT BLD, FECAL IMMUNO* 06/10/2023 Negative    Appointment on 05/31/2023   Component Date Value   • Sodium 05/31/2023 127 (L)    • Potassium 05/31/2023 4.0    • Chloride 05/31/2023 100    • CO2 05/31/2023 24    • ANION GAP 05/31/2023 3 (L)    • BUN 05/31/2023 10    • Creatinine 05/31/2023 0.92    • Glucose, Fasting 05/31/2023 108 (H)    • Calcium 05/31/2023 8.9    • eGFR 05/31/2023 75    • WBC 05/31/2023 4.81    • RBC 05/31/2023 3.85 (L)    • Hemoglobin 05/31/2023 12.0    • Hematocrit 05/31/2023 34.2 (L)    • MCV 05/31/2023 89    • MCH 05/31/2023 31.2    • MCHC 05/31/2023 35.1    • RDW 05/31/2023 13.0    • MPV 05/31/2023 8.7 (L)    • Platelets 51/60/3674 299    • nRBC 05/31/2023 0    • Neutrophils Relative 05/31/2023 73    • Immat GRANS % 05/31/2023 0    • Lymphocytes Relative 05/31/2023 12 (L)    • Monocytes Relative 05/31/2023 12    • Eosinophils Relative 05/31/2023 2    • Basophils Relative 05/31/2023 1    • Neutrophils Absolute 05/31/2023 3.51    • Immature Grans Absolute 05/31/2023 0.02    • Lymphocytes Absolute 05/31/2023 0.57 (L)    • Monocytes Absolute 05/31/2023 0.57    • Eosinophils Absolute 05/31/2023 0.08    • Basophils Absolute 05/31/2023 0.06    • Iron Saturation 05/31/2023 13 (L)    • TIBC 05/31/2023 260    • Iron 05/31/2023 34 (L)    • Ferritin 05/31/2023 121    Office Visit on 05/23/2023   Component Date Value   • Osmolality, Ur 05/31/2023 409    • Sodium, Ur 05/31/2023 80         Imaging: CT chest wo contrast    Result Date: 6/28/2023  Narrative: CT CHEST WITHOUT IV CONTRAST INDICATION:   hyponatremia work up. Per my review of the medical record, the patient has a history of prostate cancer status post radiation therapy with urinary retention status post Haley catheter and mild hematuria after Haley placement. COMPARISON: Chest radiograph 3/13/2020 and chest CT 12/14/2019. TECHNIQUE: Chest CT without intravenous contrast.  Axial, sagittal, coronal 2D reformats and coronal MIPS from source data. Radiation dose length product (DLP):  537 mGy-cm . Radiation dose exposure minimized using iterative reconstruction and automated exposure control. FINDINGS: LUNGS: Nothing to indicate malignancy. Stable 6 mm right middle lobe nodule, benign (5/67). Stable benign intrapulmonary lymph node abutting the right middle fissure (5/72). AIRWAYS: No significant filling defects. PLEURA:  Unremarkable. HEART/GREAT VESSELS: Moderate cardiomegaly. Mild coronary artery calcification indicating atherosclerotic heart disease. Trace pericardial effusion. MEDIASTINUM AND ABDIAZIZ:  Unremarkable. CHEST WALL AND LOWER NECK: Mild gynecomastia. UPPER ABDOMEN: Partially imaged moderate stranding of the right perinephric fat. OSSEOUS STRUCTURES: Mild degenerative disease in the spine with mild curvature. Impression: Nothing indicate malignancy with stable 6 mm right middle lobe nodule since December 2019, benign. Partially imaged moderate stranding in the right perinephric fat which can be seen with inflammation or acute obstruction although the small portion of the right renal calyces which are imaged are nondilated. Consider further evaluation with abdomen pelvis CT if clinically warranted. The study was marked in EPIC for significant notification.  Workstation performed: [id number]     XR spine lumbar 2 or 3 views injury    Result Date: 6/28/2023  Narrative: LUMBAR SPINE INDICATION:   lower back pain intermittently. COMPARISON:  None VIEWS:  XR SPINE LUMBAR 2 OR 3 VIEWS INJURY FINDINGS: There are 5 non rib bearing lumbar vertebral bodies. There is no evidence of acute fracture or destructive osseous lesion. Alignment is unremarkable. Degenerative spondylosis at multiple levels with osteophytes is noted. The space narrowing is most pronounced at L2-3, L3-4 and L5-S1. Schmorl's node formation is seen superiorly at L2. This appears to have progressed since an abdominal CT from 9/20/2011. The pedicles appear intact. Gas-filled loops of small bowel is seen without robert dilatation with also some gas in the colon. Impression: Degenerative spondylosis with Schmorl's node formation. Gas-filled loops of large and small bowel suggestive of ileus. Follow-up may be helpful if symptoms persist. The study was marked in [facility] for immediate notification. Workstation performed: [id number]     Echo complete w/ contrast if indicated    Result Date: 6/28/2023  Narrative: •  Left Ventricle: Left ventricular cavity size is normal. Wall thickness is increased. There is mild concentric hypertrophy. The left ventricular ejection fraction is 65%. Systolic function is normal. Wall motion is normal. Diastolic function is normal. •  Right Ventricle: Systolic function is mildly reduced. Abnormal tricuspid annular plane systolic excursion (TAPSE) < 1.7 cm. •  Left Atrium: The atrium is dilated. Review of Systems:  Review of Systems   Constitutional: Negative for chills, diaphoresis, fatigue and fever. HENT: Negative for trouble swallowing and voice change. Respiratory: Negative for chest tightness, shortness of breath and wheezing. Cardiovascular: Negative for chest pain, palpitations and leg swelling. Gastrointestinal: Negative for abdominal pain, constipation, diarrhea, nausea and vomiting. Genitourinary: Negative for dysuria.         Some residual hematuria only with catheterization Musculoskeletal: Positive for arthralgias. Negative for neck pain and neck stiffness. Skin: Negative for rash. Neurological: Negative for dizziness, syncope, light-headedness and headaches. Psychiatric/Behavioral: Negative for agitation and confusion. All other systems reviewed and are negative. Vitals:    07/20/23 0843   BP: 140/90   Pulse: (!) 110   Weight: 87.5 kg (193 lb)   Height: 6' 3" (1.905 m)     Vitals:    07/20/23 0843   Weight: 87.5 kg (193 lb)     Height: 6' 3" (190.5 cm)     Physical Exam:  Physical Exam  Vitals reviewed. Constitutional:       General: He is not in acute distress. Appearance: Normal appearance. He is not diaphoretic. HENT:      Head: Normocephalic and atraumatic. Eyes:      General:         Right eye: No discharge. Left eye: No discharge. Neck:      Comments: Trachea midline, no JVD present  Cardiovascular:      Rate and Rhythm: Tachycardia present. Heart sounds: No friction rub. Comments: regularly irregular rate and rhythm  Pulmonary:      Effort: Pulmonary effort is normal. No respiratory distress. Breath sounds: No wheezing. Chest:      Chest wall: No tenderness. Abdominal:      General: Bowel sounds are normal.      Palpations: Abdomen is soft. Tenderness: There is no abdominal tenderness. There is no rebound. Musculoskeletal:      Right lower leg: No edema. Left lower leg: No edema. Skin:     General: Skin is warm and dry. Neurological:      Mental Status: He is alert. Comments: Awake, alert, able to answer questions appropriately, able to move extremities bilaterally.    Psychiatric:         Mood and Affect: Mood normal.         Behavior: Behavior normal.

## 2023-08-02 ENCOUNTER — CLINICAL SUPPORT (OUTPATIENT)
Dept: CARDIOLOGY CLINIC | Facility: CLINIC | Age: 88
End: 2023-08-02
Payer: MEDICARE

## 2023-08-02 ENCOUNTER — TELEPHONE (OUTPATIENT)
Dept: NON INVASIVE DIAGNOSTICS | Facility: HOSPITAL | Age: 88
End: 2023-08-02

## 2023-08-02 DIAGNOSIS — I48.92 ATRIAL FLUTTER, UNSPECIFIED TYPE (HCC): ICD-10-CM

## 2023-08-02 DIAGNOSIS — I48.91 NEW ONSET A-FIB (HCC): ICD-10-CM

## 2023-08-02 DIAGNOSIS — I48.92 ATRIAL FLUTTER, UNSPECIFIED TYPE (HCC): Primary | ICD-10-CM

## 2023-08-02 PROCEDURE — 93244 EXT ECG>48HR<7D REV&INTERPJ: CPT | Performed by: INTERNAL MEDICINE

## 2023-08-02 RX ORDER — METOPROLOL SUCCINATE 100 MG/1
100 TABLET, EXTENDED RELEASE ORAL DAILY
Qty: 30 TABLET | Refills: 6 | Status: SHIPPED | OUTPATIENT
Start: 2023-08-02 | End: 2023-08-03 | Stop reason: SDUPTHER

## 2023-08-02 NOTE — TELEPHONE ENCOUNTER
-Attempted to call patient to go over recent Zio patch monitor results. Unfortunately I was not able to reach the patient but did leave a message on his phone with my name and office number to call back for a better time to speak. -Patient was able to return my phone call and I was able to speak with him about results of Zio patch monitor. Patient does appear to be reasonably well rate controlled however there does appear to be some mild room for up titration of medical therapy therefore will increase metoprolol succinate from 75 mg daily to 100 mg daily. Prescription was sent to [facility] per patient request.  I have also placed referral for electrophysiology and recommended patient be seen and evaluated by them. Potential further treatment of his atrial flutter particularly if after undergoing upcoming prostate procedure he is able to be initiated on oral anticoagulation may be candidate for rhythm control strategy. Patient noted understanding was agreeable to plan. Referral was placed and I sent message to office staff to assist in setting up appointment. Otherwise patient notes feeling well at this time on medical therapy.

## 2023-08-03 DIAGNOSIS — I48.91 NEW ONSET A-FIB (HCC): ICD-10-CM

## 2023-08-03 RX ORDER — METOPROLOL SUCCINATE 100 MG/1
100 TABLET, EXTENDED RELEASE ORAL DAILY
Qty: 90 TABLET | Refills: 3 | Status: SHIPPED | OUTPATIENT
Start: 2023-08-03

## 2023-08-08 ENCOUNTER — APPOINTMENT (OUTPATIENT)
Dept: LAB | Facility: CLINIC | Age: 88
End: 2023-08-08
Payer: MEDICARE

## 2023-08-08 DIAGNOSIS — N18.2 STAGE 2 CHRONIC KIDNEY DISEASE: ICD-10-CM

## 2023-08-08 LAB
ANION GAP SERPL CALCULATED.3IONS-SCNC: 7 MMOL/L
BUN SERPL-MCNC: 15 MG/DL (ref 5–25)
CALCIUM SERPL-MCNC: 9.2 MG/DL (ref 8.3–10.1)
CHLORIDE SERPL-SCNC: 101 MMOL/L (ref 96–108)
CO2 SERPL-SCNC: 24 MMOL/L (ref 21–32)
CREAT SERPL-MCNC: 1.05 MG/DL (ref 0.6–1.3)
GFR SERPL CREATININE-BSD FRML MDRD: 63 ML/MIN/1.73SQ M
GLUCOSE P FAST SERPL-MCNC: 118 MG/DL (ref 65–99)
POTASSIUM SERPL-SCNC: 4.1 MMOL/L (ref 3.5–5.3)
SODIUM SERPL-SCNC: 132 MMOL/L (ref 135–147)

## 2023-08-08 PROCEDURE — 80048 BASIC METABOLIC PNL TOTAL CA: CPT

## 2023-08-08 PROCEDURE — 36415 COLL VENOUS BLD VENIPUNCTURE: CPT

## 2023-08-09 ENCOUNTER — TELEPHONE (OUTPATIENT)
Dept: NEPHROLOGY | Facility: CLINIC | Age: 88
End: 2023-08-09

## 2023-08-09 DIAGNOSIS — N18.2 STAGE 2 CHRONIC KIDNEY DISEASE: Primary | ICD-10-CM

## 2023-08-16 ENCOUNTER — CONSULT (OUTPATIENT)
Dept: CARDIOLOGY CLINIC | Facility: CLINIC | Age: 88
End: 2023-08-16
Payer: MEDICARE

## 2023-08-16 VITALS
SYSTOLIC BLOOD PRESSURE: 116 MMHG | BODY MASS INDEX: 23.06 KG/M2 | HEART RATE: 96 BPM | DIASTOLIC BLOOD PRESSURE: 78 MMHG | HEIGHT: 75 IN | WEIGHT: 185.5 LBS

## 2023-08-16 DIAGNOSIS — I48.91 NEW ONSET A-FIB (HCC): ICD-10-CM

## 2023-08-16 DIAGNOSIS — I48.92 ATRIAL FLUTTER, UNSPECIFIED TYPE (HCC): ICD-10-CM

## 2023-08-16 PROCEDURE — 99214 OFFICE O/P EST MOD 30 MIN: CPT | Performed by: INTERNAL MEDICINE

## 2023-08-16 PROCEDURE — 93000 ELECTROCARDIOGRAM COMPLETE: CPT | Performed by: INTERNAL MEDICINE

## 2023-08-16 NOTE — PROGRESS NOTES
EPS Consultation/New Patient Evaluation - Ardine Aschoff. 81 y.o. male MRN: [id number]       Referring:    CC/HPI:   It was a pleasure to see Ardine Aschoff. in our arrhythmia clinic at [address] [place] [address]. As you know *** is a 81 y.o. with HTN, hx of prostate cancer, atrial fibrillation/flutter, PVC who presents for arrhythmia management. Patient was evaluated in the hospital in June 2023 for UTI with hyponatremia. He was noted to be in atrial fibrillation/flutter at that time. He was rate controlled on beta-blocker but when he was started on  anticoagulation he had hematuria. His anticoagulation was discontinued and hematuria resolved. He was seen by urologist and will be undergoing cystoscopy procedure for further evaluation. He did have a history of prostate cancer and had radiation in 2013. When he was seen for follow-up in cardiology office on July 28, 2023 he reported feeling well. He reported having some residual hematuria at the time. He underwent cardiac event monitor which revealed atrial flutter was rate control for the most part. His metoprolol was increased 75 mg to 100 mg. He remains off anticoagulation.      Past Medical History:  Past Medical History:   Diagnosis Date   • Atrial fibrillation ([address])    • Cancer ([address])     prostate cancer    • Hypertension        Medications:      Current Outpatient Medications:   •  metoprolol succinate (TOPROL-XL) 100 mg 24 hr tablet, Take 1 tablet (100 mg total) by mouth daily, Disp: 90 tablet, Rfl: 3  •  aspirin 81 mg chewable tablet, Chew 1 tablet (81 mg total) daily Do not start before July 3, 2023., Disp: 30 tablet, Rfl: 0  •  cefpodoxime (VANTIN) 200 mg tablet, , Disp: , Rfl:   •  ferrous sulfate 325 (65 Fe) mg tablet, Take 1 tablet (325 mg total) by mouth daily with breakfast Do not start before July 3, 2023., Disp: 30 tablet, Rfl: 0  •  finasteride (PROSCAR) 5 mg tablet, Take 5 mg by mouth in the morning (Patient not taking: Reported on 7/20/2023), Disp: , Rfl:   •  senna-docusate sodium (SENOKOT S) 8.6-50 mg per tablet, Take 2 tablets by mouth daily at bedtime May hold for loose stools, Disp: 60 tablet, Rfl: 0  •  tamsulosin (FLOMAX) 0.4 mg, Take 2 capsules (0.8 mg total) by mouth daily after lunch, Disp: 60 capsule, Rfl: 0     Family History   Problem Relation Age of Onset   • Hypertension Mother      Social History     Socioeconomic History   • Marital status: /Civil Union     Spouse name: Not on file   • Number of children: Not on file   • Years of education: Not on file   • Highest education level: Not on file   Occupational History   • Occupation: RETIRED   Tobacco Use   • Smoking status: Never   • Smokeless tobacco: Never   Vaping Use   • Vaping Use: Never used   Substance and Sexual Activity   • Alcohol use: Yes     Alcohol/week: 1.0 standard drink of alcohol     Types: 1 Cans of beer per week     Comment: monthly   • Drug use: Not Currently   • Sexual activity: Not Currently   Other Topics Concern   • Not on file   Social History Narrative        RETIRED     Social Determinants of Health     Financial Resource Strain: Not on file   Food Insecurity: No Food Insecurity (6/28/2023)    Hunger Vital Sign    • Worried About Running Out of Food in the Last Year: Never true    • Ran Out of Food in the Last Year: Never true   Transportation Needs: No Transportation Needs (6/28/2023)    PRAPARE - Transportation    • Lack of Transportation (Medical): No    • Lack of Transportation (Non-Medical):  No   Physical Activity: Not on file   Stress: Not on file   Social Connections: Not on file   Intimate Partner Violence: Not on file   Housing Stability: Low Risk  (6/28/2023)    Housing Stability Vital Sign    • Unable to Pay for Housing in the Last Year: No    • Number of Places Lived in the Last Year: 1    • Unstable Housing in the Last Year: No     Social History     Tobacco Use   Smoking Status Never   Smokeless Tobacco Never     Social History     Substance and Sexual Activity   Alcohol Use Yes   • Alcohol/week: 1.0 standard drink of alcohol   • Types: 1 Cans of beer per week    Comment: monthly       ROS     Objective:     Vitals: There were no vitals taken for this visit. , There is no height or weight on file to calculate BMI.,        Physical Exam:    GEN: Alecia Christianson. appears well, alert and oriented x 3, pleasant and cooperative   HEENT: pupils equal, round, and reactive to light; extraocular muscles intact  NECK: supple, no carotid bruits   HEART: regular rhythm, normal S1 and S2, no murmurs, clicks, gallops or rubs   LUNGS: clear to auscultation bilaterally; no wheezes, rales, or rhonchi   ABDOMEN: normal bowel sounds, soft, no tenderness, no distention  EXTREMITIES: peripheral pulses normal; no clubbing, cyanosis, or edema  NEURO: no focal findings   SKIN: normal without suspicious lesions on exposed skin      Labs & Results:  Below is the patient's most recent value for Albumin, ALT, AST, BUN, Calcium, Chloride, Cholesterol, CO2, Creatinine, GFR, Glucose, HDL, Hematocrit, Hemoglobin, Hemoglobin A1C, LDL, Magnesium, Phosphorus, Platelets, Potassium, PSA, Sodium, Triglycerides, and WBC. Lab Results   Component Value Date    ALT 31 06/30/2023    AST 36 06/30/2023    BUN 15 08/08/2023    CALCIUM 9.2 08/08/2023     08/08/2023    CO2 24 08/08/2023    CREATININE 1.05 08/08/2023    HDL 66 05/15/2023    HCT 29.5 (L) 07/02/2023    HGB 10.2 (L) 07/02/2023    HGBA1C 5.4 06/30/2023    MG 2.0 07/02/2023    PHOS 3.6 07/01/2023     07/02/2023    K 4.1 08/08/2023    PSA <0.1 05/08/2023    TRIG 56 05/15/2023    WBC 5.15 07/02/2023     Note: for a comprehensive list of the patient's lab results, access the Results Review activity. Cardiac testing:     I personally reviewed the ECG performed in the clinic on 08/16/23. It reveals ***    Echocardiograms:  No results found for this or any previous visit.     No results found for this or any previous visit. Catheterizations:   No results found for this or any previous visit. Stress Tests:  No results found for this or any previous visit. Holter monitor -   No results found for this or any previous visit. No results found for this or any previous visit. ASSESSMENT:  No diagnosis found.     SUMMARY:      PLAN:  ***

## 2023-08-16 NOTE — PROGRESS NOTES
Consultation - Electrophysiology Cardiology (EP)   Jazmin Lomeli. 81 y.o. 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 Atrial flutter with variable block: Patient is asymptomatic from the atrial flutter with no effects on his LV function and is rate controlled currently on current dose beta-blocker. He has no signs of atrial fibrillation on his EKGs or device report.   -Flutter appears more typical and I would recommend flutter ablation, with loop recorder placement for A-fib monitoring, with low-dose 2.5 mg twice a day Eliquis for a month after the procedure.   -Plan to start Eliquis 2.5 mg twice a day after his TURP procedure and we will schedule MAURO and ablation outpatient.  -The goal would be to come off anticoagulation after a month AC with monitoring with long term loop recorder.  -the other option was proposed was Watchman procedure. However, I believe some of his hematuria was related to Haley placementient and it was a low amount of bleeding the patient actually had. He would also have to tolerate a low-dose anticoagulation prior to the procedure and at least 45 days after the procedure and 6 months of antiplatelet therapy after that. This would be a more reasonable option if he also had atrial fibrillation and persistent issues with bleeding on full dose anticoagulation. Recommendations:  -start low dose AC 2.5 mg BID eliquis after TURP with plan for MAURO/flutter ablation with loop.  Goal to stop AC 1 month after ablation with long term monitoring       Case discussed and reviewed with Dr. Francesca Goodman pending attending addendum. Thank you for involving us in the care of your patient. Harinder Dennis MD  Cardiology Fellow   PGY-6    ==========================================================================================    History of Present Illness   Physician Requesting Consult: No att. 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 Patient presented with worsening weakness to the hospital and was found to have hyponatremia with a sodium in the low 110s. During the hospital stay a Haley catheter was also placed and is unclear if it was traumatic, but after being started on heparin for new found flutter he started having blood-tinged urine but no large volume hematuria or blood clots. He was discharged on no AC and He subsequently saw his cardiologist outpatient and was a started on baby aspirin. Patient does not feel any of the atrial flutter and Denies fevers/chills, nausea/vomiting, abdominal pain, diarrhea, cough, chest pain, shortness of breath, PND, orthopnea, presyncopal symptoms, syncopal episodes. His sodium has been improving with fluid restriction and he follows with nephro closely. He is planned for TURP procedure with urology at the beginning of september. .     TELEMETRY    ECG:  Atrial flutter with varaible block     DEVICE INTERROGATION      HOLTER  No results found for this or any previous visit. Review of Systems  ROS as noted above in HPI.        Historical Information   Past Medical History:   Diagnosis Date   • Atrial fibrillation ([address])    • Cancer [facility])     prostate cancer    • Hypertension      Past Surgical History:   Procedure Laterality Date   • APPENDECTOMY       Social History Substance and Sexual Activity   Alcohol Use Yes   • Alcohol/week: 1.0 standard drink of alcohol   • Types: 1 Cans of beer per week    Comment: monthly     Social History     Substance and Sexual Activity   Drug Use Not Currently     Social History     Tobacco Use   Smoking Status Never   Smokeless Tobacco Never     Family History: non-contributory    Meds/Allergies   Hospital Medications:   No current facility-administered medications for this visit. Home Medications: (Not in a hospital admission)      No Known Allergies    Objective   Vitals: Blood pressure 116/78, pulse 96, height 6' 3" (1.905 m), weight 84.1 kg (185 lb 8 oz). [unfilled]    Invasive Devices     Drain  Duration           Urethral Catheter Coude 47 days                Physical Exam    GEN: Vinicius Quintanilla Jr. appears well, alert and oriented x 3, pleasant and cooperative   HEENT:  Normocephalic, atraumatic, anicteric, moist mucous membranes  NECK: No JVD or carotid bruits   HEART: regular irregular rhythm, regular rate, normal S1 and S2, no murmurs, clicks, gallops or rubs   LUNGS: Clear to auscultation bilaterally; no wheezes, rales, or rhonchi; respiration nonlabored   ABDOMEN:  Normoactive bowel sounds, soft, no tenderness, no distention  EXTREMITIES: peripheral pulses palpable; no edema  NEURO: no gross focal findings; cranial nerves grossly intact   SKIN:  Dry, intact, warm to touch    Lab Results: I have personally reviewed pertinent lab results. Imaging: I have personally reviewed pertinent reports. Previous STRESS TEST:  No results found for this or any previous visit. No results found for this or any previous visit. No results found for this or any previous visit. Previous Cath/PCI:  No results found for this or any previous visit. No results found for this or any previous visit. No results found for this or any previous visit.       ECHO:  No results found for this or any previous visit. Results for orders placed during the hospital encounter of 06/27/23    Echo complete w/ contrast if indicated    Interpretation Summary  •  Left Ventricle: Left ventricular cavity size is normal. Wall thickness is increased. There is mild concentric hypertrophy. The left ventricular ejection fraction is 65%. Systolic function is normal. Wall motion is normal. Diastolic function is normal.  •  Right Ventricle: Systolic function is mildly reduced. Abnormal tricuspid annular plane systolic excursion (TAPSE) < 1.7 cm. •  Left Atrium: The atrium is dilated. MAURO:  No results found for this or any previous visit. No results found for this or any previous visit. CMR:  No results found for this or any previous visit. No results found for this or any previous visit. No results found for this or any previous visit. HOLTER  No results found for this or any previous visit. No results found for this or any previous visit. Anticoagulation VTE Prophylaxis:     Counseling / Coordination of Care  Total floor / unit time spent today 45 minutes minutes. Greater than 50% of total time was spent with the patient and / or family counseling and / or coordination of care. A description of the counseling / coordination of care: .        Funambol/ Fluorofinder/Care Everywhere records reviewed:     ** Please Note: Fluency DirectDictation voice to text software may have been used in the creation of this document.  **

## 2023-08-28 PROBLEM — N39.0 UTI (URINARY TRACT INFECTION): Status: RESOLVED | Noted: 2023-06-27 | Resolved: 2023-08-28

## 2023-08-29 ENCOUNTER — ANESTHESIA EVENT (OUTPATIENT)
Dept: PERIOP | Facility: HOSPITAL | Age: 88
End: 2023-08-29
Payer: MEDICARE

## 2023-08-29 NOTE — PRE-PROCEDURE INSTRUCTIONS
Pre-Surgery Instructions:   Medication Instructions   • aspirin 81 mg chewable tablet Instructions provided by MD   • ferrous sulfate 325 (65 Fe) mg tablet Hold day of surgery. • metoprolol succinate (TOPROL-XL) 100 mg 24 hr tablet Take day of surgery. • senna-docusate sodium (SENOKOT S) 8.6-50 mg per tablet Take night before surgery   • tamsulosin (FLOMAX) 0.4 mg Take night before surgery        Medication instructions for day surgery reviewed. Please use only a sip of water to take your instructed medications. Avoid all over the counter vitamins, supplements and NSAIDS for one week prior to surgery per anesthesia guidelines. Tylenol is ok to take as needed. You will receive a call one business day prior to surgery with an arrival time and hospital directions. If your surgery is scheduled on a Monday, the hospital will be calling you on the Friday prior to your surgery. If you have not heard from anyone by 8pm, please call the hospital supervisor through the hospital  at [phone]. Celestine Silva [phone]). Do not eat or drink anything after midnight the night before your surgery, including candy, mints, lifesavers, or chewing gum. Do not drink alcohol 24hrs before your surgery. Try not to smoke at least 24hrs before your surgery. Follow the pre surgery showering instructions as listed in the [facility] Surgical Experience Booklet” or otherwise provided by your surgeon's office. Do not shave the surgical area 24 hours before surgery. Do not apply any lotions, creams, including makeup, cologne, deodorant, or perfumes after showering on the day of your surgery. No contact lenses, eye make-up, or artificial eyelashes. Remove nail polish, including gel polish, and any artificial, gel, or acrylic nails if possible. Remove all jewelry including rings and body piercing jewelry. Wear causal clothing that is easy to take on and off. Consider your type of surgery.     Keep any valuables, jewelry, piercings at home. Please bring any specially ordered equipment (sling, braces) if indicated. Arrange for a responsible person to drive you to and from the hospital on the day of your surgery. Visitor Guidelines discussed. Call the surgeon's office with any new illnesses, exposures, or additional questions prior to surgery. Please reference your [facility] Surgical Experience Booklet” for additional information to prepare for your upcoming surgery.

## 2023-09-02 ENCOUNTER — APPOINTMENT (OUTPATIENT)
Dept: LAB | Facility: CLINIC | Age: 88
End: 2023-09-02
Payer: MEDICARE

## 2023-09-02 DIAGNOSIS — Z01.818 ENCOUNTER FOR PREOPERATIVE EXAMINATION FOR GENERAL SURGICAL PROCEDURE: ICD-10-CM

## 2023-09-02 DIAGNOSIS — R33.9 RETENTION OF URINE, UNSPECIFIED: ICD-10-CM

## 2023-09-02 DIAGNOSIS — N18.2 STAGE 2 CHRONIC KIDNEY DISEASE: ICD-10-CM

## 2023-09-02 DIAGNOSIS — N39.0 URINARY TRACT INFECTION WITHOUT HEMATURIA, SITE UNSPECIFIED: ICD-10-CM

## 2023-09-02 PROCEDURE — 80048 BASIC METABOLIC PNL TOTAL CA: CPT

## 2023-09-02 PROCEDURE — 85730 THROMBOPLASTIN TIME PARTIAL: CPT

## 2023-09-02 PROCEDURE — 36415 COLL VENOUS BLD VENIPUNCTURE: CPT

## 2023-09-02 PROCEDURE — 86850 RBC ANTIBODY SCREEN: CPT | Performed by: UROLOGY

## 2023-09-02 PROCEDURE — 86900 BLOOD TYPING SEROLOGIC ABO: CPT | Performed by: UROLOGY

## 2023-09-02 PROCEDURE — 85027 COMPLETE CBC AUTOMATED: CPT

## 2023-09-02 PROCEDURE — 85610 PROTHROMBIN TIME: CPT

## 2023-09-02 PROCEDURE — 86901 BLOOD TYPING SEROLOGIC RH(D): CPT | Performed by: UROLOGY

## 2023-09-03 ENCOUNTER — DOCUMENTATION (OUTPATIENT)
Dept: OTHER | Facility: HOSPITAL | Age: 88
End: 2023-09-03

## 2023-09-03 ENCOUNTER — NURSE TRIAGE (OUTPATIENT)
Dept: OTHER | Facility: OTHER | Age: 88
End: 2023-09-03

## 2023-09-03 DIAGNOSIS — E87.1 HYPONATREMIA: Primary | ICD-10-CM

## 2023-09-03 LAB
ANION GAP SERPL CALCULATED.3IONS-SCNC: 6 MMOL/L
APTT PPP: 37 SECONDS (ref 23–37)
BUN SERPL-MCNC: 15 MG/DL (ref 5–25)
CALCIUM SERPL-MCNC: 8.3 MG/DL (ref 8.4–10.2)
CHLORIDE SERPL-SCNC: 93 MMOL/L (ref 96–108)
CO2 SERPL-SCNC: 26 MMOL/L (ref 21–32)
CREAT SERPL-MCNC: 1.15 MG/DL (ref 0.6–1.3)
ERYTHROCYTE [DISTWIDTH] IN BLOOD BY AUTOMATED COUNT: 15.4 % (ref 11.6–15.1)
GFR SERPL CREATININE-BSD FRML MDRD: 57 ML/MIN/1.73SQ M
GLUCOSE SERPL-MCNC: 104 MG/DL (ref 65–140)
HCT VFR BLD AUTO: 29.3 % (ref 36.5–49.3)
HGB BLD-MCNC: 9.4 G/DL (ref 12–17)
INR PPP: 1.07 (ref 0.84–1.19)
MCH RBC QN AUTO: 28.5 PG (ref 26.8–34.3)
MCHC RBC AUTO-ENTMCNC: 32.1 G/DL (ref 31.4–37.4)
MCV RBC AUTO: 89 FL (ref 82–98)
PLATELET # BLD AUTO: 409 THOUSANDS/UL (ref 149–390)
PMV BLD AUTO: 8.8 FL (ref 8.9–12.7)
POTASSIUM SERPL-SCNC: 4.3 MMOL/L (ref 3.5–5.3)
PROTHROMBIN TIME: 14.1 SECONDS (ref 11.6–14.5)
RBC # BLD AUTO: 3.3 MILLION/UL (ref 3.88–5.62)
SODIUM SERPL-SCNC: 125 MMOL/L (ref 135–147)
WBC # BLD AUTO: 7.13 THOUSAND/UL (ref 4.31–10.16)

## 2023-09-03 NOTE — TELEPHONE ENCOUNTER
Regarding: low sodium levels  ----- Message from [facility] sent at 9/3/2023  2:22 PM EDT -----  "I got my results back for my sodium levels and I would like to know what is recommenced for low levels"

## 2023-09-03 NOTE — TELEPHONE ENCOUNTER
Additional Information  • Lab result questions  • Negative: Lab calling with urinalysis test results and triager can call in prescription  • Negative: Medication questions    Answer Assessment - Initial Assessment Questions  1. REASON FOR CALL or QUESTION: "What is your reason for calling today?" or "How can I best help you?" or "What question do you have that I can help answer?"      My sodium level is 125. Do I need to do anything different? I feel fine.     Protocols used: INFORMATION ONLY CALL - NO TRIAGE-ADULT-, PCP CALL - NO TRIAGE-ADULT-

## 2023-09-04 LAB
ABO GROUP BLD: NORMAL
BLD GP AB SCN SERPL QL: NEGATIVE
RH BLD: POSITIVE
SPECIMEN EXPIRATION DATE: NORMAL

## 2023-09-07 ENCOUNTER — APPOINTMENT (OUTPATIENT)
Dept: LAB | Facility: CLINIC | Age: 88
End: 2023-09-07
Payer: MEDICARE

## 2023-09-07 ENCOUNTER — CONSULT (OUTPATIENT)
Dept: INTERNAL MEDICINE CLINIC | Facility: CLINIC | Age: 88
End: 2023-09-07
Payer: MEDICARE

## 2023-09-07 VITALS
DIASTOLIC BLOOD PRESSURE: 70 MMHG | TEMPERATURE: 97.3 F | HEART RATE: 87 BPM | OXYGEN SATURATION: 98 % | SYSTOLIC BLOOD PRESSURE: 110 MMHG | WEIGHT: 181.8 LBS | BODY MASS INDEX: 22.72 KG/M2

## 2023-09-07 DIAGNOSIS — Z01.818 PREOP EXAMINATION: Primary | ICD-10-CM

## 2023-09-07 DIAGNOSIS — I48.91 NEW ONSET A-FIB (HCC): ICD-10-CM

## 2023-09-07 DIAGNOSIS — N18.31 STAGE 3A CHRONIC KIDNEY DISEASE (HCC): ICD-10-CM

## 2023-09-07 DIAGNOSIS — I10 ESSENTIAL HYPERTENSION: Chronic | ICD-10-CM

## 2023-09-07 DIAGNOSIS — E22.2 SIADH (SYNDROME OF INAPPROPRIATE ADH PRODUCTION) (HCC): ICD-10-CM

## 2023-09-07 DIAGNOSIS — E87.1 HYPONATREMIA: ICD-10-CM

## 2023-09-07 DIAGNOSIS — C61 PROSTATE CA (HCC): Chronic | ICD-10-CM

## 2023-09-07 DIAGNOSIS — N18.31 ANEMIA DUE TO STAGE 3A CHRONIC KIDNEY DISEASE (HCC): Chronic | ICD-10-CM

## 2023-09-07 DIAGNOSIS — D63.1 ANEMIA DUE TO STAGE 3A CHRONIC KIDNEY DISEASE (HCC): Chronic | ICD-10-CM

## 2023-09-07 DIAGNOSIS — R33.9 URINARY RETENTION: ICD-10-CM

## 2023-09-07 LAB
ANION GAP SERPL CALCULATED.3IONS-SCNC: 8 MMOL/L
BUN SERPL-MCNC: 16 MG/DL (ref 5–25)
CALCIUM SERPL-MCNC: 8.8 MG/DL (ref 8.4–10.2)
CHLORIDE SERPL-SCNC: 92 MMOL/L (ref 96–108)
CO2 SERPL-SCNC: 27 MMOL/L (ref 21–32)
CREAT SERPL-MCNC: 1.17 MG/DL (ref 0.6–1.3)
GFR SERPL CREATININE-BSD FRML MDRD: 56 ML/MIN/1.73SQ M
GLUCOSE P FAST SERPL-MCNC: 110 MG/DL (ref 65–99)
POTASSIUM SERPL-SCNC: 4 MMOL/L (ref 3.5–5.3)
SODIUM SERPL-SCNC: 127 MMOL/L (ref 135–147)

## 2023-09-07 PROCEDURE — 99213 OFFICE O/P EST LOW 20 MIN: CPT | Performed by: NURSE PRACTITIONER

## 2023-09-07 PROCEDURE — 80048 BASIC METABOLIC PNL TOTAL CA: CPT

## 2023-09-07 PROCEDURE — 36415 COLL VENOUS BLD VENIPUNCTURE: CPT

## 2023-09-07 RX ORDER — TAMSULOSIN HYDROCHLORIDE 0.4 MG/1
0.8 CAPSULE ORAL
COMMUNITY
End: 2023-09-12

## 2023-09-07 NOTE — ASSESSMENT & PLAN NOTE
• The patient is here today for preoperative clearance for Cystoscopy; Transurethral Resection of Prostate, due to Retention of Urine, by Dr. Adenike Monsalve, to be performed on 9/11/2023. • The patient is medically stable for this procedure as planned.

## 2023-09-07 NOTE — PATIENT INSTRUCTIONS
Problem List Items Addressed This Visit          Endocrine    SIADH (syndrome of inappropriate ADH production) ([address])       Cardiovascular and Mediastinum    Essential hypertension (Chronic)    New onset a-fib (HCC)       Genitourinary    Prostate CA (HCC) (Chronic)    Stage 3a chronic kidney disease (HCC)    Urinary retention    Relevant Medications    tamsulosin (FLOMAX) 0.4 mg       Other    Anemia (Chronic)    Hyponatremia    Preop examination - Primary     The patient is here today for preoperative clearance for Cystoscopy; Transurethral Resection of Prostate, due to Retention of Urine, by Dr. Kia Quiroz, to be performed on 9/11/2023. The patient is medically stable for this procedure as planned.

## 2023-09-07 NOTE — PROGRESS NOTES
Presurgical Evaluation    Subjective:      Patient ID: Marisela Aguilar. is a 81 y.o. male. Chief Complaint   Patient presents with   • Pre-op Exam     SzaOc-NLLJ-1/11-Dr. Javier Diggs- Needs EKG pt states he doesn't need an EKG, and has no paperwork,        The patient is here today for preoperative clearance for Cystoscopy; Transurethral Resection of Prostate, due to Retention of Urine, by Dr. Cm Ann, to be performed on 9/11/2023. The patient is medically stable for this procedure as planned. The following portions of the patient's history were reviewed and updated as appropriate: allergies, current medications, past family history, past medical history, past social history, past surgical history and problem list.    Procedure date: September 11, 2023    Surgeon:  Dr. mC Ann  Planned procedure:  Cystoscopy, TURP  Diagnosis for procedure:  Retention of urine    Prior anesthesia: Yes   General; Complications:  None / Tolerated well    CAD History: Arrhythmia  A-Fib     Pulmonary History: None    Renal history: CKD stage 3 - GFR 30-59    Diabetes History:  None     Neurological History: None     On Immunosuppressant meds/biologics: No      Review of Systems   Constitutional: Negative for activity change, chills, fatigue and fever. HENT: Negative for rhinorrhea and sore throat. Eyes: Negative for pain. Respiratory: Negative for cough and shortness of breath. Cardiovascular: Negative for chest pain, palpitations and leg swelling. Gastrointestinal: Negative for abdominal pain, constipation, diarrhea, nausea and vomiting. Genitourinary: Negative for difficulty urinating, flank pain, frequency and urgency. Musculoskeletal: Negative for gait problem, joint swelling and myalgias. Skin: Negative for color change. Neurological: Negative for dizziness, weakness, light-headedness and headaches. Psychiatric/Behavioral: Negative for sleep disturbance. The patient is not nervous/anxious.     All other systems reviewed and are negative. Current Outpatient Medications   Medication Sig Dispense Refill   • ferrous sulfate 325 (65 Fe) mg tablet Take 1 tablet (325 mg total) by mouth daily with breakfast Do not start before July 3, 2023. 30 tablet 0   • metoprolol succinate (TOPROL-XL) 100 mg 24 hr tablet Take 1 tablet (100 mg total) by mouth daily 90 tablet 3   • senna-docusate sodium (SENOKOT S) 8.6-50 mg per tablet Take 2 tablets by mouth daily at bedtime May hold for loose stools 60 tablet 0   • tamsulosin (FLOMAX) 0.4 mg Take 0.8 mg by mouth daily with dinner     • cefpodoxime (VANTIN) 200 mg tablet        No current facility-administered medications for this visit. Allergies on file:   Patient has no known allergies.     Patient Active Problem List   Diagnosis   • Generalized weakness   • Prostate CA [facility])   • Essential hypertension   • Stage 3a chronic kidney disease (HCC)   • Hyponatremia   • Urinary retention   • Anemia   • New onset a-fib [facility])   • Hospital discharge follow-up   • Abnormal CT of the chest   • History of echocardiogram   • SIADH (syndrome of inappropriate ADH production) ([facility])   • Syncope and collapse   • Preop examination        Past Medical History:   Diagnosis Date   • Atrial fibrillation (HCC)    • Cancer (HCC)     prostate cancer    • Hypertension    • Irregular heart beat        Past Surgical History:   Procedure Laterality Date   • APPENDECTOMY         Family History   Problem Relation Age of Onset   • Hypertension Mother        Social History     Tobacco Use   • Smoking status: Never   • Smokeless tobacco: Never   Vaping Use   • Vaping Use: Never used   Substance Use Topics   • Alcohol use: Not Currently   • Drug use: Not Currently       Objective:    Vitals:    09/07/23 1202   BP: 110/70   BP Location: Left arm   Patient Position: Sitting   Cuff Size: Standard   Pulse: 87   Temp: (!) 97.3 °F (36.3 °C)   SpO2: 98%   Weight: 82.5 kg (181 lb 12.8 oz)        Physical Exam  Vitals reviewed. Constitutional:       General: He is awake. Appearance: Normal appearance. He is well-developed and normal weight. HENT:      Head: Normocephalic and atraumatic. Nose: Nose normal.      Mouth/Throat:      Mouth: Mucous membranes are moist.   Eyes:      Extraocular Movements: Extraocular movements intact. Cardiovascular:      Rate and Rhythm: Normal rate. Rhythm irregularly irregular. Pulses: Normal pulses. Heart sounds: Normal heart sounds. Pulmonary:      Effort: Pulmonary effort is normal.      Breath sounds: Normal breath sounds. Abdominal:      General: Bowel sounds are normal.      Palpations: Abdomen is soft. Musculoskeletal:         General: Normal range of motion. Cervical back: Normal range of motion. Right lower leg: No edema. Left lower leg: No edema. Skin:     General: Skin is warm and dry. Neurological:      Mental Status: He is alert and oriented to person, place, and time. Psychiatric:         Attention and Perception: Attention normal.         Mood and Affect: Mood normal.         Speech: Speech normal.         Behavior: Behavior normal. Behavior is cooperative.            Preop labs/testing available and reviewed: no    eGFR   Date Value Ref Range Status   09/02/2023 57 ml/min/1.73sq m Final     WBC   Date Value Ref Range Status   09/02/2023 7.13 4.31 - 10.16 Thousand/uL Final     Hemoglobin   Date Value Ref Range Status   09/02/2023 9.4 (L) 12.0 - 17.0 g/dL Final     Hematocrit   Date Value Ref Range Status   09/02/2023 29.3 (L) 36.5 - 49.3 % Final     Platelets   Date Value Ref Range Status   09/02/2023 409 (H) 149 - 390 Thousands/uL Final     INR   Date Value Ref Range Status   09/02/2023 1.07 0.84 - 1.19 Final       EKG 8/16/2023: cleared by cardiology    Echo no    Stress test/cath no    PFT/Gil no    Functional capacity: Climb stairs                        4 Mets   Pick the highest level patient can comfortably perform   4 mets or greater for surgery    RCRI  High Risk surgery? 1 Point  CAD History:         1 Point   MI; Positive Stress Test; CP due to Mi;  Nitrate Usage to control Angina; Pathologic Q wave on EKG  CHF Active:         1 Point   Pulm Edema; Paroxysmal Nocturnal Dyspnea;  Bibasilar Rales (crackles);S3; CHF on CXR  Cerebrovascular Disease (TIA or CVA):     1 Point  DM on Insulin:        1 Point  Serum Creat >2.0 mg/dl:       1 Point          Total Points: 4     Scorin: Class I, Very Low Risk (0.4%)     1: Class II, Low risk (0.9%)     2: Class III Moderate (6.6%)     3: Class IV High (>11%)      EZIO Risk:  GFR:   eGFR   Date Value Ref Range Status   2023 57 ml/min/1.73sq m Final         For PCP:  If GFR>60, Hold ACE/ARB/Diuretic on the day of surgery, and NSAIDS 10 days before. If GFR<45, Consider PRE and POST op Nephrology Consult. If 46 <GFR> 59 : Has Patient had EZIO in last 6 Months? no   If YES: Preop Nephrology consult   If No:  [id number] [address] [facility] Nephrology consult. Assessment/Plan:    Patient is medically optimized (cleared) for the planned procedure. Further testing/evaluation is not required. Postop concerns: no    Problem List Items Addressed This Visit        Endocrine    SIADH (syndrome of inappropriate ADH production) ([address])       Cardiovascular and Mediastinum    Essential hypertension (Chronic)    New onset a-fib (HCC)       Genitourinary    Prostate CA (HCC) (Chronic)    Stage 3a chronic kidney disease (HCC)    Urinary retention    Relevant Medications    tamsulosin (FLOMAX) 0.4 mg       Other    Anemia (Chronic)    Hyponatremia    Preop examination - Primary     The patient is here today for preoperative clearance for Cystoscopy; Transurethral Resection of Prostate, due to Retention of Urine, by Dr. Titi Lima, to be performed on 2023. The patient is medically stable for this procedure as planned.                Diagnoses and all orders for this visit:    Preop examination    Essential hypertension    New onset a-fib ([address])    Stage 3a chronic kidney disease (HCC)    Prostate CA ([address])    Urinary retention    Anemia due to stage 3a chronic kidney disease (HCC)    Hyponatremia    SIADH (syndrome of inappropriate ADH production) ([address])    Other orders  -     tamsulosin (FLOMAX) 0.4 mg; Take 0.8 mg by mouth daily with dinner          No outpatient medications have been marked as taking for the 9/7/23 encounter (Appointment) with Carolina Ware, [address] [place] [address].

## 2023-09-08 DIAGNOSIS — N18.31 STAGE 3A CHRONIC KIDNEY DISEASE (HCC): Primary | ICD-10-CM

## 2023-09-10 NOTE — ANESTHESIA PREPROCEDURE EVALUATION
Procedure:  CYSTOSCOPY; TRANSURETHRAL RESECTION OF PROSTATE (TURP) (Abdomen)    Relevant Problems   CARDIO   (+) Essential hypertension   (+) New onset a-fib (HCC)      /RENAL   (+) Prostate CA (HCC)   (+) Stage 3a chronic kidney disease (HCC)      HEMATOLOGY   (+) Anemia      Medical clearance note reviewed    Physical Exam    Airway    Mallampati score: II  TM Distance: >3 FB  Neck ROM: full     Dental       Cardiovascular  Cardiovascular exam normal    Pulmonary  Pulmonary exam normal     Other Findings        Anesthesia Plan  ASA Score- 3     Anesthesia Type- general with ASA Monitors. Additional Monitors:   Airway Plan: LMA. Plan Factors-    Chart reviewed. EKG reviewed. Existing labs reviewed. Patient summary reviewed. Obstructive sleep apnea risk education given perioperatively. Induction- intravenous. Postoperative Plan-     Informed Consent- Anesthetic plan and risks discussed with patient. I personally reviewed this patient with the CRNA. Discussed and agreed on the Anesthesia Plan with the CRNA. Abhi Speaker

## 2023-09-11 ENCOUNTER — ANESTHESIA (OUTPATIENT)
Dept: PERIOP | Facility: HOSPITAL | Age: 88
End: 2023-09-11
Payer: MEDICARE

## 2023-09-11 ENCOUNTER — HOSPITAL ENCOUNTER (OUTPATIENT)
Facility: HOSPITAL | Age: 88
Setting detail: OUTPATIENT SURGERY
Discharge: HOME | End: 2023-09-12
Attending: UROLOGY | Admitting: UROLOGY
Payer: MEDICARE

## 2023-09-11 DIAGNOSIS — R33.9 RETENTION OF URINE, UNSPECIFIED: ICD-10-CM

## 2023-09-11 DIAGNOSIS — E22.2 SIADH (SYNDROME OF INAPPROPRIATE ADH PRODUCTION) (HCC): Primary | ICD-10-CM

## 2023-09-11 LAB
ABO GROUP BLD: NORMAL
RH BLD: POSITIVE

## 2023-09-11 PROCEDURE — C1769 GUIDE WIRE: HCPCS | Performed by: UROLOGY

## 2023-09-11 PROCEDURE — 88342 IMHCHEM/IMCYTCHM 1ST ANTB: CPT | Performed by: PATHOLOGY

## 2023-09-11 PROCEDURE — 88344 IMHCHEM/IMCYTCHM EA MLT ANTB: CPT | Performed by: PATHOLOGY

## 2023-09-11 PROCEDURE — 88305 TISSUE EXAM BY PATHOLOGIST: CPT | Performed by: PATHOLOGY

## 2023-09-11 PROCEDURE — 88341 IMHCHEM/IMCYTCHM EA ADD ANTB: CPT | Performed by: PATHOLOGY

## 2023-09-11 RX ORDER — METOPROLOL SUCCINATE 50 MG/1
100 TABLET, EXTENDED RELEASE ORAL DAILY
Status: DISCONTINUED | OUTPATIENT
Start: 2023-09-11 | End: 2023-09-11

## 2023-09-11 RX ORDER — FENTANYL CITRATE 50 UG/ML
INJECTION, SOLUTION INTRAMUSCULAR; INTRAVENOUS AS NEEDED
Status: DISCONTINUED | OUTPATIENT
Start: 2023-09-11 | End: 2023-09-11

## 2023-09-11 RX ORDER — CEFAZOLIN SODIUM 2 G/50ML
2000 SOLUTION INTRAVENOUS ONCE
Status: COMPLETED | OUTPATIENT
Start: 2023-09-11 | End: 2023-09-11

## 2023-09-11 RX ORDER — ONDANSETRON 2 MG/ML
INJECTION INTRAMUSCULAR; INTRAVENOUS AS NEEDED
Status: DISCONTINUED | OUTPATIENT
Start: 2023-09-11 | End: 2023-09-11

## 2023-09-11 RX ORDER — LEVOFLOXACIN 5 MG/ML
500 INJECTION, SOLUTION INTRAVENOUS ONCE
Status: COMPLETED | OUTPATIENT
Start: 2023-09-11 | End: 2023-09-11

## 2023-09-11 RX ORDER — HYDROMORPHONE HCL/PF 1 MG/ML
0.5 SYRINGE (ML) INJECTION
Status: DISCONTINUED | OUTPATIENT
Start: 2023-09-11 | End: 2023-09-11 | Stop reason: HOSPADM

## 2023-09-11 RX ORDER — MAGNESIUM HYDROXIDE 1200 MG/15ML
LIQUID ORAL AS NEEDED
Status: DISCONTINUED | OUTPATIENT
Start: 2023-09-11 | End: 2023-09-11 | Stop reason: HOSPADM

## 2023-09-11 RX ORDER — FERROUS SULFATE 325(65) MG
325 TABLET ORAL
Status: DISCONTINUED | OUTPATIENT
Start: 2023-09-12 | End: 2023-09-12 | Stop reason: HOSPADM

## 2023-09-11 RX ORDER — PROPOFOL 10 MG/ML
INJECTION, EMULSION INTRAVENOUS AS NEEDED
Status: DISCONTINUED | OUTPATIENT
Start: 2023-09-11 | End: 2023-09-11

## 2023-09-11 RX ORDER — FINASTERIDE 5 MG/1
5 TABLET, FILM COATED ORAL DAILY
Status: DISCONTINUED | OUTPATIENT
Start: 2023-09-11 | End: 2023-09-12 | Stop reason: HOSPADM

## 2023-09-11 RX ORDER — ASPIRIN 81 MG/1
81 TABLET, CHEWABLE ORAL DAILY
COMMUNITY

## 2023-09-11 RX ORDER — SODIUM CHLORIDE 9 MG/ML
50 INJECTION, SOLUTION INTRAVENOUS CONTINUOUS
Status: DISCONTINUED | OUTPATIENT
Start: 2023-09-11 | End: 2023-09-12

## 2023-09-11 RX ORDER — LIDOCAINE HYDROCHLORIDE 20 MG/ML
INJECTION, SOLUTION EPIDURAL; INFILTRATION; INTRACAUDAL; PERINEURAL AS NEEDED
Status: DISCONTINUED | OUTPATIENT
Start: 2023-09-11 | End: 2023-09-11

## 2023-09-11 RX ORDER — CEFAZOLIN SODIUM 1 G/50ML
1000 SOLUTION INTRAVENOUS EVERY 8 HOURS
Status: DISCONTINUED | OUTPATIENT
Start: 2023-09-11 | End: 2023-09-12

## 2023-09-11 RX ORDER — LEVOFLOXACIN 500 MG/1
500 TABLET, FILM COATED ORAL EVERY 24 HOURS
Status: DISCONTINUED | OUTPATIENT
Start: 2023-09-12 | End: 2023-09-12 | Stop reason: HOSPADM

## 2023-09-11 RX ORDER — METOPROLOL SUCCINATE 50 MG/1
100 TABLET, EXTENDED RELEASE ORAL DAILY
Status: DISCONTINUED | OUTPATIENT
Start: 2023-09-12 | End: 2023-09-12 | Stop reason: HOSPADM

## 2023-09-11 RX ORDER — ONDANSETRON 2 MG/ML
4 INJECTION INTRAMUSCULAR; INTRAVENOUS ONCE AS NEEDED
Status: DISCONTINUED | OUTPATIENT
Start: 2023-09-11 | End: 2023-09-11 | Stop reason: HOSPADM

## 2023-09-11 RX ORDER — SODIUM CHLORIDE, SODIUM LACTATE, POTASSIUM CHLORIDE, CALCIUM CHLORIDE 600; 310; 30; 20 MG/100ML; MG/100ML; MG/100ML; MG/100ML
50 INJECTION, SOLUTION INTRAVENOUS CONTINUOUS
Status: DISCONTINUED | OUTPATIENT
Start: 2023-09-11 | End: 2023-09-12

## 2023-09-11 RX ORDER — AMOXICILLIN 250 MG
2 CAPSULE ORAL
Status: DISCONTINUED | OUTPATIENT
Start: 2023-09-11 | End: 2023-09-12 | Stop reason: HOSPADM

## 2023-09-11 RX ORDER — FENTANYL CITRATE/PF 50 MCG/ML
25 SYRINGE (ML) INJECTION
Status: DISCONTINUED | OUTPATIENT
Start: 2023-09-11 | End: 2023-09-11 | Stop reason: HOSPADM

## 2023-09-11 RX ADMIN — PROPOFOL 100 MG: 10 INJECTION, EMULSION INTRAVENOUS at 07:36

## 2023-09-11 RX ADMIN — CEFAZOLIN SODIUM 1000 MG: 1 SOLUTION INTRAVENOUS at 15:22

## 2023-09-11 RX ADMIN — FINASTERIDE 5 MG: 5 TABLET, FILM COATED ORAL at 15:22

## 2023-09-11 RX ADMIN — FENTANYL CITRATE 50 MCG: 50 INJECTION, SOLUTION INTRAMUSCULAR; INTRAVENOUS at 10:30

## 2023-09-11 RX ADMIN — LIDOCAINE HYDROCHLORIDE 100 MG: 20 INJECTION, SOLUTION EPIDURAL; INFILTRATION; INTRACAUDAL at 07:36

## 2023-09-11 RX ADMIN — PROPOFOL 40 MG: 10 INJECTION, EMULSION INTRAVENOUS at 08:02

## 2023-09-11 RX ADMIN — FENTANYL CITRATE 25 MCG: 50 INJECTION, SOLUTION INTRAMUSCULAR; INTRAVENOUS at 07:44

## 2023-09-11 RX ADMIN — CEFAZOLIN SODIUM 2000 MG: 2 SOLUTION INTRAVENOUS at 07:27

## 2023-09-11 RX ADMIN — ONDANSETRON 4 MG: 2 INJECTION INTRAMUSCULAR; INTRAVENOUS at 08:32

## 2023-09-11 RX ADMIN — FENTANYL CITRATE 25 MCG: 50 INJECTION, SOLUTION INTRAMUSCULAR; INTRAVENOUS at 07:34

## 2023-09-11 RX ADMIN — SODIUM CHLORIDE, SODIUM LACTATE, POTASSIUM CHLORIDE, AND CALCIUM CHLORIDE: .6; .31; .03; .02 INJECTION, SOLUTION INTRAVENOUS at 06:54

## 2023-09-11 RX ADMIN — LEVOFLOXACIN: 5 INJECTION, SOLUTION INTRAVENOUS at 07:43

## 2023-09-11 RX ADMIN — SODIUM CHLORIDE 50 ML/HR: 0.9 INJECTION, SOLUTION INTRAVENOUS at 10:39

## 2023-09-11 RX ADMIN — FENTANYL CITRATE 25 MCG: 50 INJECTION, SOLUTION INTRAMUSCULAR; INTRAVENOUS at 08:02

## 2023-09-11 RX ADMIN — SENNOSIDES AND DOCUSATE SODIUM 2 TABLET: 50; 8.6 TABLET ORAL at 22:10

## 2023-09-11 RX ADMIN — FENTANYL CITRATE 25 MCG: 50 INJECTION, SOLUTION INTRAMUSCULAR; INTRAVENOUS at 07:29

## 2023-09-11 NOTE — ANESTHESIA POSTPROCEDURE EVALUATION
Post-Op Assessment Note    CV Status:  Stable    Pain management: adequate     Mental Status:  Alert and sleepy   Hydration Status:  Euvolemic   PONV Controlled:  Controlled   Airway Patency:  Patent      Post Op Vitals Reviewed: Yes      Staff: Anesthesiologist, CRNA         There were no known notable events for this encounter.     /77 (09/11/23 0859)    Temp 98.1 °F (36.7 °C) (09/11/23 0859)    Pulse 101 (09/11/23 0859)   Resp 15 (09/11/23 0859)    SpO2 100 % (09/11/23 0859)

## 2023-09-11 NOTE — OP NOTE
OPERATIVE REPORT  PATIENT NAME: Philip Nelson :  1935  MRN: [id number]  Pt Location: [facility] OR ROOM 02    SURGERY DATE: 2023    Surgeon(s) and Role:     * Mya Jimenez MD - Primary    Preop Diagnosis:  Retention of urine, unspecified [R33.9]  Prostate carcinoma    Post-Op Diagnosis Codes:     * Retention of urine, unspecified [R33.9]  Prostate carcinoma    Procedure(s):  CYSTOSCOPY; TRANSURETHRAL RESECTION OF PROSTATE (TURP)    Specimen(s):  ID Type Source Tests Collected by Time Destination   1 :  Tissue Prostate TISSUE EXAM Mya Jimenez MD 2023 [id number]        Estimated Blood Loss:   Minimal    Drains:  Urethral Catheter Latex 24 Fr. (Active)   Number of days: 0       Anesthesia Type:   General    Operative Indications:  Retention of urine, unspecified [R33.9]  This is a 71-year-old male with a history of prostate cancer treated with radiation therapy who recently developed refractory urinary retention. Options, procedures, benefits and risks were discussed and he agreed to the planned procedure. Operative Findings:  Ratty necrotic prostate tissue obstructing the distal prostatic urethra with minimally obstructing prostate tissue closer to the bladder neck. Complications:   None    Procedure and Technique:  The patient was properly identified in the operating room and after adequate general anesthesia was placed in the lithotomy position. The lower abdomen and genitalia were prepped and draped in usual fashion. Cystourethroscopy was performed with a 21 Tamazight cystoscope using 30 degree lens. The anterior urethra was normal.  However, there was obstruction at the level of the distal prostatic urethra secondary to ratty appearing necrotic prostate tissue. The true lumen was difficult to visualize and the prostatic urethra was relatively rigid.   A Solo guidewire was passed through the cystoscope anteriorly into the bladder and the cystoscope then followed easily into the bladder. The urethra was further dilated with sequential cystoscopes inserted under direct vision from 22 [place] to 25 [place]. Cystoscopy was performed with 30 degree and 70 degree lenses. Visibility was limited due to the rigid prostatic urethra. The bladder mucosa appeared smooth without masses. The ureteral orifices were not definitely identified as there was edema of the trigone posteriorly just inside the bladder neck. In addition, the verumontanum was no longer identifiable. However, the external sphincter could be seen well away from the the area of the suspected Veru. The cystoscope was removed and a 27 Swiss Olympus bipolar resectoscope was inserted under direct vision. A channel TURP was then performed. The resection was carried out starting at the level of the bladder neck but did not resect into the bladder posteriorly in order to avoid the ureteral orifices. The resection was carried out to the area of the presumed location of the Veru and was kept well proximal to the external sphincter. The ratty necrotic tissue in this location was resected to create a wide open channel. The remainder of the prostate was then resected circumferentially between the above defined borders, creating a very good open smooth channel throughout the entire prostatic urethra. There was very little bleeding throughout. Hemostasis was achieved with the plasma button attachment throughout the prostatic urethra. Prostate tissue was irrigated free from the bladder with an Ellik and sent as specimen. Upon completion, all prostate tissue had been irrigated free and the trigone and external sphincter appeared intact. The resectoscope was removed. A 24 Swiss 30 cc Haley catheter was placed easily with 50 cc of water in the balloon. The catheter irrigated well with clear drainage. CBI with normal saline was started with clear drainage.   The patient tolerated the procedure well and left the operating room in good condition. I was present for the entire procedure.     Patient Disposition:  PACU         SIGNATURE: Beulah Brown MD  DATE: September 11, 2023  TIME: 9:06 AM

## 2023-09-11 NOTE — INTERVAL H&P NOTE
H&P reviewed. After examining the patient I find no changes in the patients condition since the H&P had been written.     Vitals:    09/11/23 0645   BP: 143/82   Pulse: (!) 111   Resp: 16   Temp: (!) 97.4 °F (36.3 °C)   SpO2: 98%

## 2023-09-12 VITALS
SYSTOLIC BLOOD PRESSURE: 147 MMHG | WEIGHT: 181 LBS | HEART RATE: 116 BPM | RESPIRATION RATE: 16 BRPM | TEMPERATURE: 98.7 F | OXYGEN SATURATION: 96 % | BODY MASS INDEX: 22.5 KG/M2 | DIASTOLIC BLOOD PRESSURE: 96 MMHG | HEIGHT: 75 IN

## 2023-09-12 DIAGNOSIS — E87.1 HYPONATREMIA: Primary | ICD-10-CM

## 2023-09-12 LAB
ANION GAP SERPL CALCULATED.3IONS-SCNC: 6 MMOL/L
BUN SERPL-MCNC: 15 MG/DL (ref 5–25)
CALCIUM SERPL-MCNC: 8.2 MG/DL (ref 8.4–10.2)
CHLORIDE SERPL-SCNC: 95 MMOL/L (ref 96–108)
CO2 SERPL-SCNC: 24 MMOL/L (ref 21–32)
CREAT SERPL-MCNC: 1.15 MG/DL (ref 0.6–1.3)
ERYTHROCYTE [DISTWIDTH] IN BLOOD BY AUTOMATED COUNT: 15.2 % (ref 11.6–15.1)
GFR SERPL CREATININE-BSD FRML MDRD: 57 ML/MIN/1.73SQ M
GLUCOSE P FAST SERPL-MCNC: 114 MG/DL (ref 65–99)
GLUCOSE SERPL-MCNC: 114 MG/DL (ref 65–140)
HCT VFR BLD AUTO: 25.2 % (ref 36.5–49.3)
HGB BLD-MCNC: 8.3 G/DL (ref 12–17)
MCH RBC QN AUTO: 29.4 PG (ref 26.8–34.3)
MCHC RBC AUTO-ENTMCNC: 32.9 G/DL (ref 31.4–37.4)
MCV RBC AUTO: 89 FL (ref 82–98)
PLATELET # BLD AUTO: 271 THOUSANDS/UL (ref 149–390)
PMV BLD AUTO: 8.2 FL (ref 8.9–12.7)
POTASSIUM SERPL-SCNC: 4.3 MMOL/L (ref 3.5–5.3)
RBC # BLD AUTO: 2.82 MILLION/UL (ref 3.88–5.62)
SODIUM SERPL-SCNC: 125 MMOL/L (ref 135–147)
WBC # BLD AUTO: 7.19 THOUSAND/UL (ref 4.31–10.16)

## 2023-09-12 PROCEDURE — 85027 COMPLETE CBC AUTOMATED: CPT | Performed by: UROLOGY

## 2023-09-12 PROCEDURE — 80048 BASIC METABOLIC PNL TOTAL CA: CPT | Performed by: UROLOGY

## 2023-09-12 RX ADMIN — LEVOFLOXACIN 500 MG: 500 TABLET, FILM COATED ORAL at 07:33

## 2023-09-12 RX ADMIN — FINASTERIDE 5 MG: 5 TABLET, FILM COATED ORAL at 08:55

## 2023-09-12 RX ADMIN — CEFAZOLIN SODIUM 1000 MG: 1 SOLUTION INTRAVENOUS at 07:33

## 2023-09-12 RX ADMIN — FERROUS SULFATE TAB 325 MG (65 MG ELEMENTAL FE) 325 MG: 325 (65 FE) TAB at 07:33

## 2023-09-12 RX ADMIN — CEFAZOLIN SODIUM 1000 MG: 1 SOLUTION INTRAVENOUS at 00:03

## 2023-09-12 RX ADMIN — METOPROLOL SUCCINATE 100 MG: 50 TABLET, EXTENDED RELEASE ORAL at 08:55

## 2023-09-12 NOTE — NURSING NOTE
Discharge instructions reviewed with pt and spouse via virtual nurse. Verbalized understanding. Left in stable condition with all belongings.

## 2023-09-12 NOTE — QUICK NOTE
Discussed case with urology. Patient will be discharged home today. Patient does have hyponatremia, sodium level 125 today. Patient states that he is aware of this and follows with nephrology as outpatient. Patient advised to have lab work later this week to monitor for improvement. Sodium level likely decreased due to fluids received during this admission. Patient advised to continue fluid restriction while at home.   Reviewed case with nephrology as well who will arrange for outpatient follow-up and repeat labs

## 2023-09-12 NOTE — PLAN OF CARE
Problem: MOBILITY - ADULT  Goal: Maintain or return to baseline ADL function  Description: INTERVENTIONS:  -  Assess patient's ability to carry out ADLs; assess patient's baseline for ADL function and identify physical deficits which impact ability to perform ADLs (bathing, care of mouth/teeth, toileting, grooming, dressing, etc.)  - Assess/evaluate cause of self-care deficits   - Assess range of motion  - Assess patient's mobility; develop plan if impaired  - Assess patient's need for assistive devices and provide as appropriate  - Encourage maximum independence but intervene and supervise when necessary  - Involve family in performance of ADLs  - Assess for home care needs following discharge   - Consider OT consult to assist with ADL evaluation and planning for discharge  - Provide patient education as appropriate  9/12/2023 1210 by Clotilde Hagen RN  Outcome: Adequate for Discharge  9/12/2023 1038 by Clotilde Hagen RN  Outcome: Progressing  Goal: Maintains/Returns to pre admission functional level  Description: INTERVENTIONS:  - Perform BMAT or MOVE assessment daily.   - Set and communicate daily mobility goal to care team and patient/family/caregiver. - Collaborate with rehabilitation services on mobility goals if consulted  - Perform Range of Motion 2 times a day. - Reposition patient every 2 hours.   - Dangle patient 2 times a day  - Stand patient 2 times a day  - Ambulate patient 2 times a day  - Out of bed to chair 3 times a day   - Out of bed for meals 3 times a day  - Out of bed for toileting  - Record patient progress and toleration of activity level   9/12/2023 1210 by Clotilde Hagen RN  Outcome: Adequate for Discharge  9/12/2023 1038 by Clotilde Hagen RN  Outcome: Progressing     Problem: PAIN - ADULT  Goal: Verbalizes/displays adequate comfort level or baseline comfort level  Description: Interventions:  - Encourage patient to monitor pain and request assistance  - Assess pain using appropriate pain scale  - Administer analgesics based on type and severity of pain and evaluate response  - Implement non-pharmacological measures as appropriate and evaluate response  - Consider cultural and social influences on pain and pain management  - Notify physician/advanced practitioner if interventions unsuccessful or patient reports new pain  9/12/2023 1210 by Domenick Bamberger, RN  Outcome: Adequate for Discharge  9/12/2023 1038 by Domenick Bamberger, RN  Outcome: Progressing     Problem: INFECTION - ADULT  Goal: Absence or prevention of progression during hospitalization  Description: INTERVENTIONS:  - Assess and monitor for signs and symptoms of infection  - Monitor lab/diagnostic results  - Monitor all insertion sites, i.e. indwelling lines, tubes, and drains  - Monitor endotracheal if appropriate and nasal secretions for changes in amount and color  - [place] appropriate cooling/warming therapies per order  - Administer medications as ordered  - Instruct and encourage patient and family to use good hand hygiene technique  - Identify and instruct in appropriate isolation precautions for identified infection/condition  9/12/2023 1210 by Domenick Bamberger, RN  Outcome: Adequate for Discharge  9/12/2023 1038 by Domenick Bamberger, RN  Outcome: Progressing  Goal: Absence of fever/infection during neutropenic period  Description: INTERVENTIONS:  - Monitor WBC    9/12/2023 1210 by Domenick Bamberger, RN  Outcome: Adequate for Discharge  9/12/2023 1038 by Domenick Bamberger, RN  Outcome: Progressing     Problem: SAFETY ADULT  Goal: Maintain or return to baseline ADL function  Description: INTERVENTIONS:  -  Assess patient's ability to carry out ADLs; assess patient's baseline for ADL function and identify physical deficits which impact ability to perform ADLs (bathing, care of mouth/teeth, toileting, grooming, dressing, etc.)  - Assess/evaluate cause of self-care deficits   - Assess range of motion  - Assess patient's mobility; develop plan if impaired  - Assess patient's need for assistive devices and provide as appropriate  - Encourage maximum independence but intervene and supervise when necessary  - Involve family in performance of ADLs  - Assess for home care needs following discharge   - Consider OT consult to assist with ADL evaluation and planning for discharge  - Provide patient education as appropriate  9/12/2023 1210 by Yoandy Huertas RN  Outcome: Adequate for Discharge  9/12/2023 1038 by Yoandy Huertas RN  Outcome: Progressing  Goal: Maintains/Returns to pre admission functional level  Description: INTERVENTIONS:  - Perform BMAT or MOVE assessment daily.   - Set and communicate daily mobility goal to care team and patient/family/caregiver. - Collaborate with rehabilitation services on mobility goals if consulted  - Perform Range of Motion 2 times a day. - Reposition patient every 2 hours.   - Dangle patient 2 times a day  - Stand patient 2 times a day  - Ambulate patient 2 times a day  - Out of bed to chair 3 times a day   - Out of bed for meals 3 times a day  - Out of bed for toileting  - Record patient progress and toleration of activity level   9/12/2023 1210 by Yoandy Huertas RN  Outcome: Adequate for Discharge  9/12/2023 1038 by Yoandy Huertas RN  Outcome: Progressing  Goal: Patient will remain free of falls  Description: INTERVENTIONS:  - Educate patient/family on patient safety including physical limitations  - Instruct patient to call for assistance with activity   - Consult OT/PT to assist with strengthening/mobility   - Keep Call bell within reach  - Keep bed low and locked with side rails adjusted as appropriate  - Keep care items and personal belongings within reach  - Initiate and maintain comfort rounds  - Make Fall Risk Sign visible to staff  - Offer Toileting every 2 Hours, in advance of need  - Initiate/Maintain bed alarm  - Obtain necessary fall risk management equipment: non skid  - Apply yellow socks and bracelet for high fall risk patients  - Consider moving patient to room near nurses station  9/12/2023 1210 by Verónica Andrade RN  Outcome: Adequate for Discharge  9/12/2023 1038 by Verónica Andrade RN  Outcome: Progressing     Problem: DISCHARGE PLANNING  Goal: Discharge to home or other facility with appropriate resources  Description: INTERVENTIONS:  - Identify barriers to discharge w/patient and caregiver  - Arrange for needed discharge resources and transportation as appropriate  - Identify discharge learning needs (meds, wound care, etc.)  - Refer to Case Management Department for coordinating discharge planning if the patient needs post-hospital services based on physician/advanced practitioner order or complex needs related to functional status, cognitive ability, or social support system  9/12/2023 1210 by Verónica Andrade RN  Outcome: Adequate for Discharge  9/12/2023 1038 by Verónica Andrade RN  Outcome: Progressing     Problem: Knowledge Deficit  Goal: Patient/family/caregiver demonstrates understanding of disease process, treatment plan, medications, and discharge instructions  Description: Complete learning assessment and assess knowledge base.   Interventions:  - Provide teaching at level of understanding  - Provide teaching via preferred learning methods  9/12/2023 1210 by Verónica Andrade RN  Outcome: Adequate for Discharge  9/12/2023 1038 by Verónica Andrade RN  Outcome: Progressing

## 2023-09-12 NOTE — DISCHARGE INSTR - AVS FIRST PAGE
Continue Haley catheter. Take cefpodoxime 200 mg twice a day  Appointment at Dr. Méndez Age office tomorrow 9/13/2023 at 9 AM to remove the Haley catheter.

## 2023-09-12 NOTE — DISCHARGE SUMMARY
Discharge Summary - Shayla Olvera. 81 y.o. male MRN: [id number]    Unit/Bed#: -01 Encounter: [id number]    Admission Date: 9/11/2023    Admitting Diagnosis: Retention of urine, unspecified [R33.9]    HPI: This is an 60-year-old male with a history of prostate carcinoma status post radiation therapy recently with refractory urinary retention. Procedures Performed: Cystoscopy, TURP    Summary of Hospital Course: The patient underwent channel TURP without complication. Postoperatively Haley catheter was left in place on slow CBI. Drainage remained clear. CBI was discontinued the following morning and drainage remained clear. Hemoglobin was 8.3, although there was minimal intraoperative blood loss. He has chronic anemia with preop hemoglobin 9.4. Sodium was 125, but he has chronic hyponatremia followed by nephrology. Patient was advised to follow-up with nephrology after discharge. I discussed discharge with the hospitalist who agrees with the plan. Significant Findings, Care, Treatment and Services Provided: Urinary retention secondary to prostatic obstruction from prostate cancer and radiation therapy treated with TURP and postoperative Haley with CBI. Complications: None    Discharge Diagnosis: Urinary retention, prostate carcinoma    Medical Problems     Resolved Problems  Date Reviewed: 9/7/2023   None         Condition at Discharge: good         Discharge instructions/Information to patient and family:   See after visit summary for information provided to patient and family. Provisions for Follow-Up Care:  See after visit summary for information related to follow-up care and any pertinent home health orders. PCP: Sarah Vaughan DO    Disposition: Home    Planned Readmission: No      Discharge Statement   I spent 30 minutes discharging the patient. This time was spent on the day of discharge. I had direct contact with the patient on the day of discharge.  Additional documentation is required if more than 30 minutes were spent on discharge. Discharge Medications:  See after visit summary for reconciled discharge medications provided to patient and family.

## 2023-09-12 NOTE — PLAN OF CARE
Problem: MOBILITY - ADULT  Goal: Maintain or return to baseline ADL function  Description: INTERVENTIONS:  -  Assess patient's ability to carry out ADLs; assess patient's baseline for ADL function and identify physical deficits which impact ability to perform ADLs (bathing, care of mouth/teeth, toileting, grooming, dressing, etc.)  - Assess/evaluate cause of self-care deficits   - Assess range of motion  - Assess patient's mobility; develop plan if impaired  - Assess patient's need for assistive devices and provide as appropriate  - Encourage maximum independence but intervene and supervise when necessary  - Involve family in performance of ADLs  - Assess for home care needs following discharge   - Consider OT consult to assist with ADL evaluation and planning for discharge  - Provide patient education as appropriate  Outcome: Progressing  Goal: Maintains/Returns to pre admission functional level  Description: INTERVENTIONS:  - Perform BMAT or MOVE assessment daily.   - Set and communicate daily mobility goal to care team and patient/family/caregiver. - Collaborate with rehabilitation services on mobility goals if consulted  - Perform Range of Motion 3 times a day. - Reposition patient every 2 hours.   - Dangle patient 3 times a day  - Stand patient 3 times a day  - Ambulate patient 3 times a day  - Out of bed to chair 3 times a day   - Out of bed for meals 3 times a day  - Out of bed for toileting  - Record patient progress and toleration of activity level   Outcome: Progressing     Problem: PAIN - ADULT  Goal: Verbalizes/displays adequate comfort level or baseline comfort level  Description: Interventions:  - Encourage patient to monitor pain and request assistance  - Assess pain using appropriate pain scale  - Administer analgesics based on type and severity of pain and evaluate response  - Implement non-pharmacological measures as appropriate and evaluate response  - Consider cultural and social influences on pain and pain management  - Notify physician/advanced practitioner if interventions unsuccessful or patient reports new pain  Outcome: Progressing     Problem: INFECTION - ADULT  Goal: Absence or prevention of progression during hospitalization  Description: INTERVENTIONS:  - Assess and monitor for signs and symptoms of infection  - Monitor lab/diagnostic results  - Monitor all insertion sites, i.e. indwelling lines, tubes, and drains  - Monitor endotracheal if appropriate and nasal secretions for changes in amount and color  - [place] appropriate cooling/warming therapies per order  - Administer medications as ordered  - Instruct and encourage patient and family to use good hand hygiene technique  - Identify and instruct in appropriate isolation precautions for identified infection/condition  Outcome: Progressing  Goal: Absence of fever/infection during neutropenic period  Description: INTERVENTIONS:  - Monitor WBC    Outcome: Progressing     Problem: SAFETY ADULT  Goal: Maintain or return to baseline ADL function  Description: INTERVENTIONS:  -  Assess patient's ability to carry out ADLs; assess patient's baseline for ADL function and identify physical deficits which impact ability to perform ADLs (bathing, care of mouth/teeth, toileting, grooming, dressing, etc.)  - Assess/evaluate cause of self-care deficits   - Assess range of motion  - Assess patient's mobility; develop plan if impaired  - Assess patient's need for assistive devices and provide as appropriate  - Encourage maximum independence but intervene and supervise when necessary  - Involve family in performance of ADLs  - Assess for home care needs following discharge   - Consider OT consult to assist with ADL evaluation and planning for discharge  - Provide patient education as appropriate  Outcome: Progressing  Goal: Maintains/Returns to pre admission functional level  Description: INTERVENTIONS:  - Perform BMAT or MOVE assessment daily.   - Set and communicate daily mobility goal to care team and patient/family/caregiver. - Collaborate with rehabilitation services on mobility goals if consulted  - Perform Range of Motion 3 times a day. - Reposition patient every 2 hours.   - Dangle patient 3 times a day  - Stand patient 3 times a day  - Ambulate patient 3 times a day  - Out of bed to chair 3 times a day   - Out of bed for meals 3 times a day  - Out of bed for toileting  - Record patient progress and toleration of activity level   Outcome: Progressing  Goal: Patient will remain free of falls  Description: INTERVENTIONS:  - Educate patient/family on patient safety including physical limitations  - Instruct patient to call for assistance with activity   - Consult OT/PT to assist with strengthening/mobility   - Keep Call bell within reach  - Keep bed low and locked with side rails adjusted as appropriate  - Keep care items and personal belongings within reach  - Initiate and maintain comfort rounds  - Make Fall Risk Sign visible to staff  - Offer Toileting every 2 Hours, in advance of need  - Initiate/Maintain bed alarm  - Apply yellow socks and bracelet for high fall risk patients  - Consider moving patient to room near nurses station  Outcome: Progressing     Problem: DISCHARGE PLANNING  Goal: Discharge to home or other facility with appropriate resources  Description: INTERVENTIONS:  - Identify barriers to discharge w/patient and caregiver  - Arrange for needed discharge resources and transportation as appropriate  - Identify discharge learning needs (meds, wound care, etc.)  - Arrange for interpretive services to assist at discharge as needed  - Refer to Case Management Department for coordinating discharge planning if the patient needs post-hospital services based on physician/advanced practitioner order or complex needs related to functional status, cognitive ability, or social support system  Outcome: Progressing     Problem: Knowledge Deficit  Goal: Patient/family/caregiver demonstrates understanding of disease process, treatment plan, medications, and discharge instructions  Description: Complete learning assessment and assess knowledge base.   Interventions:  - Provide teaching at level of understanding  - Provide teaching via preferred learning methods  Outcome: Progressing

## 2023-09-18 ENCOUNTER — APPOINTMENT (OUTPATIENT)
Dept: LAB | Facility: CLINIC | Age: 88
End: 2023-09-18
Payer: MEDICARE

## 2023-09-18 DIAGNOSIS — N18.31 STAGE 3A CHRONIC KIDNEY DISEASE (HCC): ICD-10-CM

## 2023-09-18 LAB
ANION GAP SERPL CALCULATED.3IONS-SCNC: 10 MMOL/L
BUN SERPL-MCNC: 18 MG/DL (ref 5–25)
CALCIUM SERPL-MCNC: 8.8 MG/DL (ref 8.4–10.2)
CHLORIDE SERPL-SCNC: 93 MMOL/L (ref 96–108)
CO2 SERPL-SCNC: 25 MMOL/L (ref 21–32)
CREAT SERPL-MCNC: 1.13 MG/DL (ref 0.6–1.3)
GFR SERPL CREATININE-BSD FRML MDRD: 58 ML/MIN/1.73SQ M
GLUCOSE P FAST SERPL-MCNC: 105 MG/DL (ref 65–99)
POTASSIUM SERPL-SCNC: 4.6 MMOL/L (ref 3.5–5.3)
SODIUM SERPL-SCNC: 128 MMOL/L (ref 135–147)

## 2023-09-18 PROCEDURE — 88344 IMHCHEM/IMCYTCHM EA MLT ANTB: CPT | Performed by: PATHOLOGY

## 2023-09-18 PROCEDURE — 88341 IMHCHEM/IMCYTCHM EA ADD ANTB: CPT | Performed by: PATHOLOGY

## 2023-09-18 PROCEDURE — 80048 BASIC METABOLIC PNL TOTAL CA: CPT

## 2023-09-18 PROCEDURE — 88305 TISSUE EXAM BY PATHOLOGIST: CPT | Performed by: PATHOLOGY

## 2023-09-18 PROCEDURE — 88342 IMHCHEM/IMCYTCHM 1ST ANTB: CPT | Performed by: PATHOLOGY

## 2023-09-18 PROCEDURE — 36415 COLL VENOUS BLD VENIPUNCTURE: CPT

## 2023-09-22 ENCOUNTER — APPOINTMENT (OUTPATIENT)
Dept: LAB | Facility: CLINIC | Age: 88
End: 2023-09-22
Payer: MEDICARE

## 2023-09-22 DIAGNOSIS — C61 PROSTATE CANCER (HCC): ICD-10-CM

## 2023-09-22 LAB
ERYTHROCYTE [DISTWIDTH] IN BLOOD BY AUTOMATED COUNT: 15.4 % (ref 11.6–15.1)
HCT VFR BLD AUTO: 28 % (ref 36.5–49.3)
HGB BLD-MCNC: 8.9 G/DL (ref 12–17)
MCH RBC QN AUTO: 28.4 PG (ref 26.8–34.3)
MCHC RBC AUTO-ENTMCNC: 31.8 G/DL (ref 31.4–37.4)
MCV RBC AUTO: 90 FL (ref 82–98)
PLATELET # BLD AUTO: 399 THOUSANDS/UL (ref 149–390)
PMV BLD AUTO: 8.4 FL (ref 8.9–12.7)
RBC # BLD AUTO: 3.13 MILLION/UL (ref 3.88–5.62)
WBC # BLD AUTO: 7.23 THOUSAND/UL (ref 4.31–10.16)

## 2023-09-22 PROCEDURE — 85027 COMPLETE CBC AUTOMATED: CPT

## 2023-09-22 PROCEDURE — 36415 COLL VENOUS BLD VENIPUNCTURE: CPT

## 2023-09-27 ENCOUNTER — OFFICE VISIT (OUTPATIENT)
Dept: NEPHROLOGY | Facility: CLINIC | Age: 88
End: 2023-09-27
Payer: MEDICARE

## 2023-09-27 VITALS
BODY MASS INDEX: 22.88 KG/M2 | DIASTOLIC BLOOD PRESSURE: 70 MMHG | SYSTOLIC BLOOD PRESSURE: 120 MMHG | OXYGEN SATURATION: 97 % | HEIGHT: 75 IN | WEIGHT: 184 LBS | HEART RATE: 82 BPM

## 2023-09-27 DIAGNOSIS — N18.31 STAGE 3A CHRONIC KIDNEY DISEASE (HCC): ICD-10-CM

## 2023-09-27 DIAGNOSIS — I48.91 NEW ONSET A-FIB (HCC): ICD-10-CM

## 2023-09-27 DIAGNOSIS — R60.0 BILATERAL LOWER EXTREMITY EDEMA: ICD-10-CM

## 2023-09-27 DIAGNOSIS — E22.2 SIADH (SYNDROME OF INAPPROPRIATE ADH PRODUCTION) (HCC): Primary | ICD-10-CM

## 2023-09-27 DIAGNOSIS — I10 ESSENTIAL HYPERTENSION: Chronic | ICD-10-CM

## 2023-09-27 DIAGNOSIS — E87.1 HYPONATREMIA: ICD-10-CM

## 2023-09-27 PROCEDURE — 99214 OFFICE O/P EST MOD 30 MIN: CPT | Performed by: INTERNAL MEDICINE

## 2023-09-27 RX ORDER — TAMSULOSIN HYDROCHLORIDE 0.4 MG/1
0.4 CAPSULE ORAL
COMMUNITY

## 2023-09-27 RX ORDER — FINASTERIDE 1 MG/1
1 TABLET, FILM COATED ORAL DAILY
COMMUNITY

## 2023-09-27 RX ORDER — FUROSEMIDE 20 MG/1
20 TABLET ORAL DAILY
Qty: 30 TABLET | Refills: 1 | Status: SHIPPED | OUTPATIENT
Start: 2023-09-27

## 2023-09-27 NOTE — PATIENT INSTRUCTIONS
Will consider reducing the Toprol back down to 50 mg and add Cardizem to help control the heart rate. I will contact today or tomorrow about that.

## 2023-09-27 NOTE — PROGRESS NOTES
[facility] [address] [place] [address]    Name: Marita Robles. YOB: 1935      Assessment/Plan:  CKD 3a  Continue for potential nephrotoxins, optimize care. Follow-up regular blood work. 2.  SIADH  Continue to encourage 1.1 liter fluid restriction, and low-sodium diet. Avoid thiazide or thiazide like diuretics. 3.  Hypertension  Continue current medications, blood pressures well controlled. 4.  Bilateral lower extremity edema  Continue to encourage low-sodium diet, continue furosemide 20 mg daily. 5.  Atrial fibrillation  Currently on rate control with metoprolol, continue care according to our cardiology colleagues. Problem List Items Addressed This Visit          Endocrine    SIADH (syndrome of inappropriate ADH production) ([address]) - Primary    Relevant Orders    Basic metabolic panel       Cardiovascular and Mediastinum    Essential hypertension (Chronic)    Relevant Medications    furosemide (LASIX) 20 mg tablet    New onset a-fib (HCC)       Genitourinary    Stage 3a chronic kidney disease (HCC)    Relevant Medications    furosemide (LASIX) 20 mg tablet    Other Relevant Orders    Basic metabolic panel       Other    Hyponatremia    Bilateral lower extremity edema    Relevant Medications    furosemide (LASIX) 20 mg tablet       Patient stable from renal standpoint, we will see him back for regular appointment in 6 months with labs to be done prior to that appointment provided during this visit. Subjective:      Patient ID: Marita Robles. is a 81 y.o. male. Patient presents for follow-up appoint. Reviewed the patient's labs in detail, most recent creatinine is 1.13 mg/dL, which gives him estimated GFR of 58 mL/min, most recent serum sodium level is 128 mmol/L with no other significant electrolyte abnormality noted.   He is taking all medications as prescribed and no specific side effects at this time, he denies use of nonsteroid anti-inflammatory medications. The following portions of the patient's history were reviewed and updated as appropriate: allergies, current medications, past family history, past medical history, past social history, past surgical history and problem list.    Review of Systems   All other systems reviewed and are negative. Social History     Socioeconomic History    Marital status: /Civil Union     Spouse name: None    Number of children: None    Years of education: None    Highest education level: None   Occupational History    Occupation: RETIRED   Tobacco Use    Smoking status: Never    Smokeless tobacco: Never   Vaping Use    Vaping Use: Never used   Substance and Sexual Activity    Alcohol use: Not Currently    Drug use: Not Currently    Sexual activity: Not Currently   Other Topics Concern    None   Social History Narrative        RETIRED     Social Determinants of Health     Financial Resource Strain: Not on file   Food Insecurity: No Food Insecurity (6/28/2023)    Hunger Vital Sign     Worried About Running Out of Food in the Last Year: Never true     Ran Out of Food in the Last Year: Never true   Transportation Needs: No Transportation Needs (6/28/2023)    PRAPARE - Transportation     Lack of Transportation (Medical): No     Lack of Transportation (Non-Medical):  No   Physical Activity: Not on file   Stress: Not on file   Social Connections: Not on file   Intimate Partner Violence: Not on file   Housing Stability: Low Risk  (6/28/2023)    Housing Stability Vital Sign     Unable to Pay for Housing in the Last Year: No     Number of Places Lived in the Last Year: 1     Unstable Housing in the Last Year: No     Past Medical History:   Diagnosis Date    Atrial fibrillation ([address])     Cancer ([address])     prostate cancer     Hypertension     Irregular heart beat     SIADH (syndrome of inappropriate ADH production) ([address])      Past Surgical History:   Procedure Laterality Date APPENDECTOMY      [place] TRURL ELECTROSURG RESCJ PROSTATE BLEED COMPLETE N/A 9/11/2023    Procedure: CYSTOSCOPY; TRANSURETHRAL RESECTION OF PROSTATE (TURP);   Surgeon: Blaine Saleh MD;  [facility];  Service: Urology       Current Outpatient Medications:     aspirin 81 mg chewable tablet, Chew 81 mg daily, Disp: , Rfl:     finasteride (PROPECIA) 1 MG tablet, Take 1 mg by mouth daily, Disp: , Rfl:     furosemide (LASIX) 20 mg tablet, Take 1 tablet (20 mg total) by mouth daily, Disp: 30 tablet, Rfl: 1    metoprolol succinate (TOPROL-XL) 100 mg 24 hr tablet, Take 1 tablet (100 mg total) by mouth daily, Disp: 90 tablet, Rfl: 3    tamsulosin (Flomax) 0.4 mg, Take 0.4 mg by mouth daily with dinner, Disp: , Rfl:     ferrous sulfate 325 (65 Fe) mg tablet, Take 1 tablet (325 mg total) by mouth daily with breakfast Do not start before July 3, 2023., Disp: 30 tablet, Rfl: 0    senna-docusate sodium (SENOKOT S) 8.6-50 mg per tablet, Take 2 tablets by mouth daily at bedtime May hold for loose stools, Disp: 60 tablet, Rfl: 0    Lab Results   Component Value Date    SODIUM 128 (L) 09/18/2023    K 4.6 09/18/2023    CL 93 (L) 09/18/2023    CO2 25 09/18/2023    AGAP 10 09/18/2023    BUN 18 09/18/2023    CREATININE 1.13 09/18/2023    GLUC 114 09/12/2023    GLUF 105 (H) 09/18/2023    CALCIUM 8.8 09/18/2023    AST 36 06/30/2023    ALT 31 06/30/2023    ALKPHOS 37 06/30/2023    TP 5.5 (L) 06/30/2023    TBILI 0.53 06/30/2023    EGFR 58 09/18/2023     Lab Results   Component Value Date    WBC 7.23 09/22/2023    HGB 8.9 (L) 09/22/2023    HCT 28.0 (L) 09/22/2023    MCV 90 09/22/2023     (H) 09/22/2023     Lab Results   Component Value Date    CHOLESTEROL 132 05/15/2023    CHOLESTEROL 162 05/03/2021     Lab Results   Component Value Date    HDL 66 05/15/2023    HDL 60 05/03/2021     Lab Results   Component Value Date    LDLCALC 55 05/15/2023    LDLCALC 87 05/03/2021     Lab Results   Component Value Date    TRIG 56 05/15/2023 TRIG 76 05/03/2021     No results found for: "CHOLHDL"  Lab Results   Component Value Date    EYM0NDPHYSBT 1.249 06/28/2023     Lab Results   Component Value Date    CALCIUM 8.8 09/18/2023    PHOS 3.6 07/01/2023     No results found for: "SPEP", "UPEP"  No results found for: "Abiel Greaser", "[id number]"        Objective:      /70 (BP Location: Left arm, Patient Position: Sitting, Cuff Size: Standard)   Pulse 82   Ht 6' 3" (1.905 m)   Wt 83.5 kg (184 lb)   SpO2 97%   BMI 23.00 kg/m²          Physical Exam  Vitals reviewed. Constitutional:       General: He is not in acute distress. Appearance: He is well-developed. HENT:      Head: Normocephalic and atraumatic. Eyes:      Conjunctiva/sclera: Conjunctivae normal.   Cardiovascular:      Rate and Rhythm: Normal rate and regular rhythm. Pulmonary:      Effort: Pulmonary effort is normal.      Breath sounds: Normal breath sounds. Abdominal:      Palpations: Abdomen is soft. Musculoskeletal:      Cervical back: Neck supple. Skin:     General: Skin is warm. Findings: No rash. Neurological:      Mental Status: He is alert and oriented to person, place, and time. Cranial Nerves: No cranial nerve deficit.    Psychiatric:         Behavior: Behavior normal.

## 2023-09-28 ENCOUNTER — HOSPITAL ENCOUNTER (OUTPATIENT)
Dept: CT IMAGING | Facility: HOSPITAL | Age: 88
End: 2023-09-28
Attending: UROLOGY
Payer: MEDICARE

## 2023-09-28 DIAGNOSIS — C68.0 MALIGNANT NEOPLASM OF URETHRA (HCC): ICD-10-CM

## 2023-09-28 DIAGNOSIS — C61 MALIGNANT NEOPLASM OF PROSTATE (HCC): ICD-10-CM

## 2023-09-28 PROCEDURE — 74178 CT ABD&PLV WO CNTR FLWD CNTR: CPT

## 2023-09-28 PROCEDURE — G1004 CDSM NDSC: HCPCS

## 2023-09-28 RX ADMIN — IOHEXOL 100 ML: 350 INJECTION, SOLUTION INTRAVENOUS at 12:49

## 2023-10-04 PROCEDURE — 87086 URINE CULTURE/COLONY COUNT: CPT | Performed by: UROLOGY

## 2023-10-05 ENCOUNTER — LAB REQUISITION (OUTPATIENT)
Dept: LAB | Facility: HOSPITAL | Age: 88
End: 2023-10-05
Payer: MEDICARE

## 2023-10-05 DIAGNOSIS — N39.0 URINARY TRACT INFECTION, SITE NOT SPECIFIED: ICD-10-CM

## 2023-10-06 ENCOUNTER — DOCUMENTATION (OUTPATIENT)
Dept: HEMATOLOGY ONCOLOGY | Facility: CLINIC | Age: 88
End: 2023-10-06

## 2023-10-06 ENCOUNTER — APPOINTMENT (OUTPATIENT)
Dept: LAB | Facility: CLINIC | Age: 88
End: 2023-10-06
Payer: MEDICARE

## 2023-10-06 DIAGNOSIS — E22.2 SIADH (SYNDROME OF INAPPROPRIATE ADH PRODUCTION) (HCC): ICD-10-CM

## 2023-10-06 DIAGNOSIS — N18.31 STAGE 3A CHRONIC KIDNEY DISEASE (HCC): ICD-10-CM

## 2023-10-06 LAB
ANION GAP SERPL CALCULATED.3IONS-SCNC: 10 MMOL/L
BACTERIA UR CULT: NORMAL
BUN SERPL-MCNC: 24 MG/DL (ref 5–25)
CALCIUM SERPL-MCNC: 8.6 MG/DL (ref 8.4–10.2)
CHLORIDE SERPL-SCNC: 93 MMOL/L (ref 96–108)
CO2 SERPL-SCNC: 25 MMOL/L (ref 21–32)
CREAT SERPL-MCNC: 1.4 MG/DL (ref 0.6–1.3)
GFR SERPL CREATININE-BSD FRML MDRD: 45 ML/MIN/1.73SQ M
GLUCOSE SERPL-MCNC: 138 MG/DL (ref 65–140)
POTASSIUM SERPL-SCNC: 4.3 MMOL/L (ref 3.5–5.3)
SODIUM SERPL-SCNC: 128 MMOL/L (ref 135–147)

## 2023-10-06 PROCEDURE — 80048 BASIC METABOLIC PNL TOTAL CA: CPT

## 2023-10-06 PROCEDURE — 36415 COLL VENOUS BLD VENIPUNCTURE: CPT

## 2023-10-09 ENCOUNTER — PATIENT OUTREACH (OUTPATIENT)
Dept: CASE MANAGEMENT | Facility: HOSPITAL | Age: 88
End: 2023-10-09

## 2023-10-09 ENCOUNTER — PATIENT OUTREACH (OUTPATIENT)
Dept: HEMATOLOGY ONCOLOGY | Facility: CLINIC | Age: 88
End: 2023-10-09

## 2023-10-09 DIAGNOSIS — N18.32 STAGE 3B CHRONIC KIDNEY DISEASE (HCC): Primary | ICD-10-CM

## 2023-10-09 DIAGNOSIS — C61 PROSTATE CANCER (HCC): Primary | ICD-10-CM

## 2023-10-09 NOTE — PROGRESS NOTES
LSW reviewed pt's chart and accepted the referral. Jaci Marinelli will contact patient to complete the initial OSW assessment, DT and offer any available resources and support patient may need.

## 2023-10-09 NOTE — PROGRESS NOTES
Call to pt and introduced myself as Nurse Navigator and explained my role in his care with coordinating appts, being a point of contact, providing support and connecting him with available resources as needed. General assessment completed and evaluated for any barriers to care. Referral to Oncology Social Work placed. Answered questions to pt's satisfaction. Reviewed upcoming appts. Provided support and encouraged to call with any questions or concerns. Future Appointments   Date Time Provider [address]   10/14/2023 10:30 AM [facility]   10/17/2023 11:00 AM Elliott Miranda DO HEM ONC [facility]   10/18/2023 10:00 AM Aron Valenzuela DO CARDIO [facility]   11/16/2023  2:00 PM Hilda Flores MD [facility]   11/28/2023 12:40 PM Taylor Conteh DO [facility]   12/15/2023 10:30 AM Selina Mayen DO Neph [facility]     Patient would like an appt in [facility] with Dr. Gabby Garvin. Message sent to Dr. Gabby Garvin requesting options for appt as there is nothing available until November. ONN will follow up     Able to obtain appointment with Dr. Gabby Garvin Wednesday 10/11/23 1:20. Patient aware agreeable and informed of location.

## 2023-10-11 ENCOUNTER — CONSULT (OUTPATIENT)
Dept: HEMATOLOGY ONCOLOGY | Facility: CLINIC | Age: 88
End: 2023-10-11
Payer: MEDICARE

## 2023-10-11 ENCOUNTER — TELEPHONE (OUTPATIENT)
Dept: HEMATOLOGY ONCOLOGY | Facility: CLINIC | Age: 88
End: 2023-10-11

## 2023-10-11 VITALS
TEMPERATURE: 97.2 F | OXYGEN SATURATION: 100 % | DIASTOLIC BLOOD PRESSURE: 80 MMHG | WEIGHT: 182 LBS | HEIGHT: 75 IN | RESPIRATION RATE: 17 BRPM | HEART RATE: 111 BPM | BODY MASS INDEX: 22.63 KG/M2 | SYSTOLIC BLOOD PRESSURE: 126 MMHG

## 2023-10-11 DIAGNOSIS — C67.5 MALIGNANT NEOPLASM OF BLADDER NECK (HCC): ICD-10-CM

## 2023-10-11 DIAGNOSIS — C67.9 MALIGNANT NEOPLASM OF URINARY BLADDER, UNSPECIFIED SITE (HCC): Primary | ICD-10-CM

## 2023-10-11 DIAGNOSIS — C61 PROSTATE CANCER (HCC): ICD-10-CM

## 2023-10-11 DIAGNOSIS — E53.8 FOLIC ACID DEFICIENCY: ICD-10-CM

## 2023-10-11 DIAGNOSIS — D64.9 ANEMIA, UNSPECIFIED TYPE: ICD-10-CM

## 2023-10-11 DIAGNOSIS — E53.8 VITAMIN B12 DEFICIENCY: ICD-10-CM

## 2023-10-11 DIAGNOSIS — C68.9 HIGH GRADE UROTHELIAL CARCINOMA PRESENT ON URINE CYTOLOGY (HCC): Primary | ICD-10-CM

## 2023-10-11 PROCEDURE — 99205 OFFICE O/P NEW HI 60 MIN: CPT | Performed by: INTERNAL MEDICINE

## 2023-10-11 NOTE — PROGRESS NOTES
Educated pt and his wife regarding treatment plan of Single Agent Keytruda, if pt tolerates single agent Keytruda there is a possibility of the addition of Padcev on days 1 and 8 every 3 weeks. Pt was provided with a phone contact list, medication information sheets, and diarrhea management sheet. Pt is on restricted fluid intake because of his SIADH. Chemo consent was signed. Message sent to infusion . Pt told he will need to have labs drawn the day before each treatment.

## 2023-10-11 NOTE — TELEPHONE ENCOUNTER
Pt needs to be scheduled every 3 weeks for Keytruda at [address] [place] [facility]. Pt does not want a port.

## 2023-10-11 NOTE — PROGRESS NOTES
Hematology/Oncology Outpatient Follow-up  Lugene Felty. 81 y.o. male 1935 [id number]    Date:  10/11/2023        Assessment and Plan:  1. Prostate cancer [facility])  Status post definitive radiation of the prostate about 12 years ago. His most recent PSA level from May of this year was undetectable. 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 - Ambulatory Referral to Hematology / Oncology    2. 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 The exact staging at this point is not entirely clear. He does not seem to have lymph node involvement in the pelvic region or obvious spread of his bladder cancer to the chest.  We did discuss the potential benefit of pursuing a PET CT scan as a baseline since the disease is involving the prostate area. We decided to avoid any IV contrast CT scan imaging to avoid further kidney dysfunction. We did discuss the usual recommendation in patients who are fit for treatment. The patient unfortunately is not in any shape to get surgical intervention with a radical cystectomy or concurrent chemoradiation. We did discuss a single agent pembrolizumab as immunotherapy. We also discussed the potential benefit of adding enfortumab vedotin (EV) to the immunotherapy as it was recently approved for cisplatin and ineligible patients with bladder cancer. However, he might not be a candidate for EV due to the extensive potential side effects related to the antibody drug conjugate. He was educated extensively about pembrolizumab and EV.   We will get him started on pembrolizumab alone as a single palliative treatment and monitor him closely. - CBC and differential; Future  - Comprehensive metabolic panel; Future  - Magnesium; Future  - TSH, 3rd generation with Free T4 reflex; Future  - UA (URINE) with reflex to Scope; Future  - NM PET CT skull base to mid thigh; Future    3. Anemia, unspecified type  Most likely multifactorial.  We will pursue basic work-up. - CBC and differential; Future  - Comprehensive metabolic panel; Future  - Magnesium; Future  - TSH, 3rd generation with Free T4 reflex; Future  - UA (URINE) with reflex to Scope; Future  - Iron Panel (Includes Ferritin, Iron Sat%, Iron, and TIBC); Future  - Ferritin; Future  - Vitamin B12; Future  - Folate; Future            HPI:  This is an 60-year-old male with history of atrial fibrillation, hypertension, SIADH and prostate cancer which was treated with definitive radiation around 12 years ago. The patient apparently complained about significant urinary symptoms just recently which was initially evaluated with an MRI of the pelvis. This showed moderate prostatomegaly on 5/30/2023. The patient had then TURP procedure on 9/11/2023 which was done by Dr. Mckenzie Blackwell from the urology team.  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 Recent CT renal protocol on 9/20/2023 showed:  IMPRESSION:     Markedly distended urinary bladder suggestive of chronic partial bladder outlet obstruction. Markedly enlarged prostate gland which indents the urinary bladder base in this patient with history of prostate neoplasm with significant internal heterogeneity   and areas of hypoattenuation suggestive of necrosis. Moderate left-sided hydroureteronephrosis to the level of the distal ureter which appears obstructed secondary to adjacent enlarged prostatic tissue. Absence of contrast excretion from the left kidney after a 15-minute delay.      Moderate right-sided hydroureteronephrosis to the level of the urinary bladder; excretion of contrast is demonstrated into the ureter after a 7-minute delay. Interval history:  The patient is seen via nephrology team for his SIADH and low sodium. His recent blood work on 9/22/2023 showed hemoglobin of 8.9 with platelet count of 917. White cell count was normal.  Sodium was 128 on 10/6/2023 with creatinine of 1.4.  ROS: Review of Systems   Constitutional:  Positive for fatigue. Negative for chills and fever. HENT:  Negative for ear pain and sore throat. Eyes:  Negative for pain and visual disturbance. Respiratory:  Negative for cough and shortness of breath. Cardiovascular:  Negative for chest pain and palpitations. Gastrointestinal:  Negative for abdominal pain and vomiting. Genitourinary:  Negative for dysuria and hematuria. Musculoskeletal:  Negative for arthralgias and back pain. Skin:  Negative for color change and rash. Neurological:  Negative for seizures and syncope. All other systems reviewed and are negative. Past Medical History:   Diagnosis Date    Atrial fibrillation ([address] [facility])     Cancer ([address] [facility])     prostate cancer     Hypertension     Irregular heart beat     SIADH (syndrome of inappropriate ADH production) ([address])        Past Surgical History:   Procedure Laterality Date    APPENDECTOMY      [place] TRURL ELECTROSURG RESCJ PROSTATE BLEED COMPLETE N/A 9/11/2023    Procedure: CYSTOSCOPY; TRANSURETHRAL RESECTION OF PROSTATE (TURP);   Surgeon: Shen Joshi MD;  Location: [facility];  Service: Urology       Social History     Socioeconomic History    Marital status: /Civil Union     Spouse name: None    Number of children: None    Years of education: None    Highest education level: None   Occupational History    Occupation: RETIRED   Tobacco Use    Smoking status: Never    Smokeless tobacco: Never   Vaping Use    Vaping Use: Never used   Substance and Sexual Activity    Alcohol use: Not Currently Drug use: Not Currently    Sexual activity: Not Currently   Other Topics Concern    None   Social History Narrative        RETIRED     Social Determinants of Health     Financial Resource Strain: Not on file   Food Insecurity: No Food Insecurity (6/28/2023)    Hunger Vital Sign     Worried About Running Out of Food in the Last Year: Never true     Ran Out of Food in the Last Year: Never true   Transportation Needs: No Transportation Needs (6/28/2023)    PRAPARE - Transportation     Lack of Transportation (Medical): No     Lack of Transportation (Non-Medical):  No   Physical Activity: Not on file   Stress: Not on file   Social Connections: Not on file   Intimate Partner Violence: Not on file   Housing Stability: Low Risk  (6/28/2023)    Housing Stability Vital Sign     Unable to Pay for Housing in the Last Year: No     Number of Places Lived in the Last Year: 1     Unstable Housing in the Last Year: No       Family History   Problem Relation Age of Onset    Hypertension Mother        No Known Allergies      Current Outpatient Medications:     aspirin 81 mg chewable tablet, Chew 81 mg daily, Disp: , Rfl:     finasteride (PROPECIA) 1 MG tablet, Take 1 mg by mouth daily, Disp: , Rfl:     furosemide (LASIX) 20 mg tablet, Take 1 tablet (20 mg total) by mouth daily, Disp: 30 tablet, Rfl: 1    metoprolol succinate (TOPROL-XL) 100 mg 24 hr tablet, Take 1 tablet (100 mg total) by mouth daily, Disp: 90 tablet, Rfl: 3    tamsulosin (Flomax) 0.4 mg, Take 0.4 mg by mouth daily with dinner, Disp: , Rfl:     ferrous sulfate 325 (65 Fe) mg tablet, Take 1 tablet (325 mg total) by mouth daily with breakfast Do not start before July 3, 2023., Disp: 30 tablet, Rfl: 0    senna-docusate sodium (SENOKOT S) 8.6-50 mg per tablet, Take 2 tablets by mouth daily at bedtime May hold for loose stools, Disp: 60 tablet, Rfl: 0      Physical Exam:  /80 (BP Location: Left arm, Patient Position: Sitting, Cuff Size: Adult)   Pulse (!) 111   Temp (!) 97.2 °F (36.2 °C)   Resp 17   Ht 6' 3" (1.905 m)   Wt 82.6 kg (182 lb)   SpO2 100%   BMI 22.75 kg/m²     Physical Exam  Constitutional:       Appearance: He is well-developed. HENT:      Head: Normocephalic and atraumatic. Nose: Nose normal.   Eyes:      General: No scleral icterus. Right eye: No discharge. Left eye: No discharge. Conjunctiva/sclera: Conjunctivae normal.      Pupils: Pupils are equal, round, and reactive to light. Neck:      Thyroid: No thyromegaly. Trachea: No tracheal deviation. Cardiovascular:      Rate and Rhythm: Normal rate and regular rhythm. Heart sounds: Normal heart sounds. No murmur heard. No friction rub. Pulmonary:      Effort: Pulmonary effort is normal. No respiratory distress. Breath sounds: Normal breath sounds. No wheezing or rales. Chest:      Chest wall: No tenderness. Abdominal:      General: There is no distension. Palpations: Abdomen is soft. There is no hepatomegaly or splenomegaly. Tenderness: There is no abdominal tenderness. There is no guarding or rebound. Musculoskeletal:         General: No tenderness or deformity. Normal range of motion. Cervical back: Normal range of motion and neck supple. Right lower leg: Edema present. Left lower leg: Edema present. Lymphadenopathy:      Cervical: No cervical adenopathy. Skin:     General: Skin is warm and dry. Coloration: Skin is pale. Findings: No erythema or rash. Neurological:      Mental Status: He is alert and oriented to person, place, and time. Cranial Nerves: No cranial nerve deficit. Coordination: Coordination normal.      Deep Tendon Reflexes: Reflexes are normal and symmetric. Psychiatric:         Behavior: Behavior normal.         Thought Content:  Thought content normal.         Judgment: Judgment normal.           Labs:  Lab Results   Component Value Date    WBC 7.23 09/22/2023    HGB 8.9 (L) 09/22/2023    HCT 28.0 (L) 09/22/2023    MCV 90 09/22/2023     (H) 09/22/2023     Lab Results   Component Value Date    K 4.3 10/06/2023    CL 93 (L) 10/06/2023    CO2 25 10/06/2023    BUN 24 10/06/2023    CREATININE 1.40 (H) 10/06/2023    GLUF 105 (H) 09/18/2023    CALCIUM 8.6 10/06/2023    CORRECTEDCA 9.4 05/15/2023    AST 36 06/30/2023    ALT 31 06/30/2023    ALKPHOS 37 06/30/2023    EGFR 45 10/06/2023     No results found for: "TSH"    Patient voiced understanding and agreement in the above discussion. Aware to contact our office with questions/symptoms in the interim.

## 2023-10-12 ENCOUNTER — PATIENT OUTREACH (OUTPATIENT)
Dept: HEMATOLOGY ONCOLOGY | Facility: CLINIC | Age: 88
End: 2023-10-12

## 2023-10-13 NOTE — PROGRESS NOTES
ONN outreached patient to follow up after the medical oncology consult. LVM with direct contact information and reminders of upcoming appointments.  Requested a call back   Future Appointments   Date Time Provider [address]   10/14/2023 10:30 AM [facility] US 1 [facility]   10/18/2023 10:00 AM Reji Ortiz DO CARDIO [facility]   10/30/2023  1:30 PM BE NM SLN PET RM 2 BE SLN NM BE NORTH   11/16/2023  2:00 PM Zari Garcia MD CARD [facility]   11/28/2023 12:40 PM Thalia Wolf DO [facility]   12/6/2023 10:00 AM Sergio Connelly MD Hem Onc [facility]   12/15/2023 10:30 AM Rosalina Green DO Neph [facility]

## 2023-10-14 ENCOUNTER — HOSPITAL ENCOUNTER (OUTPATIENT)
Dept: ULTRASOUND IMAGING | Facility: HOSPITAL | Age: 88
Discharge: HOME | End: 2023-10-14
Attending: UROLOGY
Payer: MEDICARE

## 2023-10-14 DIAGNOSIS — N13.30 UNSPECIFIED HYDRONEPHROSIS: ICD-10-CM

## 2023-10-14 PROCEDURE — 76775 US EXAM ABDO BACK WALL LIM: CPT

## 2023-10-16 ENCOUNTER — PATIENT OUTREACH (OUTPATIENT)
Dept: CASE MANAGEMENT | Facility: HOSPITAL | Age: 88
End: 2023-10-16

## 2023-10-16 NOTE — PROGRESS NOTES
Biopsychosocial and Barriers Assessment    Cancer Diagnosis: Bladder  Home/Cell Phone: [phone]  Emergency Contact: Smita Saxena (Spouse) [phone]   Marital Status:   Interpretation concerns, speaks another language, preferred language:  Cultural concerns: no  Ability to read or write: yes    Caregiver/Support: wife, Moy Baez: 3 daughters: one in [facility], one in [facility] and one in [place]. 6 grandchildren and 5 great grandchildren  Child/Elder care: no    Housing: no issues  Home Setup: two stories  Lives With: wife  Daily Living Activities: independent  Durable Medical Equipment: no  Ambulation: independent    [facility]  High co-pays with insurance:   High co-pays with medication coverage:  No medication coverage: no issues, has [place] and  [employer]    Primary Care Provider: Zahida Jackman  Hx of [facility] [address]: no  Hx of Short term rehab: no  Mental Health Hx: no  Substance Abuse Hx:   Employment: retired   Status/Location: yes, served [employer], is not service connected  Ability to pay bills: yes  POA/LW/AD: no  Transportation Plan/Concerns: no concerns, pt does not drive, but wife and daughters will      What do you know about your Cancer Diagnosis    What has your doctor told you about your cancer diagnosis: bladder    What has your doctor told you about your cancer treatment: he will need chemo infusion, but wishes to start after his daughter returns from her trip [place]    What specific concerns do you have about your diagnosis and treatment: wants to know more about treatments    Have you been made aware of any hair loss associated with treatment: na    Additional Comments:    LSW spoke with patient this morning to complete the initial OSW assessment, DT and offer available resources. Patient lives at home with his wife, Marii aSn. They have 3 adult daughters, 6 grandchildren and 5 great grandchildren. Pt has a lot of support.  He no longer drives, but his wife and daughters are able to provide transportation. Patient is independent at home, no assistive devices. Patient scored a 2 on the DT. He is worried about his upcoming treatment. He denies having pain, losing sleep or any financial concerns. LSW reviewed the OSW resources that are available. Pt denies the need for any at this time. Patient explains he will need to start chemo infusion treatments at [facility], but he wishes to wait until his dtr returns from [place]. LSW explained that I would be seeing him at the infusion center when he starts. Pt is agreeable to follow visits and calls. He was appreciative of the information. Emotional support provided. LSW will follow up as needed.

## 2023-10-17 ENCOUNTER — DOCUMENTATION (OUTPATIENT)
Dept: HEMATOLOGY ONCOLOGY | Facility: CLINIC | Age: 88
End: 2023-10-17

## 2023-10-17 NOTE — ASSESSMENT & PLAN NOTE
Lab Results   Component Value Date    EGFR 45 10/06/2023    EGFR 58 09/18/2023    EGFR 57 09/12/2023    CREATININE 1.40 (H) 10/06/2023    CREATININE 1.13 09/18/2023    CREATININE 1.15 09/12/2023     Patient's kidney function is little lower than usual, will continue to monitor for now. Recheck labs as indicated.

## 2023-10-17 NOTE — PROGRESS NOTES
Call to [facility] to facilitate scheduling per patients request to start 10/31/23. Spoke directly with [place] infusion staff who was able to appropriately schedule patient. Scheduled for 10/31/23 at 0900 at the [facility]. Blood work will be needed day prior to appointment. Once order dates are updated to the start date, infusion will schedule the rest of the treatments accordingly and provide dates to patient. ONN notified patient of same. Message returned to Rita Diaz requesting date changes for the [facility] orders.     Patient aware

## 2023-10-18 ENCOUNTER — OFFICE VISIT (OUTPATIENT)
Dept: CARDIOLOGY CLINIC | Facility: CLINIC | Age: 88
End: 2023-10-18
Payer: MEDICARE

## 2023-10-18 VITALS
HEART RATE: 92 BPM | BODY MASS INDEX: 22.5 KG/M2 | DIASTOLIC BLOOD PRESSURE: 74 MMHG | HEIGHT: 75 IN | WEIGHT: 181 LBS | RESPIRATION RATE: 18 BRPM | SYSTOLIC BLOOD PRESSURE: 118 MMHG | OXYGEN SATURATION: 99 %

## 2023-10-18 DIAGNOSIS — I49.3 PVC (PREMATURE VENTRICULAR CONTRACTION): ICD-10-CM

## 2023-10-18 DIAGNOSIS — R60.0 BILATERAL LOWER EXTREMITY EDEMA: ICD-10-CM

## 2023-10-18 DIAGNOSIS — I48.92 ATRIAL FLUTTER, UNSPECIFIED TYPE (HCC): Primary | ICD-10-CM

## 2023-10-18 DIAGNOSIS — N18.31 STAGE 3A CHRONIC KIDNEY DISEASE (HCC): ICD-10-CM

## 2023-10-18 DIAGNOSIS — I10 ESSENTIAL HYPERTENSION: Chronic | ICD-10-CM

## 2023-10-18 PROCEDURE — 99214 OFFICE O/P EST MOD 30 MIN: CPT | Performed by: INTERNAL MEDICINE

## 2023-10-18 NOTE — PROGRESS NOTES
Cardiology Follow up    Lugene Felty  [id number]  1935  [facility] [address] [address] [phone]      1. Atrial flutter, unspecified type ([address])        2. Essential hypertension        3. Stage 3a chronic kidney disease ([address])        4. Bilateral lower extremity edema        5. PVC (premature ventricular contraction)            Discussion/Summary:  1. Atrial fibrillation/atrial flutter with elevated [id number] score not on oral anticoagulation due to hematuria and anemia  2. 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 Hypertension  4. PVCs  5. CKD  6. Bilateral lower extremity edema  7. Anemia felt most likely multifactorial managed by hematology/oncology  8.   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 followed by oncology    -Transthoracic echocardiogram 6/28/2023 showing left-ventricular systolic function normal estimated LVEF 65% with mildly reduced right ventricular systolic function, trace tricuspid regurgitation  -Patient appears reasonably well rate controlled in office today  -We will continue aspirin 81 mg daily, metoprolol succinate 100 mg daily  -Patient currently on furosemide 20 mg daily managed by nephrology team  -Agree with sodium and fluid restricted diet along with use of compression stockings for patient to assist with lower extremity edema as he notes significant urine output with above regimen  -Zio patch monitor 8/2/2023 showing 100% atrial flutter with average heart rate 94 bpm with rare ventricular ectopic activity.  -Patient will follow-up with electrophysiology for discussion of potential ablation therapy and I have sent message to patient's oncology team and urology team to determine if patient can be safely initiated from their standpoint on oral anticoagulation with Eliquis 2.5 mg twice daily  -As patient will be starting Gelene Pipe if continues to have worsening lower extremity edema or addition of alternative symptoms will consider cardio oncology referral at that time  -I will see patient in 3 months or sooner if necessary  -Patient will continue to follow with nephrology team for evaluation and treatment of SIADH, CKD. -Patient counseled if he were to have any warning or alarm type symptoms he is to seek emergency medical care immediately. History of Present Illness:  -Patient is an 81-year-old male hospitalized in late June and early July 2023 at [address] [facility] found at that time to have urinary frequency with hyponatremia with concern for diabetes insipidus however was found to have SIADH and underwent nephrology evaluation during which time was also found to have atrial fibrillation/atrial flutter that appeared reasonably well rate controlled on beta-blocker therapy but after was trialed on oral anticoagulation found to have more significant hematuria that improved with discontinuation of anticoagulation. He was started on aspirin therapy and recommended to follow-up with cardiology and urology in the outpatient setting. He had been seen and evaluated by urology team Dr. Isabela Tejeda and unfortunately was found to have urothelial cancer after recent evaluation and met with hematology/oncology to discuss potential options for which patient will be undergoing Keytruda infusions. Patient was also seen and evaluated by electrophysiology team and even had discussion about ablation however would need to tolerate oral anticoagulation at least for short-term after procedure to allow for this to happen. He presents to the office today for scheduled follow-up. -Currently in the office today patient denies any chest pain, palpitations, lightness or dizziness, loss of consciousness, shortness of breath, orthopnea or bendopnea.   Patient has noticed over the last several weeks at least having lower extremity edema and was seen and evaluated by his nephrology team and placed on diuretic therapy. Patient notes significant urine output on current diuretic regimen managed by nephrology team and has been sticking to his sodium and fluid restricted diet.     Patient Active Problem List   Diagnosis    Generalized weakness    Prostate CA ([address])    Essential hypertension    Stage 3a chronic kidney disease (HCC)    Hyponatremia    Urinary retention    Anemia    New onset a-fib [facility])    Hospital discharge follow-up    Abnormal CT of the chest    History of echocardiogram    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)    Syncope and collapse    Preop examination    Bilateral lower extremity edema    High grade urothelial carcinoma present on urine cytology [facility])     Past Medical History:   Diagnosis Date    Atrial fibrillation (HCC)     Cancer (HCC)     prostate cancer     Hypertension     Irregular heart beat     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)      Social History     Socioeconomic History    Marital status: /Civil Union     Spouse name: Not on file    Number of children: Not on file    Years of education: Not on file    Highest education level: Not on file   Occupational History    Occupation: RETIRED   Tobacco Use    Smoking status: Never    Smokeless tobacco: Never   Vaping Use    Vaping Use: Never used   Substance and Sexual Activity    Alcohol use: Not Currently    Drug use: Not Currently    Sexual activity: Not Currently   Other Topics Concern    Not on file   Social History Narrative        RETIRED     Social Determinants of Health     Financial Resource Strain: Not on file   Food Insecurity: No Food Insecurity (6/28/2023)    Hunger Vital Sign     Worried About Running Out of Food in the Last Year: Never true     Ran Out of Food in the Last Year: Never true   Transportation Needs: No Transportation Needs (6/28/2023)    PRAPARE - Transportation     Lack of Transportation (Medical): No     Lack of Transportation (Non-Medical): No   Physical Activity: Not on file   Stress: Not on file   Social Connections: Not on file   Intimate Partner Violence: Not on file   Housing Stability: Low Risk  (6/28/2023)    Housing Stability Vital Sign     Unable to Pay for Housing in the Last Year: No     Number of Places Lived in the Last Year: 1     Unstable Housing in the Last Year: No      Family History   Problem Relation Age of Onset    Hypertension Mother      Past Surgical History:   Procedure Laterality Date    APPENDECTOMY      LA TRURL ELECTROSURG RESCJ PROSTATE BLEED COMPLETE N/A 9/11/2023    Procedure: CYSTOSCOPY; TRANSURETHRAL RESECTION OF PROSTATE (TURP);   Surgeon: Derick Feliciano MD;  Location: [facility];  Service: Urology       Current Outpatient Medications:     aspirin 81 mg chewable tablet, Chew 81 mg daily, Disp: , Rfl:     finasteride (PROPECIA) 1 MG tablet, Take 1 mg by mouth daily, Disp: , Rfl:     furosemide (LASIX) 20 mg tablet, Take 1 tablet (20 mg total) by mouth daily, Disp: 30 tablet, Rfl: 1    metoprolol succinate (TOPROL-XL) 100 mg 24 hr tablet, Take 1 tablet (100 mg total) by mouth daily, Disp: 90 tablet, Rfl: 3    tamsulosin (Flomax) 0.4 mg, Take 0.4 mg by mouth daily with dinner, Disp: , Rfl:     ferrous sulfate 325 (65 Fe) mg tablet, Take 1 tablet (325 mg total) by mouth daily with breakfast Do not start before July 3, 2023., Disp: 30 tablet, Rfl: 0    senna-docusate sodium (SENOKOT S) 8.6-50 mg per tablet, Take 2 tablets by mouth daily at bedtime May hold for loose stools, Disp: 60 tablet, Rfl: 0  No Known Allergies      Labs:  Appointment on 10/06/2023   Component Date Value    Sodium 10/06/2023 128 (L)     Potassium 10/06/2023 4.3     Chloride 10/06/2023 93 (L)     CO2 10/06/2023 25     ANION GAP 10/06/2023 10     BUN 10/06/2023 24     Creatinine 10/06/2023 1.40 (H)     Glucose 10/06/2023 138     Calcium 10/06/2023 8.6     eGFR 10/06/2023 45    Lab Requisition on 10/04/2023   Component Date Value    Urine Culture 10/04/2023 >100,000 cfu/ml    Appointment on 09/22/2023   Component Date Value    WBC 09/22/2023 7.23     RBC 09/22/2023 3.13 (L)     Hemoglobin 09/22/2023 8.9 (L)     Hematocrit 09/22/2023 28.0 (L)     MCV 09/22/2023 90     MCH 09/22/2023 28.4     MCHC 09/22/2023 31.8     RDW 09/22/2023 15.4 (H)     Platelets 85/74/5865 399 (H)     MPV 09/22/2023 8.4 (L)    Appointment on 09/18/2023   Component Date Value    Sodium 09/18/2023 128 (L)     Potassium 09/18/2023 4.6     Chloride 09/18/2023 93 (L)     CO2 09/18/2023 25     ANION GAP 09/18/2023 10     BUN 09/18/2023 18     Creatinine 09/18/2023 1.13     Glucose, Fasting 09/18/2023 105 (H)     Calcium 09/18/2023 8.8     eGFR 09/18/2023 58    Admission on 09/11/2023, Discharged on 09/12/2023   Component Date Value    ABO Grouping 09/02/2023 O     Rh Factor 09/02/2023 Positive     Antibody Screen 09/02/2023 Negative     Specimen Expiration Date 09/02/2023 [id number]     ABO Grouping 09/11/2023 O     Rh Factor 09/11/2023 Positive     Case Report 09/11/2023                      Value:Surgical Pathology Report                         Case: [id number]                                   Authorizing Provider:  Ernesto Benavidez MD        Collected:           09/11/2023 0843              Ordering Location:     [address] [place] [address] Received:            09/11/2023 1403                                     Operating Room                                                               Pathologist:           Maggie Mcpherson MD                                                                  Specimen:    Prostate                                                                                   Final Diagnosis 09/11/2023                      Value: This result contains rich text formatting which cannot be displayed here. Microscopic Description 09/11/2023                      Value: This result contains rich text formatting which cannot be displayed here. Note 09/11/2023                      Value: This result contains rich text formatting which cannot be displayed here. Additional Information 09/11/2023                      Value: This result contains rich text formatting which cannot be displayed here. Gross Description 09/11/2023                      Value: This result contains rich text formatting which cannot be displayed here. Clinical Information 09/11/2023                      Value:Ratty necrotic prostate tissue obstructing the distal prostatic urethra with minimally obstructing prostate tissue closer to the bladder neck.     WBC 09/12/2023 7.19     RBC 09/12/2023 2.82 (L)     Hemoglobin 09/12/2023 8.3 (L)     Hematocrit 09/12/2023 25.2 (L)     MCV 09/12/2023 89     MCH 09/12/2023 29.4     MCHC 09/12/2023 32.9     RDW 09/12/2023 15.2 (H)     Platelets 36/38/0825 271     MPV 09/12/2023 8.2 (L)     Sodium 09/12/2023 125 (L)     Potassium 09/12/2023 4.3     Chloride 09/12/2023 95 (L)     CO2 09/12/2023 24     ANION GAP 09/12/2023 6     BUN 09/12/2023 15     Creatinine 09/12/2023 1.15     Glucose 09/12/2023 114     Glucose, Fasting 09/12/2023 114 (H)     Calcium 09/12/2023 8.2 (L)     eGFR 09/12/2023 57    Appointment on 09/07/2023   Component Date Value    Sodium 09/07/2023 127 (L)     Potassium 09/07/2023 4.0     Chloride 09/07/2023 92 (L)     CO2 09/07/2023 27     ANION GAP 09/07/2023 8     BUN 09/07/2023 16     Creatinine 09/07/2023 1.17     Glucose, Fasting 09/07/2023 110 (H)     Calcium 09/07/2023 8.8     eGFR 09/07/2023 56    Appointment on 09/02/2023   Component Date Value    Sodium 09/02/2023 125 (L)     Potassium 09/02/2023 4.3     Chloride 09/02/2023 93 (L)     CO2 09/02/2023 26     ANION GAP 09/02/2023 6     BUN 09/02/2023 15     Creatinine 09/02/2023 1.15     Glucose 09/02/2023 104     Calcium 09/02/2023 8.3 (L)     eGFR 09/02/2023 57     WBC 09/02/2023 7.13     RBC 09/02/2023 3.30 (L)     Hemoglobin 09/02/2023 9.4 (L) Hematocrit 09/02/2023 29.3 (L)     MCV 09/02/2023 89     MCH 09/02/2023 28.5     MCHC 09/02/2023 32.1     RDW 09/02/2023 15.4 (H)     Platelets 16/72/6443 409 (H)     MPV 09/02/2023 8.8 (L)     Protime 09/02/2023 14.1     INR 09/02/2023 1.07     PTT 09/02/2023 37    Transcribe Orders on 09/02/2023   Component Date Value    Urine Culture 09/02/2023 >100,000 cfu/ml     Color, UA 09/02/2023 Light Yellow     Clarity, UA 09/02/2023 Turbid     Specific Gravity, UA 09/02/2023 1.009     pH, UA 09/02/2023 7.5     Leukocytes, UA 09/02/2023 Large (A)     Nitrite, UA 09/02/2023 Negative     Protein, UA 09/02/2023 70 (1+) (A)     Glucose, UA 09/02/2023 Negative     Ketones, UA 09/02/2023 Negative     Urobilinogen, UA 09/02/2023 <2.0     Bilirubin, UA 09/02/2023 Negative     Occult Blood, UA 09/02/2023 Moderate (A)     RBC, UA 09/02/2023 Innumerable (A)     WBC, UA 09/02/2023 Innumerable (A)     Epithelial Cells 09/02/2023 Occasional     Bacteria, UA 09/02/2023 Innumerable (A)     MUCUS THREADS 09/02/2023 Occasional (A)     WBC Clumps 09/02/2023 Present         Imaging: US kidney and bladder    Result Date: 10/14/2023  Narrative: RENAL ULTRASOUND INDICATION:   N13.30: Unspecified hydronephrosis. COMPARISON: CT renal protocol 9/28/2023 TECHNIQUE:   Ultrasound of the retroperitoneum was performed with a curvilinear transducer utilizing volumetric sweeps and still imaging techniques. FINDINGS: KIDNEYS: Symmetric and normal size. Right kidney:  11.0 x 5.6 x 5.8 cm. Volume 183.6 mL Left kidney:  10.3 x 5.0 x 5.3 cm. Volume 142.8 mL Right kidney Normal echogenicity and contour. No mass is identified. No hydronephrosis. No shadowing calculi. No perinephric fluid collections. Left kidney Normal echogenicity and contour. No mass is identified. Moderate hydroureteronephrosis. No shadowing calculi. No perinephric fluid collections. URETERS: Nonvisualized. BLADDER: Incomplete distended noting circumferential bladder wall thickening.  No focal thickening or mass lesions. Bilateral ureteral jets were not detected. Enlarged heterogeneous prostate with TURP defect measuring 7.2 x 6.5 x 7.4 cm (volume 180 mL). Impression: Resolution of right-sided hydronephrosis. Persistent left-sided hydroureteronephrosis. Heterogeneous enlarged prostate with TURP defect. 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 COMPARISON: CT abdomen/pelvis dated 9/20/2011. TECHNIQUE: Initial CT of the abdomen and pelvis was performed without intravenous contrast.  Subsequent dynamic CT evaluation of the abdomen and pelvis was performed after the administration of intravenous contrast in both nephrographic and delayed phases. Multiplanar 2D reformatted images were created from the source data. This examination, like all CT scans performed in the [facility], was performed utilizing techniques to minimize radiation dose exposure, including the use of iterative reconstruction and automated exposure control. Radiation dose length product (DLP) for this visit:  2997.68 mGy-cm IV Contrast:  100 mL of iohexol (OMNIPAQUE) Enteric Contrast:  Enteric contrast was not administered. FINDINGS: ABDOMEN RIGHT KIDNEY AND URETER: No solid renal mass. No detectable urothelial mass. Moderate hydroureteronephrosis to the level of the urinary bladder. Excretion of contrast is demonstrated into the ureter after a 7-minute delay. No urinary tract calculi. No perinephric collection. LEFT KIDNEY AND URETER: No solid renal mass. No detectable urothelial mass. Moderate hydroureteronephrosis to the level of the distal ureter which appears obstructed secondary to adjacent prostatic tissue (image 79, series 601). There is absence of contrast excretion from the kidney after a 15-minute delay.  No urinary tract calculi. No perinephric collection. URINARY BLADDER: Markedly distended. No bladder wall mass. No calculi. LOWER CHEST:  No clinically significant abnormality identified in the visualized lower chest. LIVER/BILIARY TREE:  Unremarkable. GALLBLADDER:  No calcified gallstones. No pericholecystic inflammatory change. SPLEEN:  Unremarkable. PANCREAS:  Unremarkable. ADRENAL GLANDS:  Unremarkable. STOMACH AND BOWEL:  Unremarkable. APPENDIX:  No findings to suggest appendicitis. ABDOMINOPELVIC CAVITY:  No ascites. No free intraperitoneal air. No lymphadenopathy. VESSELS: Atherosclerotic disease. No abdominal aortic aneurysm. PELVIS REPRODUCTIVE ORGANS: Two fiducial markers are noted within the prostate gland which is markedly enlarged measuring 8.0 x 7.9 x 6.6 cm and indents the urinary bladder base in this patient with known history of prostate neoplasm. Significant internal heterogeneity with areas of hypoattenuation are suggestive of necrosis. ABDOMINAL WALL/INGUINAL REGIONS:  Unremarkable. OSSEOUS STRUCTURES:  No acute fracture or destructive osseous lesion. Multilevel degenerative disc disease of the lumbar spine. Impression: Markedly distended urinary bladder suggestive of chronic partial bladder outlet obstruction. Markedly enlarged prostate gland which indents the urinary bladder base in this patient with history of prostate neoplasm with significant internal heterogeneity  and areas of hypoattenuation suggestive of necrosis. Moderate left-sided hydroureteronephrosis to the level of the distal ureter which appears obstructed secondary to adjacent enlarged prostatic tissue. Absence of contrast excretion from the left kidney after a 15-minute delay. Moderate right-sided hydroureteronephrosis to the level of the urinary bladder; excretion of contrast is demonstrated into the ureter after a 7-minute delay. The study was marked in EPIC for significant notification.  Workstation performed: [id number]     Review of Systems:  Review of Systems   Constitutional:  Negative for chills, diaphoresis, fatigue and fever. HENT:  Negative for trouble swallowing and voice change. Eyes:  Negative for pain and redness. Respiratory:  Negative for shortness of breath and wheezing. Cardiovascular:  Negative for chest pain, palpitations and leg swelling. Gastrointestinal:  Negative for abdominal pain, blood in stool, constipation, diarrhea, nausea and vomiting. Genitourinary:  Negative for dysuria. Musculoskeletal:  Positive for arthralgias. Negative for neck pain and neck stiffness. Skin:  Negative for rash. Neurological:  Negative for dizziness, syncope, light-headedness and headaches. Psychiatric/Behavioral:  Negative for agitation and confusion. All other systems reviewed and are negative. Vitals:    10/18/23 0944   BP: 118/74   Pulse: 92   Resp: 18   SpO2: 99%   Weight: 82.1 kg (181 lb)   Height: 6' 3" (1.905 m)     Vitals:    10/18/23 0944   Weight: 82.1 kg (181 lb)     Height: 6' 3" (190.5 cm)     Physical Exam:  Physical Exam  Vitals reviewed. Constitutional:       General: He is not in acute distress. Appearance: Normal appearance. He is not diaphoretic. HENT:      Head: Normocephalic and atraumatic. Eyes:      General:         Right eye: No discharge. Left eye: No discharge. Neck:      Comments: Trachea midline, no JVD present  Cardiovascular:      Rate and Rhythm: Normal rate. Rhythm irregular. Heart sounds: No friction rub. Pulmonary:      Effort: Pulmonary effort is normal. No respiratory distress. Breath sounds: No wheezing. Chest:      Chest wall: No tenderness. Abdominal:      General: Bowel sounds are normal.      Palpations: Abdomen is soft. Tenderness: There is no abdominal tenderness. There is no rebound. Musculoskeletal:      Right lower leg: Edema (1+) present. Left lower leg: Edema (1+) present.    Skin:     General: Skin is warm and dry.   Neurological:      Mental Status: He is alert. Comments: Awake, alert, able to answer questions appropriately, able to move extremities bilaterally.    Psychiatric:         Mood and Affect: Mood normal.         Behavior: Behavior normal.

## 2023-10-19 ENCOUNTER — TELEPHONE (OUTPATIENT)
Dept: CARDIOLOGY CLINIC | Facility: CLINIC | Age: 88
End: 2023-10-19

## 2023-10-19 NOTE — TELEPHONE ENCOUNTER
Attempted to call patient to discuss initiation of Eliquis 2.5 mg twice daily. I received information from both urology and hematology that patient would be safe to trial low-dose oral anticoagulation and monitor closely for issue. Unfortunately I was not able to reach patient but did leave a message on his phone with my name and office number to call back for a better time to speak to assist in initiating this medication.

## 2023-10-23 NOTE — PROGRESS NOTES
Oncology Finance Advocacy Intake and Intervention  Oncology Finance Counselor/Advocate placed call to patient. This writer informed patient that this writer is here to assist patient with billing questions, financial assistance, payment/payment plans, quotes, copayment assistance, insurance optimization, and insurance navigation.    This writer conducted a thorough benefit review of copayment, deductible, and out of pocket cost. This information is documented below and has been reviewed with patient.     Copayment: N/A  Deductible: N/A  Out of Pocket Cost: N/A  Insurance optimization (Limited benefit vs self-pay): N/A  Patient assistance status: N/A  Free Drug Applications: N/A  Interventions:  Pt has active medicare A & B  Pt also has active  for life  Added to careteam  Pt outreach to follow        Information above was review thoroughly with patient and patient was advise of possible assistance programs/interventions. If any question arise patient can contact this writer at below information. This information was given to patient at time of contact.      Sahara Wood  Phone:[phone]  Email: [email or web address]

## 2023-10-24 DIAGNOSIS — C68.9 HIGH GRADE UROTHELIAL CARCINOMA PRESENT ON URINE CYTOLOGY (HCC): Primary | ICD-10-CM

## 2023-10-24 RX ORDER — SODIUM CHLORIDE 9 MG/ML
20 INJECTION, SOLUTION INTRAVENOUS ONCE
Status: CANCELLED | OUTPATIENT
Start: 2023-10-31

## 2023-10-24 NOTE — PROGRESS NOTES
Called [phone] got voicemail left a message for pt to call me back  Called [phone]  called pt to go over his ins benefits & he thanked me for the call & the info

## 2023-10-25 PROCEDURE — 87086 URINE CULTURE/COLONY COUNT: CPT | Performed by: UROLOGY

## 2023-10-25 PROCEDURE — 87186 SC STD MICRODIL/AGAR DIL: CPT | Performed by: UROLOGY

## 2023-10-25 PROCEDURE — 87077 CULTURE AEROBIC IDENTIFY: CPT | Performed by: UROLOGY

## 2023-10-26 ENCOUNTER — LAB REQUISITION (OUTPATIENT)
Dept: LAB | Facility: HOSPITAL | Age: 88
End: 2023-10-26
Payer: MEDICARE

## 2023-10-26 DIAGNOSIS — N39.0 URINARY TRACT INFECTION, SITE NOT SPECIFIED: ICD-10-CM

## 2023-10-29 LAB
BACTERIA UR CULT: ABNORMAL
BACTERIA UR CULT: ABNORMAL

## 2023-10-30 ENCOUNTER — HOSPITAL ENCOUNTER (OUTPATIENT)
Dept: RADIOLOGY | Age: 88
Discharge: HOME | End: 2023-10-30
Payer: MEDICARE

## 2023-10-30 ENCOUNTER — APPOINTMENT (OUTPATIENT)
Dept: LAB | Facility: CLINIC | Age: 88
End: 2023-10-30
Payer: MEDICARE

## 2023-10-30 DIAGNOSIS — C67.9 MALIGNANT NEOPLASM OF URINARY BLADDER, UNSPECIFIED SITE (HCC): ICD-10-CM

## 2023-10-30 DIAGNOSIS — C67.5 MALIGNANT NEOPLASM OF BLADDER NECK (HCC): ICD-10-CM

## 2023-10-30 DIAGNOSIS — E53.8 VITAMIN B12 DEFICIENCY: ICD-10-CM

## 2023-10-30 DIAGNOSIS — C68.9 HIGH GRADE UROTHELIAL CARCINOMA PRESENT ON URINE CYTOLOGY (HCC): ICD-10-CM

## 2023-10-30 DIAGNOSIS — E53.8 FOLIC ACID DEFICIENCY: ICD-10-CM

## 2023-10-30 DIAGNOSIS — D64.9 ANEMIA, UNSPECIFIED TYPE: ICD-10-CM

## 2023-10-30 LAB
ALBUMIN SERPL BCP-MCNC: 3 G/DL (ref 3.5–5)
ALP SERPL-CCNC: 47 U/L (ref 34–104)
ALT SERPL W P-5'-P-CCNC: 29 U/L (ref 7–52)
ANION GAP SERPL CALCULATED.3IONS-SCNC: 9 MMOL/L
AST SERPL W P-5'-P-CCNC: 27 U/L (ref 13–39)
BACTERIA UR QL AUTO: ABNORMAL /HPF
BASOPHILS # BLD AUTO: 0.05 THOUSANDS/ÂΜL (ref 0–0.1)
BASOPHILS NFR BLD AUTO: 1 % (ref 0–1)
BILIRUB SERPL-MCNC: 0.51 MG/DL (ref 0.2–1)
BILIRUB UR QL STRIP: NEGATIVE
BUN SERPL-MCNC: 19 MG/DL (ref 5–25)
CALCIUM ALBUM COR SERPL-MCNC: 9.8 MG/DL (ref 8.3–10.1)
CALCIUM SERPL-MCNC: 9 MG/DL (ref 8.4–10.2)
CHLORIDE SERPL-SCNC: 96 MMOL/L (ref 96–108)
CLARITY UR: ABNORMAL
CO2 SERPL-SCNC: 24 MMOL/L (ref 21–32)
COLOR UR: YELLOW
CREAT SERPL-MCNC: 1.16 MG/DL (ref 0.6–1.3)
EOSINOPHIL # BLD AUTO: 0.1 THOUSAND/ÂΜL (ref 0–0.61)
EOSINOPHIL NFR BLD AUTO: 1 % (ref 0–6)
ERYTHROCYTE [DISTWIDTH] IN BLOOD BY AUTOMATED COUNT: 14.8 % (ref 11.6–15.1)
FERRITIN SERPL-MCNC: 339 NG/ML (ref 24–336)
FOLATE SERPL-MCNC: 4 NG/ML
GFR SERPL CREATININE-BSD FRML MDRD: 56 ML/MIN/1.73SQ M
GLUCOSE SERPL-MCNC: 107 MG/DL (ref 65–140)
GLUCOSE SERPL-MCNC: 110 MG/DL (ref 65–140)
GLUCOSE UR STRIP-MCNC: NEGATIVE MG/DL
HCT VFR BLD AUTO: 27.8 % (ref 36.5–49.3)
HGB BLD-MCNC: 8.9 G/DL (ref 12–17)
HGB UR QL STRIP.AUTO: ABNORMAL
IMM GRANULOCYTES # BLD AUTO: 0.07 THOUSAND/UL (ref 0–0.2)
IMM GRANULOCYTES NFR BLD AUTO: 1 % (ref 0–2)
IRON SATN MFR SERPL: 30 % (ref 15–50)
IRON SERPL-MCNC: 48 UG/DL (ref 50–212)
KETONES UR STRIP-MCNC: NEGATIVE MG/DL
LEUKOCYTE ESTERASE UR QL STRIP: ABNORMAL
LYMPHOCYTES # BLD AUTO: 0.4 THOUSANDS/ÂΜL (ref 0.6–4.47)
LYMPHOCYTES NFR BLD AUTO: 6 % (ref 14–44)
MAGNESIUM SERPL-MCNC: 2 MG/DL (ref 1.9–2.7)
MCH RBC QN AUTO: 28.3 PG (ref 26.8–34.3)
MCHC RBC AUTO-ENTMCNC: 32 G/DL (ref 31.4–37.4)
MCV RBC AUTO: 89 FL (ref 82–98)
MONOCYTES # BLD AUTO: 0.5 THOUSAND/ÂΜL (ref 0.17–1.22)
MONOCYTES NFR BLD AUTO: 7 % (ref 4–12)
MUCOUS THREADS UR QL AUTO: ABNORMAL
NEUTROPHILS # BLD AUTO: 6.16 THOUSANDS/ÂΜL (ref 1.85–7.62)
NEUTS SEG NFR BLD AUTO: 84 % (ref 43–75)
NITRITE UR QL STRIP: NEGATIVE
NON-SQ EPI CELLS URNS QL MICRO: ABNORMAL /HPF
NRBC BLD AUTO-RTO: 0 /100 WBCS
PH UR STRIP.AUTO: 7 [PH]
PLATELET # BLD AUTO: 463 THOUSANDS/UL (ref 149–390)
PMV BLD AUTO: 8.7 FL (ref 8.9–12.7)
POTASSIUM SERPL-SCNC: 4.3 MMOL/L (ref 3.5–5.3)
PROT SERPL-MCNC: 6.9 G/DL (ref 6.4–8.4)
PROT UR STRIP-MCNC: ABNORMAL MG/DL
RBC # BLD AUTO: 3.14 MILLION/UL (ref 3.88–5.62)
RBC #/AREA URNS AUTO: ABNORMAL /HPF
SODIUM SERPL-SCNC: 129 MMOL/L (ref 135–147)
SP GR UR STRIP.AUTO: 1.02 (ref 1–1.03)
T3FREE SERPL-MCNC: 2.5 PG/ML (ref 2.5–3.9)
TIBC SERPL-MCNC: 161 UG/DL (ref 250–450)
TSH SERPL DL<=0.05 MIU/L-ACNC: 2.56 UIU/ML (ref 0.45–4.5)
UIBC SERPL-MCNC: 113 UG/DL (ref 155–355)
UROBILINOGEN UR STRIP-ACNC: <2 MG/DL
VIT B12 SERPL-MCNC: 895 PG/ML (ref 180–914)
WBC # BLD AUTO: 7.28 THOUSAND/UL (ref 4.31–10.16)
WBC #/AREA URNS AUTO: ABNORMAL /HPF
WBC CLUMPS # UR AUTO: PRESENT /UL

## 2023-10-30 PROCEDURE — 85025 COMPLETE CBC W/AUTO DIFF WBC: CPT

## 2023-10-30 PROCEDURE — 84443 ASSAY THYROID STIM HORMONE: CPT

## 2023-10-30 PROCEDURE — 36415 COLL VENOUS BLD VENIPUNCTURE: CPT

## 2023-10-30 PROCEDURE — 78815 PET IMAGE W/CT SKULL-THIGH: CPT

## 2023-10-30 PROCEDURE — 82746 ASSAY OF FOLIC ACID SERUM: CPT

## 2023-10-30 PROCEDURE — 84481 FREE ASSAY (FT-3): CPT

## 2023-10-30 PROCEDURE — 82728 ASSAY OF FERRITIN: CPT

## 2023-10-30 PROCEDURE — A9552 F18 FDG: HCPCS

## 2023-10-30 PROCEDURE — 82948 REAGENT STRIP/BLOOD GLUCOSE: CPT

## 2023-10-30 PROCEDURE — 80053 COMPREHEN METABOLIC PANEL: CPT

## 2023-10-30 PROCEDURE — 83735 ASSAY OF MAGNESIUM: CPT

## 2023-10-30 PROCEDURE — G1004 CDSM NDSC: HCPCS

## 2023-10-30 PROCEDURE — 83540 ASSAY OF IRON: CPT

## 2023-10-30 PROCEDURE — 82607 VITAMIN B-12: CPT

## 2023-10-30 PROCEDURE — 83550 IRON BINDING TEST: CPT

## 2023-10-30 PROCEDURE — 81001 URINALYSIS AUTO W/SCOPE: CPT

## 2023-10-31 ENCOUNTER — HOSPITAL ENCOUNTER (OUTPATIENT)
Dept: INFUSION CENTER | Facility: HOSPITAL | Age: 88
Discharge: HOME | End: 2023-10-31
Attending: INTERNAL MEDICINE
Payer: MEDICARE

## 2023-10-31 VITALS
SYSTOLIC BLOOD PRESSURE: 133 MMHG | HEIGHT: 73 IN | TEMPERATURE: 96.3 F | BODY MASS INDEX: 23.81 KG/M2 | WEIGHT: 179.68 LBS | RESPIRATION RATE: 18 BRPM | OXYGEN SATURATION: 100 % | HEART RATE: 122 BPM | DIASTOLIC BLOOD PRESSURE: 72 MMHG

## 2023-10-31 DIAGNOSIS — C68.9 HIGH GRADE UROTHELIAL CARCINOMA PRESENT ON URINE CYTOLOGY (HCC): Primary | ICD-10-CM

## 2023-10-31 PROCEDURE — 96413 CHEMO IV INFUSION 1 HR: CPT

## 2023-10-31 RX ORDER — SODIUM CHLORIDE 9 MG/ML
20 INJECTION, SOLUTION INTRAVENOUS ONCE
Status: COMPLETED | OUTPATIENT
Start: 2023-10-31 | End: 2023-10-31

## 2023-10-31 RX ADMIN — SODIUM CHLORIDE 20 ML/HR: 0.9 INJECTION, SOLUTION INTRAVENOUS at 10:00

## 2023-10-31 RX ADMIN — SODIUM CHLORIDE 200 MG: 9 INJECTION, SOLUTION INTRAVENOUS at 10:19

## 2023-10-31 NOTE — PROGRESS NOTES
Keytruda given without incident. Patient offers no complaints on D/C. AVS given. Patient taken to the lobby via wheelchair with his wife.

## 2023-11-03 ENCOUNTER — PATIENT OUTREACH (OUTPATIENT)
Dept: HEMATOLOGY ONCOLOGY | Facility: CLINIC | Age: 88
End: 2023-11-03

## 2023-11-03 ENCOUNTER — PATIENT OUTREACH (OUTPATIENT)
Dept: CASE MANAGEMENT | Facility: HOSPITAL | Age: 88
End: 2023-11-03

## 2023-11-03 NOTE — PROGRESS NOTES
Patients wife called Daphne Bey today with concerns about the status of the patient. States something is very "off", lethargic, fatigued, weak, and struggling with significant anxiety. Wife is very concerned and upset. States would like to have PT/OT see patient to help strengthen and endurance.  Inbox sent to PCP asking for outreach to assist with options for both anxiety and fatigue     Will f/u with patient Monday

## 2023-11-03 NOTE — PROGRESS NOTES
LSW spoke with patient this afternoon. Patient reports he has started chemo infusion last week and so far is doing okay. Patient has not had any ill effects. Patient and his wife are getting around and help each other. Pt has no concerns or questions at this time. Emotional support provided. LSW will follow up with patient for emotional support.

## 2023-11-07 ENCOUNTER — PATIENT OUTREACH (OUTPATIENT)
Dept: HEMATOLOGY ONCOLOGY | Facility: CLINIC | Age: 88
End: 2023-11-07

## 2023-11-07 NOTE — PROGRESS NOTES
ONN called to check in with patient and spouse. Patient reports no issues at all with infusion side effects outside of some mild fatigue. PCP appt was scheduled end of November. Overall patient reports feeling better this week. ONN will follow.

## 2023-11-14 DIAGNOSIS — C68.9 HIGH GRADE UROTHELIAL CARCINOMA PRESENT ON URINE CYTOLOGY (HCC): Primary | ICD-10-CM

## 2023-11-14 RX ORDER — SODIUM CHLORIDE 9 MG/ML
20 INJECTION, SOLUTION INTRAVENOUS ONCE
Status: CANCELLED | OUTPATIENT
Start: 2023-11-21

## 2023-11-15 PROCEDURE — 87086 URINE CULTURE/COLONY COUNT: CPT | Performed by: UROLOGY

## 2023-11-15 NOTE — PROGRESS NOTES
EPS Follow-up Patient Evaluation - Kelly Fox 81 y.o. male MRN: [id number]      CC/HPI:   It was a pleasure to see Kelly Fox in our arrhythmia clinic at [facility]. As you know he is a 81 y.o.  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 Patient presented with his daughter as well as his wife who also provided part of the history. Patient reported having hematuria after Dominique placement during hospitalization with heparin drip treatment. Patient then tried doing self-catheterization but ended up having indwelling Dominique catheter due to enlarged prostate. Patient will be having TURP procedure in September. We discussed anticoagulation given elevated [id number] score of 3. We discussed starting Eliquis 2.5 mg twice daily after his procedure and if he tolerates without hematuria then can proceed with MAURO/atrial flutter ablation plus loop recorder placement. We also discussed other option would be watchman implantation however patient's EKGs have only shown atrial flutter which can be treated with ablation. If he ever has atrial fibrillation in the future then we can consider Watchman procedure. Interval history:   He now presents for a follow-up visit. 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 He is started on Keytruda. He also has dominique catheterization currently and will be going for removal next week. He is unsure if he will get new catheter. He otherwise notes swelling in legs that is stable. He takes Lasix as needed. He denies any bleeding at the moment. He did have bleeding at time of catheterization.      Past Medical History:  Past Medical History:   Diagnosis Date Atrial fibrillation (HCC)     Cancer (HCC)     prostate cancer     Hypertension     Irregular heart beat     SIADH (syndrome of inappropriate ADH production) ([facility])        Medications:      Current Outpatient Medications:     aspirin 81 mg chewable tablet, Chew 81 mg daily, Disp: , Rfl:     cephalexin (KEFLEX) 500 mg capsule, Take 500 mg by mouth 2 (two) times a day, Disp: , Rfl:     finasteride (PROPECIA) 1 MG tablet, Take 1 mg by mouth daily, Disp: , Rfl:     furosemide (LASIX) 20 mg tablet, Take 1 tablet (20 mg total) by mouth daily, Disp: 30 tablet, Rfl: 1    metoprolol succinate (TOPROL-XL) 100 mg 24 hr tablet, Take 1 tablet (100 mg total) by mouth daily, Disp: 90 tablet, Rfl: 3    tamsulosin (Flomax) 0.4 mg, Take 0.4 mg by mouth daily with dinner, Disp: , Rfl:     ferrous sulfate 325 (65 Fe) mg tablet, Take 1 tablet (325 mg total) by mouth daily with breakfast Do not start before July 3, 2023., Disp: 30 tablet, Rfl: 0    senna-docusate sodium (SENOKOT S) 8.6-50 mg per tablet, Take 2 tablets by mouth daily at bedtime May hold for loose stools, Disp: 60 tablet, Rfl: 0     Family History   Problem Relation Age of Onset    Hypertension Mother      Social History     Socioeconomic History    Marital status: /Civil Union     Spouse name: Not on file    Number of children: Not on file    Years of education: Not on file    Highest education level: Not on file   Occupational History    Occupation: RETIRED   Tobacco Use    Smoking status: Never    Smokeless tobacco: Never   Vaping Use    Vaping Use: Never used   Substance and Sexual Activity    Alcohol use: Not Currently    Drug use: Not Currently    Sexual activity: Not Currently   Other Topics Concern    Not on file   Social History Narrative        RETIRED     Social Determinants of Health     Financial Resource Strain: Not on file   Food Insecurity: No Food Insecurity (6/28/2023)    Hunger Vital Sign     Worried About Running Out of Food in the Last Year: Never true     [address] in the Last Year: Never true   Transportation Needs: No Transportation Needs (6/28/2023)    PRAPARE - Transportation     Lack of Transportation (Medical): No     Lack of Transportation (Non-Medical): No   Physical Activity: Not on file   Stress: Not on file   Social Connections: Not on file   Intimate Partner Violence: Not on file   Housing Stability: Low Risk  (6/28/2023)    Housing Stability Vital Sign     Unable to Pay for Housing in the Last Year: No     Number of Places Lived in the Last Year: 1     Unstable Housing in the Last Year: No     Social History     Tobacco Use   Smoking Status Never   Smokeless Tobacco Never     Social History     Substance and Sexual Activity   Alcohol Use Not Currently       Review of Systems   Constitutional: Negative for chills and fever. HENT: Negative. Eyes:  Negative for blurred vision and double vision. Cardiovascular:  Positive for leg swelling. Negative for chest pain, dyspnea on exertion, near-syncope, orthopnea, palpitations, paroxysmal nocturnal dyspnea and syncope. Respiratory:  Negative for cough and sputum production. Endocrine: Negative. Skin: Negative. Negative for rash. Musculoskeletal: Negative. Negative for arthritis and joint pain. Gastrointestinal:  Negative for abdominal pain, nausea and vomiting. Genitourinary: Negative. Neurological: Negative. Negative for dizziness and light-headedness. Psychiatric/Behavioral: Negative. The patient is not nervous/anxious. Objective:     Vitals: Blood pressure 130/76, pulse (!) 114, height 6' 1.07" (1.856 m), weight 79.9 kg (176 lb 3.2 oz). , Body mass index is 23.2 kg/m². ,        Physical Exam:    GEN: Nino Chahal appears well, alert and oriented x 3, pleasant and cooperative   HEENT: pupils equal, round, and reactive to light; extraocular muscles intact  NECK: supple, no carotid bruits   HEART: regular rhythm, tachycardic, normal S1 and S2, no murmurs, clicks, gallops or rubs   LUNGS: clear to auscultation bilaterally; no wheezes, rales, or rhonchi   ABDOMEN: normal bowel sounds, soft, no tenderness, no distention  EXTREMITIES: peripheral pulses normal; no clubbing, cyanosis, or edema  NEURO: no focal findings   SKIN: normal without suspicious lesions on exposed skin      Labs & Results:  Below is the patient's most recent value for Albumin, ALT, AST, BUN, Calcium, Chloride, Cholesterol, CO2, Creatinine, GFR, Glucose, HDL, Hematocrit, Hemoglobin, Hemoglobin A1C, LDL, Magnesium, Phosphorus, Platelets, Potassium, PSA, Sodium, Triglycerides, and WBC. Lab Results   Component Value Date    ALT 29 10/30/2023    AST 27 10/30/2023    BUN 19 10/30/2023    CALCIUM 9.0 10/30/2023    CL 96 10/30/2023    CO2 24 10/30/2023    CREATININE 1.16 10/30/2023    HDL 66 05/15/2023    HCT 27.8 (L) 10/30/2023    HGB 8.9 (L) 10/30/2023    HGBA1C 5.4 06/30/2023    MG 2.0 10/30/2023    PHOS 3.6 07/01/2023     (H) 10/30/2023    K 4.3 10/30/2023    PSA <0.1 05/08/2023    TRIG 56 05/15/2023    WBC 7.28 10/30/2023     Note: for a comprehensive list of the patient's lab results, access the Results Review activity. Cardiac testing:     I personally reviewed the ECG performed in the clinic on 11/16/23. It reveals atrial flutter with ventricular rate of 114 bpm.     Echocardiograms:  No results found for this or any previous visit. No results found for this or any previous visit. Catheterizations:   No results found for this or any previous visit. Stress Tests:  No results found for this or any previous visit. Holter monitor -   No results found for this or any previous visit. No results found for this or any previous visit.         ASSESSMENT/PLAN:  Typical atrial flutter  We had discussed MAURO/atrial flutter ablation vs. Watchman implant  Patient was planning to have TURP  Palma was to start Eliquis 2.5 mg twice daily due to hematuria  Patient now has dominique catheter and had bleeding at time of placement. He is interested in ablation but he need to take Eliquis 2.5 mg twice daily for about 4 week to see if he will be able to tolerate it after the ablation  He will let us know when he would like to start Eliquis since it is unclear how many times he will need to do catheterization   Rates mildly elevated; recommended he obtain heart rate reading once daily and call us after a week with numbers  Continue on metoprolol and aspirin   HTN  Normotensive in office  Maintained on diet control   Invasive high grade urothelial carcinoma involving prostate  Followed by Oncology  Starting [facility]  Patient currently has dominique catheter; will be going next week for replacement  Has bleeding at time of placement.

## 2023-11-16 ENCOUNTER — LAB REQUISITION (OUTPATIENT)
Dept: LAB | Facility: HOSPITAL | Age: 88
End: 2023-11-16
Payer: MEDICARE

## 2023-11-16 ENCOUNTER — OFFICE VISIT (OUTPATIENT)
Dept: CARDIOLOGY CLINIC | Facility: CLINIC | Age: 88
End: 2023-11-16
Payer: MEDICARE

## 2023-11-16 VITALS
DIASTOLIC BLOOD PRESSURE: 76 MMHG | HEIGHT: 73 IN | SYSTOLIC BLOOD PRESSURE: 130 MMHG | BODY MASS INDEX: 23.35 KG/M2 | WEIGHT: 176.2 LBS | HEART RATE: 114 BPM

## 2023-11-16 DIAGNOSIS — N39.0 URINARY TRACT INFECTION, SITE NOT SPECIFIED: ICD-10-CM

## 2023-11-16 DIAGNOSIS — I48.91 ATRIAL FIBRILLATION, UNSPECIFIED TYPE (HCC): Primary | ICD-10-CM

## 2023-11-16 PROCEDURE — 99215 OFFICE O/P EST HI 40 MIN: CPT | Performed by: INTERNAL MEDICINE

## 2023-11-16 PROCEDURE — 93000 ELECTROCARDIOGRAM COMPLETE: CPT | Performed by: INTERNAL MEDICINE

## 2023-11-16 RX ORDER — CEFPODOXIME PROXETIL 200 MG/1
200 TABLET, FILM COATED ORAL 2 TIMES DAILY
COMMUNITY
Start: 2023-10-05 | End: 2023-11-16 | Stop reason: SDUPTHER

## 2023-11-16 RX ORDER — CEPHALEXIN 500 MG/1
500 CAPSULE ORAL 2 TIMES DAILY
COMMUNITY
Start: 2023-11-15 | End: 2023-11-23

## 2023-11-16 NOTE — PATIENT INSTRUCTIONS
Check your blood pressure once a day. If heart rate is staying above 100 bpm then let us know.      Also let us know when you are stable to start Eliquis 2.5 mg twice daily

## 2023-11-17 ENCOUNTER — APPOINTMENT (OUTPATIENT)
Dept: LAB | Facility: CLINIC | Age: 88
End: 2023-11-17
Payer: MEDICARE

## 2023-11-17 ENCOUNTER — HOSPITAL ENCOUNTER (EMERGENCY)
Facility: HOSPITAL | Age: 88
Discharge: HOME | End: 2023-11-17
Attending: FAMILY MEDICINE
Payer: MEDICARE

## 2023-11-17 VITALS
RESPIRATION RATE: 18 BRPM | BODY MASS INDEX: 23.33 KG/M2 | WEIGHT: 176 LBS | HEART RATE: 129 BPM | DIASTOLIC BLOOD PRESSURE: 80 MMHG | OXYGEN SATURATION: 96 % | SYSTOLIC BLOOD PRESSURE: 126 MMHG | HEIGHT: 73 IN | TEMPERATURE: 97.5 F

## 2023-11-17 DIAGNOSIS — I48.91 ATRIAL FIBRILLATION (HCC): ICD-10-CM

## 2023-11-17 DIAGNOSIS — C68.9 HIGH GRADE UROTHELIAL CARCINOMA PRESENT ON URINE CYTOLOGY (HCC): ICD-10-CM

## 2023-11-17 DIAGNOSIS — T83.9XXA PROBLEM WITH FOLEY CATHETER, INITIAL ENCOUNTER (HCC): Primary | ICD-10-CM

## 2023-11-17 DIAGNOSIS — N18.32 STAGE 3B CHRONIC KIDNEY DISEASE (HCC): ICD-10-CM

## 2023-11-17 PROBLEM — C67.9 BLADDER CANCER (HCC): Status: ACTIVE | Noted: 2023-11-17

## 2023-11-17 LAB
ALBUMIN SERPL BCP-MCNC: 3 G/DL (ref 3.5–5)
ALP SERPL-CCNC: 45 U/L (ref 34–104)
ALT SERPL W P-5'-P-CCNC: 21 U/L (ref 7–52)
ANION GAP SERPL CALCULATED.3IONS-SCNC: 10 MMOL/L
AST SERPL W P-5'-P-CCNC: 25 U/L (ref 13–39)
BACTERIA UR CULT: NORMAL
BASOPHILS # BLD AUTO: 0.04 THOUSANDS/ÂΜL (ref 0–0.1)
BASOPHILS NFR BLD AUTO: 1 % (ref 0–1)
BILIRUB SERPL-MCNC: 0.51 MG/DL (ref 0.2–1)
BUN SERPL-MCNC: 24 MG/DL (ref 5–25)
CALCIUM ALBUM COR SERPL-MCNC: 9.8 MG/DL (ref 8.3–10.1)
CALCIUM SERPL-MCNC: 9 MG/DL (ref 8.4–10.2)
CHLORIDE SERPL-SCNC: 98 MMOL/L (ref 96–108)
CO2 SERPL-SCNC: 24 MMOL/L (ref 21–32)
CREAT SERPL-MCNC: 1.23 MG/DL (ref 0.6–1.3)
EOSINOPHIL # BLD AUTO: 0.11 THOUSAND/ÂΜL (ref 0–0.61)
EOSINOPHIL NFR BLD AUTO: 1 % (ref 0–6)
ERYTHROCYTE [DISTWIDTH] IN BLOOD BY AUTOMATED COUNT: 15.8 % (ref 11.6–15.1)
GFR SERPL CREATININE-BSD FRML MDRD: 52 ML/MIN/1.73SQ M
GLUCOSE P FAST SERPL-MCNC: 137 MG/DL (ref 65–99)
HCT VFR BLD AUTO: 28 % (ref 36.5–49.3)
HGB BLD-MCNC: 8.9 G/DL (ref 12–17)
IMM GRANULOCYTES # BLD AUTO: 0.12 THOUSAND/UL (ref 0–0.2)
IMM GRANULOCYTES NFR BLD AUTO: 1 % (ref 0–2)
LYMPHOCYTES # BLD AUTO: 0.6 THOUSANDS/ÂΜL (ref 0.6–4.47)
LYMPHOCYTES NFR BLD AUTO: 7 % (ref 14–44)
MCH RBC QN AUTO: 28.2 PG (ref 26.8–34.3)
MCHC RBC AUTO-ENTMCNC: 31.8 G/DL (ref 31.4–37.4)
MCV RBC AUTO: 89 FL (ref 82–98)
MONOCYTES # BLD AUTO: 0.62 THOUSAND/ÂΜL (ref 0.17–1.22)
MONOCYTES NFR BLD AUTO: 7 % (ref 4–12)
NEUTROPHILS # BLD AUTO: 6.96 THOUSANDS/ÂΜL (ref 1.85–7.62)
NEUTS SEG NFR BLD AUTO: 83 % (ref 43–75)
NRBC BLD AUTO-RTO: 0 /100 WBCS
PLATELET # BLD AUTO: 406 THOUSANDS/UL (ref 149–390)
PMV BLD AUTO: 9.6 FL (ref 8.9–12.7)
POTASSIUM SERPL-SCNC: 4 MMOL/L (ref 3.5–5.3)
PROT SERPL-MCNC: 6.4 G/DL (ref 6.4–8.4)
RBC # BLD AUTO: 3.16 MILLION/UL (ref 3.88–5.62)
SODIUM SERPL-SCNC: 132 MMOL/L (ref 135–147)
WBC # BLD AUTO: 8.45 THOUSAND/UL (ref 4.31–10.16)

## 2023-11-17 PROCEDURE — 99284 EMERGENCY DEPT VISIT MOD MDM: CPT | Performed by: FAMILY MEDICINE

## 2023-11-17 PROCEDURE — 36415 COLL VENOUS BLD VENIPUNCTURE: CPT

## 2023-11-17 PROCEDURE — 80053 COMPREHEN METABOLIC PANEL: CPT

## 2023-11-17 PROCEDURE — 99283 EMERGENCY DEPT VISIT LOW MDM: CPT

## 2023-11-17 PROCEDURE — 51798 US URINE CAPACITY MEASURE: CPT

## 2023-11-17 PROCEDURE — 85025 COMPLETE CBC W/AUTO DIFF WBC: CPT

## 2023-11-17 PROCEDURE — 93005 ELECTROCARDIOGRAM TRACING: CPT

## 2023-11-17 RX ORDER — METOPROLOL TARTRATE 1 MG/ML
2.5 INJECTION, SOLUTION INTRAVENOUS ONCE
Status: DISCONTINUED | OUTPATIENT
Start: 2023-11-17 | End: 2023-11-17 | Stop reason: HOSPADM

## 2023-11-17 NOTE — ED PROVIDER NOTES
History  Chief Complaint   Patient presents with    Urinary Retention     Pt has a catheter in, per urology he was toldto come in as he has not voided since yesterday. Belives his catheter is blocked     HPI    Prior to Admission Medications   Prescriptions Last Dose Informant Patient Reported? Taking?   aspirin 81 mg chewable tablet  Self Yes No   Sig: Chew 81 mg daily   cephalexin (KEFLEX) 500 mg capsule   Yes No   Sig: Take 500 mg by mouth 2 (two) times a day   ferrous sulfate 325 (65 Fe) mg tablet  Self No No   Sig: Take 1 tablet (325 mg total) by mouth daily with breakfast Do not start before July 3, 2023. finasteride (PROPECIA) 1 MG tablet  Self Yes No   Sig: Take 1 mg by mouth daily   furosemide (LASIX) 20 mg tablet  Self No No   Sig: Take 1 tablet (20 mg total) by mouth daily   metoprolol succinate (TOPROL-XL) 100 mg 24 hr tablet  Self No No   Sig: Take 1 tablet (100 mg total) by mouth daily   senna-docusate sodium (SENOKOT S) 8.6-50 mg per tablet  Self No No   Sig: Take 2 tablets by mouth daily at bedtime May hold for loose stools   tamsulosin (Flomax) 0.4 mg  Self Yes No   Sig: Take 0.4 mg by mouth daily with dinner      Facility-Administered Medications: None       Past Medical History:   Diagnosis Date    Atrial fibrillation (HCC)     Bladder cancer (HCC)     Cancer (HCC)     prostate cancer     Hypertension     Irregular heart beat     SIADH (syndrome of inappropriate ADH production) ([facility])        Past Surgical History:   Procedure Laterality Date    APPENDECTOMY      TN TRURL ELECTROSURG RESCJ PROSTATE BLEED COMPLETE N/A 9/11/2023    Procedure: CYSTOSCOPY; TRANSURETHRAL RESECTION OF PROSTATE (TURP); Surgeon: Hitesh Milner MD;  Location: [facility];  Service: Urology       Family History   Problem Relation Age of Onset    Hypertension Mother      I have reviewed and agree with the history as documented.     E-Cigarette/Vaping    E-Cigarette Use Never User      E-Cigarette/Vaping Substances Nicotine No     THC No     CBD No     Flavoring No     Other No     Unknown No      Social History     Tobacco Use    Smoking status: Never    Smokeless tobacco: Never   Vaping Use    Vaping Use: Never used   Substance Use Topics    Alcohol use: Not Currently    Drug use: Not Currently       Review of Systems   Constitutional:  Negative for chills and fever. HENT:  Negative for rhinorrhea and sore throat. Eyes:  Negative for visual disturbance. Respiratory:  Negative for cough and shortness of breath. Cardiovascular:  Negative for chest pain and leg swelling. Gastrointestinal:  Negative for abdominal pain, diarrhea, nausea and vomiting. Genitourinary:  Positive for difficulty urinating. Negative for dysuria. Musculoskeletal:  Negative for back pain and myalgias. Skin:  Negative for rash. Neurological:  Negative for dizziness and headaches. Psychiatric/Behavioral:  Negative for confusion. All other systems reviewed and are negative. Physical Exam  Physical Exam  Vitals and nursing note reviewed. Constitutional:       General: He is not in acute distress. Appearance: He is well-developed. He is not diaphoretic. HENT:      Head: Normocephalic and atraumatic. Right Ear: External ear normal.      Left Ear: External ear normal.      Nose: Nose normal.   Eyes:      Conjunctiva/sclera: Conjunctivae normal.      Pupils: Pupils are equal, round, and reactive to light. Cardiovascular:      Rate and Rhythm: Regular rhythm. Tachycardia present. Pulmonary:      Effort: Pulmonary effort is normal. No respiratory distress. Breath sounds: Normal breath sounds. No wheezing. Abdominal:      General: Bowel sounds are normal. There is no distension. Palpations: Abdomen is soft. Tenderness: There is no abdominal tenderness. Genitourinary:     Comments: Haley catheter was flushed started to drain  Musculoskeletal:      Cervical back: Normal range of motion and neck supple. Lymphadenopathy:      Cervical: No cervical adenopathy. Skin:     General: Skin is warm and dry. Capillary Refill: Capillary refill takes less than 2 seconds. Neurological:      Mental Status: He is alert and oriented to person, place, and time. Psychiatric:         Behavior: Behavior normal.         Vital Signs  ED Triage Vitals [11/17/23 1035]   Temperature Pulse Respirations Blood Pressure SpO2   97.5 °F (36.4 °C) (!) 129 18 126/80 96 %      Temp Source Heart Rate Source Patient Position - Orthostatic VS BP Location FiO2 (%)   Oral Monitor Sitting Left arm --      Pain Score       1           Vitals:    11/17/23 1035   BP: 126/80   Pulse: (!) 129   Patient Position - Orthostatic VS: Sitting         Visual Acuity      ED Medications  Medications   metoprolol (LOPRESSOR) injection 2.5 mg (has no administration in time range)       Diagnostic Studies  Results Reviewed       None                   No orders to display              Procedures  Procedures         ED Course  ED Course as of 11/17/23 1201   Fri Nov 17, 2023   1153 Patient is refusing to take medication at this time states that he feels fine and his heart rate is typically high. He states that he did follow-up with his urologist and cardiologist.   [phone] Requesting be discharged home. SBIRT 20yo+      Flowsheet Row Most Recent Value   Initial Alcohol Screen: US AUDIT-C     1. How often do you have a drink containing alcohol? 0 Filed at: 11/17/2023 1041   2. How many drinks containing alcohol do you have on a typical day you are drinking? 0 Filed at: 11/17/2023 1041   3a. Male UNDER 65: How often do you have five or more drinks on one occasion? 0 Filed at: 11/17/2023 1041   3b. FEMALE Any Age, or MALE 65+: How often do you have 4 or more drinks on one occassion? 0 Filed at: 11/17/2023 1041   Audit-C Score 0 Filed at: 11/17/2023 1041   MICK: How many times in the past year have you. ..     Used an illegal drug or used a prescription medication for non-medical reasons? Never Filed at: 11/17/2023 1041                      Medical Decision Making  52-year-old male that presented to ED with the Haley catheter problem. History taken from patient and wife who is at bedside. Stated that he is having difficulty with his catheter call Dr. Mark Mcbride who recommended to come to the ED. Haley catheter was flushed at bedside started to drain. Bladder scan shows a 350 mL of the urine. Multiple clots were removed. Draining clear. Patient also found to have elevated heart rate was in A-fib the patient does have a history of A-fib was seen by his cardiologist yesterday in the office heart rate was elevated to 114. I offered further work-up and treatment however patient refused stated that "I feel fine I have whitecoat syndrome once I leave the ED I will be fine ". Patient did not want any medication or treatment at this time. Not requesting to discharge home. Disposition discharge home with strict precaution to return to the ED if noticed any new or worsening symptoms patient and wife verbalized this and the plan discharge home. Risk  Prescription drug management. Disposition  Final diagnoses:   Problem with Haley catheter, initial encounter [facility])   Atrial fibrillation ([address])     Time reflects when diagnosis was documented in both MDM as applicable and the Disposition within this note       Time User Action Codes Description Comment    11/17/2023 11:53 AM Miquel Alexander Add [T83. 9XXA] Problem with Haley catheter, initial encounter ([address])     11/17/2023 12:01 PM Miquel Alexander Add [I48.91] Atrial fibrillation [facility])           ED Disposition       ED Disposition   Discharge    Condition   Stable    Date/Time   Fri Nov 17, 2023 [address]. discharge to home/self care.                    Follow-up Information       Follow up With Specialties Details Why [address], DO Internal Medicine Schedule an appointment as soon as possible for a visit in 2 days If symptoms worsen [address]  [phone]              Current Discharge Medication List        CONTINUE these medications which have NOT CHANGED    Details   aspirin 81 mg chewable tablet Chew 81 mg daily      cephalexin (KEFLEX) 500 mg capsule Take 500 mg by mouth 2 (two) times a day      ferrous sulfate 325 (65 Fe) mg tablet Take 1 tablet (325 mg total) by mouth daily with breakfast Do not start before July 3, 2023. Qty: 30 tablet, Refills: 0    Associated Diagnoses: Anemia, unspecified type      finasteride (PROPECIA) 1 MG tablet Take 1 mg by mouth daily      furosemide (LASIX) 20 mg tablet Take 1 tablet (20 mg total) by mouth daily  Qty: 30 tablet, Refills: 1    Associated Diagnoses: Bilateral lower extremity edema      metoprolol succinate (TOPROL-XL) 100 mg 24 hr tablet Take 1 tablet (100 mg total) by mouth daily  Qty: 90 tablet, Refills: 3    Comments: Changed to 90-day supply  Associated Diagnoses: New onset a-fib (HCC)      senna-docusate sodium (SENOKOT S) 8.6-50 mg per tablet Take 2 tablets by mouth daily at bedtime May hold for loose stools  Qty: 60 tablet, Refills: 0    Associated Diagnoses: Anemia, unspecified type      tamsulosin (Flomax) 0.4 mg Take 0.4 mg by mouth daily with dinner             No discharge procedures on file.     PDMP Review       None            ED Provider  Electronically Signed by             Ramu Ulloa MD  11/17/23 [id number]

## 2023-11-19 LAB
ATRIAL RATE: 252 BPM
P AXIS: 259 DEGREES
QRS AXIS: 0 DEGREES
QRSD INTERVAL: 82 MS
QT INTERVAL: 306 MS
QTC INTERVAL: 443 MS
T WAVE AXIS: 63 DEGREES
VENTRICULAR RATE: 126 BPM

## 2023-11-19 PROCEDURE — 93010 ELECTROCARDIOGRAM REPORT: CPT | Performed by: INTERNAL MEDICINE

## 2023-11-20 ENCOUNTER — VBI (OUTPATIENT)
Dept: INTERNAL MEDICINE CLINIC | Facility: CLINIC | Age: 88
End: 2023-11-20

## 2023-11-20 ENCOUNTER — HOSPITAL ENCOUNTER (OUTPATIENT)
Dept: ULTRASOUND IMAGING | Facility: HOSPITAL | Age: 88
Discharge: HOME | End: 2023-11-20
Attending: UROLOGY
Payer: MEDICARE

## 2023-11-20 DIAGNOSIS — R33.9 RETENTION OF URINE, UNSPECIFIED: ICD-10-CM

## 2023-11-20 DIAGNOSIS — N13.30 UNSPECIFIED HYDRONEPHROSIS: ICD-10-CM

## 2023-11-20 PROCEDURE — 76775 US EXAM ABDO BACK WALL LIM: CPT

## 2023-11-20 NOTE — TELEPHONE ENCOUNTER
11/20/23 8:51 AM    Patient contacted post ED visit, first outreach attempt made. Message was left for patient to return a call to the VBI Department at [facility]: Phone [phone]. Thank you.   ALBERT BRANDT  [facility]

## 2023-11-20 NOTE — TELEPHONE ENCOUNTER
11/20/23 9:24 AM    Patient contacted post ED visit, VBI department spoke with patient/caregiver and outreach was successful. Thank you.   ALBERT BRANDT  [facility]

## 2023-11-21 ENCOUNTER — HOSPITAL ENCOUNTER (OUTPATIENT)
Dept: INFUSION CENTER | Facility: HOSPITAL | Age: 88
Discharge: HOME | End: 2023-11-21
Attending: INTERNAL MEDICINE
Payer: MEDICARE

## 2023-11-21 VITALS
HEIGHT: 73 IN | TEMPERATURE: 96.6 F | WEIGHT: 181.22 LBS | BODY MASS INDEX: 24.02 KG/M2 | HEART RATE: 109 BPM | DIASTOLIC BLOOD PRESSURE: 84 MMHG | SYSTOLIC BLOOD PRESSURE: 134 MMHG | RESPIRATION RATE: 18 BRPM

## 2023-11-21 DIAGNOSIS — C68.9 HIGH GRADE UROTHELIAL CARCINOMA PRESENT ON URINE CYTOLOGY (HCC): Primary | ICD-10-CM

## 2023-11-21 PROCEDURE — 96413 CHEMO IV INFUSION 1 HR: CPT

## 2023-11-21 RX ORDER — SODIUM CHLORIDE 9 MG/ML
20 INJECTION, SOLUTION INTRAVENOUS ONCE
Status: COMPLETED | OUTPATIENT
Start: 2023-11-21 | End: 2023-11-21

## 2023-11-21 RX ADMIN — SODIUM CHLORIDE 20 ML/HR: 0.9 INJECTION, SOLUTION INTRAVENOUS at 11:45

## 2023-11-21 RX ADMIN — SODIUM CHLORIDE 200 MG: 9 INJECTION, SOLUTION INTRAVENOUS at 11:46

## 2023-11-21 NOTE — PROGRESS NOTES
Labs reviewed. Keytruda infusion tolerated without incident. PIV removed. AVS provided & next appt reviewed. Discharged in stable condition.

## 2023-11-22 ENCOUNTER — PATIENT OUTREACH (OUTPATIENT)
Dept: HEMATOLOGY ONCOLOGY | Facility: CLINIC | Age: 88
End: 2023-11-22

## 2023-11-22 NOTE — PROGRESS NOTES
ONN outreached patient. Patient reports [facility] infusions are going very well and there are no needs at this time. Patient aware managing physicians will be primary contact. Confirmed follow up appointments for ongoing care and provided ONN contact information if needed in the future. Oncology care coordination will sign off. Please reach out if needed in the future.

## 2023-11-23 ENCOUNTER — HOSPITAL ENCOUNTER (EMERGENCY)
Facility: HOSPITAL | Age: 88
Discharge: HOME | End: 2023-11-23
Attending: EMERGENCY MEDICINE
Payer: MEDICARE

## 2023-11-23 VITALS
SYSTOLIC BLOOD PRESSURE: 146 MMHG | HEIGHT: 73 IN | RESPIRATION RATE: 16 BRPM | OXYGEN SATURATION: 98 % | DIASTOLIC BLOOD PRESSURE: 67 MMHG | BODY MASS INDEX: 23.86 KG/M2 | TEMPERATURE: 97.4 F | WEIGHT: 180 LBS | HEART RATE: 119 BPM

## 2023-11-23 DIAGNOSIS — N39.0 UTI (URINARY TRACT INFECTION): ICD-10-CM

## 2023-11-23 DIAGNOSIS — T83.9XXA FOLEY CATHETER PROBLEM, INITIAL ENCOUNTER (HCC): Primary | ICD-10-CM

## 2023-11-23 LAB
BACTERIA UR QL AUTO: ABNORMAL /HPF
BILIRUB UR QL STRIP: NEGATIVE
CLARITY UR: ABNORMAL
COLOR UR: YELLOW
GLUCOSE UR STRIP-MCNC: NEGATIVE MG/DL
HGB UR QL STRIP.AUTO: ABNORMAL
KETONES UR STRIP-MCNC: NEGATIVE MG/DL
LEUKOCYTE ESTERASE UR QL STRIP: ABNORMAL
MUCOUS THREADS UR QL AUTO: ABNORMAL
NITRITE UR QL STRIP: POSITIVE
NON-SQ EPI CELLS URNS QL MICRO: ABNORMAL /HPF
PH UR STRIP.AUTO: 8 [PH]
PROT UR STRIP-MCNC: ABNORMAL MG/DL
RBC #/AREA URNS AUTO: ABNORMAL /HPF
SP GR UR STRIP.AUTO: 1.02
TRI-PHOS CRY URNS QL MICRO: ABNORMAL /HPF
UROBILINOGEN UR QL STRIP.AUTO: 0.2 E.U./DL
WBC #/AREA URNS AUTO: ABNORMAL /HPF

## 2023-11-23 PROCEDURE — 99283 EMERGENCY DEPT VISIT LOW MDM: CPT

## 2023-11-23 PROCEDURE — 81001 URINALYSIS AUTO W/SCOPE: CPT | Performed by: EMERGENCY MEDICINE

## 2023-11-23 PROCEDURE — 87186 SC STD MICRODIL/AGAR DIL: CPT | Performed by: EMERGENCY MEDICINE

## 2023-11-23 PROCEDURE — 99284 EMERGENCY DEPT VISIT MOD MDM: CPT | Performed by: EMERGENCY MEDICINE

## 2023-11-23 PROCEDURE — 87077 CULTURE AEROBIC IDENTIFY: CPT | Performed by: EMERGENCY MEDICINE

## 2023-11-23 PROCEDURE — 87086 URINE CULTURE/COLONY COUNT: CPT | Performed by: EMERGENCY MEDICINE

## 2023-11-23 RX ORDER — CEFUROXIME AXETIL 250 MG/1
500 TABLET ORAL ONCE
Status: COMPLETED | OUTPATIENT
Start: 2023-11-23 | End: 2023-11-23

## 2023-11-23 RX ORDER — CEFUROXIME AXETIL 500 MG/1
500 TABLET ORAL EVERY 12 HOURS SCHEDULED
Qty: 14 TABLET | Refills: 0 | Status: SHIPPED | OUTPATIENT
Start: 2023-11-23 | End: 2023-11-28

## 2023-11-23 RX ORDER — CEFUROXIME AXETIL 500 MG/1
500 TABLET ORAL EVERY 12 HOURS SCHEDULED
Qty: 14 TABLET | Refills: 0 | Status: SHIPPED | OUTPATIENT
Start: 2023-11-23 | End: 2023-11-23 | Stop reason: SDUPTHER

## 2023-11-23 RX ADMIN — CEFUROXIME AXETIL 500 MG: 250 TABLET ORAL at 05:17

## 2023-11-23 NOTE — DISCHARGE INSTRUCTIONS
Return if symptoms worsen or if new symptoms develop or if you have any other concerns.   Follow up with your urologist.

## 2023-11-23 NOTE — ED PROVIDER NOTES
History  Chief Complaint   Patient presents with    Urinary Catheter Problem     Had cath placed last Wednesday, Friday had it flushed. Was working fine until 240     81year-old male presents with Haley that is not draining. Patient states that he had a catheter placed about a week ago and had it flushed on Friday, noted yesterday evening that it was clogged. Patient has a history of bladder cancer, and A-fib on aspirin. Prior to Admission Medications   Prescriptions Last Dose Informant Patient Reported? Taking?   aspirin 81 mg chewable tablet 11/22/2023 Self Yes Yes   Sig: Chew 81 mg daily   ferrous sulfate 325 (65 Fe) mg tablet  Self No No   Sig: Take 1 tablet (325 mg total) by mouth daily with breakfast Do not start before July 3, 2023. finasteride (PROPECIA) 1 MG tablet 11/22/2023 Self Yes Yes   Sig: Take 1 mg by mouth daily   furosemide (LASIX) 20 mg tablet Past Week Self No Yes   Sig: Take 1 tablet (20 mg total) by mouth daily   metoprolol succinate (TOPROL-XL) 100 mg 24 hr tablet 11/22/2023 Self No Yes   Sig: Take 1 tablet (100 mg total) by mouth daily   senna-docusate sodium (SENOKOT S) 8.6-50 mg per tablet  Self No No   Sig: Take 2 tablets by mouth daily at bedtime May hold for loose stools   tamsulosin (Flomax) 0.4 mg 11/22/2023 Self Yes Yes   Sig: Take 0.4 mg by mouth daily with dinner      Facility-Administered Medications: None       Past Medical History:   Diagnosis Date    Atrial fibrillation (HCC)     Bladder cancer (HCC)     Cancer ([facility])     prostate cancer     Hypertension     Irregular heart beat     SIADH (syndrome of inappropriate ADH production) ([facility])        Past Surgical History:   Procedure Laterality Date    APPENDECTOMY      MD TRURL ELECTROSURG RESCJ PROSTATE BLEED COMPLETE N/A 9/11/2023    Procedure: CYSTOSCOPY; TRANSURETHRAL RESECTION OF PROSTATE (TURP);   Surgeon: Kehinde Meade MD;  Location: [facility];  Service: Urology       Family History   Problem Relation Age of Onset    Hypertension Mother      I have reviewed and agree with the history as documented. E-Cigarette/Vaping    E-Cigarette Use Never User      E-Cigarette/Vaping Substances    Nicotine No     THC No     CBD No     Flavoring No     Other No     Unknown No      Social History     Tobacco Use    Smoking status: Never    Smokeless tobacco: Never   Vaping Use    Vaping Use: Never used   Substance Use Topics    Alcohol use: Not Currently    Drug use: Not Currently       Review of Systems    Physical Exam  Physical Exam  Vitals and nursing note reviewed. Constitutional:       General: He is not in acute distress. Appearance: Normal appearance. HENT:      Head: Normocephalic and atraumatic. Nose: Nose normal.   Eyes:      General:         Right eye: No discharge. Left eye: No discharge. Cardiovascular:      Rate and Rhythm: Tachycardia present. Rhythm irregular. Pulmonary:      Effort: Pulmonary effort is normal. No respiratory distress. Abdominal:      General: Abdomen is flat. There is no distension. Musculoskeletal:         General: No deformity. Normal range of motion. Skin:     General: Skin is warm. Findings: No rash. Neurological:      Mental Status: He is alert and oriented to person, place, and time. Motor: No weakness.    Psychiatric:         Mood and Affect: Mood normal.         Behavior: Behavior normal.         Vital Signs  ED Triage Vitals   Temperature Pulse Respirations Blood Pressure SpO2   11/23/23 0443 11/23/23 0443 11/23/23 0443 11/23/23 0523 11/23/23 0443   (!) 97.4 °F (36.3 °C) (!) 125 16 146/67 98 %      Temp Source Heart Rate Source Patient Position - Orthostatic VS BP Location FiO2 (%)   11/23/23 0443 11/23/23 0443 11/23/23 0443 11/23/23 0443 --   Tympanic Monitor Lying Left arm       Pain Score       11/23/23 0443       2           Vitals:    11/23/23 0443 11/23/23 0508 11/23/23 0523   BP:   146/67   Pulse: (!) 125 (!) 119    Patient Position - Orthostatic VS: Lying           Visual Acuity      ED Medications  Medications   cefuroxime (CEFTIN) tablet 500 mg (500 mg Oral Given 11/23/23 0517)       Diagnostic Studies  Results Reviewed       Procedure Component Value Units Date/Time    Urine Microscopic [[id number]]  (Abnormal) Collected: 11/23/23 0501    Lab Status: Final result Specimen: Urine, Indwelling Haley Catheter Updated: 11/23/23 0523     RBC, UA 20-30 /hpf      WBC, UA 30-50 /hpf      Epithelial Cells Occasional /hpf      Bacteria, UA Innumerable /hpf      Triplep Phos Hermelinda, UA Occasional /hpf      MUCUS THREADS Moderate    Urine culture [[id number]] Collected: 11/23/23 0501    Lab Status: In process Specimen: Urine, Indwelling Haley Catheter Updated: 11/23/23 0523    UA w Reflex to Microscopic w Reflex to Culture [[id number]]  (Abnormal) Collected: 11/23/23 0501    Lab Status: Final result Specimen: Urine, Indwelling Haley Catheter Updated: 11/23/23 0509     Color, UA Yellow     Clarity, UA Cloudy     Specific Gravity, UA 1.020     pH, UA 8.0     Leukocytes, UA 2+     Nitrite, UA Positive     Protein, UA 2+ mg/dl      Glucose, UA Negative mg/dl      Ketones, UA Negative mg/dl      Urobilinogen, UA 0.2 E.U./dl      Bilirubin, UA Negative     Occult Blood, UA 2+                   No orders to display              Procedures  Procedures         ED Course  ED Course as of 11/23/23 0634   Thu Nov 23, 2023   0504 Patient requesting discharge none the Haley issue is resolved, explained that we would like to analyze his urinalysis prior to discharge. 8568 Chart review indicates patient    0508 Pulse(!): 125   0514 Nitrite, UA(!): Positive  Will treat. Recently was on keflex, will place on ceftin                                             Medical Decision Making  81-year-old male presenting with Haley in place  In A-fib with slightly elevated heart rate likely secondary to discomfort, will reassess once Haley catheter is treated.     Chart review indicates patient had clots on recent ER visit due to recent procedure    Will send UA due to heavy sediment in the dominique tubing concerning for infection. He is mildly tachycardic due to his afib. Problems Addressed: Dominique catheter problem, initial encounter [facility]): acute illness or injury  UTI (urinary tract infection): acute illness or injury    Amount and/or Complexity of Data Reviewed  External Data Reviewed: notes. Labs: ordered. Decision-making details documented in ED Course. Risk  Prescription drug management. Disposition  Final diagnoses: Dominique catheter problem, initial encounter [facility])   UTI (urinary tract infection)     Time reflects when diagnosis was documented in both MDM as applicable and the Disposition within this note       Time User Action Codes Description Comment    11/23/2023  5:12 AM Karla Barrow Add [T83. 9XXA] Dominique catheter problem, initial encounter ([address])     11/23/2023  5:12 AM Karla Barrow Add [N39.0] UTI (urinary tract infection)           ED Disposition       ED Disposition   Discharge    Condition   Stable    Date/Time   Thu Nov 23, 2023  5:11 AM    Comment   Shayla Olvera. discharge to home/self care.                    Follow-up Information       Follow up With Specialties Details Why Contact Info    Antoni Finley MD Urology Schedule an appointment as soon as possible for a visit   [address] [facility] [address] [facility]  [phone]              Discharge Medication List as of 11/23/2023  5:16 AM        CONTINUE these medications which have CHANGED    Details   cefuroxime (CEFTIN) 500 mg tablet Take 1 tablet (500 mg total) by mouth every 12 (twelve) hours for 7 days, Starting Thu 11/23/2023, Until Thu 11/30/2023, Normal           CONTINUE these medications which have NOT CHANGED    Details   aspirin 81 mg chewable tablet Chew 81 mg daily, Historical Med      finasteride (PROPECIA) 1 MG tablet Take 1 mg by mouth daily, Historical Med      furosemide (LASIX) 20 mg tablet Take 1 tablet (20 mg total) by mouth daily, Starting Wed 9/27/2023, Normal      metoprolol succinate (TOPROL-XL) 100 mg 24 hr tablet Take 1 tablet (100 mg total) by mouth daily, Starting Thu 8/3/2023, Normal      tamsulosin (Flomax) 0.4 mg Take 0.4 mg by mouth daily with dinner, Historical Med      ferrous sulfate 325 (65 Fe) mg tablet Take 1 tablet (325 mg total) by mouth daily with breakfast Do not start before July 3, 2023., Starting Mon 7/3/2023, Until Mon 9/11/2023, Normal      senna-docusate sodium (SENOKOT S) 8.6-50 mg per tablet Take 2 tablets by mouth daily at bedtime May hold for loose stools, Starting Sun 7/2/2023, Until Mon 9/11/2023, Normal             No discharge procedures on file.     PDMP Review       None            ED Provider  Electronically Signed by             Ryan Cunningham DO  11/23/23 6034

## 2023-11-24 ENCOUNTER — VBI (OUTPATIENT)
Dept: INTERNAL MEDICINE CLINIC | Facility: CLINIC | Age: 88
End: 2023-11-24

## 2023-11-24 NOTE — TELEPHONE ENCOUNTER
11/24/23 2:20 PM      The patient returned the call;      Patient contacted post ED visit, [facility] department spoke with patient/caregiver and outreach was successful. Thank you.   Nils Patricia MA  [facility]

## 2023-11-24 NOTE — TELEPHONE ENCOUNTER
11/24/23 2:05 PM    Patient contacted post ED visit, first outreach attempt made. Message was left for patient to return a call to the [facility] Department at [facility]: Phone [phone]. Thank you.   Aron Kwong MA  [facility]

## 2023-11-25 LAB
BACTERIA UR CULT: ABNORMAL
BACTERIA UR CULT: ABNORMAL

## 2023-11-27 ENCOUNTER — TELEPHONE (OUTPATIENT)
Dept: NEPHROLOGY | Facility: CLINIC | Age: 88
End: 2023-11-27

## 2023-11-27 NOTE — TELEPHONE ENCOUNTER
Call from patient states his recent lab work , sodium is up to 132. Do you still want him to continue fluid restriction of 36 to 40oz or can he increase this?

## 2023-11-27 NOTE — TELEPHONE ENCOUNTER
Called and spoke with patient, aware of the following: Very happy with his results, recommend that he can slightly increase up to 46 ounces for now. Will continue to monitor his blood work from time to time and see how he does with the relaxation to the fluid intake.

## 2023-11-27 NOTE — TELEPHONE ENCOUNTER
Very happy with his results, recommend that he can slightly increase up to 46 ounces for now.   Will continue to monitor his blood work from time to time and see how he does with the relaxation to the fluid intake

## 2023-11-28 ENCOUNTER — OFFICE VISIT (OUTPATIENT)
Dept: INTERNAL MEDICINE CLINIC | Facility: CLINIC | Age: 88
End: 2023-11-28
Payer: MEDICARE

## 2023-11-28 VITALS
HEART RATE: 114 BPM | OXYGEN SATURATION: 99 % | BODY MASS INDEX: 24.12 KG/M2 | DIASTOLIC BLOOD PRESSURE: 80 MMHG | TEMPERATURE: 97.9 F | HEIGHT: 73 IN | SYSTOLIC BLOOD PRESSURE: 126 MMHG | WEIGHT: 182 LBS

## 2023-11-28 DIAGNOSIS — N18.31 STAGE 3A CHRONIC KIDNEY DISEASE (HCC): ICD-10-CM

## 2023-11-28 DIAGNOSIS — I10 ESSENTIAL HYPERTENSION: Chronic | ICD-10-CM

## 2023-11-28 DIAGNOSIS — Z00.00 MEDICARE ANNUAL WELLNESS VISIT, SUBSEQUENT: Primary | ICD-10-CM

## 2023-11-28 DIAGNOSIS — C61 PROSTATE CA (HCC): Chronic | ICD-10-CM

## 2023-11-28 DIAGNOSIS — C67.9 MALIGNANT NEOPLASM OF URINARY BLADDER, UNSPECIFIED SITE (HCC): ICD-10-CM

## 2023-11-28 DIAGNOSIS — I48.91 NEW ONSET A-FIB (HCC): ICD-10-CM

## 2023-11-28 DIAGNOSIS — E22.2 SIADH (SYNDROME OF INAPPROPRIATE ADH PRODUCTION) (HCC): ICD-10-CM

## 2023-11-28 PROBLEM — R33.9 URINARY RETENTION: Status: RESOLVED | Noted: 2023-06-27 | Resolved: 2023-11-28

## 2023-11-28 PROCEDURE — G0439 PPPS, SUBSEQ VISIT: HCPCS | Performed by: INTERNAL MEDICINE

## 2023-11-28 PROCEDURE — 99213 OFFICE O/P EST LOW 20 MIN: CPT | Performed by: INTERNAL MEDICINE

## 2023-11-28 NOTE — ASSESSMENT & PLAN NOTE
Lab Results   Component Value Date    EGFR 52 11/17/2023    EGFR 56 10/30/2023    EGFR 45 10/06/2023    CREATININE 1.23 11/17/2023    CREATININE 1.16 10/30/2023    CREATININE 1.40 (H) 10/06/2023   Stable creatinine noted

## 2023-11-28 NOTE — PROGRESS NOTES
Assessment and Plan:     Problem List Items Addressed This Visit     Prostate CA ([address]) (Chronic)    Essential hypertension (Chronic)     The patient is tolerating the furosemide 20 mg, but still has peripheral edema. Offered compression stockings and declined. Stage 3a chronic kidney disease [facility])     Lab Results   Component Value Date    EGFR 52 11/17/2023    EGFR 56 10/30/2023    EGFR 45 10/06/2023    CREATININE 1.23 11/17/2023    CREATININE 1.16 10/30/2023    CREATININE 1.40 (H) 10/06/2023   Stable creatinine noted         New onset a-fib (HCC)     HR controlled with Metoprolol Succ 100 mg Qday  No change at this time         SIADH (syndrome of inappropriate ADH production) ([address])     The patient is controlling the Na+ with fluid restrictions         Bladder cancer ([address])     Keytruda infussion noted and the patient is doing well        Other Visit Diagnoses     Medicare annual wellness visit, subsequent    -  Primary          Depression Screening and Follow-up Plan: Patient was screened for depression during today's encounter. They screened negative with a PHQ-2 score of 0. Preventive health issues were discussed with patient, and age appropriate screening tests were ordered as noted in patient's After Visit Summary. Personalized health advice and appropriate referrals for health education or preventive services given if needed, as noted in patient's After Visit Summary. History of Present Illness:     Patient presents for a Medicare Wellness Visit    The patient has increased his Na+ to 134 with water restrictions and is now loosening up on the restrictions since noting the improvement.   Reviewed the medications and no changes       Patient Care Team:  Augustin Valiente DO as PCP - General (Internal Medicine)  DO LOYDA (Nephrology)  Jessica Hyatt DO (Cardiology)  Tyra Gleason as  (Oncology)     Review of Systems:     Review of Systems   Constitutional: Negative for chills, fatigue and fever. HENT: Negative. Respiratory:  Negative for cough, chest tightness and shortness of breath. Cardiovascular:  Negative for chest pain and palpitations. Gastrointestinal:  Negative for abdominal pain, constipation, diarrhea, nausea and vomiting. Genitourinary: Negative. Musculoskeletal:  Negative for back pain and myalgias. Skin: Negative. Neurological: Negative. Psychiatric/Behavioral:  Negative for dysphoric mood. The patient is not nervous/anxious. Problem List:     Patient Active Problem List   Diagnosis   • Generalized weakness   • Prostate CA [facility])   • Essential hypertension   • Stage 3a chronic kidney disease ([address])   • Hyponatremia   • Anemia   • New onset a-fib [facility])   • Hospital discharge follow-up   • Abnormal CT of the chest   • History of echocardiogram   • 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)   • Syncope and collapse   • Preop examination   • Bilateral lower extremity edema   • High grade urothelial carcinoma present on urine cytology [facility])   • Bladder cancer [facility])      Past Medical and Surgical History:     Past Medical History:   Diagnosis Date   • Atrial fibrillation ([address])    • Bladder cancer ([address])    • Cancer ([address])     prostate cancer    • Hypertension    • Irregular heart beat    • SIADH (syndrome of inappropriate ADH production) ([address])      Past Surgical History:   Procedure Laterality Date   • APPENDECTOMY     • NM TRURL ELECTROSURG RESCJ PROSTATE BLEED COMPLETE N/A 9/11/2023    Procedure: CYSTOSCOPY; TRANSURETHRAL RESECTION OF PROSTATE (TURP);   Surgeon: Celio Garcia MD;  Location: [facility];  Service: Urology      Family History:     Family History   Problem Relation Age of Onset   • Hypertension Mother       Social History:     Social History     Socioeconomic History   • Marital status: /Civil Union     Spouse name: None   • Number of children: None   • Years of education: None   • Highest education level: None   Occupational History   • Occupation: RETIRED   Tobacco Use   • Smoking status: Never     Passive exposure: Never   • Smokeless tobacco: Never   Vaping Use   • Vaping Use: Never used   Substance and Sexual Activity   • Alcohol use: Not Currently   • Drug use: Not Currently   • Sexual activity: Not Currently   Other Topics Concern   • None   Social History Narrative        RETIRED     Social Determinants of Health     Financial Resource Strain: Low Risk  (11/28/2023)    Overall Financial Resource Strain (CARDIA)    • Difficulty of Paying Living Expenses: Not hard at all   Food Insecurity: No Food Insecurity (6/28/2023)    Hunger Vital Sign    • Worried About Running Out of Food in the Last Year: Never true    • Ran Out of Food in the Last Year: Never true   Transportation Needs: No Transportation Needs (11/28/2023)    PRAPARE - Transportation    • Lack of Transportation (Medical): No    • Lack of Transportation (Non-Medical):  No   Physical Activity: Not on file   Stress: Not on file   Social Connections: Not on file   Intimate Partner Violence: Not on file   Housing Stability: Low Risk  (6/28/2023)    Housing Stability Vital Sign    • Unable to Pay for Housing in the Last Year: No    • Number of Places Lived in the Last Year: 1    • Unstable Housing in the Last Year: No      Medications and Allergies:     Current Outpatient Medications   Medication Sig Dispense Refill   • aspirin 81 mg chewable tablet Chew 81 mg daily     • ferrous sulfate 325 (65 Fe) mg tablet Take 1 tablet (325 mg total) by mouth daily with breakfast Do not start before July 3, 2023. 30 tablet 0   • finasteride (PROPECIA) 1 MG tablet Take 1 mg by mouth daily     • furosemide (LASIX) 20 mg tablet Take 1 tablet (20 mg total) by mouth daily 30 tablet 1   • metoprolol succinate (TOPROL-XL) 100 mg 24 hr tablet Take 1 tablet (100 mg total) by mouth daily 90 tablet 3   • senna-docusate sodium (SENOKOT S) 8.6-50 mg per tablet Take 2 tablets by mouth daily at bedtime May hold for loose stools 60 tablet 0   • tamsulosin (Flomax) 0.4 mg Take 0.4 mg by mouth daily with dinner       No current facility-administered medications for this visit. No Known Allergies   Immunizations:     Immunization History   Administered Date(s) Administered   • COVID-19 MODERNA VACC 0.5 ML IM 01/25/2021, 02/22/2021   • INFLUENZA 01/04/2013, 01/24/2020, 10/22/2021   • Influenza, high dose seasonal 0.7 mL 10/19/2020, 10/22/2021, 11/22/2022   • Tdap 01/04/2013      Health Maintenance: There are no preventive care reminders to display for this patient. Topic Date Due   • Pneumococcal Vaccine: 65+ Years (1 - PCV) Never done   • COVID-19 Vaccine (3 - Moderna series) 04/19/2021   • Influenza Vaccine (1) 09/01/2023      Medicare Screening Tests and Risk Assessments:     Jose Rodriguez is here for his Subsequent Wellness visit. Last Medicare Wellness visit information reviewed, patient interviewed and updates made to the record today. Health Risk Assessment:   Patient rates overall health as very good. Patient feels that their physical health rating is same. Patient is very satisfied with their life. Eyesight was rated as same. Hearing was rated as same. Patient feels that their emotional and mental health rating is same. Patients states they are never, rarely angry. Patient states they are never, rarely unusually tired/fatigued. Pain experienced in the last 7 days has been none. Patient states that he has experienced no weight loss or gain in last 6 months. Depression Screening:   PHQ-2 Score: 0      Fall Risk Screening: In the past year, patient has experienced: no history of falling in past year      Home Safety:  Patient does not have trouble with stairs inside or outside of their home. Patient has working smoke alarms and has working carbon monoxide detector. Home safety hazards include: none. Nutrition:   Current diet is Regular. Medications:   Patient is currently taking over-the-counter supplements. OTC medications include: see medication list. Patient is not able to manage medications. Activities of Daily Living (ADLs)/Instrumental Activities of Daily Living (IADLs):   Walk and transfer into and out of bed and chair?: Yes  Dress and groom yourself?: Yes    Bathe or shower yourself?: Yes    Feed yourself? Yes  Do your laundry/housekeeping?: Yes  Manage your money, pay your bills and track your expenses?: Yes  Make your own meals?: Yes    Do your own shopping?: Yes    Previous Hospitalizations:   Any hospitalizations or ED visits within the last 12 months?: No      Advance Care Planning:   Living will: Yes    Durable POA for healthcare: Yes    Advanced directive: Yes    Advanced directive counseling given: Yes    Five wishes given: No    Patient declined ACP directive: No    End of Life Decisions reviewed with patient: Yes    Provider agrees with end of life decisions: Yes      Cognitive Screening:   Provider or family/friend/caregiver concerned regarding cognition?: No    PREVENTIVE SCREENINGS      Cardiovascular Screening:    General: Screening Current      Diabetes Screening:     General: Screening Current      Colorectal Cancer Screening:     General: Screening Not Indicated      Prostate Cancer Screening:    General: History Prostate Cancer and Screening Not Indicated      Osteoporosis Screening:    General: Patient Declines      Abdominal Aortic Aneurysm (AAA) Screening:        General: Screening Not Indicated      Lung Cancer Screening:     General: Screening Not Indicated      Hepatitis C Screening:    General: Screening Not Indicated    Screening, Brief Intervention, and Referral to Treatment (SBIRT)    Screening  Typical number of drinks in a day: 0  Typical number of drinks in a week: 3  Interpretation: Low risk drinking behavior.     Single Item Drug Screening:  How often have you used an illegal drug (including marijuana) or a prescription medication for non-medical reasons in the past year? never    Single Item Drug Screen Score: 0  Interpretation: Negative screen for possible drug use disorder    No results found. Physical Exam:     /80   Pulse (!) 114   Temp 97.9 °F (36.6 °C)   Ht 6' 1" (1.854 m)   Wt 82.6 kg (182 lb)   SpO2 99%   BMI 24.01 kg/m²     Physical Exam  Vitals and nursing note reviewed. Constitutional:       General: He is not in acute distress. Appearance: He is well-developed. HENT:      Head: Normocephalic and atraumatic. Eyes:      Conjunctiva/sclera: Conjunctivae normal.   Cardiovascular:      Rate and Rhythm: Normal rate and regular rhythm. Heart sounds: No murmur heard. Pulmonary:      Effort: Pulmonary effort is normal. No respiratory distress. Breath sounds: Normal breath sounds. Abdominal:      Palpations: Abdomen is soft. Tenderness: There is no abdominal tenderness. Musculoskeletal:         General: No swelling. Cervical back: Neck supple. Skin:     General: Skin is warm and dry. Capillary Refill: Capillary refill takes less than 2 seconds. Neurological:      Mental Status: He is alert.    Psychiatric:         Mood and Affect: Mood normal.          Augustin Arch, DO

## 2023-11-28 NOTE — PATIENT INSTRUCTIONS
Medicare Preventive Visit Patient Instructions  Thank you for completing your Welcome to Medicare Visit or Medicare Annual Wellness Visit today. Your next wellness visit will be due in one year (11/28/2024). The screening/preventive services that you may require over the next 5-10 years are detailed below. Some tests may not apply to you based off risk factors and/or age. Screening tests ordered at today's visit but not completed yet may show as past due. Also, please note that scanned in results may not display below. Preventive Screenings:  Service Recommendations Previous Testing/Comments   Colorectal Cancer Screening  Colonoscopy    Fecal Occult Blood Test (FOBT)/Fecal Immunochemical Test (FIT)  Fecal DNA/Cologuard Test  Flexible Sigmoidoscopy Age: 43-73 years old   Colonoscopy: every 10 years (May be performed more frequently if at higher risk)  OR  FOBT/FIT: every 1 year  OR  Cologuard: every 3 years  OR  Sigmoidoscopy: every 5 years  Screening may be recommended earlier than age 39 if at higher risk for colorectal cancer. Also, an individualized decision between you and your healthcare provider will decide whether screening between the ages of 77-80 would be appropriate.  Colonoscopy: Not on file  FOBT/FIT: 06/10/2023  Cologuard: Not on file  Sigmoidoscopy: Not on file    Screening Not Indicated     Prostate Cancer Screening Individualized decision between patient and health care provider in men between ages of 53-66   Medicare will cover every 12 months beginning on the day after your 50th birthday PSA: <0.1 ng/mL     History Prostate Cancer  Screening Not Indicated     Hepatitis C Screening Once for adults born between 1945 and 1965  More frequently in patients at high risk for Hepatitis C Hep C Antibody: Not on file        Diabetes Screening 1-2 times per year if you're at risk for diabetes or have pre-diabetes Fasting glucose: 137 mg/dL (11/17/2023)  A1C: 5.4 % (6/30/2023)  Screening Current Cholesterol Screening Once every 5 years if you don't have a lipid disorder. May order more often based on risk factors. Lipid panel: 05/15/2023  Screening Current      Other Preventive Screenings Covered by Medicare:  Abdominal Aortic Aneurysm (AAA) Screening: covered once if your at risk. You're considered to be at risk if you have a family history of AAA or a male between the age of 70-76 who smoking at least 100 cigarettes in your lifetime. Lung Cancer Screening: covers low dose CT scan once per year if you meet all of the following conditions: (1) Age 48-67; (2) No signs or symptoms of lung cancer; (3) Current smoker or have quit smoking within the last 15 years; (4) You have a tobacco smoking history of at least 20 pack years (packs per day x number of years you smoked); (5) You get a written order from a healthcare provider. Glaucoma Screening: covered annually if you're considered high risk: (1) You have diabetes OR (2) Family history of glaucoma OR (3)  aged 48 and older OR (3)  American aged 72 and older  Osteoporosis Screening: covered every 2 years if you meet one of the following conditions: (1) Have a vertebral abnormality; (2) On glucocorticoid therapy for more than 3 months; (3) Have primary hyperparathyroidism; (4) On osteoporosis medications and need to assess response to drug therapy. HIV Screening: covered annually if you're between the age of 14-79. Also covered annually if you are younger than 13 and older than 72 with risk factors for HIV infection. For pregnant patients, it is covered up to 3 times per pregnancy.     Immunizations:  Immunization Recommendations   Influenza Vaccine Annual influenza vaccination during flu season is recommended for all persons aged >= 6 months who do not have contraindications   Pneumococcal Vaccine   * Pneumococcal conjugate vaccine = PCV13 (Prevnar 13), PCV15 (Vaxneuvance), PCV20 (Prevnar 20)  * Pneumococcal polysaccharide vaccine = PPSV23 (Pneumovax) Adults 01-58 yo with certain risk factors or if 69+ yo  If never received any pneumonia vaccine: recommend Prevnar 20 (PCV20)  Give PCV20 if previously received 1 dose of PCV13 or PPSV23   Hepatitis B Vaccine 3 dose series if at intermediate or high risk (ex: diabetes, end stage renal disease, liver disease)   Respiratory syncytial virus (RSV) Vaccine - COVERED BY MEDICARE PART D  * RSVPreF3 (Arexvy) CDC recommends that adults 61years of age and older may receive a single dose of RSV vaccine using shared clinical decision-making (SCDM)   Tetanus (Td) Vaccine - COST NOT COVERED BY MEDICARE PART B Following completion of primary series, a booster dose should be given every 10 years to maintain immunity against tetanus. Td may also be given as tetanus wound prophylaxis. Tdap Vaccine - COST NOT COVERED BY MEDICARE PART B Recommended at least once for all adults. For pregnant patients, recommended with each pregnancy. Shingles Vaccine (Shingrix) - COST NOT COVERED BY MEDICARE PART B  2 shot series recommended in those 19 years and older who have or will have weakened immune systems or those 50 years and older     Health Maintenance Due:  There are no preventive care reminders to display for this patient. Immunizations Due:      Topic Date Due   • Pneumococcal Vaccine: 65+ Years (1 - PCV) Never done   • COVID-19 Vaccine (3 - Moderna series) 04/19/2021   • Influenza Vaccine (1) 09/01/2023     Advance Directives   What are advance directives? Advance directives are legal documents that state your wishes and plans for medical care. These plans are made ahead of time in case you lose your ability to make decisions for yourself. Advance directives can apply to any medical decision, such as the treatments you want, and if you want to donate organs. What are the types of advance directives? There are many types of advance directives, and each state has rules about how to use them.  You may choose a combination of any of the following:  Living will: This is a written record of the treatment you want. You can also choose which treatments you do not want, which to limit, and which to stop at a certain time. This includes surgery, medicine, IV fluid, and tube feedings. Durable power of  for healthcare [facility]): This is a written record that states who you want to make healthcare choices for you when you are unable to make them for yourself. This person, called a proxy, is usually a family member or a friend. You may choose more than 1 proxy. Do not resuscitate (DNR) order:  A DNR order is used in case your heart stops beating or you stop breathing. It is a request not to have certain forms of treatment, such as CPR. A DNR order may be included in other types of advance directives. Medical directive: This covers the care that you want if you are in a coma, near death, or unable to make decisions for yourself. You can list the treatments you want for each condition. Treatment may include pain medicine, surgery, blood transfusions, dialysis, IV or tube feedings, and a ventilator (breathing machine). Values history: This document has questions about your views, beliefs, and how you feel and think about life. This information can help others choose the care that you would choose. Why are advance directives important? An advance directive helps you control your care. Although spoken wishes may be used, it is better to have your wishes written down. Spoken wishes can be misunderstood, or not followed. Treatments may be given even if you do not want them. An advance directive may make it easier for your family to make difficult choices about your care. © Copyright [employer] 2018 Information is for End User's use only and may not be sold, redistributed or otherwise used for commercial purposes.  All illustrations and images included in CareNotes® are the copyrighted property of A.D.A.M., Inc. or [address]

## 2023-11-28 NOTE — ASSESSMENT & PLAN NOTE
The patient is tolerating the furosemide 20 mg, but still has peripheral edema. Offered compression stockings and declined.

## 2023-12-06 ENCOUNTER — TELEPHONE (OUTPATIENT)
Dept: HEMATOLOGY ONCOLOGY | Facility: CLINIC | Age: 88
End: 2023-12-06

## 2023-12-06 ENCOUNTER — OFFICE VISIT (OUTPATIENT)
Dept: HEMATOLOGY ONCOLOGY | Facility: CLINIC | Age: 88
End: 2023-12-06
Payer: MEDICARE

## 2023-12-06 VITALS
WEIGHT: 182 LBS | OXYGEN SATURATION: 97 % | RESPIRATION RATE: 18 BRPM | BODY MASS INDEX: 24.12 KG/M2 | HEIGHT: 73 IN | DIASTOLIC BLOOD PRESSURE: 80 MMHG | TEMPERATURE: 98.2 F | HEART RATE: 123 BPM | SYSTOLIC BLOOD PRESSURE: 124 MMHG

## 2023-12-06 DIAGNOSIS — D50.9 IRON DEFICIENCY ANEMIA, UNSPECIFIED IRON DEFICIENCY ANEMIA TYPE: Primary | ICD-10-CM

## 2023-12-06 DIAGNOSIS — E03.2 UNDERACTIVE THYROID DUE DRUGS: ICD-10-CM

## 2023-12-06 DIAGNOSIS — E53.8 FOLIC ACID DEFICIENCY: ICD-10-CM

## 2023-12-06 DIAGNOSIS — C68.9 HIGH GRADE UROTHELIAL CARCINOMA PRESENT ON URINE CYTOLOGY (HCC): ICD-10-CM

## 2023-12-06 DIAGNOSIS — C67.9 MALIGNANT NEOPLASM OF URINARY BLADDER, UNSPECIFIED SITE (HCC): Primary | ICD-10-CM

## 2023-12-06 DIAGNOSIS — D50.9 IRON DEFICIENCY ANEMIA, UNSPECIFIED IRON DEFICIENCY ANEMIA TYPE: ICD-10-CM

## 2023-12-06 DIAGNOSIS — C68.9 HIGH GRADE UROTHELIAL CARCINOMA PRESENT ON URINE CYTOLOGY (HCC): Primary | ICD-10-CM

## 2023-12-06 DIAGNOSIS — C61 PROSTATE CANCER (HCC): ICD-10-CM

## 2023-12-06 PROCEDURE — 99214 OFFICE O/P EST MOD 30 MIN: CPT | Performed by: INTERNAL MEDICINE

## 2023-12-06 RX ORDER — FOLIC ACID 1 MG/1
1 TABLET ORAL DAILY
Qty: 30 TABLET | Refills: 5 | Status: SHIPPED | OUTPATIENT
Start: 2023-12-06

## 2023-12-06 RX ORDER — SODIUM CHLORIDE 9 MG/ML
20 INJECTION, SOLUTION INTRAVENOUS ONCE
OUTPATIENT
Start: 2023-12-12

## 2023-12-06 NOTE — TELEPHONE ENCOUNTER
Phoned [facility] inf to let them know that pt will need more appt time for his chemo on 12/12/23 as pt will have two doses of Venofer every 3 weeks.
PROBLEM DIAGNOSES  Problem: History of endocarditis  Assessment and Plan: will continue aspirin kwabena-op  to schedule cardiology evaluation prior to surgery   most recent echo with normal LV function  on chart     Problem: Diaphragmatic paralysis  Assessment and Plan: patient to schedule evaluation with cardiologist prior to surgery     Problem: Need for prophylactic measure  Assessment and Plan: The Caprini score indicates that this patient is at high risk for a VTE event (score 6 or greater). Surgical patients in this group will benefit from both pharmacologic prophylaxis and intermittent compression devices.  The surgical team will determine the balance between VTE risk and bleeding risk, and other clinical considerations

## 2023-12-06 NOTE — PROGRESS NOTES
Hematology/Oncology Outpatient Follow-up  Philip Moctezuma. 81 y.o. male 1935 [id number]    Date:  12/6/2023        Assessment and Plan:  1. Malignant neoplasm of urinary bladder, unspecified site [facility])  I had a lengthy discussion with the patient and his wife/daughter regarding the initial staging after the PET CT scan which seems to be compatible with high-grade bladder cancer involving the prostate with diffuse activity mainly in the prostate gland. There is also small left common iliac chain lymph node with mild uptake which could be related to metastatic disease or reactive etiology. The estimated clinical staging at this point for his high-grade bladder cancer is (cT4, cN1, cM0). He was found not to be a candidate for surgery or concurrent chemoradiation. He is currently on Keytruda which was started on 10/31/2023. The patient is tolerating the single agent treatment with Gela Mitten which is being given on every 3-week basis relatively well. He received so far 2 cycles. We did discuss pursuing imaging preferably without IV contrast to avoid further kidney damage after about 3 to 4 months worth of treatment. He seems to be in agreement with the plan. - CBC and differential; Standing  - Comprehensive metabolic panel; Standing  - Magnesium; Standing  - TSH, 3rd generation with Free T4 reflex; Standing  - CBC and differential  - Comprehensive metabolic panel  - Magnesium  - TSH, 3rd generation with Free T4 reflex    2. High grade urothelial carcinoma present on urine cytology (HCC)    - CBC and differential; Future  - Comprehensive metabolic panel; Future  - TSH, 3rd generation with Free T4 reflex; Future  - T3, free; Future    3. Prostate cancer [facility])  Status post definitive radiation of the prostate about 12 years ago. His most recent PSA level from May of this year was undetectable     4.  Iron deficiency anemia, unspecified iron deficiency anemia type  He seems to have anemia due to chronic disease and other etiologies including renal dysfunction. He did not have obvious iron deficiency. However the low TIBC suggest potential benefit of iron IV Venofer x2 doses which will be added to his treatment. 5. Folic acid deficiency  He was found to have folic acid deficiency which will be corrected with oral supplements. - folic acid (FOLVITE) 1 mg tablet; Take 1 tablet (1 mg total) by mouth daily  Dispense: 30 tablet; Refill: 5    6. Underactive thyroid due drugs  We will check at the thyroid function frequently while on immunotherapy.  - TSH, 3rd generation with Free T4 reflex; Standing  - TSH, 3rd generation with Free T4 reflex        HPI:  The patient came there for follow-up visit accompanied by his wife and daughter. He had a PET CT scan for staging on 10/30/2023 which showed:  IMPRESSION:  1. Fairly diffuse radiotracer uptake throughout the prostate gland compatible with the known malignancy. 2.  Mild FDG uptake identified in a small left common iliac chain lymph node, nonspecific but metastasis is not excluded. 3. No findings for hypermetabolic metastasis to the neck, chest or abdomen. 4.  Mild to moderate bilateral hydroureteronephrosis, worse on the left similar to recent CT. Diminished physiologic activity in the left kidney compared to the right concerning for underlying renal dysfunction. He had ultrasound of the kidneys on 11/20/2023 which showed:    IMPRESSION:     1. Mild to moderate hydronephrosis bilaterally worse on the left. This appears similar to recent PET/CT. 2.  Limited assessment of the bladder as it is collapsed around a Haley catheter. He is currently having a Haley catheter in place. Denied hematuria. The patient already received 2 doses of Keytruda which he tolerated relatively well. Most recent blood work on 11/17/2023 showed hemoglobin of 8.9 with normal white cells. Platelet count was 967. Creatinine 1.2 with normal calcium and liver enzymes.   Iron panel showed saturation of 30% and ferritin of 339. TIBC 161. Vitamin B29 was normal.  Folic acid was low 4.0. He denied obvious bleeding from any sites. Oncology History Overview Note   This is an 81-year-old male with history of atrial fibrillation, hypertension, SIADH and prostate cancer which was treated with definitive radiation around 12 years ago. The patient apparently complained about significant urinary symptoms just recently which was initially evaluated with an MRI of the pelvis. This showed moderate prostatomegaly on 5/30/2023. The patient had then TURP procedure on 9/11/2023 which was done by Dr. Kay Bradley from the urology team.  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 Recent CT renal protocol on 9/20/2023 showed:  IMPRESSION:     Markedly distended urinary bladder suggestive of chronic partial bladder outlet obstruction. Markedly enlarged prostate gland which indents the urinary bladder base in this patient with history of prostate neoplasm with significant internal heterogeneity   and areas of hypoattenuation suggestive of necrosis. Moderate left-sided hydroureteronephrosis to the level of the distal ureter which appears obstructed secondary to adjacent enlarged prostatic tissue. Absence of contrast excretion from the left kidney after a 15-minute delay. Moderate right-sided hydroureteronephrosis to the level of the urinary bladder; excretion of contrast is demonstrated into the ureter after a 7-minute delay.      Bladder cancer ([address])   9/11/2023 -  Cancer Staged    Staging form: Urinary Bladder, AJCC 8th Edition  - Clinical stage from 9/11/2023: cT4, cN1, cM0 - Signed by Ysabel Mahajan MD on 12/6/2023  Stage prefix: Initial diagnosis  WHO/ISUP grade (low/high): High Grade  Histologic grading system: 2 grade system       11/17/2023 Initial Diagnosis    Bladder cancer (HCC)         Interval history:    ROS: Review of Systems   Constitutional:  Negative for chills and fever. HENT:  Negative for ear pain and sore throat. Eyes:  Negative for pain and visual disturbance. Respiratory:  Negative for cough and shortness of breath. Cardiovascular:  Negative for chest pain and palpitations. Gastrointestinal:  Negative for abdominal pain and vomiting. Genitourinary:  Negative for dysuria and hematuria. Musculoskeletal:  Negative for arthralgias and back pain. Skin:  Negative for color change and rash. Neurological:  Negative for seizures and syncope. All other systems reviewed and are negative. Past Medical History:   Diagnosis Date    Atrial fibrillation ([facility])     Bladder cancer ([facility])     Cancer ([facility])     prostate cancer     Hypertension     Irregular heart beat     SIADH (syndrome of inappropriate ADH production) ([facility])        Past Surgical History:   Procedure Laterality Date    APPENDECTOMY      SC TRURL ELECTROSURG RESCJ PROSTATE BLEED COMPLETE N/A 9/11/2023    Procedure: CYSTOSCOPY; TRANSURETHRAL RESECTION OF PROSTATE (TURP);   Surgeon: Lawanda Brittle, MD;  Location: [facility];  Service: Urology       Social History     Socioeconomic History    Marital status: /Civil Union     Spouse name: None    Number of children: None    Years of education: None    Highest education level: None   Occupational History    Occupation: RETIRED   Tobacco Use    Smoking status: Never     Passive exposure: Never    Smokeless tobacco: Never   Vaping Use    Vaping Use: Never used   Substance and Sexual Activity    Alcohol use: Not Currently    Drug use: Not Currently    Sexual activity: Not Currently   Other Topics Concern    None   Social History Narrative        RETIRED     Social Determinants of Health     Financial Resource Strain: Low Risk  (11/28/2023)    Overall Financial Resource Strain (CARDIA)     Difficulty of Paying Living Expenses: Not hard at all   Food Insecurity: No Food Insecurity (6/28/2023)    Hunger Vital Sign     Worried About Running Out of Food in the Last Year: Never true     Ran Out of Food in the Last Year: Never true   Transportation Needs: No Transportation Needs (11/28/2023)    PRAPARE - Transportation     Lack of Transportation (Medical): No     Lack of Transportation (Non-Medical):  No   Physical Activity: Not on file   Stress: Not on file   Social Connections: Not on file   Intimate Partner Violence: Not on file   Housing Stability: Low Risk  (6/28/2023)    Housing Stability Vital Sign     Unable to Pay for Housing in the Last Year: No     Number of Places Lived in the Last Year: 1     Unstable Housing in the Last Year: No       Family History   Problem Relation Age of Onset    Hypertension Mother        No Known Allergies      Current Outpatient Medications:     aspirin 81 mg chewable tablet, Chew 81 mg daily, Disp: , Rfl:     ferrous sulfate 325 (65 Fe) mg tablet, Take 1 tablet (325 mg total) by mouth daily with breakfast Do not start before July 3, 2023., Disp: 30 tablet, Rfl: 0    finasteride (PROPECIA) 1 MG tablet, Take 1 mg by mouth daily, Disp: , Rfl:     folic acid (FOLVITE) 1 mg tablet, Take 1 tablet (1 mg total) by mouth daily, Disp: 30 tablet, Rfl: 5    metoprolol succinate (TOPROL-XL) 100 mg 24 hr tablet, Take 1 tablet (100 mg total) by mouth daily, Disp: 90 tablet, Rfl: 3    senna-docusate sodium (SENOKOT S) 8.6-50 mg per tablet, Take 2 tablets by mouth daily at bedtime May hold for loose stools, Disp: 60 tablet, Rfl: 0    tamsulosin (Flomax) 0.4 mg, Take 0.4 mg by mouth daily with dinner, Disp: , Rfl:     furosemide (LASIX) 20 mg tablet, Take 1 tablet (20 mg total) by mouth daily (Patient not taking: Reported on 12/6/2023), Disp: 30 tablet, Rfl: 1      Physical Exam:  /80 (BP Location: Left arm, Patient Position: Sitting, Cuff Size: Adult)   Pulse (!) 123   Temp 98.2 °F (36.8 °C)   Resp 18   Ht 6' 1" (1.854 m)   Wt 82.6 kg (182 lb)   SpO2 97% BMI 24.01 kg/m²     Physical Exam  Constitutional:       Appearance: He is well-developed. HENT:      Head: Normocephalic and atraumatic. Nose: Nose normal.   Eyes:      General: No scleral icterus. Right eye: No discharge. Left eye: No discharge. Conjunctiva/sclera: Conjunctivae normal.      Pupils: Pupils are equal, round, and reactive to light. Neck:      Thyroid: No thyromegaly. Trachea: No tracheal deviation. Cardiovascular:      Rate and Rhythm: Normal rate and regular rhythm. Heart sounds: Normal heart sounds. No murmur heard. No friction rub. Pulmonary:      Effort: Pulmonary effort is normal. No respiratory distress. Breath sounds: Normal breath sounds. No wheezing or rales. Chest:      Chest wall: No tenderness. Abdominal:      General: There is no distension. Palpations: Abdomen is soft. There is no hepatomegaly or splenomegaly. Tenderness: There is no abdominal tenderness. There is no guarding or rebound. Musculoskeletal:         General: No tenderness or deformity. Normal range of motion. Cervical back: Normal range of motion and neck supple. Lymphadenopathy:      Cervical: No cervical adenopathy. Skin:     General: Skin is warm and dry. Coloration: Skin is not pale. Findings: No erythema or rash. Neurological:      Mental Status: He is alert and oriented to person, place, and time. Cranial Nerves: No cranial nerve deficit. Coordination: Coordination normal.      Deep Tendon Reflexes: Reflexes are normal and symmetric. Psychiatric:         Behavior: Behavior normal.         Thought Content:  Thought content normal.         Judgment: Judgment normal.           Labs:  Lab Results   Component Value Date    WBC 8.45 11/17/2023    HGB 8.9 (L) 11/17/2023    HCT 28.0 (L) 11/17/2023    MCV 89 11/17/2023     (H) 11/17/2023     Lab Results   Component Value Date    K 4.0 11/17/2023    CL 98 11/17/2023 CO2 24 11/17/2023    BUN 24 11/17/2023    CREATININE 1.23 11/17/2023    GLUF 137 (H) 11/17/2023    CALCIUM 9.0 11/17/2023    CORRECTEDCA 9.8 11/17/2023    AST 25 11/17/2023    ALT 21 11/17/2023    ALKPHOS 45 11/17/2023    EGFR 52 11/17/2023     No results found for: "TSH"    Patient voiced understanding and agreement in the above discussion. Aware to contact our office with questions/symptoms in the interim.

## 2023-12-07 ENCOUNTER — HOSPITAL ENCOUNTER (OUTPATIENT)
Dept: ULTRASOUND IMAGING | Facility: HOSPITAL | Age: 88
End: 2023-12-07
Attending: UROLOGY
Payer: MEDICARE

## 2023-12-07 DIAGNOSIS — N13.30 HYDRONEPHROSIS, UNSPECIFIED HYDRONEPHROSIS TYPE: ICD-10-CM

## 2023-12-07 PROCEDURE — 76775 US EXAM ABDO BACK WALL LIM: CPT

## 2023-12-08 ENCOUNTER — APPOINTMENT (OUTPATIENT)
Dept: LAB | Facility: CLINIC | Age: 88
End: 2023-12-08
Payer: MEDICARE

## 2023-12-08 DIAGNOSIS — C68.9 HIGH GRADE UROTHELIAL CARCINOMA PRESENT ON URINE CYTOLOGY (HCC): ICD-10-CM

## 2023-12-08 LAB
ALBUMIN SERPL BCP-MCNC: 3 G/DL (ref 3.5–5)
ALP SERPL-CCNC: 46 U/L (ref 34–104)
ALT SERPL W P-5'-P-CCNC: 13 U/L (ref 7–52)
ANION GAP SERPL CALCULATED.3IONS-SCNC: 7 MMOL/L
AST SERPL W P-5'-P-CCNC: 23 U/L (ref 13–39)
BASOPHILS # BLD AUTO: 0.04 THOUSANDS/ÂΜL (ref 0–0.1)
BASOPHILS NFR BLD AUTO: 1 % (ref 0–1)
BILIRUB SERPL-MCNC: 0.61 MG/DL (ref 0.2–1)
BUN SERPL-MCNC: 20 MG/DL (ref 5–25)
CALCIUM ALBUM COR SERPL-MCNC: 9.4 MG/DL (ref 8.3–10.1)
CALCIUM SERPL-MCNC: 8.6 MG/DL (ref 8.4–10.2)
CHLORIDE SERPL-SCNC: 96 MMOL/L (ref 96–108)
CO2 SERPL-SCNC: 27 MMOL/L (ref 21–32)
CREAT SERPL-MCNC: 0.97 MG/DL (ref 0.6–1.3)
EOSINOPHIL # BLD AUTO: 0.15 THOUSAND/ÂΜL (ref 0–0.61)
EOSINOPHIL NFR BLD AUTO: 2 % (ref 0–6)
ERYTHROCYTE [DISTWIDTH] IN BLOOD BY AUTOMATED COUNT: 17 % (ref 11.6–15.1)
GFR SERPL CREATININE-BSD FRML MDRD: 70 ML/MIN/1.73SQ M
GLUCOSE P FAST SERPL-MCNC: 88 MG/DL (ref 65–99)
HCT VFR BLD AUTO: 27.7 % (ref 36.5–49.3)
HGB BLD-MCNC: 8.6 G/DL (ref 12–17)
IMM GRANULOCYTES # BLD AUTO: 0.06 THOUSAND/UL (ref 0–0.2)
IMM GRANULOCYTES NFR BLD AUTO: 1 % (ref 0–2)
LYMPHOCYTES # BLD AUTO: 0.37 THOUSANDS/ÂΜL (ref 0.6–4.47)
LYMPHOCYTES NFR BLD AUTO: 6 % (ref 14–44)
MAGNESIUM SERPL-MCNC: 1.8 MG/DL (ref 1.9–2.7)
MCH RBC QN AUTO: 27.5 PG (ref 26.8–34.3)
MCHC RBC AUTO-ENTMCNC: 31 G/DL (ref 31.4–37.4)
MCV RBC AUTO: 89 FL (ref 82–98)
MONOCYTES # BLD AUTO: 0.49 THOUSAND/ÂΜL (ref 0.17–1.22)
MONOCYTES NFR BLD AUTO: 8 % (ref 4–12)
NEUTROPHILS # BLD AUTO: 5.36 THOUSANDS/ÂΜL (ref 1.85–7.62)
NEUTS SEG NFR BLD AUTO: 82 % (ref 43–75)
NRBC BLD AUTO-RTO: 0 /100 WBCS
PLATELET # BLD AUTO: 357 THOUSANDS/UL (ref 149–390)
PMV BLD AUTO: 9.1 FL (ref 8.9–12.7)
POTASSIUM SERPL-SCNC: 4.3 MMOL/L (ref 3.5–5.3)
PROT SERPL-MCNC: 6.4 G/DL (ref 6.4–8.4)
RBC # BLD AUTO: 3.13 MILLION/UL (ref 3.88–5.62)
SODIUM SERPL-SCNC: 130 MMOL/L (ref 135–147)
T3FREE SERPL-MCNC: 2.41 PG/ML (ref 2.5–3.9)
TSH SERPL DL<=0.05 MIU/L-ACNC: 2.68 UIU/ML (ref 0.45–4.5)
WBC # BLD AUTO: 6.47 THOUSAND/UL (ref 4.31–10.16)

## 2023-12-08 PROCEDURE — 80053 COMPREHEN METABOLIC PANEL: CPT | Performed by: INTERNAL MEDICINE

## 2023-12-08 PROCEDURE — 83735 ASSAY OF MAGNESIUM: CPT | Performed by: INTERNAL MEDICINE

## 2023-12-08 PROCEDURE — 84481 FREE ASSAY (FT-3): CPT

## 2023-12-08 PROCEDURE — 85025 COMPLETE CBC W/AUTO DIFF WBC: CPT | Performed by: INTERNAL MEDICINE

## 2023-12-08 PROCEDURE — 36415 COLL VENOUS BLD VENIPUNCTURE: CPT | Performed by: INTERNAL MEDICINE

## 2023-12-08 PROCEDURE — 84443 ASSAY THYROID STIM HORMONE: CPT | Performed by: INTERNAL MEDICINE

## 2023-12-11 ENCOUNTER — TELEPHONE (OUTPATIENT)
Dept: HEMATOLOGY ONCOLOGY | Facility: CLINIC | Age: 88
End: 2023-12-11

## 2023-12-11 RX ORDER — MAGNESIUM SULFATE HEPTAHYDRATE 40 MG/ML
2 INJECTION, SOLUTION INTRAVENOUS ONCE
Status: CANCELLED
Start: 2023-12-12

## 2023-12-11 NOTE — TELEPHONE ENCOUNTER
Title: Magnesium Sulfate     Date patient scheduled: 12/12/23    Original medication ordered: None    New Medication ordered: Magnesium Sulfate 2gm over 1 hour    Office RN notified patient?? No    Is the patient scheduled within 24 hours? ? If yes, follow up with verbal telephone call. Phoned infusion. Time out called to [employer] RN to route to [facility]. Infusion  pool routes to [facility] [place]. Infusion tech to receive message, confirm scheduled treatment duration matches ordered treatment duration or adjust accordingly, and re-link appointment request orders. Infusion tech to notify pharmacy and finance.

## 2023-12-11 NOTE — PROGRESS NOTES
Time out with jacob arias rn at dr perdomo. Pt will have 2 gm mag iv tomorrow with tx for mag level of 1.8. Marianne Crimes in pharmacy made aware.

## 2023-12-12 ENCOUNTER — HOSPITAL ENCOUNTER (OUTPATIENT)
Dept: INFUSION CENTER | Facility: HOSPITAL | Age: 88
Discharge: HOME | End: 2023-12-12
Attending: INTERNAL MEDICINE
Payer: MEDICARE

## 2023-12-12 VITALS
OXYGEN SATURATION: 99 % | RESPIRATION RATE: 18 BRPM | BODY MASS INDEX: 24.37 KG/M2 | HEART RATE: 107 BPM | DIASTOLIC BLOOD PRESSURE: 65 MMHG | TEMPERATURE: 98.8 F | WEIGHT: 183.86 LBS | HEIGHT: 73 IN | SYSTOLIC BLOOD PRESSURE: 149 MMHG

## 2023-12-12 DIAGNOSIS — D50.9 IRON DEFICIENCY ANEMIA, UNSPECIFIED IRON DEFICIENCY ANEMIA TYPE: Primary | ICD-10-CM

## 2023-12-12 DIAGNOSIS — C68.9 HIGH GRADE UROTHELIAL CARCINOMA PRESENT ON URINE CYTOLOGY (HCC): ICD-10-CM

## 2023-12-12 PROCEDURE — 96413 CHEMO IV INFUSION 1 HR: CPT

## 2023-12-12 PROCEDURE — 96367 TX/PROPH/DG ADDL SEQ IV INF: CPT

## 2023-12-12 RX ORDER — SODIUM CHLORIDE 9 MG/ML
20 INJECTION, SOLUTION INTRAVENOUS ONCE
Status: DISCONTINUED | OUTPATIENT
Start: 2023-12-12 | End: 2023-12-16 | Stop reason: HOSPADM

## 2023-12-12 RX ORDER — SODIUM CHLORIDE 9 MG/ML
20 INJECTION, SOLUTION INTRAVENOUS ONCE
OUTPATIENT
Start: 2024-01-02

## 2023-12-12 RX ORDER — MAGNESIUM SULFATE HEPTAHYDRATE 40 MG/ML
2 INJECTION, SOLUTION INTRAVENOUS ONCE
Status: COMPLETED | OUTPATIENT
Start: 2023-12-12 | End: 2023-12-12

## 2023-12-12 RX ORDER — SODIUM CHLORIDE 9 MG/ML
20 INJECTION, SOLUTION INTRAVENOUS ONCE
Status: COMPLETED | OUTPATIENT
Start: 2023-12-12 | End: 2023-12-12

## 2023-12-12 RX ADMIN — MAGNESIUM SULFATE IN WATER 2 G: 40 INJECTION, SOLUTION INTRAVENOUS at 13:00

## 2023-12-12 RX ADMIN — SODIUM CHLORIDE 20 ML/HR: 0.9 INJECTION, SOLUTION INTRAVENOUS at 12:14

## 2023-12-12 RX ADMIN — IRON SUCROSE 200 MG: 20 INJECTION, SOLUTION INTRAVENOUS at 12:14

## 2023-12-12 RX ADMIN — SODIUM CHLORIDE 200 MG: 9 INJECTION, SOLUTION INTRAVENOUS at 14:33

## 2023-12-12 NOTE — PROGRESS NOTES
Recent labs reviewed. Patient tolerated keytruda, iv mag, and iv venofer  treatment without reaction or issues. Peripheral iv discontinued jelco intact. AVS given to patient. Patient is aware of their next appointment on jan 2. Patient ambulated off unit without incident. All personal belongings taken with patient.

## 2023-12-15 ENCOUNTER — OFFICE VISIT (OUTPATIENT)
Dept: NEPHROLOGY | Facility: CLINIC | Age: 88
End: 2023-12-15
Payer: MEDICARE

## 2023-12-15 VITALS
SYSTOLIC BLOOD PRESSURE: 122 MMHG | BODY MASS INDEX: 24.39 KG/M2 | OXYGEN SATURATION: 98 % | HEIGHT: 73 IN | DIASTOLIC BLOOD PRESSURE: 74 MMHG | HEART RATE: 95 BPM | WEIGHT: 184 LBS

## 2023-12-15 DIAGNOSIS — I10 ESSENTIAL HYPERTENSION: Chronic | ICD-10-CM

## 2023-12-15 DIAGNOSIS — E22.2 SIADH (SYNDROME OF INAPPROPRIATE ADH PRODUCTION) (HCC): ICD-10-CM

## 2023-12-15 DIAGNOSIS — N18.31 STAGE 3A CHRONIC KIDNEY DISEASE (HCC): Primary | ICD-10-CM

## 2023-12-15 DIAGNOSIS — R60.0 BILATERAL LOWER EXTREMITY EDEMA: ICD-10-CM

## 2023-12-15 DIAGNOSIS — E87.1 HYPONATREMIA: ICD-10-CM

## 2023-12-15 PROCEDURE — 99214 OFFICE O/P EST MOD 30 MIN: CPT | Performed by: INTERNAL MEDICINE

## 2023-12-15 NOTE — PROGRESS NOTES
[facility] [address] [place] [address]    Name: Katie Wilkinson. YOB: 1935      Assessment/Plan:    Stage 3a chronic kidney disease [facility])  Lab Results   Component Value Date    EGFR 70 12/08/2023    EGFR 52 11/17/2023    EGFR 56 10/30/2023    CREATININE 0.97 12/08/2023    CREATININE 1.23 11/17/2023    CREATININE 1.16 10/30/2023     Kidney function better than usual, unclear reason but happy to see this. Next blood work we may note that his creatinine is back up to about 1.1-1.2 mg/dL. Continue to optimize care and avoid potential for toxins. SIADH (syndrome of inappropriate ADH production) ([address])  Patient continues to well with fluid restriction, goal serum sodium level is 130 mmol/liter and above. Continue with current fluid restriction which is between 48-54 ounces a day    Essential hypertension  Blood pressures are well-controlled, continue with current medications, no episodes of hypotension. Bilateral lower extremity edema  Patient has a little more bilateral lower extremity edema than usual, he is currently on hold for his furosemide due to upcoming Lasix scan. Continue low-sodium diet. Patient to reinitiate furosemide the day following Lasix scan. Problem List Items Addressed This Visit          Endocrine    SIADH (syndrome of inappropriate ADH production) ([address])     Patient continues to well with fluid restriction, goal serum sodium level is 130 mmol/liter and above. Continue with current fluid restriction which is between 48-54 ounces a day            Cardiovascular and Mediastinum    Essential hypertension (Chronic)     Blood pressures are well-controlled, continue with current medications, no episodes of hypotension.             Genitourinary    Stage 3a chronic kidney disease [facility]) - Primary     Lab Results   Component Value Date    EGFR 70 12/08/2023    EGFR 52 11/17/2023    EGFR 56 10/30/2023    CREATININE 0.97 12/08/2023 CREATININE 1.23 11/17/2023    CREATININE 1.16 10/30/2023     Kidney function better than usual, unclear reason but happy to see this. Next blood work we may note that his creatinine is back up to about 1.1-1.2 mg/dL. Continue to optimize care and avoid potential for toxins. Relevant Orders    Albumin / creatinine urine ratio    Urinalysis with microscopic    Comprehensive metabolic panel    CBC and differential    Magnesium    PTH, intact    Phosphorus    Vitamin D 25 hydroxy       Other    Hyponatremia    Bilateral lower extremity edema     Patient has a little more bilateral lower extremity edema than usual, he is currently on hold for his furosemide due to upcoming Lasix scan. Continue low-sodium diet. Patient to reinitiate furosemide the day following Lasix scan. Patient is doing well from the renal standpoint. Of course diagnosis of bladder cancer along with other associated treatment, management, continue evaluation is ongoing. We will see the patient back for regular appointment in May, 2024, and continue to monitor blood work in the meantime. Subjective:      Patient ID: Maria A Falk. is a 81 y.o. male. Patient presents for follow up. We reviewed the patient's labs in detail, most recent creatinine is about 1 mg/dL, places estimate GFR at 70 mL/min, which is exceptionally good for him. Serum sodium level is slightly reduced but is appropriate at 130 mmol/L. There were no other significant lecture abnormalities. He is taking all medications as prescribed with no specific side effects. The only exception is his furosemide which is currently on hold due to a Lasix nuclear split study coming up in the near future with our urology colleagues. Patient's hemoglobin is noted to be reduced, he is currently having iron infusions by our hematology colleagues.     Patient's serum magnesium level noted to be slightly reduced, also received 2 g of IV magnesium sulfate at his recent infusion. Patient is currently seeing her hematologist for medical management of bladder cancer. He is on Keytruda infusions, I believe he has about 2 more cycles left. He appears to be tolerating the infusion well and has no acute complaints regarding it. The following portions of the patient's history were reviewed and updated as appropriate: allergies, current medications, past family history, past medical history, past social history, past surgical history and problem list.    Review of Systems   All other systems reviewed and are negative. Social History     Socioeconomic History    Marital status: /Civil Union     Spouse name: None    Number of children: None    Years of education: None    Highest education level: None   Occupational History    Occupation: RETIRED   Tobacco Use    Smoking status: Never     Passive exposure: Never    Smokeless tobacco: Never   Vaping Use    Vaping status: Never Used   Substance and Sexual Activity    Alcohol use: Not Currently    Drug use: Not Currently    Sexual activity: Not Currently   Other Topics Concern    None   Social History Narrative        RETIRED     Social Determinants of Health     Financial Resource Strain: Low Risk  (11/28/2023)    Overall Financial Resource Strain (CARDIA)     Difficulty of Paying Living Expenses: Not hard at all   Food Insecurity: No Food Insecurity (6/28/2023)    Hunger Vital Sign     Worried About Running Out of Food in the Last Year: Never true     Ran Out of Food in the Last Year: Never true   Transportation Needs: No Transportation Needs (11/28/2023)    PRAPARE - Transportation     Lack of Transportation (Medical): No     Lack of Transportation (Non-Medical):  No   Physical Activity: Not on file   Stress: Not on file   Social Connections: Not on file   Intimate Partner Violence: Not on file   Housing Stability: Low Risk  (6/28/2023)    Housing Stability Vital Sign     Unable to Pay for Housing in the Last Year: No     Number of Places Lived in the Last Year: 1     Unstable Housing in the Last Year: No     Past Medical History:   Diagnosis Date    Atrial fibrillation ([address])     Bladder cancer ([address])     Cancer ([address])     prostate cancer     Hypertension     Irregular heart beat     SIADH (syndrome of inappropriate ADH production) ([address])      Past Surgical History:   Procedure Laterality Date    APPENDECTOMY      IL TRURL ELECTROSURG RESCJ PROSTATE BLEED COMPLETE N/A 9/11/2023    Procedure: CYSTOSCOPY; TRANSURETHRAL RESECTION OF PROSTATE (TURP); Surgeon: Shen Joshi MD;  Location: [facility];  Service: Urology       Current Outpatient Medications:     aspirin 81 mg chewable tablet, Chew 81 mg daily, Disp: , Rfl:     finasteride (PROPECIA) 1 MG tablet, Take 1 mg by mouth daily, Disp: , Rfl:     folic acid (FOLVITE) 1 mg tablet, Take 1 tablet (1 mg total) by mouth daily, Disp: 30 tablet, Rfl: 5    metoprolol succinate (TOPROL-XL) 100 mg 24 hr tablet, Take 1 tablet (100 mg total) by mouth daily, Disp: 90 tablet, Rfl: 3    tamsulosin (Flomax) 0.4 mg, Take 0.4 mg by mouth daily with dinner, Disp: , Rfl:     ferrous sulfate 325 (65 Fe) mg tablet, Take 1 tablet (325 mg total) by mouth daily with breakfast Do not start before July 3, 2023., Disp: 30 tablet, Rfl: 0    furosemide (LASIX) 20 mg tablet, Take 1 tablet (20 mg total) by mouth daily (Patient not taking: Reported on 12/6/2023), Disp: 30 tablet, Rfl: 1    senna-docusate sodium (SENOKOT S) 8.6-50 mg per tablet, Take 2 tablets by mouth daily at bedtime May hold for loose stools, Disp: 60 tablet, Rfl: 0  No current facility-administered medications for this visit.     Facility-Administered Medications Ordered in Other Visits:     alteplase (CATHFLO) injection 2 mg, 2 mg, Intracatheter, Q1MIN PRN, Franca Pratt MD    sodium chloride 0.9 % infusion, 20 mL/hr, Intravenous, Once, Franca Pratt MD    Lab Results   Component Value Date    SODIUM 130 (L) 12/08/2023    K 4.3 12/08/2023    CL 96 12/08/2023    CO2 27 12/08/2023    AGAP 7 12/08/2023    BUN 20 12/08/2023    CREATININE 0.97 12/08/2023    GLUC 110 10/30/2023    GLUF 88 12/08/2023    CALCIUM 8.6 12/08/2023    AST 23 12/08/2023    ALT 13 12/08/2023    ALKPHOS 46 12/08/2023    TP 6.4 12/08/2023    TBILI 0.61 12/08/2023    EGFR 70 12/08/2023     Lab Results   Component Value Date    WBC 6.47 12/08/2023    HGB 8.6 (L) 12/08/2023    HCT 27.7 (L) 12/08/2023    MCV 89 12/08/2023     12/08/2023     Lab Results   Component Value Date    CHOLESTEROL 132 05/15/2023    CHOLESTEROL 162 05/03/2021     Lab Results   Component Value Date    HDL 66 05/15/2023    HDL 60 05/03/2021     Lab Results   Component Value Date    LDLCALC 55 05/15/2023    LDLCALC 87 05/03/2021     Lab Results   Component Value Date    TRIG 56 05/15/2023    TRIG 76 05/03/2021     No results found for: "CHOLHDL"  Lab Results   Component Value Date    OIJ2HRLIMXIA 2.684 12/08/2023     Lab Results   Component Value Date    CALCIUM 8.6 12/08/2023    PHOS 3.6 07/01/2023     No results found for: "SPEP", "UPEP"  No results found for: "Fernando Nguyen", "[id number]"        Objective:      /74 (BP Location: Left arm, Patient Position: Sitting, Cuff Size: Standard)   Pulse 95   Ht 6' 1" (1.854 m)   Wt 83.5 kg (184 lb)   SpO2 98%   BMI 24.28 kg/m²          Physical Exam  Vitals reviewed. Constitutional:       General: He is not in acute distress. Appearance: Normal appearance. HENT:      Head: Normocephalic and atraumatic. Right Ear: External ear normal.      Left Ear: External ear normal.   Eyes:      Conjunctiva/sclera: Conjunctivae normal.   Cardiovascular:      Rate and Rhythm: Normal rate and regular rhythm. Heart sounds: Normal heart sounds. Pulmonary:      Effort: No respiratory distress. Breath sounds: No wheezing. Abdominal:      Palpations: Abdomen is soft. Musculoskeletal:      Right lower leg: Edema present.       Left lower leg: Edema present. Skin:     General: Skin is warm and dry. Neurological:      General: No focal deficit present. Mental Status: He is alert and oriented to person, place, and time.    Psychiatric:         Mood and Affect: Mood normal.         Behavior: Behavior normal.

## 2023-12-15 NOTE — ASSESSMENT & PLAN NOTE
Patient has a little more bilateral lower extremity edema than usual, he is currently on hold for his furosemide due to upcoming Lasix scan. Continue low-sodium diet. Patient to reinitiate furosemide the day following Lasix scan.

## 2023-12-15 NOTE — ASSESSMENT & PLAN NOTE
Lab Results   Component Value Date    EGFR 70 12/08/2023    EGFR 52 11/17/2023    EGFR 56 10/30/2023    CREATININE 0.97 12/08/2023    CREATININE 1.23 11/17/2023    CREATININE 1.16 10/30/2023     Kidney function better than usual, unclear reason but happy to see this. Next blood work we may note that his creatinine is back up to about 1.1-1.2 mg/dL. Continue to optimize care and avoid potential for toxins.

## 2023-12-15 NOTE — ASSESSMENT & PLAN NOTE
Patient continues to well with fluid restriction, goal serum sodium level is 130 mmol/liter and above.   Continue with current fluid restriction which is between 48-54 ounces a day

## 2023-12-17 ENCOUNTER — HOSPITAL ENCOUNTER (EMERGENCY)
Facility: HOSPITAL | Age: 88
Discharge: HOME | End: 2023-12-17
Attending: EMERGENCY MEDICINE
Payer: MEDICARE

## 2023-12-17 VITALS
SYSTOLIC BLOOD PRESSURE: 152 MMHG | RESPIRATION RATE: 18 BRPM | HEART RATE: 92 BPM | DIASTOLIC BLOOD PRESSURE: 84 MMHG | OXYGEN SATURATION: 98 %

## 2023-12-17 DIAGNOSIS — T83.091A OBSTRUCTION OF FOLEY CATHETER, INITIAL ENCOUNTER (HCC): Primary | ICD-10-CM

## 2023-12-17 PROCEDURE — 99283 EMERGENCY DEPT VISIT LOW MDM: CPT

## 2023-12-17 PROCEDURE — 99283 EMERGENCY DEPT VISIT LOW MDM: CPT | Performed by: EMERGENCY MEDICINE

## 2023-12-17 NOTE — DISCHARGE INSTRUCTIONS
RETURN IF WORSE IN ANY WAY:   PAIN,   FEVER OR FLU LIKE SYMPTOMS,   OR NEW AND CONCERNING SYMPTOMS SIGNS OR SYMPTOMS      PLEASE CALL UROLOGIST SET UP FOLLOW UP for TOMORROW or the Next day  PLEASE REVIEW THE WORK UP RESULTS WITH YOUR DOCTORs

## 2023-12-17 NOTE — ED PROVIDER NOTES
History  No chief complaint on file.    87-YEAR-OLD MALE        Pt w/ blocked dominique  Has had many similar episodes  Pt has associated lower abdominal pressure     NO ASSOCIATED SYMPTOMS:  NO back pain/flank pain   URINARY  SYMPTOMS: THERE IS NO DYSURIA, NO HEMATURIA  HE Denies NAUSEA, VOMITING.    DENIES FEVERS/CHILLS     INTERVENTIONS: NONE            History provided by:  Patient      Prior to Admission Medications   Prescriptions Last Dose Informant Patient Reported? Taking?   aspirin 81 mg chewable tablet  Self Yes No   Sig: Chew 81 mg daily   ferrous sulfate 325 (65 Fe) mg tablet  Self No No   Sig: Take 1 tablet (325 mg total) by mouth daily with breakfast Do not start before July 3, 2023.   finasteride (PROPECIA) 1 MG tablet  Self Yes No   Sig: Take 1 mg by mouth daily   folic acid (FOLVITE) 1 mg tablet  Self No No   Sig: Take 1 tablet (1 mg total) by mouth daily   furosemide (LASIX) 20 mg tablet  Self No No   Sig: Take 1 tablet (20 mg total) by mouth daily   Patient not taking: Reported on 12/6/2023   metoprolol succinate (TOPROL-XL) 100 mg 24 hr tablet  Self No No   Sig: Take 1 tablet (100 mg total) by mouth daily   senna-docusate sodium (SENOKOT S) 8.6-50 mg per tablet  Self No No   Sig: Take 2 tablets by mouth daily at bedtime May hold for loose stools   tamsulosin (Flomax) 0.4 mg  Self Yes No   Sig: Take 0.4 mg by mouth daily with dinner      Facility-Administered Medications: None       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)        Past Surgical History:   Procedure Laterality Date    APPENDECTOMY      CO TRURL ELECTROSURG RESCJ PROSTATE BLEED COMPLETE N/A 9/11/2023    Procedure: CYSTOSCOPY; TRANSURETHRAL RESECTION OF PROSTATE (TURP);  Surgeon: Franklyn Joseph MD;  Location: [facility];  Service: Urology        Family History   Problem Relation Age of Onset    Hypertension Mother      I have reviewed and agree with the history as documented.    E-Cigarette/Vaping    E-Cigarette Use Never User      E-Cigarette/Vaping Substances    Nicotine No     THC No     CBD No     Flavoring No     Other No     Unknown No      Social History     Tobacco Use    Smoking status: Never     Passive exposure: Never    Smokeless tobacco: Never   Vaping Use    Vaping status: Never Used   Substance Use Topics    Alcohol use: Not Currently    Drug use: Not Currently       Review of Systems   Constitutional:  Negative for chills, diaphoresis, fatigue and fever.   Respiratory:  Negative for shortness of breath and wheezing.    Cardiovascular:  Negative for chest pain, palpitations and leg swelling.   Gastrointestinal:  Negative for abdominal pain, blood in stool, nausea and vomiting.   Genitourinary:  Negative for dysuria, flank pain and frequency.        Blocked dominique catheter    Musculoskeletal:  Negative for arthralgias, back pain, gait problem, joint swelling, myalgias, neck pain and neck stiffness.   Skin:  Negative for rash and wound.   Neurological:  Negative for dizziness, light-headedness and headaches.   All other systems reviewed and are negative.      Physical Exam  Physical Exam  Constitutional:       General: He is not in acute distress.     Appearance: Normal appearance. He is well-developed. He is not ill-appearing, toxic-appearing or diaphoretic.   HENT:      Head: Normocephalic and atraumatic.      Right Ear: External ear normal.      Left Ear: External ear normal.      Nose: Nose normal. No congestion or rhinorrhea.      Mouth/Throat:      Pharynx: No oropharyngeal exudate or posterior oropharyngeal erythema.   Eyes:      General: No scleral icterus.        Right eye: No discharge.         Left eye: No discharge.      Extraocular Movements: Extraocular movements intact.      Conjunctiva/sclera: Conjunctivae normal.       Pupils: Pupils are equal, round, and reactive to light.   Neck:      Vascular: No JVD.      Trachea: No tracheal deviation.   Cardiovascular:      Rate and Rhythm: Normal rate and regular rhythm.      Pulses: Normal pulses.      Heart sounds: Normal heart sounds. No murmur heard.     No friction rub. No gallop.   Pulmonary:      Effort: Pulmonary effort is normal. No respiratory distress.      Breath sounds: Normal breath sounds. No stridor. No wheezing, rhonchi or rales.   Chest:      Chest wall: No tenderness.   Abdominal:      General: Bowel sounds are normal. There is no distension.      Palpations: Abdomen is soft. There is no mass.      Tenderness: There is no abdominal tenderness. There is no right CVA tenderness, left CVA tenderness, guarding or rebound.      Hernia: No hernia is present.   Musculoskeletal:         General: No swelling, tenderness, deformity or signs of injury. Normal range of motion.      Cervical back: Normal range of motion and neck supple. No rigidity or tenderness.      Right lower leg: No edema.      Left lower leg: No edema.   Lymphadenopathy:      Cervical: No cervical adenopathy.   Skin:     General: Skin is warm.      Capillary Refill: Capillary refill takes less than 2 seconds.      Coloration: Skin is not jaundiced or pale.      Findings: No bruising, erythema, lesion or rash.   Neurological:      General: No focal deficit present.      Mental Status: He is alert and oriented to person, place, and time. Mental status is at baseline.      Cranial Nerves: No cranial nerve deficit.      Sensory: No sensory deficit.      Motor: No weakness or abnormal muscle tone.      Coordination: Coordination normal.   Psychiatric:         Mood and Affect: Mood normal.         Behavior: Behavior normal.         Thought Content: Thought content normal.         Judgment: Judgment normal.         Vital Signs  ED Triage Vitals   Temp Pulse Resp BP SpO2   -- -- -- -- --      Temp src Heart Rate Source  Patient Position - Orthostatic VS BP Location FiO2 (%)   -- -- -- -- --      Pain Score       --           There were no vitals filed for this visit.      Visual Acuity      ED Medications  Medications - No data to display    Diagnostic Studies  Results Reviewed       None                   No orders to display              Procedures  Procedures         ED Course  ED Course as of 12/17/23 2111   [place] Dec 17, 2023   5756 Dominique obstruction is resolved completely.  Patient has sediment in the Dominique bag, which is a recurrent problem and has brought him to the ER several times for the same    Feeling much better and is requesting discharge.  He will follow-up with his urologist    He understands return precautions                                             Medical Decision Making  Patient with history as above presented with Patient presents with:  Urinary Catheter Problem: Pt expresses no urine drainage since 9pm. Last time pt had dominique irrigated and was working again. Has been a reoccuring problem    . History obtained from patient    Patient was nontoxic, stable. Ambulatory. Exam as above.    Differential diagnosis considered.   Overall presentation is consistent with dominique catheter blockage, resolved  Low suspicion for infection, sepsis, surgical process, life or limb threatening process    Patient was treated with Dominique flushing with improvement in symptoms.    NO Consideration was given for admission, as the patient was stable for outpatient management.    Disposition: Discussed need to follow up with Urology  Discharged with instructions to obtain outpatient follow up of patient's symptoms and findings, with strict return precautions if patient develops new or worsening symptoms.      This medical documentation was created using an electronic medical record system with M Modal voice recognition.  Although this document has been carefully reviewed, there may still be some phonetic and typographical errors.  These  errors are purely typographical and due to imperfections of the software program, do not reflect any compromise in the patient's medical care.                 Disposition  Final diagnoses:   None     ED Disposition       None          Follow-up Information    None         Patient's Medications   Discharge Prescriptions    No medications on file       No discharge procedures on file.    PDMP Review       None            ED Provider  Electronically Signed by             Chris Coffman MD  12/18/23 0320

## 2023-12-19 ENCOUNTER — VBI (OUTPATIENT)
Dept: INTERNAL MEDICINE CLINIC | Facility: CLINIC | Age: 88
End: 2023-12-19

## 2023-12-19 NOTE — TELEPHONE ENCOUNTER
12/19/23 2:17 PM    Patient contacted post ED visit, [facility] department spoke with patient/caregiver and outreach was successful.    Thank you.  Michelle Sevilla  [facility]

## 2023-12-20 ENCOUNTER — HOSPITAL ENCOUNTER (OUTPATIENT)
Dept: NUCLEAR MEDICINE | Facility: HOSPITAL | Age: 88
Discharge: HOME | End: 2023-12-20
Attending: UROLOGY
Payer: MEDICARE

## 2023-12-20 DIAGNOSIS — N13.30 HYDRONEPHROSIS, UNSPECIFIED HYDRONEPHROSIS TYPE: ICD-10-CM

## 2023-12-20 PROCEDURE — G1004 CDSM NDSC: HCPCS

## 2023-12-20 PROCEDURE — A9562 TC99M MERTIATIDE: HCPCS

## 2023-12-20 PROCEDURE — 78707 K FLOW/FUNCT IMAGE W/O DRUG: CPT

## 2023-12-28 ENCOUNTER — APPOINTMENT (OUTPATIENT)
Dept: LAB | Facility: CLINIC | Age: 88
End: 2023-12-28
Payer: MEDICARE

## 2023-12-28 DIAGNOSIS — E83.42 HYPOMAGNESEMIA: Primary | ICD-10-CM

## 2023-12-29 ENCOUNTER — TELEPHONE (OUTPATIENT)
Dept: HEMATOLOGY ONCOLOGY | Facility: CLINIC | Age: 88
End: 2023-12-29

## 2023-12-29 NOTE — TELEPHONE ENCOUNTER
Received a phone call from pt's wife Mckenna to report that pt is having some diarrhea but the Imodium is controlling it. Wife asking if pt should take Imodium at bedtime if stools are not loose during day, if instructed her to hold imodium at bedtime if not having diarrhea. His labs were checked yesterday and Mag was low but I informed wife that there are standing orders for magnesium replacement when Mag is low.

## 2024-01-01 ENCOUNTER — HOME CARE VISIT (OUTPATIENT)
Dept: HOME HOSPICE | Facility: HOSPICE | Age: 89
End: 2024-01-01
Payer: MEDICARE

## 2024-01-01 ENCOUNTER — HOME CARE VISIT (OUTPATIENT)
Dept: HOME HEALTH SERVICES | Facility: HOME HEALTHCARE | Age: 89
End: 2024-01-01
Payer: MEDICARE

## 2024-01-01 ENCOUNTER — HOSPICE ADMISSION (OUTPATIENT)
Dept: HOME HOSPICE | Facility: HOSPICE | Age: 89
End: 2024-01-01
Payer: MEDICARE

## 2024-01-01 VITALS — HEART RATE: 89 BPM | DIASTOLIC BLOOD PRESSURE: 34 MMHG | SYSTOLIC BLOOD PRESSURE: 107 MMHG | RESPIRATION RATE: 20 BRPM

## 2024-01-01 DIAGNOSIS — C68.9 HIGH GRADE UROTHELIAL CARCINOMA PRESENT ON URINE CYTOLOGY (HCC): Primary | ICD-10-CM

## 2024-01-01 PROCEDURE — 10330064 GLOVE, EXAM VNYL MED N/S (100/BX 10BX/CS

## 2024-01-01 PROCEDURE — 10330064 UNDERPAD, REUSE 36X36 1DZ     BECKCL

## 2024-01-01 PROCEDURE — 10330064 WIPE, SKIN GEL PROT DRSNG (50/BX)

## 2024-01-01 PROCEDURE — 10330064

## 2024-01-01 PROCEDURE — 10330057 MEDICATION, GENERAL

## 2024-01-01 PROCEDURE — G0299 HHS/HOSPICE OF RN EA 15 MIN: HCPCS

## 2024-01-01 PROCEDURE — 10330064 TAPE, RETENTION 2"X10YDS (1/BX24BX/CS)

## 2024-01-01 PROCEDURE — G0156 HHCP-SVS OF AIDE,EA 15 MIN: HCPCS

## 2024-01-01 PROCEDURE — 10330087 HSPC SERVICE INTENSITY ADD-ON

## 2024-01-01 PROCEDURE — 10330064 PASTE, SKIN BARRIER STOMAHESIVE TU 2OZ

## 2024-01-01 PROCEDURE — 10330064 SPONGE, EXCILON SPLIT DRN STR 4"X4" (2/P

## 2024-01-01 PROCEDURE — 10330064 ALIGNER, FOAM BODY SM (8/CS)

## 2024-01-01 RX ORDER — SODIUM CHLORIDE 9 MG/ML
20 INJECTION, SOLUTION INTRAVENOUS ONCE
Status: CANCELLED | OUTPATIENT
Start: 2024-01-02

## 2024-01-02 ENCOUNTER — HOSPITAL ENCOUNTER (OUTPATIENT)
Dept: INFUSION CENTER | Facility: HOSPITAL | Age: 89
Discharge: HOME | End: 2024-01-02
Attending: INTERNAL MEDICINE
Payer: MEDICARE

## 2024-01-02 ENCOUNTER — PATIENT OUTREACH (OUTPATIENT)
Dept: HEMATOLOGY ONCOLOGY | Facility: CLINIC | Age: 89
End: 2024-01-02

## 2024-01-02 DIAGNOSIS — C68.9 HIGH GRADE UROTHELIAL CARCINOMA PRESENT ON URINE CYTOLOGY (HCC): Primary | ICD-10-CM

## 2024-01-02 DIAGNOSIS — C68.9 HIGH GRADE UROTHELIAL CARCINOMA PRESENT ON URINE CYTOLOGY (HCC): ICD-10-CM

## 2024-01-02 DIAGNOSIS — K52.1 DIARRHEA DUE TO DRUG: Primary | ICD-10-CM

## 2024-01-02 DIAGNOSIS — D50.9 IRON DEFICIENCY ANEMIA, UNSPECIFIED IRON DEFICIENCY ANEMIA TYPE: Primary | ICD-10-CM

## 2024-01-02 DIAGNOSIS — E83.42 HYPOMAGNESEMIA: ICD-10-CM

## 2024-01-02 RX ORDER — SODIUM CHLORIDE 9 MG/ML
20 INJECTION, SOLUTION INTRAVENOUS ONCE
Status: COMPLETED | OUTPATIENT
Start: 2024-01-02 | End: 2024-01-02

## 2024-01-02 RX ORDER — SODIUM CHLORIDE 9 MG/ML
20 INJECTION, SOLUTION INTRAVENOUS ONCE
Status: CANCELLED | OUTPATIENT
Start: 2024-01-02

## 2024-01-02 RX ADMIN — SODIUM CHLORIDE 200 MG: 9 INJECTION, SOLUTION INTRAVENOUS at 13:29

## 2024-01-02 RX ADMIN — MAGNESIUM SULFATE HEPTAHYDRATE: 500 INJECTION, SOLUTION INTRAMUSCULAR; INTRAVENOUS at 12:19

## 2024-01-02 RX ADMIN — SODIUM CHLORIDE 20 ML/HR: 0.9 INJECTION, SOLUTION INTRAVENOUS at 11:23

## 2024-01-02 RX ADMIN — IRON SUCROSE 200 MG: 20 INJECTION, SOLUTION INTRAVENOUS at 11:22

## 2024-01-02 RX ADMIN — SODIUM CHLORIDE 20 ML/HR: 0.9 INJECTION, SOLUTION INTRAVENOUS at 13:28

## 2024-01-02 NOTE — PROGRESS NOTES
Daughter calls with significant concerns for the ongoing and increasing reported issues.     Severe diarrhea with inability to hold stools overnight, very weak, recent fall that required EMS to lift off floor.  Reports feeling like patient is overwhelmed appearing depressed, lethargic, not eating. Was instructed to do BRAT diet which patient does not enjoy so is not eating at all. Message sent high priority to Dr. Britton and team.     Daughter has PT coming into home to help build strength. Also has provided walker for patient on 1st and 2nd floor.     Reports patient being willing to proceed with treatment that will improve the quality of life.     Family states patient will not discuss health challenges openly with oncology staff however definitely has strong feelings when speaking with family. This is the 2nd time the family has reached out to [facility] with the same concerns. Will forward to  for assistance with conversation of goals of care.     Offered palliative care appointment, however patient is very reluctant simply due to inability to get out to the office.  Will d/w palliative for possible virtual visit.

## 2024-01-02 NOTE — PROGRESS NOTES
Keytruda, Venofer and magnesium given without incident.   Candido Valladares Jr. is aware of future appt on 1-23-24 at 1130   AVS Declined by Candido Valladares Jr. Patient left in stable condition via wheelchair with his daughter.

## 2024-01-02 NOTE — TELEPHONE ENCOUNTER
Phoned pt's dtr Lanette and let her know that a Rx for prednisone and a Rx for protonix was being sent to local pharmacy and keytruda would be placed on hold until pt is down to Prednisone 10 mg daily. Lanette asked if pt needed his 1/18/24 appt with Dr hickman I let her know that I will discuss with him in AM and call her back.    <<--- Click to launch

## 2024-01-02 NOTE — PLAN OF CARE
Problem: Potential for Falls  Goal: Patient will remain free of falls  Description: INTERVENTIONS:  - Educate patient/family on patient safety including physical limitations  - Instruct patient to call for assistance with activity   - Consult OT/PT to assist with strengthening/mobility   - Keep Call bell within reach  - Keep bed low and locked with side rails adjusted as appropriate  - Keep care items and personal belongings within reach  - Initiate and maintain comfort rounds  - Make Fall Risk Sign visible to staff

## 2024-01-03 ENCOUNTER — TELEPHONE (OUTPATIENT)
Dept: NEPHROLOGY | Facility: CLINIC | Age: 89
End: 2024-01-03

## 2024-01-03 ENCOUNTER — PATIENT OUTREACH (OUTPATIENT)
Dept: CASE MANAGEMENT | Facility: HOSPITAL | Age: 89
End: 2024-01-03

## 2024-01-03 ENCOUNTER — PATIENT OUTREACH (OUTPATIENT)
Dept: HEMATOLOGY ONCOLOGY | Facility: CLINIC | Age: 89
End: 2024-01-03

## 2024-01-03 RX ORDER — PANTOPRAZOLE SODIUM 40 MG/1
40 TABLET, DELAYED RELEASE ORAL DAILY
Qty: 30 TABLET | Refills: 11 | Status: SHIPPED | OUTPATIENT
Start: 2024-01-03

## 2024-01-03 RX ORDER — PREDNISONE 10 MG/1
TABLET ORAL
Qty: 94 TABLET | Refills: 0 | Status: SHIPPED | OUTPATIENT
Start: 2024-01-03

## 2024-01-03 NOTE — TELEPHONE ENCOUNTER
Patient's daughter Lore called regarding patient she states he has seen Dr Royal and he wants the patient to drink more fluilds then he was told by you due to him having diarrhea for a week .She is requesting you give the patient's spouse Solange a call [phone]

## 2024-01-03 NOTE — TELEPHONE ENCOUNTER
I can give a call later if they wish, but please relay to them that he cannot drink anymore water.  I noticed that the kidney function is slightly lower than previous, however his kidney function is stable compared to other prior evaluations.  His sodium level was already too low at 128, he cannot drink anymore water or fluids over the course of the day.    If she needs for me to contact her directly, I would do so after office hours

## 2024-01-03 NOTE — PROGRESS NOTES
BHUPENDRAW received a staff message from RN, Lanette Rodriguez to assist pt's family with resources and goals of care. LSW called and spoke to pt's dtr, Indira this morning. Indira is retired and has been helping pt and her mother with decisions and needs. LSW explained my role and reviewed available resources. Indira explains how reluctant her father is to accept help. She has been trying with small steps to have him accept her and her sisters help. Indira also is concerned for her mothers well being.     BHUPENDRAW explained services from the Aging office and how that is processed, Home Health services, and support groups. BHUPENDRAW emailed Indira a [facility] [place] current flyer. LSW also reviewed goals of care and what supports are available if patient continues to decline and requires more assistance than the family can provide. Palliative care was recommended, but patient and wife are reluctant to accept.    Indira has obtained Home PT and patient is agreeable. LSW suggested that the same agency can be requested to provide nursing and an aid if patient would agree. BHUPENDRAW also explained of home care services that are available if they wish to ay out of pocket. Indira  and her sisters will contiue to assist their parents and encourage them to accept more help as they need it. LSW left Indira with my name, email and zeferino number so I ca assist as needed.

## 2024-01-03 NOTE — TELEPHONE ENCOUNTER
Called and spoke to patients wife. She is aware he should not be drinking any extra fluids.  She will have him continue current restriction. She will contact office with any other questions/concerns.

## 2024-01-03 NOTE — PROGRESS NOTES
SADE called and spoke at length with patient. Patient reports having taken first dose of Prednisone as prescribed for treatment associated diarrhea. Reports having seen Dr. Joseph today. Pt reports being told by Dr. Joseph to take in some extra water at this time to assist flushing kidneys. Currently taking in gaterade and water.  Suggested protein shakes/ensure however patient does not like the taste.   SADE talked with patient about the option of palliative care to assist with the symptoms of cancer and side effects of treatment. Patient declines at this time. Reports feeling like with the new steroid prescribed will be feeling better and wants to rest.  Reinforced ability to reach out with any questions or concerns. Patient understands.

## 2024-01-04 ENCOUNTER — TELEPHONE (OUTPATIENT)
Dept: NEPHROLOGY | Facility: CLINIC | Age: 89
End: 2024-01-04

## 2024-01-04 DIAGNOSIS — E86.9 VOLUME DEPLETION: ICD-10-CM

## 2024-01-04 DIAGNOSIS — E87.1 HYPONATREMIA: Primary | ICD-10-CM

## 2024-01-04 NOTE — TELEPHONE ENCOUNTER
Indira called into the office regarding her dad, Candido. She states she needs clarification on how much fluids is he allowed to intake during the day. She states Candido currently has bladder cancer and the treatments he receives is causing him to have severe diarrhea which has caused severe dehydration. She states they just discussed this with Dr Joseph who told Candido to intake his fluids because he is so dehydrated. She states there is conflicting messages and she just wants clarification so her dad can safely rehydrate. She is requesting a call back to her number ([phone]) to discuss.

## 2024-01-04 NOTE — PROGRESS NOTES
I spoke to patient's daughter Indira. She states her father has bladder cancer being treated with Keytrude. He has developed severe diarrhea for 1.5 weeks refractory to BRAT diet and imodium. He was recently placed on Prednisone by oncologist to assist with diarrhea. He was evaluated by Dr. Joseph yesterday who noted patient is oliguric and recommended increased hydration.   Lasix has been on hold.  Patient has a history of SIADH with recurrent severe hyponatremia which complicates this issue.  Due to multiple severe comorbidities and oliguria, recommend ER evaluation for volume expansion and close monitoring of sodium and renal function.  If patient absolutely refuses, BMP to be drawn STAT.  He may hydrate up to 54 ounces and I recommend using pedialyte.  Daughter expressed understanding and will call me back.

## 2024-01-04 NOTE — TELEPHONE ENCOUNTER
I called Indira back. She reports Candido is drinking gatorade up to 54 ounces, is taking prednisone as prescribed, diarrhea is decreased and is urinating more. He declined ER visit and BMP. Advised to get lab work tomorrow and present to ER if symptoms worsen.

## 2024-01-05 ENCOUNTER — APPOINTMENT (OUTPATIENT)
Dept: LAB | Facility: CLINIC | Age: 89
End: 2024-01-05
Payer: MEDICARE

## 2024-01-05 ENCOUNTER — TELEPHONE (OUTPATIENT)
Dept: HEMATOLOGY ONCOLOGY | Facility: CLINIC | Age: 89
End: 2024-01-05

## 2024-01-05 DIAGNOSIS — E87.1 HYPONATREMIA: ICD-10-CM

## 2024-01-05 DIAGNOSIS — E86.9 VOLUME DEPLETION: ICD-10-CM

## 2024-01-05 LAB
ANION GAP SERPL CALCULATED.3IONS-SCNC: 8 MMOL/L
BUN SERPL-MCNC: 27 MG/DL (ref 5–25)
CALCIUM SERPL-MCNC: 9.2 MG/DL (ref 8.4–10.2)
CHLORIDE SERPL-SCNC: 99 MMOL/L (ref 96–108)
CO2 SERPL-SCNC: 22 MMOL/L (ref 21–32)
CREAT SERPL-MCNC: 1.1 MG/DL (ref 0.6–1.3)
GFR SERPL CREATININE-BSD FRML MDRD: 60 ML/MIN/1.73SQ M
GLUCOSE P FAST SERPL-MCNC: 124 MG/DL (ref 65–99)
MAGNESIUM SERPL-MCNC: 2 MG/DL (ref 1.9–2.7)
POTASSIUM SERPL-SCNC: 4.6 MMOL/L (ref 3.5–5.3)
SODIUM SERPL-SCNC: 129 MMOL/L (ref 135–147)

## 2024-01-05 PROCEDURE — 83735 ASSAY OF MAGNESIUM: CPT

## 2024-01-05 PROCEDURE — 80048 BASIC METABOLIC PNL TOTAL CA: CPT

## 2024-01-05 PROCEDURE — 36415 COLL VENOUS BLD VENIPUNCTURE: CPT

## 2024-01-05 NOTE — TELEPHONE ENCOUNTER
Received vm: Hi, this is Solange Kay. My , Candido is a patient. I was just calling to let Basia know that Candido is responding well to the Prednisone, even though he's only been taking it three days. We are seeing an improvement in the diarrhea that seems to have stopped. We are enlarging the kinds of food that he can have, although we are still reducing dairy and no high fiber veggies. But I just wanted to give her an update. Makes me feel better. And I'm sure she appreciates knowing things are somewhat improved. Thank you. Bye, bye.      Returned call to patient/spouse.  Per spouse, patient is doing much better.  She is introducing more foods and continues to see improvement.  No additional questions at this.

## 2024-01-10 ENCOUNTER — TELEPHONE (OUTPATIENT)
Dept: NEPHROLOGY | Facility: CLINIC | Age: 89
End: 2024-01-10

## 2024-01-10 DIAGNOSIS — N18.31 STAGE 3A CHRONIC KIDNEY DISEASE (HCC): Primary | ICD-10-CM

## 2024-01-17 ENCOUNTER — APPOINTMENT (OUTPATIENT)
Dept: LAB | Facility: CLINIC | Age: 89
End: 2024-01-17
Payer: MEDICARE

## 2024-01-18 ENCOUNTER — TELEPHONE (OUTPATIENT)
Dept: HEMATOLOGY ONCOLOGY | Facility: CLINIC | Age: 89
End: 2024-01-18

## 2024-01-18 ENCOUNTER — OFFICE VISIT (OUTPATIENT)
Dept: HEMATOLOGY ONCOLOGY | Facility: CLINIC | Age: 89
End: 2024-01-18
Payer: MEDICARE

## 2024-01-18 VITALS
HEART RATE: 102 BPM | DIASTOLIC BLOOD PRESSURE: 80 MMHG | TEMPERATURE: 98 F | OXYGEN SATURATION: 98 % | HEIGHT: 73 IN | BODY MASS INDEX: 23.99 KG/M2 | WEIGHT: 181 LBS | SYSTOLIC BLOOD PRESSURE: 122 MMHG | RESPIRATION RATE: 18 BRPM

## 2024-01-18 DIAGNOSIS — C68.9 HIGH GRADE UROTHELIAL CARCINOMA PRESENT ON URINE CYTOLOGY (HCC): ICD-10-CM

## 2024-01-18 DIAGNOSIS — E53.8 FOLIC ACID DEFICIENCY: ICD-10-CM

## 2024-01-18 DIAGNOSIS — C61 PROSTATE CANCER (HCC): ICD-10-CM

## 2024-01-18 DIAGNOSIS — C68.9 HIGH GRADE UROTHELIAL CARCINOMA PRESENT ON URINE CYTOLOGY (HCC): Primary | ICD-10-CM

## 2024-01-18 DIAGNOSIS — D50.9 IRON DEFICIENCY ANEMIA, UNSPECIFIED IRON DEFICIENCY ANEMIA TYPE: ICD-10-CM

## 2024-01-18 DIAGNOSIS — C67.9 MALIGNANT NEOPLASM OF URINARY BLADDER, UNSPECIFIED SITE (HCC): Primary | ICD-10-CM

## 2024-01-18 DIAGNOSIS — E06.4 DRUG-INDUCED THYROIDITIS: ICD-10-CM

## 2024-01-18 DIAGNOSIS — C67.2 MALIGNANT NEOPLASM OF LATERAL WALL OF BLADDER (HCC): ICD-10-CM

## 2024-01-18 PROCEDURE — 99214 OFFICE O/P EST MOD 30 MIN: CPT | Performed by: INTERNAL MEDICINE

## 2024-01-18 RX ORDER — SODIUM CHLORIDE 9 MG/ML
20 INJECTION, SOLUTION INTRAVENOUS ONCE
Status: CANCELLED | OUTPATIENT
Start: 2024-01-23

## 2024-01-18 RX ORDER — SODIUM CHLORIDE 9 MG/ML
20 INJECTION, SOLUTION INTRAVENOUS ONCE
OUTPATIENT
Start: 2024-02-13

## 2024-01-18 NOTE — PROGRESS NOTES
Hematology/Oncology Outpatient Follow-up  Candido Valladares Jr. 88 y.o. male 1935 [id number]    Date:  1/18/2024        Assessment and Plan:  1. Malignant neoplasm of urinary bladder, unspecified site (HCC)   The estimated clinical staging for his high-grade bladder cancer is (cT4, cN1, cM0).  He was found not to be a candidate for surgery or concurrent chemoradiation.  He was started on Keytruda on 10/31/2023.    The patient had diarrhea after cycle #4 of Keytruda which was thought to be related to the immunotherapy.  He did respond to the high-dose prednisone which is being tapered down gradually.  He is currently on 20 mg of prednisone which will be tapered down to 10 mg.  He stated that he would be interested in pursuing cycle 6 as manage old on 1/23/2024.  I did ask him to stay on the prednisone at 10 mg as a maintenance dose.  Will continue to monitor him for any obvious hint of immunotherapy mediated side effect.  We did discuss pursuing a PET CT scan around the end of February to evaluate the status of his bladder cancer on the single agent immunotherapy.  - CBC and differential; Future  - Comprehensive metabolic panel; Future  - TSH, 3rd generation with Free T4 reflex; Future  - NM PET CT skull base to mid thigh; Future    2. High grade urothelial carcinoma present on urine cytology (HCC)    - CBC and differential; Future  - Comprehensive metabolic panel; Future  - TSH, 3rd generation with Free T4 reflex; Future  - T3, free; Future  - CBC and differential; Future  - Comprehensive metabolic panel; Future    3. Iron deficiency anemia, unspecified iron deficiency anemia type  He received 2 doses of Venofer IV.  - Iron Panel (Includes Ferritin, Iron Sat%, Iron, and TIBC); Future  - Ferritin; Future    4. Prostate cancer (HCC)  Status post definitive radiation of the prostate about 12 years ago.  His most recent PSA level from May of this year was undetectable    - Folate; Future    5. Drug-induced  thyroiditis  Recent TSH is in the normal range.  Will continue to monitor while on immunotherapy.  - TSH, 3rd generation with Free T4 reflex; Future    6. Folic acid deficiency  He was encouraged to continue with the folic acid.  - Folate; Future            HPI:  The patient came today for follow-up visit accompanied by his wife and daughter.  He stated that his diarrhea has improved immediately after he was started on the high dose of prednisone.  He is currently on 20 mg of prednisone which will be tapered down to 10 mg next Wednesday.  Recent blood work on 1/17/2024 showed hemoglobin of 10.9 with white cell count of 14.4.  Platelet count was 280.  Creatinine 0.9 with normal calcium and liver enzymes.  Magnesium 2.0.  TSH 3.4.  Oncology History Overview Note   This is an 87-year-old male with history of atrial fibrillation, hypertension, SIADH and prostate cancer which was treated with definitive radiation around 12 years ago.     The patient apparently complained about significant urinary symptoms just recently which was initially evaluated with an MRI of the pelvis.  This showed moderate prostatomegaly on 5/30/2023.  The patient had then TURP procedure on 9/11/2023 which was done by Dr. Joseph from the urology team.  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     Recent CT renal protocol on 9/20/2023 showed:  IMPRESSION:     Markedly distended urinary bladder suggestive of chronic partial bladder outlet obstruction. Markedly enlarged prostate gland which indents the urinary bladder base in this patient with history of prostate neoplasm with significant internal heterogeneity   and areas of hypoattenuation suggestive of necrosis.     Moderate left-sided hydroureteronephrosis to the level of the distal ureter which appears obstructed secondary to adjacent enlarged prostatic tissue. Absence of contrast excretion from the left  kidney after a 15-minute delay.     Moderate right-sided hydroureteronephrosis to the level of the urinary bladder; excretion of contrast is demonstrated into the ureter after a 7-minute delay.     Bladder cancer (HCC)   9/11/2023 -  Cancer Staged    Staging form: Urinary Bladder, AJCC 8th Edition  - Clinical stage from 9/11/2023: cT4, cN1, cM0 - Signed by Ford Britton MD on 12/6/2023  Stage prefix: Initial diagnosis  WHO/ISUP grade (low/high): High Grade  Histologic grading system: 2 grade system       11/17/2023 Initial Diagnosis    Bladder cancer (HCC)         Interval history:    ROS: Review of Systems   Constitutional:  Positive for fatigue. Negative for chills and fever.   HENT:  Negative for ear pain and sore throat.    Eyes:  Negative for pain and visual disturbance.   Respiratory:  Negative for cough and shortness of breath.    Cardiovascular:  Negative for chest pain and palpitations.   Gastrointestinal:  Negative for abdominal pain and vomiting.   Genitourinary:  Negative for dysuria and hematuria.   Musculoskeletal:  Negative for arthralgias and back pain.   Skin:  Negative for color change and rash.   Neurological:  Negative for seizures and syncope.   All other systems reviewed and are negative.      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)        Past Surgical History:   Procedure Laterality Date    APPENDECTOMY      IL TRURL ELECTROSURG RESCJ PROSTATE BLEED COMPLETE N/A 9/11/2023    Procedure: CYSTOSCOPY; TRANSURETHRAL RESECTION OF PROSTATE (TURP);  Surgeon: Franklyn Joseph MD;  Location: [facility];  Service: Urology       Social History     Socioeconomic History    Marital status: /Civil Union     Spouse name: None    Number of children: None    Years of education: None    Highest education level: None    Occupational History    Occupation: RETIRED   Tobacco Use    Smoking status: Never     Passive exposure: Never    Smokeless tobacco: Never   Vaping Use    Vaping status: Never Used   Substance and Sexual Activity    Alcohol use: Not Currently    Drug use: Not Currently    Sexual activity: Not Currently   Other Topics Concern    None   Social History Narrative        RETIRED     Social Determinants of Health     Financial Resource Strain: Low Risk  (11/28/2023)    Overall Financial Resource Strain (CARDIA)     Difficulty of Paying Living Expenses: Not hard at all   Food Insecurity: No Food Insecurity (6/28/2023)    Hunger Vital Sign     Worried About Running Out of Food in the Last Year: Never true     Ran Out of Food in the Last Year: Never true   Transportation Needs: No Transportation Needs (11/28/2023)    PRAPARE - Transportation     Lack of Transportation (Medical): No     Lack of Transportation (Non-Medical): No   Physical Activity: Not on file   Stress: Not on file   Social Connections: Not on file   Intimate Partner Violence: Not on file   Housing Stability: Low Risk  (6/28/2023)    Housing Stability Vital Sign     Unable to Pay for Housing in the Last Year: No     Number of Places Lived in the Last Year: 1     Unstable Housing in the Last Year: No       Family History   Problem Relation Age of Onset    Hypertension Mother        No Known Allergies      Current Outpatient Medications:     aspirin 81 mg chewable tablet, Chew 81 mg daily, Disp: , Rfl:     finasteride (PROPECIA) 1 MG tablet, Take 1 mg by mouth daily, Disp: , Rfl:     folic acid (FOLVITE) 1 mg tablet, Take 1 tablet (1 mg total) by mouth daily, Disp: 30 tablet, Rfl: 5    metoprolol succinate (TOPROL-XL) 100 mg 24 hr tablet, Take 1 tablet (100 mg total) by mouth daily, Disp: 90 tablet, Rfl: 3    pantoprazole (PROTONIX) 40 mg tablet, Take 1 tablet (40 mg total) by mouth daily, Disp: 30 tablet, Rfl: 11    predniSONE 10 mg tablet, TAKE SIX  "TABS (60mg) )FOR 7 DAYS THEN 4 TABS (40 mg)  FOR 7 DAYS THEN 2 TABS (20 mg)  FOR 7 DAYS THEN ONE TAB ( 10 MG) FOR 7 DAYS, Disp: 94 tablet, Rfl: 0    tamsulosin (Flomax) 0.4 mg, Take 0.4 mg by mouth daily with dinner, Disp: , Rfl:     ferrous sulfate 325 (65 Fe) mg tablet, Take 1 tablet (325 mg total) by mouth daily with breakfast Do not start before July 3, 2023., Disp: 30 tablet, Rfl: 0    furosemide (LASIX) 20 mg tablet, Take 1 tablet (20 mg total) by mouth daily (Patient not taking: Reported on 12/6/2023), Disp: 30 tablet, Rfl: 1    senna-docusate sodium (SENOKOT S) 8.6-50 mg per tablet, Take 2 tablets by mouth daily at bedtime May hold for loose stools, Disp: 60 tablet, Rfl: 0      Physical Exam:  /80 (BP Location: Left arm, Patient Position: Sitting, Cuff Size: Adult)   Pulse 102   Temp 98 °F (36.7 °C)   Resp 18   Ht 6' 1\" (1.854 m)   Wt 82.1 kg (181 lb)   SpO2 98%   BMI 23.88 kg/m²     Physical Exam  Constitutional:       Appearance: He is well-developed.   HENT:      Head: Normocephalic and atraumatic.      Nose: Nose normal.   Eyes:      General: No scleral icterus.        Right eye: No discharge.         Left eye: No discharge.      Conjunctiva/sclera: Conjunctivae normal.      Pupils: Pupils are equal, round, and reactive to light.   Neck:      Thyroid: No thyromegaly.      Trachea: No tracheal deviation.   Cardiovascular:      Rate and Rhythm: Normal rate and regular rhythm.      Heart sounds: Normal heart sounds. No murmur heard.     No friction rub.   Pulmonary:      Effort: Pulmonary effort is normal. No respiratory distress.      Breath sounds: Normal breath sounds. No wheezing or rales.   Chest:      Chest wall: No tenderness.   Abdominal:      General: Bowel sounds are normal. There is no distension.      Palpations: Abdomen is soft. There is no hepatomegaly, splenomegaly or mass.      Tenderness: There is no abdominal tenderness. There is no guarding or rebound.   Musculoskeletal:    " "     General: No tenderness or deformity. Normal range of motion.      Cervical back: Normal range of motion and neck supple.   Lymphadenopathy:      Cervical: No cervical adenopathy.   Skin:     General: Skin is warm and dry.      Coloration: Skin is not pale.      Findings: No erythema or rash.   Neurological:      Mental Status: He is alert and oriented to person, place, and time.      Cranial Nerves: No cranial nerve deficit.      Coordination: Coordination normal.      Deep Tendon Reflexes: Reflexes are normal and symmetric. Reflexes normal.   Psychiatric:         Behavior: Behavior normal.         Thought Content: Thought content normal.         Judgment: Judgment normal.           Labs:  Lab Results   Component Value Date    WBC 14.47 (H) 01/17/2024    HGB 10.9 (L) 01/17/2024    HCT 35.3 (L) 01/17/2024    MCV 92 01/17/2024     01/17/2024     Lab Results   Component Value Date    K 4.3 01/17/2024    CL 98 01/17/2024    CO2 30 01/17/2024    BUN 26 (H) 01/17/2024    CREATININE 0.98 01/17/2024    GLUF 77 01/17/2024    CALCIUM 8.3 (L) 01/17/2024    CORRECTEDCA 9.2 01/17/2024    AST 16 01/17/2024    ALT 19 01/17/2024    ALKPHOS 53 01/17/2024    EGFR 69 01/17/2024     No results found for: \"TSH\"    Patient voiced understanding and agreement in the above discussion. Aware to contact our office with questions/symptoms in the interim.   "

## 2024-01-19 ENCOUNTER — TELEPHONE (OUTPATIENT)
Dept: CARDIOLOGY CLINIC | Facility: CLINIC | Age: 89
End: 2024-01-19

## 2024-01-19 NOTE — TELEPHONE ENCOUNTER
Hi, this is Solange Dwell. My  Candido is a patient of Dr Navas   YOB: 1935.   He has a scheduled appointment  February the 8th, but he is still receiving Keytruda infusions and we were wondering if he needed to keep that appointment?  You can reach me at [phone]. Thank you.

## 2024-01-22 NOTE — TELEPHONE ENCOUNTER
Spoke to pt. Notified of your message below --    Abhi Zurita MD       If they can make it then great. If not ask them about bleeding with dominique catheterization? He is not on blood thinner and should be but having bleeding when he slef catheterizes.    Thanks.     Pt explained that at his last visit you told him you couldn't really move forward w/ procedure if he's still having infusions. As of right now he needs to continue infusions until end of February. He will then have a PET scan done and depending on the results he will either continue infusions or be referred to you for afib procedure. He also confirmed he's not having any bleeding w/ cath. Can appt be moved?    Please review.

## 2024-01-23 ENCOUNTER — HOSPITAL ENCOUNTER (OUTPATIENT)
Dept: INFUSION CENTER | Facility: HOSPITAL | Age: 89
Discharge: HOME | End: 2024-01-23
Attending: INTERNAL MEDICINE
Payer: MEDICARE

## 2024-01-23 VITALS
SYSTOLIC BLOOD PRESSURE: 119 MMHG | RESPIRATION RATE: 16 BRPM | DIASTOLIC BLOOD PRESSURE: 60 MMHG | BODY MASS INDEX: 24.34 KG/M2 | HEART RATE: 87 BPM | OXYGEN SATURATION: 98 % | WEIGHT: 183.64 LBS | TEMPERATURE: 98.1 F | HEIGHT: 73 IN

## 2024-01-23 DIAGNOSIS — C68.9 HIGH GRADE UROTHELIAL CARCINOMA PRESENT ON URINE CYTOLOGY (HCC): Primary | ICD-10-CM

## 2024-01-23 PROCEDURE — 96413 CHEMO IV INFUSION 1 HR: CPT

## 2024-01-23 RX ORDER — SODIUM CHLORIDE 9 MG/ML
20 INJECTION, SOLUTION INTRAVENOUS ONCE
Status: COMPLETED | OUTPATIENT
Start: 2024-01-23 | End: 2024-01-23

## 2024-01-23 RX ADMIN — SODIUM CHLORIDE 200 MG: 9 INJECTION, SOLUTION INTRAVENOUS at 12:11

## 2024-01-23 RX ADMIN — SODIUM CHLORIDE 20 ML/HR: 0.9 INJECTION, SOLUTION INTRAVENOUS at 11:49

## 2024-01-23 NOTE — TELEPHONE ENCOUNTER
Pt would like to know if the Eliquis 2.5 mg will interfere with his Oncology medications and infusion

## 2024-01-23 NOTE — PLAN OF CARE
Problem: Potential for Falls  Goal: Patient will remain free of falls  Description: INTERVENTIONS:  - Educate patient/family on patient safety including physical limitations  - Instruct patient to call for assistance with activity   - Consult OT/PT to assist with strengthening/mobility   - Keep Call bell within reach  - Keep bed low and locked with side rails adjusted as appropriate  - Keep care items and personal belongings within reach  - Initiate and maintain comfort rounds  - Make Fall Risk Sign visible to staff  - Apply yellow socks and bracelet for high fall risk patients  - Consider moving patient to room near nurses station  Outcome: Progressing      Please see completed report in cardiology procedures.

## 2024-01-23 NOTE — TELEPHONE ENCOUNTER
Spoke with pt's wife, she stated she forgot to mention to you he has a permanent catheter that causes some bleeding

## 2024-01-23 NOTE — PROGRESS NOTES
Patient tolerated infusion without incident. AVS printed and given to pt. Aware of next scheduled appointment on 2/13 @ 7200.

## 2024-01-26 DIAGNOSIS — K52.1 DIARRHEA DUE TO DRUG: Primary | ICD-10-CM

## 2024-01-26 RX ORDER — PREDNISONE 10 MG/1
10 TABLET ORAL DAILY
Qty: 90 TABLET | Refills: 0 | Status: SHIPPED | OUTPATIENT
Start: 2024-01-26

## 2024-01-26 NOTE — TELEPHONE ENCOUNTER
Received vm: Rayray Ceballos, this is Solange Dwell. My  Candido is patient of Doctor Britton. His YOB: 1937. When Candido saw Doctor Siddiqui last week, Doctor said something about continuing the prescription for the one Prednisone. I guess it's 10 milligrams a day now. Candido is on the week where we only where he only takes one a day, but I was under the impression the doctor wanted him to continue on one a day. So we will need a regular prescription for that and that could go to [facility]. My number is [phone]. Thank you. amarjit Dhaliwal.      Reviewed office note from Dr. Britton: I did ask him to stay on the prednisone at 10 mg as a maintenance dose.  Will continue to monitor him for any obvious hint of immunotherapy mediated side effect.     Prescription routed to provider     Returned call to patient.  Aware prescription sent.  No additional questions

## 2024-01-29 ENCOUNTER — PATIENT OUTREACH (OUTPATIENT)
Dept: CASE MANAGEMENT | Facility: HOSPITAL | Age: 89
End: 2024-01-29

## 2024-01-29 NOTE — PROGRESS NOTES
LSW attempted to contact patient's dtr, Indira for a follow up supportive call from earlier January. LSW left a voicemail with my reason for calling, along with my name and phone number to return my call.

## 2024-02-02 DIAGNOSIS — K52.1 DIARRHEA DUE TO DRUG: Primary | ICD-10-CM

## 2024-02-02 RX ORDER — PREDNISONE 10 MG/1
10 TABLET ORAL DAILY
Qty: 7 TABLET | Refills: 0 | Status: SHIPPED | OUTPATIENT
Start: 2024-02-02

## 2024-02-02 NOTE — TELEPHONE ENCOUNTER
Received call from patient spouse.  Patient has not received prednisone from [facility] yet.  Will send one week supply to local pharmacy.  Patient to continue with protonix.  Will send refills for protonix to [facility], per patient request.

## 2024-02-05 DIAGNOSIS — K52.1 DIARRHEA DUE TO DRUG: ICD-10-CM

## 2024-02-05 RX ORDER — PANTOPRAZOLE SODIUM 40 MG/1
40 TABLET, DELAYED RELEASE ORAL DAILY
Qty: 90 TABLET | Refills: 3 | Status: SHIPPED | OUTPATIENT
Start: 2024-02-05

## 2024-02-06 ENCOUNTER — TELEPHONE (OUTPATIENT)
Dept: PALLIATIVE MEDICINE | Facility: CLINIC | Age: 89
End: 2024-02-06

## 2024-02-06 ENCOUNTER — TELEPHONE (OUTPATIENT)
Dept: HEMATOLOGY ONCOLOGY | Facility: CLINIC | Age: 89
End: 2024-02-06

## 2024-02-06 DIAGNOSIS — C67.9 MALIGNANT NEOPLASM OF URINARY BLADDER, UNSPECIFIED SITE (HCC): Primary | ICD-10-CM

## 2024-02-06 NOTE — TELEPHONE ENCOUNTER
Received a phone call from pt's dtr. Lore who phoned upon her mother's request to report that pt is still having diarrhea but Imodium is stopping it. Pt is now on Prednisone 10 mg daily so he could have keytruda. Pt is only sitting on sofa and not getting dressed and pt is almost having panic attacks. I suggested to Lore that we place a referral to Palliative Care explained what they offer and order placed asking them to phone her.

## 2024-02-06 NOTE — TELEPHONE ENCOUNTER
Left message on patient's daughters voicemail to call back and schedule with palliative care from a referral we received.

## 2024-02-09 ENCOUNTER — APPOINTMENT (OUTPATIENT)
Dept: LAB | Facility: CLINIC | Age: 89
End: 2024-02-09
Payer: MEDICARE

## 2024-02-09 DIAGNOSIS — C68.9 HIGH GRADE UROTHELIAL CARCINOMA PRESENT ON URINE CYTOLOGY (HCC): ICD-10-CM

## 2024-02-12 DIAGNOSIS — C68.9 HIGH GRADE UROTHELIAL CARCINOMA PRESENT ON URINE CYTOLOGY (HCC): Primary | ICD-10-CM

## 2024-02-12 NOTE — PROGRESS NOTES
Pt called to postpone his appt tomorrow until 2/20/24. He will get keytruda an dmag then. Plan date changed by devonte arias rn. No need to repeat labs. We may use labs from 2/9/24 for 2/20 treament.  Omayra Masters and Radha Rosales made aware.

## 2024-02-13 ENCOUNTER — HOSPITAL ENCOUNTER (OUTPATIENT)
Dept: INFUSION CENTER | Facility: HOSPITAL | Age: 89
Discharge: HOME | End: 2024-02-13
Attending: INTERNAL MEDICINE

## 2024-02-13 ENCOUNTER — TELEPHONE (OUTPATIENT)
Dept: HEMATOLOGY ONCOLOGY | Facility: CLINIC | Age: 89
End: 2024-02-13

## 2024-02-13 NOTE — TELEPHONE ENCOUNTER
RECEIVED A PHONE CALL FROM PT'S DTR MRIYAM TO REPORT THAT HER MOTHER STATED PT IS ON PREDNISONE 10 MG DAILY HOWEVER HE IS STILL HAVING DIARRHEA AND HAD 4 BOUTS TODAY. PT IS SCHEDULED FOR KEYTRUDA ON 2/20/24 BUT HAS A PET SCAN ON 2/27/24 WIFE IS WONDERING IF PT SHOULD JUST WAIT TILL AFTER THE PET SCAN. PT SAW DR OLIVERA LAST WEEK AND HAD AN ACCIDENT AND FAMILY IS CONCERNED ABOUT PT HAVING PET SCAN WITHOUT INCIDENT. I TOLD DTR I WOULD DISCUSS WITH DR SAMUEL TOMORROW AND ALL HER BACK. PT WAS PHONED BY PALLIATIVE CARE AND DECLINED HE IS TOO TIRED TO GO TO ANYMORE APPTS. DTR FOUND A PLACE THAT WILL DO PALLIATIVE CARE IN THE HOME.

## 2024-02-14 ENCOUNTER — TELEPHONE (OUTPATIENT)
Dept: HEMATOLOGY ONCOLOGY | Facility: CLINIC | Age: 89
End: 2024-02-14

## 2024-02-14 NOTE — TELEPHONE ENCOUNTER
Phoned pt and let him know I discussed pt's concern about having keytruda on 2/20/24. Pt now on Prednisone 10 mg daily but still has episodes of diarrhea. Dr hickman does agree that it would be wise to hold off on tx until after his Pet Scan is done on 2/27/24. Phoned [facility] and asked them to cancel his infusion appt and phoned pt's dr Brown to give her update on decision.

## 2024-02-16 ENCOUNTER — TELEPHONE (OUTPATIENT)
Dept: HEMATOLOGY ONCOLOGY | Facility: CLINIC | Age: 89
End: 2024-02-16

## 2024-02-16 DIAGNOSIS — K52.1 DIARRHEA DUE TO DRUG: Primary | ICD-10-CM

## 2024-02-16 DIAGNOSIS — C68.9 HIGH GRADE UROTHELIAL CARCINOMA PRESENT ON URINE CYTOLOGY (HCC): ICD-10-CM

## 2024-02-16 RX ORDER — DIPHENHYDRAMINE HCL 25 MG
25 TABLET ORAL ONCE
OUTPATIENT
Start: 2024-02-19

## 2024-02-16 RX ORDER — METHYLPREDNISOLONE SODIUM SUCCINATE 40 MG/ML
40 INJECTION, POWDER, LYOPHILIZED, FOR SOLUTION INTRAMUSCULAR; INTRAVENOUS ONCE
OUTPATIENT
Start: 2024-02-19

## 2024-02-16 RX ORDER — ACETAMINOPHEN 325 MG/1
650 TABLET ORAL ONCE
OUTPATIENT
Start: 2024-02-19

## 2024-02-16 RX ORDER — SODIUM CHLORIDE 9 MG/ML
20 INJECTION, SOLUTION INTRAVENOUS ONCE
OUTPATIENT
Start: 2024-02-19

## 2024-02-16 RX ORDER — PREDNISONE 10 MG/1
TABLET ORAL DAILY
Qty: 49 TABLET | Refills: 0 | Status: SHIPPED | OUTPATIENT
Start: 2024-02-16 | End: 2024-03-08

## 2024-02-16 NOTE — TELEPHONE ENCOUNTER
Returned call to Kristin with Dr. Britton recommendations for prednisone taper, GI  consult and Infliximab.  Aware and agreeable

## 2024-02-16 NOTE — TELEPHONE ENCOUNTER
Received vm: Yes. Hi, my name is Samara Lyle. My father is Candido Ramos. Well, he's a patient with Doctor Reba. I was actually calling with the hopes of speaking with Basia. This is just an in reference to my dad's continued side effects. If somebody could please give me a call. My work number is [phone]. Thank you.    Returned call to patient.  Per Samara, patient is continuing with diarrhea.  Patient is experiencing 2-3 episodes at night, 1-2 episodes in the morning and afternoon.  Patient is taking imodium with out much relief.  Kristin reviewed that patient/family has not reported these symptoms, in efforts to continue treatment.  Reviewed with Kristin need for patient/family to report all symptoms to office.  Kristin aware and agreeable.      Offered to have  and palliative care reach out for additional support.  Kristin agreeable.       Will review with Dr. Britton and return call to Kristin with recommendations

## 2024-02-19 ENCOUNTER — TELEPHONE (OUTPATIENT)
Dept: NEPHROLOGY | Facility: CLINIC | Age: 89
End: 2024-02-19

## 2024-02-19 ENCOUNTER — TELEPHONE (OUTPATIENT)
Dept: HEMATOLOGY ONCOLOGY | Facility: CLINIC | Age: 89
End: 2024-02-19

## 2024-02-19 ENCOUNTER — PATIENT OUTREACH (OUTPATIENT)
Dept: CASE MANAGEMENT | Facility: HOSPITAL | Age: 89
End: 2024-02-19

## 2024-02-19 DIAGNOSIS — E53.8 FOLIC ACID DEFICIENCY: Primary | ICD-10-CM

## 2024-02-19 RX ORDER — FOLIC ACID 1 MG/1
1 TABLET ORAL DAILY
Qty: 30 TABLET | Refills: 0 | Status: SHIPPED | OUTPATIENT
Start: 2024-02-19

## 2024-02-19 NOTE — TELEPHONE ENCOUNTER
Yes, this changes the situation if he is having that much diarrhea, he should be having salty foods to assist with the loss of those types of bodily fluids.  This includes chicken broth, or other foods which tend to be salty.  If he enjoys having crackers he can simply have crackers and drink extra water as well.

## 2024-02-19 NOTE — TELEPHONE ENCOUNTER
Call from Solange, patients wife.  She states patient is having serious diarrhea from bladder cancer treatments.  He is going for blood work for oncology on Friday.  She states they are trying really hard to  to stay at his 55oz fluid limit.  She is questioning if this might be able to be increased for him?

## 2024-02-19 NOTE — PROGRESS NOTES
LSW attempted to contact patient's dtrSamara for a follow up supportive call after receiving a staff message from Lucila Guadarrama that pt's family needs additional support.  LSW left a voicemail with my reason for calling, along with my name and phone number to return my call.

## 2024-02-19 NOTE — TELEPHONE ENCOUNTER
Received vm: his is Solange Do. Well, Candido Valladares is my . His YOB: 1935. He has accidentally let a prescription run out and it's for folic acid. It on the bottle it says script number 7559. He has enough for tomorrow, so I was wondering if you could call in something to [facility] in [place] and we can pick it up later today. We normally get it from [facility], but like I said, he let it lapse. I'm seeing somewhat of an improvement since he's on the updated version of the Prednisone. We're really trying to stick to that brat diet and getting very, very inventive and how to make rice palatable. But if you want to call me back, I'm at [phone]. Thank you. Bye, bye.      Returned call to Solange.  Prescription routed to provider.  Encouraged palliative for symptom management.  Per Solange, they are thinking about it.  Solange reporting improvement in diarrhea.

## 2024-02-20 ENCOUNTER — HOSPITAL ENCOUNTER (OUTPATIENT)
Dept: INFUSION CENTER | Facility: HOSPITAL | Age: 89
Discharge: HOME | End: 2024-02-20
Attending: INTERNAL MEDICINE

## 2024-02-20 ENCOUNTER — PATIENT OUTREACH (OUTPATIENT)
Dept: CASE MANAGEMENT | Facility: HOSPITAL | Age: 89
End: 2024-02-20

## 2024-02-20 NOTE — TELEPHONE ENCOUNTER
Called and spoke to patient.  He is aware and appreciative of the following:  Yes, this changes the situation if he is having that much diarrhea, he should be having salty foods to assist with the loss of those types of bodily fluids.  This includes chicken broth, or other foods which tend to be salty.  If he enjoys having crackers he can simply have crackers and drink extra water as well.     He states he starting to feel better.

## 2024-02-20 NOTE — PROGRESS NOTES
LSW received a return call from pt's dtr, Samara this morning. Pt continues to decline home services. Pt is afraid of losing his independence. Samara also had questions regarding palliative care. Pt has also declined palliative. Pt's family is concerned for pt's wife. She is 86 yrs old and still caring for patient, which has been more difficult with his recent ill effects of diarrhea. Pt's daughters would like to have some additional support for their mother, such as counseling or support groups. LSW explained the [facility] and how this could be helpful for patients and families. LSW emailed Samara a flyer and she will share with her sisters and mother. All questions answered to Samara's satisfaction. Pt's both daughters have my contact information if any additional questions arise.

## 2024-02-21 ENCOUNTER — PATIENT OUTREACH (OUTPATIENT)
Dept: CASE MANAGEMENT | Facility: HOSPITAL | Age: 89
End: 2024-02-21

## 2024-02-21 NOTE — PROGRESS NOTES
LSW received another resource that may be beneficial to pt's wife for someone to speak with re: the stress and acceptance of her 's declining health.  Therapist: Be Magallon LCSW. She is offering virtual visits, which is beneficial for our patients. Her contact information is [phone]- [email or web address].   LSW emailed the above information to pt's dtrSamara this morning.

## 2024-02-23 ENCOUNTER — APPOINTMENT (OUTPATIENT)
Dept: LAB | Facility: CLINIC | Age: 89
End: 2024-02-23
Payer: MEDICARE

## 2024-02-23 DIAGNOSIS — C68.9 HIGH GRADE UROTHELIAL CARCINOMA PRESENT ON URINE CYTOLOGY (HCC): ICD-10-CM

## 2024-02-23 DIAGNOSIS — C61 PROSTATE CANCER (HCC): ICD-10-CM

## 2024-02-23 DIAGNOSIS — N18.31 STAGE 3A CHRONIC KIDNEY DISEASE (HCC): ICD-10-CM

## 2024-02-23 DIAGNOSIS — E03.2 UNDERACTIVE THYROID DUE DRUGS: ICD-10-CM

## 2024-02-23 DIAGNOSIS — E06.4 DRUG-INDUCED THYROIDITIS: ICD-10-CM

## 2024-02-23 DIAGNOSIS — C67.9 MALIGNANT NEOPLASM OF URINARY BLADDER, UNSPECIFIED SITE (HCC): ICD-10-CM

## 2024-02-23 DIAGNOSIS — E53.8 FOLIC ACID DEFICIENCY: ICD-10-CM

## 2024-02-23 DIAGNOSIS — D50.9 IRON DEFICIENCY ANEMIA, UNSPECIFIED IRON DEFICIENCY ANEMIA TYPE: ICD-10-CM

## 2024-02-23 LAB
ALBUMIN SERPL BCP-MCNC: 2.9 G/DL (ref 3.5–5)
ALP SERPL-CCNC: 57 U/L (ref 34–104)
ALT SERPL W P-5'-P-CCNC: 16 U/L (ref 7–52)
ANION GAP SERPL CALCULATED.3IONS-SCNC: 11 MMOL/L
ANISOCYTOSIS BLD QL SMEAR: PRESENT
AST SERPL W P-5'-P-CCNC: 17 U/L (ref 13–39)
BASOPHILS # BLD MANUAL: 0 THOUSAND/UL (ref 0–0.1)
BASOPHILS NFR MAR MANUAL: 0 % (ref 0–1)
BILIRUB SERPL-MCNC: 0.51 MG/DL (ref 0.2–1)
BUN SERPL-MCNC: 29 MG/DL (ref 5–25)
CALCIUM ALBUM COR SERPL-MCNC: 9.5 MG/DL (ref 8.3–10.1)
CALCIUM SERPL-MCNC: 8.6 MG/DL (ref 8.4–10.2)
CHLORIDE SERPL-SCNC: 99 MMOL/L (ref 96–108)
CO2 SERPL-SCNC: 23 MMOL/L (ref 21–32)
CREAT SERPL-MCNC: 1.18 MG/DL (ref 0.6–1.3)
EOSINOPHIL # BLD MANUAL: 0 THOUSAND/UL (ref 0–0.4)
EOSINOPHIL NFR BLD MANUAL: 0 % (ref 0–6)
ERYTHROCYTE [DISTWIDTH] IN BLOOD BY AUTOMATED COUNT: 18.6 % (ref 11.6–15.1)
FERRITIN SERPL-MCNC: 364 NG/ML (ref 24–336)
FOLATE SERPL-MCNC: >22.3 NG/ML
GFR SERPL CREATININE-BSD FRML MDRD: 55 ML/MIN/1.73SQ M
GLUCOSE SERPL-MCNC: 202 MG/DL (ref 65–140)
HCT VFR BLD AUTO: 28.1 % (ref 36.5–49.3)
HGB BLD-MCNC: 9.1 G/DL (ref 12–17)
IRON SATN MFR SERPL: 30 % (ref 15–50)
IRON SERPL-MCNC: 45 UG/DL (ref 50–212)
LYMPHOCYTES # BLD AUTO: 1.83 THOUSAND/UL (ref 0.6–4.47)
LYMPHOCYTES # BLD AUTO: 10 % (ref 14–44)
MACROCYTES BLD QL AUTO: PRESENT
MAGNESIUM SERPL-MCNC: 1.8 MG/DL (ref 1.9–2.7)
MCH RBC QN AUTO: 29.7 PG (ref 26.8–34.3)
MCHC RBC AUTO-ENTMCNC: 32.4 G/DL (ref 31.4–37.4)
MCV RBC AUTO: 92 FL (ref 82–98)
METAMYELOCYTES NFR BLD MANUAL: 1 % (ref 0–1)
MONOCYTES # BLD AUTO: 0.37 THOUSAND/UL (ref 0–1.22)
MONOCYTES NFR BLD: 3 % (ref 4–12)
NEUTROPHILS # BLD MANUAL: 9.9 THOUSAND/UL (ref 1.85–7.62)
NEUTS BAND NFR BLD MANUAL: 1 % (ref 0–8)
NEUTS SEG NFR BLD AUTO: 80 % (ref 43–75)
OVALOCYTES BLD QL SMEAR: PRESENT
PLATELET # BLD AUTO: 450 THOUSANDS/UL (ref 149–390)
PLATELET BLD QL SMEAR: ABNORMAL
PMV BLD AUTO: 8.9 FL (ref 8.9–12.7)
POIKILOCYTOSIS BLD QL SMEAR: PRESENT
POLYCHROMASIA BLD QL SMEAR: PRESENT
POTASSIUM SERPL-SCNC: 4.4 MMOL/L (ref 3.5–5.3)
PROT SERPL-MCNC: 5.2 G/DL (ref 6.4–8.4)
RBC # BLD AUTO: 3.06 MILLION/UL (ref 3.88–5.62)
RBC MORPH BLD: PRESENT
SODIUM SERPL-SCNC: 133 MMOL/L (ref 135–147)
TIBC SERPL-MCNC: 152 UG/DL (ref 250–450)
TOXIC GRANULES BLD QL SMEAR: PRESENT
TSH SERPL DL<=0.05 MIU/L-ACNC: 3.29 UIU/ML (ref 0.45–4.5)
UIBC SERPL-MCNC: 107 UG/DL (ref 155–355)
VARIANT LYMPHS # BLD AUTO: 5 %
WBC # BLD AUTO: 12.22 THOUSAND/UL (ref 4.31–10.16)

## 2024-02-23 PROCEDURE — 80053 COMPREHEN METABOLIC PANEL: CPT

## 2024-02-23 PROCEDURE — 83550 IRON BINDING TEST: CPT

## 2024-02-23 PROCEDURE — 82746 ASSAY OF FOLIC ACID SERUM: CPT

## 2024-02-23 PROCEDURE — 83735 ASSAY OF MAGNESIUM: CPT

## 2024-02-23 PROCEDURE — 84443 ASSAY THYROID STIM HORMONE: CPT

## 2024-02-23 PROCEDURE — 82728 ASSAY OF FERRITIN: CPT

## 2024-02-23 PROCEDURE — 85007 BL SMEAR W/DIFF WBC COUNT: CPT

## 2024-02-23 PROCEDURE — 36415 COLL VENOUS BLD VENIPUNCTURE: CPT

## 2024-02-23 PROCEDURE — 83540 ASSAY OF IRON: CPT

## 2024-02-23 PROCEDURE — 85027 COMPLETE CBC AUTOMATED: CPT

## 2024-02-27 ENCOUNTER — HOSPITAL ENCOUNTER (OUTPATIENT)
Dept: RADIOLOGY | Age: 89
Discharge: HOME | End: 2024-02-27
Payer: MEDICARE

## 2024-02-27 DIAGNOSIS — C67.2 MALIGNANT NEOPLASM OF LATERAL WALL OF BLADDER (HCC): ICD-10-CM

## 2024-02-27 DIAGNOSIS — C67.9 MALIGNANT NEOPLASM OF URINARY BLADDER, UNSPECIFIED SITE (HCC): ICD-10-CM

## 2024-02-27 LAB — GLUCOSE SERPL-MCNC: 106 MG/DL (ref 65–140)

## 2024-02-27 PROCEDURE — 78815 PET IMAGE W/CT SKULL-THIGH: CPT

## 2024-02-27 PROCEDURE — A9552 F18 FDG: HCPCS

## 2024-02-27 PROCEDURE — 82948 REAGENT STRIP/BLOOD GLUCOSE: CPT

## 2024-02-28 ENCOUNTER — TELEPHONE (OUTPATIENT)
Dept: HEMATOLOGY ONCOLOGY | Facility: CLINIC | Age: 89
End: 2024-02-28

## 2024-02-28 ENCOUNTER — OFFICE VISIT (OUTPATIENT)
Dept: HEMATOLOGY ONCOLOGY | Facility: CLINIC | Age: 89
End: 2024-02-28
Payer: MEDICARE

## 2024-02-28 VITALS
TEMPERATURE: 97.9 F | DIASTOLIC BLOOD PRESSURE: 76 MMHG | HEART RATE: 84 BPM | BODY MASS INDEX: 23.33 KG/M2 | SYSTOLIC BLOOD PRESSURE: 122 MMHG | WEIGHT: 176 LBS | RESPIRATION RATE: 18 BRPM | OXYGEN SATURATION: 99 % | HEIGHT: 73 IN

## 2024-02-28 DIAGNOSIS — D64.9 ANEMIA, UNSPECIFIED TYPE: ICD-10-CM

## 2024-02-28 DIAGNOSIS — C67.9 MALIGNANT NEOPLASM OF URINARY BLADDER, UNSPECIFIED SITE (HCC): Primary | ICD-10-CM

## 2024-02-28 DIAGNOSIS — D50.9 IRON DEFICIENCY ANEMIA, UNSPECIFIED IRON DEFICIENCY ANEMIA TYPE: ICD-10-CM

## 2024-02-28 DIAGNOSIS — E06.4 DRUG-INDUCED THYROIDITIS: ICD-10-CM

## 2024-02-28 DIAGNOSIS — E83.42 HYPOMAGNESEMIA: ICD-10-CM

## 2024-02-28 PROCEDURE — G2211 COMPLEX E/M VISIT ADD ON: HCPCS | Performed by: INTERNAL MEDICINE

## 2024-02-28 PROCEDURE — 99214 OFFICE O/P EST MOD 30 MIN: CPT | Performed by: INTERNAL MEDICINE

## 2024-02-28 NOTE — PROGRESS NOTES
Hematology/Oncology Outpatient Follow-up  Candido Valladares Jr. 88 y.o. male 1935 [id number]    Date:  2/28/2024        Assessment and Plan:  1. Malignant neoplasm of urinary bladder, unspecified site (HCC)  The estimated clinical staging for his high-grade bladder cancer is (cT4, cN1, cM0).  He was found not to be a candidate for surgery or concurrent chemoradiation.  He was started on Keytruda on 10/31/2023.    The patient was educated about the result of the recent PET CT scan after 5 cycles worth of pembrolizumab which showed partial response to the immunotherapy.  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  He is currently on 20 mg of prednisone with occasional watery diarrhea which may be related to his eating habits.  He was asked to adhere with the brat diet.  The wife was instructed to get in touch with us within the next 2 weeks after he gets to his prednisone taper down to 10 mg daily to see if his diarrhea will continue to improve or worsen.  In case he starts to have worsening of his diarrhea infliximab will be tried to avoid the need to go on another course of high-dose of prednisone.  The patient was told that he would be a candidate for a rechallenge with pembrolizumab after his diarrhea is well-controlled on a low-dose of steroids.      - CBC and differential; Standing  - Comprehensive metabolic panel; Standing  - C-reactive protein; Standing  - Sedimentation rate, automated; Standing  - Magnesium; Standing    2. Drug-induced thyroiditis  Recent TSH is within normal range.    3. Iron deficiency anemia, unspecified iron deficiency anemia type  He seems to have high ferritin and saturation level.  His anemia is most likely multifactorial including anemia of chronic disease and renal dysfunction.  He might benefit from small dose of iron IV in the future if he continues to be anemic.  - CBC  and differential; Standing  - Comprehensive metabolic panel; Standing  - C-reactive protein; Standing  - Sedimentation rate, automated; Standing  - Magnesium; Standing    4. Hypomagnesemia  Acceptable magnesium level of 1.8.  - Magnesium; Standing    5. Anemia, unspecified type  As above.        HPI:  The patient came there for a follow-up visit accompanied by his wife, daughter and the family member.  He stated that he had good control of his diarrhea after he was given a second course of high-dose of prednisone.  However, after a fatty meal he seems to have a relapse of his watery diarrhea.  He is currently taking prednisone 20 mg daily.  He did have a PET CT scan on 2/27/2024 which showed:    IMPRESSION:     1. Overall findings suggest partial response to therapy.  2. Slightly smaller overall abnormal activity at the level of the inferior bladder and prostate as noted above suggesting partial therapy response.  3. No significant FDG uptake at the previously noted small left common iliac chain lymph node.  4. Resolution of previously seen right-sided hydroureteronephrosis. Chronic left-sided hydroureteronephrosis is stable.  5. Mild linear radiotracer uptake noted in the esophagus may be physiologic or inflammatory. Now mild FDG uptake in a small right distal paraesophageal lymph node, nonspecific may be reactive. This can be reassessed on follow-up.  6. Otherwise no findings for hypermetabolic metastasis.    Blood work on 2/23/2024 showed white cell count of 12.2, hemoglobin 9.1 with a platelet count of 450.  ANC 9.9.  Creatinine 1.1 with normal calcium and liver enzymes.  Magnesium 1.8.  TSH 3.2.  Folic acid more than 22.  Iron panel showed saturation of 30 and ferritin of 364.  Oncology History Overview Note   This is an 87-year-old male with history of atrial fibrillation, hypertension, SIADH and prostate cancer which was treated with definitive radiation around 12 years ago.     The patient apparently  complained about significant urinary symptoms just recently which was initially evaluated with an MRI of the pelvis.  This showed moderate prostatomegaly on 5/30/2023.  The patient had then TURP procedure on 9/11/2023 which was done by Dr. Joseph from the urology team.  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     Recent CT renal protocol on 9/20/2023 showed:  IMPRESSION:     Markedly distended urinary bladder suggestive of chronic partial bladder outlet obstruction. Markedly enlarged prostate gland which indents the urinary bladder base in this patient with history of prostate neoplasm with significant internal heterogeneity   and areas of hypoattenuation suggestive of necrosis.     Moderate left-sided hydroureteronephrosis to the level of the distal ureter which appears obstructed secondary to adjacent enlarged prostatic tissue. Absence of contrast excretion from the left kidney after a 15-minute delay.     Moderate right-sided hydroureteronephrosis to the level of the urinary bladder; excretion of contrast is demonstrated into the ureter after a 7-minute delay.     Bladder cancer (HCC)   9/11/2023 -  Cancer Staged    Staging form: Urinary Bladder, AJCC 8th Edition  - Clinical stage from 9/11/2023: cT4, cN1, cM0 - Signed by Ford Britton MD on 12/6/2023  Stage prefix: Initial diagnosis  WHO/ISUP grade (low/high): High Grade  Histologic grading system: 2 grade system       11/17/2023 Initial Diagnosis    Bladder cancer (HCC)         Interval history:    ROS: Review of Systems   Constitutional:  Positive for fatigue. Negative for chills and fever.   HENT:  Negative for ear pain and sore throat.    Eyes:  Negative for pain and visual disturbance.   Respiratory:  Negative for cough and shortness of breath.    Cardiovascular:  Negative for chest pain and palpitations.   Gastrointestinal:  Positive for diarrhea. Negative for abdominal  pain and vomiting.   Genitourinary:  Negative for dysuria and hematuria.   Musculoskeletal:  Negative for arthralgias and back pain.   Skin:  Negative for color change and rash.   Neurological:  Negative for seizures and syncope.   All other systems reviewed and are negative.      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)        Past Surgical History:   Procedure Laterality Date    APPENDECTOMY      AK TRURL ELECTROSURG RESCJ PROSTATE BLEED COMPLETE N/A 9/11/2023    Procedure: CYSTOSCOPY; TRANSURETHRAL RESECTION OF PROSTATE (TURP);  Surgeon: Franklyn Joseph MD;  Location: [facility];  Service: Urology       Social History     Socioeconomic History    Marital status: /Civil Union     Spouse name: None    Number of children: None    Years of education: None    Highest education level: None   Occupational History    Occupation: RETIRED   Tobacco Use    Smoking status: Never     Passive exposure: Never    Smokeless tobacco: Never   Vaping Use    Vaping status: Never Used   Substance and Sexual Activity    Alcohol use: Not Currently    Drug use: Not Currently    Sexual activity: Not Currently   Other Topics Concern    None   Social History Narrative        RETIRED     Social Determinants of Health     Financial Resource Strain: Low Risk  (11/28/2023)    Overall Financial Resource Strain (CARDIA)     Difficulty of Paying Living Expenses: Not hard at all   Food Insecurity: No Food Insecurity (6/28/2023)    Hunger Vital Sign     Worried About Running Out of Food in the Last Year: Never true     Ran Out of Food in the Last Year: Never true   Transportation Needs: No Transportation Needs (11/28/2023)    PRAPARE - Transportation     Lack of Transportation (Medical): No     Lack of Transportation (Non-Medical): No   Physical  Activity: Not on file   Stress: Not on file   Social Connections: Not on file   Intimate Partner Violence: Not on file   Housing Stability: Low Risk  (6/28/2023)    Housing Stability Vital Sign     Unable to Pay for Housing in the Last Year: No     Number of Places Lived in the Last Year: 1     Unstable Housing in the Last Year: No       Family History   Problem Relation Age of Onset    Hypertension Mother        No Known Allergies      Current Outpatient Medications:     aspirin 81 mg chewable tablet, Chew 81 mg daily, Disp: , Rfl:     finasteride (PROPECIA) 1 MG tablet, Take 1 mg by mouth daily, Disp: , Rfl:     folic acid (FOLVITE) 1 mg tablet, Take 1 tablet (1 mg total) by mouth daily, Disp: 30 tablet, Rfl: 5    folic acid (KP Folic Acid) 1 mg tablet, Take 1 tablet (1 mg total) by mouth daily, Disp: 30 tablet, Rfl: 0    metoprolol succinate (TOPROL-XL) 100 mg 24 hr tablet, Take 1 tablet (100 mg total) by mouth daily, Disp: 90 tablet, Rfl: 3    pantoprazole (PROTONIX) 40 mg tablet, Take 1 tablet (40 mg total) by mouth daily, Disp: 90 tablet, Rfl: 3    predniSONE 10 mg tablet, TAKE SIX TABS (60mg) )FOR 7 DAYS THEN 4 TABS (40 mg)  FOR 7 DAYS THEN 2 TABS (20 mg)  FOR 7 DAYS THEN ONE TAB ( 10 MG) FOR 7 DAYS, Disp: 94 tablet, Rfl: 0    predniSONE 10 mg tablet, Take 1 tablet (10 mg total) by mouth daily, Disp: 90 tablet, Rfl: 0    predniSONE 10 mg tablet, Take 1 tablet (10 mg total) by mouth daily, Disp: 7 tablet, Rfl: 0    predniSONE 10 mg tablet, Take 4 tablets (40 mg total) by mouth daily for 7 days, THEN 2 tablets (20 mg total) daily for 7 days, THEN 1 tablet (10 mg total) daily for 7 days., Disp: 49 tablet, Rfl: 0    tamsulosin (Flomax) 0.4 mg, Take 0.4 mg by mouth daily with dinner, Disp: , Rfl:     ferrous sulfate 325 (65 Fe) mg tablet, Take 1 tablet (325 mg total) by mouth daily with breakfast Do not start before July 3, 2023., Disp: 30 tablet, Rfl: 0    furosemide (LASIX) 20 mg tablet, Take 1 tablet (20 mg  "total) by mouth daily (Patient not taking: Reported on 12/6/2023), Disp: 30 tablet, Rfl: 1    senna-docusate sodium (SENOKOT S) 8.6-50 mg per tablet, Take 2 tablets by mouth daily at bedtime May hold for loose stools, Disp: 60 tablet, Rfl: 0      Physical Exam:  /76 (BP Location: Left arm, Patient Position: Sitting, Cuff Size: Adult)   Pulse 84   Temp 97.9 °F (36.6 °C)   Resp 18   Ht 6' 1\" (1.854 m)   Wt 79.8 kg (176 lb)   SpO2 99%   BMI 23.22 kg/m²     Physical Exam  Constitutional:       Appearance: He is well-developed.   HENT:      Head: Normocephalic and atraumatic.      Nose: Nose normal.   Eyes:      General: No scleral icterus.        Right eye: No discharge.         Left eye: No discharge.      Conjunctiva/sclera: Conjunctivae normal.      Pupils: Pupils are equal, round, and reactive to light.   Neck:      Thyroid: No thyromegaly.      Trachea: No tracheal deviation.   Cardiovascular:      Rate and Rhythm: Normal rate and regular rhythm.      Heart sounds: Normal heart sounds. No murmur heard.     No friction rub.   Pulmonary:      Effort: Pulmonary effort is normal. No respiratory distress.      Breath sounds: Normal breath sounds. No wheezing or rales.   Chest:      Chest wall: No tenderness.   Abdominal:      General: There is no distension.      Palpations: Abdomen is soft. There is no hepatomegaly or splenomegaly.      Tenderness: There is no abdominal tenderness. There is no guarding or rebound.   Musculoskeletal:         General: No tenderness or deformity. Normal range of motion.      Cervical back: Normal range of motion and neck supple.   Lymphadenopathy:      Cervical: No cervical adenopathy.   Skin:     General: Skin is warm and dry.      Coloration: Skin is not pale.      Findings: No erythema or rash.   Neurological:      Mental Status: He is alert and oriented to person, place, and time.      Cranial Nerves: No cranial nerve deficit.      Coordination: Coordination normal.      " Deep Tendon Reflexes: Reflexes are normal and symmetric.   Psychiatric:         Behavior: Behavior normal.         Thought Content: Thought content normal.         Judgment: Judgment normal.           Labs:  Lab Results   Component Value Date    WBC 12.22 (H) 02/23/2024    HGB 9.1 (L) 02/23/2024    HCT 28.1 (L) 02/23/2024    MCV 92 02/23/2024     (H) 02/23/2024     Lab Results   Component Value Date    K 4.4 02/23/2024    CL 99 02/23/2024    CO2 23 02/23/2024    BUN 29 (H) 02/23/2024    CREATININE 1.18 02/23/2024    GLUF 77 01/17/2024    CALCIUM 8.6 02/23/2024    CORRECTEDCA 9.5 02/23/2024    AST 17 02/23/2024    ALT 16 02/23/2024    ALKPHOS 57 02/23/2024    EGFR 55 02/23/2024     Lab Results   Component Value Date    TSH 1.10 01/13/2019       Patient voiced understanding and agreement in the above discussion. Aware to contact our office with questions/symptoms in the interim.

## 2024-02-28 NOTE — TELEPHONE ENCOUNTER
Phoned [facility] and spoke to  Leesa Quiroga to let her know that his inf appt for 3/5/24 can be cancelled as pt seen today and wife is to phone me around 3/13/24 to let me know if pt needs Infliximab and keytruda to remain on hold until then.

## 2024-03-01 ENCOUNTER — TELEPHONE (OUTPATIENT)
Dept: HEMATOLOGY ONCOLOGY | Facility: CLINIC | Age: 89
End: 2024-03-01

## 2024-03-01 NOTE — TELEPHONE ENCOUNTER
[facility] We had a very good report from Doctor Reba on Wednesday. Hello and we were given a a cookbook. So I'm just wondering if introducing A probiotic to Lawson's diet would help with the balance in his gut. He had prior to this cancer diagnosis always been eating either cheese or yogurt with probiotics in it. So I was wondering if you could find out if that would be a possibility to add to as his diet, maybe in small amounts. My number is [phone]. Thanks amarjit Morales.    Returned call to patient/spouse.  Reviewed OK to take probiotics.  Aware and agreeable.  Reviewed diarrhea improving and having approx 1 episode daily.  NO additional questions at this time

## 2024-03-06 ENCOUNTER — PATIENT OUTREACH (OUTPATIENT)
Dept: CASE MANAGEMENT | Facility: HOSPITAL | Age: 89
End: 2024-03-06

## 2024-03-06 NOTE — PROGRESS NOTES
LSW emailed pt's dtr, Samara the [facility] flyer as discussed in previous conversation. This is in regards to support for pt's wife.

## 2024-03-11 DIAGNOSIS — E53.8 FOLIC ACID DEFICIENCY: ICD-10-CM

## 2024-03-11 RX ORDER — FOLIC ACID 1 MG/1
1000 TABLET ORAL DAILY
Qty: 30 TABLET | Refills: 0 | Status: SHIPPED | OUTPATIENT
Start: 2024-03-11

## 2024-03-12 ENCOUNTER — TELEPHONE (OUTPATIENT)
Dept: HEMATOLOGY ONCOLOGY | Facility: CLINIC | Age: 89
End: 2024-03-12

## 2024-03-12 NOTE — TELEPHONE ENCOUNTER
Received a phone call from pt's wife Solange to report that pt has been off Prednisone entirely as of last Fri 3/8/24. Pt has had a recurrence of his diarrhea and sometimes it is explosive and other times it is a small amt of loose stool followed by more formed stool. Wife states this seems to happen between 3 AM and ( Am . Pt is exhausted and does not have much energy. I told wife I would discuss with Dr hickman and then phone her back at [phone] with his recommendation. Pt has been off his Keytruda and most likely will require the infliximab.

## 2024-03-13 ENCOUNTER — HOSPITAL ENCOUNTER (OUTPATIENT)
Dept: INFUSION CENTER | Facility: HOSPITAL | Age: 89
Discharge: HOME | End: 2024-03-13

## 2024-03-13 ENCOUNTER — TELEPHONE (OUTPATIENT)
Dept: HEMATOLOGY ONCOLOGY | Facility: CLINIC | Age: 89
End: 2024-03-13

## 2024-03-13 DIAGNOSIS — K52.1 DIARRHEA DUE TO DRUG: Primary | ICD-10-CM

## 2024-03-13 DIAGNOSIS — C67.9 MALIGNANT NEOPLASM OF URINARY BLADDER, UNSPECIFIED SITE (HCC): Primary | ICD-10-CM

## 2024-03-13 RX ORDER — METHYLPREDNISOLONE SODIUM SUCCINATE 40 MG/ML
40 INJECTION, POWDER, LYOPHILIZED, FOR SOLUTION INTRAMUSCULAR; INTRAVENOUS ONCE
Status: CANCELLED | OUTPATIENT
Start: 2024-03-19

## 2024-03-13 RX ORDER — ACETAMINOPHEN 325 MG/1
650 TABLET ORAL ONCE
Status: CANCELLED | OUTPATIENT
Start: 2024-03-19

## 2024-03-13 RX ORDER — SODIUM CHLORIDE 9 MG/ML
20 INJECTION, SOLUTION INTRAVENOUS ONCE
Status: CANCELLED | OUTPATIENT
Start: 2024-03-19

## 2024-03-13 RX ORDER — DIPHENHYDRAMINE HCL 25 MG
25 TABLET ORAL ONCE
Status: CANCELLED | OUTPATIENT
Start: 2024-03-19

## 2024-03-13 NOTE — TELEPHONE ENCOUNTER
Received a phone call from pt's dtr Kristin to report that pt is not drinking and is very weak and has had diarrhea since 12/26/24. Dtr feels pt needs to be admitted, suggested pt go to ED, but when phoned wife she said pt had four formed stools and would rather have infliximab at infusion so this was scheduled at 10:30. Phoned wife to give her time and she said they were not awake yet and pt would prefer to speak to me before going to Inf. Phoned infusion back to let them know most likely pt will not be coming.

## 2024-03-14 DIAGNOSIS — K52.1 DIARRHEA DUE TO DRUG: Primary | ICD-10-CM

## 2024-03-18 ENCOUNTER — APPOINTMENT (OUTPATIENT)
Dept: LAB | Facility: CLINIC | Age: 89
End: 2024-03-18
Payer: MEDICARE

## 2024-03-18 ENCOUNTER — TELEPHONE (OUTPATIENT)
Dept: HEMATOLOGY ONCOLOGY | Facility: CLINIC | Age: 89
End: 2024-03-18

## 2024-03-18 DIAGNOSIS — Z11.59 ENCOUNTER FOR HEPATITIS C SCREENING TEST FOR LOW RISK PATIENT: ICD-10-CM

## 2024-03-18 DIAGNOSIS — K51.919 ULCERATIVE COLITIS WITH COMPLICATION, UNSPECIFIED LOCATION (HCC): ICD-10-CM

## 2024-03-18 DIAGNOSIS — K52.9 COLITIS: ICD-10-CM

## 2024-03-18 DIAGNOSIS — Z13.9 SCREENING DUE: ICD-10-CM

## 2024-03-18 DIAGNOSIS — C67.9 MALIGNANT NEOPLASM OF URINARY BLADDER, UNSPECIFIED SITE (HCC): ICD-10-CM

## 2024-03-18 DIAGNOSIS — K29.70 GASTRITIS, PRESENCE OF BLEEDING UNSPECIFIED, UNSPECIFIED CHRONICITY, UNSPECIFIED GASTRITIS TYPE: ICD-10-CM

## 2024-03-18 DIAGNOSIS — K52.1 DIARRHEA DUE TO DRUG: ICD-10-CM

## 2024-03-18 DIAGNOSIS — E83.42 HYPOMAGNESEMIA: ICD-10-CM

## 2024-03-18 DIAGNOSIS — K52.1 DIARRHEA DUE TO DRUG: Primary | ICD-10-CM

## 2024-03-18 DIAGNOSIS — D50.9 IRON DEFICIENCY ANEMIA, UNSPECIFIED IRON DEFICIENCY ANEMIA TYPE: ICD-10-CM

## 2024-03-18 LAB
ALBUMIN SERPL BCP-MCNC: 2.5 G/DL (ref 3.5–5)
ALP SERPL-CCNC: 68 U/L (ref 34–104)
ALT SERPL W P-5'-P-CCNC: 9 U/L (ref 7–52)
ANION GAP SERPL CALCULATED.3IONS-SCNC: 9 MMOL/L (ref 4–13)
AST SERPL W P-5'-P-CCNC: 13 U/L (ref 13–39)
BASOPHILS # BLD AUTO: 0.05 THOUSANDS/ÂΜL (ref 0–0.1)
BASOPHILS NFR BLD AUTO: 0 % (ref 0–1)
BILIRUB SERPL-MCNC: 0.49 MG/DL (ref 0.2–1)
BUN SERPL-MCNC: 25 MG/DL (ref 5–25)
CALCIUM ALBUM COR SERPL-MCNC: 9 MG/DL (ref 8.3–10.1)
CALCIUM SERPL-MCNC: 7.8 MG/DL (ref 8.4–10.2)
CHLORIDE SERPL-SCNC: 99 MMOL/L (ref 96–108)
CO2 SERPL-SCNC: 23 MMOL/L (ref 21–32)
CREAT SERPL-MCNC: 1.09 MG/DL (ref 0.6–1.3)
CRP SERPL QL: 110.1 MG/L
EOSINOPHIL # BLD AUTO: 0.11 THOUSAND/ÂΜL (ref 0–0.61)
EOSINOPHIL NFR BLD AUTO: 1 % (ref 0–6)
ERYTHROCYTE [DISTWIDTH] IN BLOOD BY AUTOMATED COUNT: 16.6 % (ref 11.6–15.1)
ERYTHROCYTE [SEDIMENTATION RATE] IN BLOOD: 41 MM/HOUR (ref 0–19)
GFR SERPL CREATININE-BSD FRML MDRD: 60 ML/MIN/1.73SQ M
GLUCOSE P FAST SERPL-MCNC: 101 MG/DL (ref 65–99)
HCT VFR BLD AUTO: 27.8 % (ref 36.5–49.3)
HGB BLD-MCNC: 8.9 G/DL (ref 12–17)
IMM GRANULOCYTES # BLD AUTO: 0.24 THOUSAND/UL (ref 0–0.2)
IMM GRANULOCYTES NFR BLD AUTO: 2 % (ref 0–2)
LYMPHOCYTES # BLD AUTO: 0.88 THOUSANDS/ÂΜL (ref 0.6–4.47)
LYMPHOCYTES NFR BLD AUTO: 8 % (ref 14–44)
MAGNESIUM SERPL-MCNC: 1.8 MG/DL (ref 1.9–2.7)
MCH RBC QN AUTO: 30.5 PG (ref 26.8–34.3)
MCHC RBC AUTO-ENTMCNC: 32 G/DL (ref 31.4–37.4)
MCV RBC AUTO: 95 FL (ref 82–98)
MONOCYTES # BLD AUTO: 0.84 THOUSAND/ÂΜL (ref 0.17–1.22)
MONOCYTES NFR BLD AUTO: 7 % (ref 4–12)
NEUTROPHILS # BLD AUTO: 9.3 THOUSANDS/ÂΜL (ref 1.85–7.62)
NEUTS SEG NFR BLD AUTO: 82 % (ref 43–75)
NRBC BLD AUTO-RTO: 0 /100 WBCS
PLATELET # BLD AUTO: 561 THOUSANDS/UL (ref 149–390)
PMV BLD AUTO: 8.9 FL (ref 8.9–12.7)
POTASSIUM SERPL-SCNC: 4.3 MMOL/L (ref 3.5–5.3)
PROT SERPL-MCNC: 5.2 G/DL (ref 6.4–8.4)
RBC # BLD AUTO: 2.92 MILLION/UL (ref 3.88–5.62)
SODIUM SERPL-SCNC: 131 MMOL/L (ref 135–147)
WBC # BLD AUTO: 11.42 THOUSAND/UL (ref 4.31–10.16)

## 2024-03-18 PROCEDURE — 86705 HEP B CORE ANTIBODY IGM: CPT

## 2024-03-18 PROCEDURE — 83735 ASSAY OF MAGNESIUM: CPT

## 2024-03-18 PROCEDURE — 86480 TB TEST CELL IMMUN MEASURE: CPT

## 2024-03-18 PROCEDURE — 85652 RBC SED RATE AUTOMATED: CPT

## 2024-03-18 PROCEDURE — 86803 HEPATITIS C AB TEST: CPT

## 2024-03-18 PROCEDURE — 85025 COMPLETE CBC W/AUTO DIFF WBC: CPT

## 2024-03-18 PROCEDURE — 86140 C-REACTIVE PROTEIN: CPT

## 2024-03-18 PROCEDURE — 86704 HEP B CORE ANTIBODY TOTAL: CPT

## 2024-03-18 PROCEDURE — 87340 HEPATITIS B SURFACE AG IA: CPT

## 2024-03-18 PROCEDURE — 36415 COLL VENOUS BLD VENIPUNCTURE: CPT

## 2024-03-18 PROCEDURE — 80053 COMPREHEN METABOLIC PANEL: CPT

## 2024-03-18 NOTE — TELEPHONE ENCOUNTER
Received vm: Rayray Gaytan, it's Solange Luong. I have some questions concerning my 's infusion tomorrow. It's Candido Ashley and his YOB: 1937. He is to be getting Remicade tomorrow and I have the questions are about that. So I do appreciate your call back at [phone]. Thank you, amarjit Morales.    Returned call to Solange.  Reviewed infliximab infusion.  Per Solange, patient continues to be weak.  Daughter will be present with patient tomorrow.        Returned call to patient.  Patient instructed to have TB test and hepatitis panel prior to infusion.  Results needed prior to infusion.  Patient aware and will go today for testing

## 2024-03-19 ENCOUNTER — HOSPITAL ENCOUNTER (OUTPATIENT)
Dept: INFUSION CENTER | Facility: HOSPITAL | Age: 89
Discharge: HOME | End: 2024-03-19
Attending: INTERNAL MEDICINE

## 2024-03-19 LAB
GAMMA INTERFERON BACKGROUND BLD IA-ACNC: 0.06 IU/ML
HBV CORE AB SER QL: NORMAL
HBV CORE IGM SER QL: NORMAL
HBV SURFACE AG SER QL: NORMAL
HCV AB SER QL: NORMAL
M TB IFN-G BLD-IMP: NEGATIVE
M TB IFN-G CD4+ BCKGRND COR BLD-ACNC: 0 IU/ML
M TB IFN-G CD4+ BCKGRND COR BLD-ACNC: 0 IU/ML
MITOGEN IGNF BCKGRD COR BLD-ACNC: 1.17 IU/ML

## 2024-03-20 DIAGNOSIS — K52.1 DIARRHEA DUE TO DRUG: Primary | ICD-10-CM

## 2024-03-20 RX ORDER — SODIUM CHLORIDE 9 MG/ML
20 INJECTION, SOLUTION INTRAVENOUS ONCE
Status: CANCELLED | OUTPATIENT
Start: 2024-03-22

## 2024-03-20 RX ORDER — DIPHENHYDRAMINE HCL 25 MG
25 TABLET ORAL ONCE
Status: CANCELLED | OUTPATIENT
Start: 2024-03-22

## 2024-03-20 RX ORDER — METHYLPREDNISOLONE SODIUM SUCCINATE 40 MG/ML
40 INJECTION, POWDER, LYOPHILIZED, FOR SOLUTION INTRAMUSCULAR; INTRAVENOUS ONCE
Status: CANCELLED | OUTPATIENT
Start: 2024-03-22

## 2024-03-20 RX ORDER — ACETAMINOPHEN 325 MG/1
650 TABLET ORAL ONCE
Status: CANCELLED | OUTPATIENT
Start: 2024-03-22

## 2024-03-22 ENCOUNTER — HOSPITAL ENCOUNTER (OUTPATIENT)
Dept: INFUSION CENTER | Facility: HOSPITAL | Age: 89
End: 2024-03-22
Attending: INTERNAL MEDICINE
Payer: MEDICARE

## 2024-03-22 VITALS
SYSTOLIC BLOOD PRESSURE: 128 MMHG | DIASTOLIC BLOOD PRESSURE: 68 MMHG | OXYGEN SATURATION: 100 % | TEMPERATURE: 96.9 F | HEART RATE: 77 BPM

## 2024-03-22 DIAGNOSIS — E83.42 HYPOMAGNESEMIA: ICD-10-CM

## 2024-03-22 DIAGNOSIS — K52.1 DIARRHEA DUE TO DRUG: Primary | ICD-10-CM

## 2024-03-22 PROCEDURE — 96367 TX/PROPH/DG ADDL SEQ IV INF: CPT

## 2024-03-22 PROCEDURE — 96415 CHEMO IV INFUSION ADDL HR: CPT

## 2024-03-22 PROCEDURE — 96413 CHEMO IV INFUSION 1 HR: CPT

## 2024-03-22 PROCEDURE — 96375 TX/PRO/DX INJ NEW DRUG ADDON: CPT

## 2024-03-22 RX ORDER — SODIUM CHLORIDE 9 MG/ML
20 INJECTION, SOLUTION INTRAVENOUS ONCE
Status: COMPLETED | OUTPATIENT
Start: 2024-03-22 | End: 2024-03-22

## 2024-03-22 RX ORDER — METHYLPREDNISOLONE SODIUM SUCCINATE 40 MG/ML
40 INJECTION, POWDER, LYOPHILIZED, FOR SOLUTION INTRAMUSCULAR; INTRAVENOUS ONCE
Status: COMPLETED | OUTPATIENT
Start: 2024-03-22 | End: 2024-03-22

## 2024-03-22 RX ORDER — METHYLPREDNISOLONE SODIUM SUCCINATE 40 MG/ML
40 INJECTION, POWDER, LYOPHILIZED, FOR SOLUTION INTRAMUSCULAR; INTRAVENOUS ONCE
Status: CANCELLED | OUTPATIENT
Start: 2024-03-22

## 2024-03-22 RX ORDER — ACETAMINOPHEN 325 MG/1
650 TABLET ORAL ONCE
Status: CANCELLED | OUTPATIENT
Start: 2024-03-22

## 2024-03-22 RX ORDER — DIPHENHYDRAMINE HCL 25 MG
25 TABLET ORAL ONCE
Status: CANCELLED | OUTPATIENT
Start: 2024-03-22

## 2024-03-22 RX ORDER — SODIUM CHLORIDE 9 MG/ML
20 INJECTION, SOLUTION INTRAVENOUS ONCE
Status: CANCELLED | OUTPATIENT
Start: 2024-03-22

## 2024-03-22 RX ORDER — DIPHENHYDRAMINE HCL 25 MG
25 TABLET ORAL ONCE
Status: COMPLETED | OUTPATIENT
Start: 2024-03-22 | End: 2024-03-22

## 2024-03-22 RX ORDER — ACETAMINOPHEN 325 MG/1
650 TABLET ORAL ONCE
Status: COMPLETED | OUTPATIENT
Start: 2024-03-22 | End: 2024-03-22

## 2024-03-22 RX ADMIN — MAGNESIUM SULFATE HEPTAHYDRATE: 500 INJECTION, SOLUTION INTRAMUSCULAR; INTRAVENOUS at 12:03

## 2024-03-22 RX ADMIN — METHYLPREDNISOLONE SODIUM SUCCINATE 40 MG: 40 INJECTION, POWDER, FOR SOLUTION INTRAMUSCULAR; INTRAVENOUS at 11:52

## 2024-03-22 RX ADMIN — SODIUM CHLORIDE 20 ML/HR: 0.9 INJECTION, SOLUTION INTRAVENOUS at 11:50

## 2024-03-22 RX ADMIN — ACETAMINOPHEN 650 MG: 325 TABLET ORAL at 11:53

## 2024-03-22 RX ADMIN — DIPHENHYDRAMINE HYDROCHLORIDE 25 MG: 25 TABLET ORAL at 11:53

## 2024-03-22 RX ADMIN — INFLIXIMAB 400 MG: 100 INJECTION, POWDER, LYOPHILIZED, FOR SOLUTION INTRAVENOUS at 13:14

## 2024-03-22 NOTE — PROGRESS NOTES
Candido Valladares Jr.  tolerated Remicade and Magnesium treatment well with no complications.      Candido Valladares Jr. is waiting for confirmation for next appointment.     AVS printed and given to Candido Valladares Jr.: No (Declined by Candido Valladares Jr.).

## 2024-03-25 ENCOUNTER — TELEPHONE (OUTPATIENT)
Dept: HEMATOLOGY ONCOLOGY | Facility: CLINIC | Age: 89
End: 2024-03-25

## 2024-03-25 NOTE — TELEPHONE ENCOUNTER
Appointment Schedule   Who are you speaking with? Patient   If it is not the patient, are they listed on an active communication consent form? Yes   Which provider is the appointment scheduled with? Dr. Britton   At which location is the appointment scheduled for? [place]   When is the appointment scheduled?  Please list date and time 4/2/24   What is the reason for this appointment? Follow up    Did patient voice understanding of the details of this appointment? Yes   Was the no show policy reviewed with patient? Yes

## 2024-03-26 ENCOUNTER — TELEPHONE (OUTPATIENT)
Dept: HEMATOLOGY ONCOLOGY | Facility: CLINIC | Age: 89
End: 2024-03-26

## 2024-03-26 ENCOUNTER — HOSPITAL ENCOUNTER (OUTPATIENT)
Dept: INFUSION CENTER | Facility: HOSPITAL | Age: 89
Discharge: HOME | End: 2024-03-26
Attending: INTERNAL MEDICINE

## 2024-03-26 NOTE — TELEPHONE ENCOUNTER
Received a phone call from pt's wife Solange who sounded frustrated as to pt's side effects. Pt has some swelling to his right hand and forearm and redness to the right side of his face. Per wife pt is not drinking as much as he could he has liquid restrictions to 55 ozs per day but has been drinking 40 oz. Pt  has had improvement in his diarrhea still has but  less frequency. Wife asked that I speak to pt and pt states the swelling in his hand comes and goes, denies pain or heat coming from arm or hand. Pt states he has had redness to his face even before his Infliximab infusion. Pt wishes to take benadryl to see if this will improve the redness to his face and pt is using Imodium AD and pt is declining doppler study and will not go to Ed.

## 2024-04-01 ENCOUNTER — TELEPHONE (OUTPATIENT)
Dept: HEMATOLOGY ONCOLOGY | Facility: CLINIC | Age: 89
End: 2024-04-01

## 2024-04-01 NOTE — TELEPHONE ENCOUNTER
Received vm: Rayray Ceballos, my name is Indira Mukherjee. I'm Candido Dwells daughter, or one of them I should say. I'd really like to speak to you. He has an appointment tomorrow with Doctor Britton and my mom asked if I could call and give you some information to give to the doctor prior to us getting there. If you could please call me back at [phone], I would greatly appreciate it. Thanks. Have a great day.    Returned call to Indira.  Per Indira, patient is refusing palliative care, and is continuing to decline.  The family is calling and asking that during office visit tomorrow, Dr. Britton could assist in guiding patient with next steps and assessing the patients current situation.    Per Indira, the patient is continuing to have 3-4 episodes of diarrhea per 24 hour period.  Patient now has some sores on his buttocks from the diarrhea.  Patient has decreased in his food and water intake and is becoming increasingly weak and tired.  Patient does have a 55oz fluid intake limit, but is taking in less that that.  Indira reports edema in both legs, requiring patient to wear slippers, as he can not get shoes on.  Per Indira, patient was still in bed at 1130 today.      Per Indira, patient has right arm edema from unknown cause.  Patient did fall on left side, and does have bruising.  Patient refused ED at that time.      Family states the patient will listen to Dr. Britton and they are requesting Dr. Britton to discuss these concerns at tomorrows appt.while maintaining the confidential nature of this phone call.

## 2024-04-02 ENCOUNTER — OFFICE VISIT (OUTPATIENT)
Dept: HEMATOLOGY ONCOLOGY | Facility: CLINIC | Age: 89
End: 2024-04-02
Payer: MEDICARE

## 2024-04-02 ENCOUNTER — TELEPHONE (OUTPATIENT)
Dept: HEMATOLOGY ONCOLOGY | Facility: CLINIC | Age: 89
End: 2024-04-02

## 2024-04-02 VITALS
SYSTOLIC BLOOD PRESSURE: 144 MMHG | DIASTOLIC BLOOD PRESSURE: 86 MMHG | TEMPERATURE: 97.6 F | HEIGHT: 73 IN | HEART RATE: 128 BPM | BODY MASS INDEX: 23.22 KG/M2 | OXYGEN SATURATION: 97 % | RESPIRATION RATE: 17 BRPM

## 2024-04-02 DIAGNOSIS — K52.1 DIARRHEA DUE TO DRUG: ICD-10-CM

## 2024-04-02 DIAGNOSIS — N18.31 STAGE 3A CHRONIC KIDNEY DISEASE (HCC): ICD-10-CM

## 2024-04-02 DIAGNOSIS — D64.9 ANEMIA, UNSPECIFIED TYPE: ICD-10-CM

## 2024-04-02 DIAGNOSIS — C67.9 MALIGNANT NEOPLASM OF URINARY BLADDER, UNSPECIFIED SITE (HCC): Primary | ICD-10-CM

## 2024-04-02 DIAGNOSIS — E53.8 FOLIC ACID DEFICIENCY: ICD-10-CM

## 2024-04-02 DIAGNOSIS — E53.8 VITAMIN B12 DEFICIENCY: ICD-10-CM

## 2024-04-02 PROCEDURE — G2211 COMPLEX E/M VISIT ADD ON: HCPCS | Performed by: INTERNAL MEDICINE

## 2024-04-02 PROCEDURE — 99214 OFFICE O/P EST MOD 30 MIN: CPT | Performed by: INTERNAL MEDICINE

## 2024-04-02 NOTE — TELEPHONE ENCOUNTER
Phoned  inf and spoke to Janeth Whitlock RN to schedule pt for one liter NS over two hrs and pt needs repeat CMP after each hydration infusion. Entered orders with three days in between each hydration as pt has fluid restrictions and has SIADH.

## 2024-04-02 NOTE — PROGRESS NOTES
Hematology/Oncology Outpatient Follow-up  Candido Valladares Jr. 88 y.o. male 1935 [id number]    Date:  4/2/2024        Assessment and Plan:  1. Malignant neoplasm of urinary bladder, unspecified site (HCC)  The estimated clinical staging for his high-grade bladder cancer is (cT4, cN1, cM0).  He was found not to be a candidate for surgery or concurrent chemoradiation.  He was started on Keytruda on 10/31/2023.  PET CT scan on 2/27/2024 after 5 cycles worth of pembrolizumab showed partial response to the immunotherapy.  The patient had to trials of high-dose steroids with prednisone with a slow taper to treat his watery diarrhea.  He also had infliximab treatment just recently since his watery diarrhea persisted.    I had a lengthy discussion with the patient and his family regarding symptoms, goals and expectations.  The patient did not complain about watery diarrhea during the day and seems to have some issues with stool incontinence during the night which is causing significant stress to the patient and his family.    The patient was told that we will continue to hold the immunotherapy most likely permanently.  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      - CBC and differential; Future  - Comprehensive metabolic panel; Future  - C-reactive protein; Future  - Occult Blood, Fecal Immunochemical; Future  - Ferritin; Future  - Iron Panel (Includes Ferritin, Iron Sat%, Iron, and TIBC); Future  - TSH, 3rd generation with Free T4 reflex; Future  - Sedimentation rate, automated; Future  - Vitamin B12; Future  - LD,Blood; Future  - Folate; Future    2. Diarrhea due to drug  I did also offer him to be seen by the GI team for further evaluation of his stool incontinence and persistent watery diarrhea after 2 trials of steroids and infliximab.  He was told that his diarrhea pattern does not seem to fit with immunotherapy related side effect.  The patient agreed with  the GI evaluation to be done as soon as possible.  I did also ask him to consider seeing the palliative care team for symptom management.  - Ambulatory referral to Gastroenterology; Future    3. Anemia, unspecified type  Multifactorial, workup will be ordered.  - CBC and differential; Future  - Comprehensive metabolic panel; Future  - C-reactive protein; Future  - Occult Blood, Fecal Immunochemical; Future  - Ferritin; Future  - Iron Panel (Includes Ferritin, Iron Sat%, Iron, and TIBC); Future  - TSH, 3rd generation with Free T4 reflex; Future  - Sedimentation rate, automated; Future  - Vitamin B12; Future  - LD,Blood; Future  - Folate; Future    4. Stage 3a chronic kidney disease (HCC)  He is under the supervision of the nephrology team.    5. Vitamin B12 deficiency  Level will be checked.  - Vitamin B12; Future    6. Folic acid deficiency  Level will be checked.  - Folate; Future        HPI:  The patient came there for follow-up visit accompanied by his wife and daughter.  The family seems to be concerned about his stool incontinence.  Watery diarrhea will was the main discussion today during this office visit which only happens at night.  During the day the patient seems to have formed stools.  He did complain about significant fatigue and swelling of the lower extremities as well.  Recent available blood work from 3/18/2024 showed hemoglobin of 8.9, white cell count 11.4 with a platelet count of 561.  ANC was 9.3.  Potassium was 4.3.  Sodium 131.  Creatinine 1.0 with normal corrected calcium and normal liver enzymes.  C-reactive protein 110 with sed rate of 41.  Magnesium 1.8.  Oncology History Overview Note   This is an 87-year-old male with history of atrial fibrillation, hypertension, SIADH and prostate cancer which was treated with definitive radiation around 12 years ago.     The patient apparently complained about significant urinary symptoms just recently which was initially evaluated with an MRI of the  pelvis.  This showed moderate prostatomegaly on 5/30/2023.  The patient had then TURP procedure on 9/11/2023 which was done by Dr. Joseph from the urology team.  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     Recent CT renal protocol on 9/20/2023 showed:  IMPRESSION:     Markedly distended urinary bladder suggestive of chronic partial bladder outlet obstruction. Markedly enlarged prostate gland which indents the urinary bladder base in this patient with history of prostate neoplasm with significant internal heterogeneity   and areas of hypoattenuation suggestive of necrosis.     Moderate left-sided hydroureteronephrosis to the level of the distal ureter which appears obstructed secondary to adjacent enlarged prostatic tissue. Absence of contrast excretion from the left kidney after a 15-minute delay.     Moderate right-sided hydroureteronephrosis to the level of the urinary bladder; excretion of contrast is demonstrated into the ureter after a 7-minute delay.     Bladder cancer (HCC)   9/11/2023 -  Cancer Staged    Staging form: Urinary Bladder, AJCC 8th Edition  - Clinical stage from 9/11/2023: cT4, cN1, cM0 - Signed by Ford Britton MD on 12/6/2023  Stage prefix: Initial diagnosis  WHO/ISUP grade (low/high): High Grade  Histologic grading system: 2 grade system       11/17/2023 Initial Diagnosis    Bladder cancer (HCC)         Interval history:    ROS: Review of Systems   Constitutional:  Positive for activity change and fatigue. Negative for chills and fever.   HENT:  Negative for ear pain and sore throat.    Eyes:  Negative for pain and visual disturbance.   Respiratory:  Negative for cough and shortness of breath.    Cardiovascular:  Negative for chest pain and palpitations.   Gastrointestinal:  Positive for diarrhea. Negative for abdominal pain and vomiting.   Genitourinary:  Negative for dysuria and hematuria.    Musculoskeletal:  Negative for arthralgias and back pain.   Skin:  Negative for color change and rash.   Neurological:  Negative for seizures and syncope.   All other systems reviewed and are negative.      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)        Past Surgical History:   Procedure Laterality Date    APPENDECTOMY      [facility] TRURL ELECTROSURG RESCJ PROSTATE BLEED COMPLETE N/A 9/11/2023    Procedure: CYSTOSCOPY; TRANSURETHRAL RESECTION OF PROSTATE (TURP);  Surgeon: Franklyn Joseph MD;  Location: [facility];  Service: Urology       Social History     Socioeconomic History    Marital status: /Civil Union     Spouse name: None    Number of children: None    Years of education: None    Highest education level: None   Occupational History    Occupation: RETIRED   Tobacco Use    Smoking status: Never     Passive exposure: Never    Smokeless tobacco: Never   Vaping Use    Vaping status: Never Used   Substance and Sexual Activity    Alcohol use: Not Currently    Drug use: Not Currently    Sexual activity: Not Currently   Other Topics Concern    None   Social History Narrative        RETIRED     Social Determinants of Health     Financial Resource Strain: Low Risk  (11/28/2023)    Overall Financial Resource Strain (CARDIA)     Difficulty of Paying Living Expenses: Not hard at all   Food Insecurity: No Food Insecurity (6/28/2023)    Hunger Vital Sign     Worried About Running Out of Food in the Last Year: Never true     Ran Out of Food in the Last Year: Never true   Transportation Needs: No Transportation Needs (11/28/2023)    PRAPARE - Transportation     Lack of Transportation (Medical): No     Lack of Transportation (Non-Medical): No   Physical Activity: Not on file   Stress: Not on file   Social Connections: Not on  file   Intimate Partner Violence: Not on file   Housing Stability: Low Risk  (6/28/2023)    Housing Stability Vital Sign     Unable to Pay for Housing in the Last Year: No     Number of Places Lived in the Last Year: 1     Unstable Housing in the Last Year: No       Family History   Problem Relation Age of Onset    Hypertension Mother        No Known Allergies      Current Outpatient Medications:     aspirin 81 mg chewable tablet, Chew 81 mg daily, Disp: , Rfl:     finasteride (PROPECIA) 1 MG tablet, Take 1 mg by mouth daily, Disp: , Rfl:     folic acid (FOLVITE) 1 mg tablet, Take 1 tablet (1 mg total) by mouth daily, Disp: 30 tablet, Rfl: 5    folic acid (FOLVITE) 1 mg tablet, take 1 tablet by mouth once daily, Disp: 30 tablet, Rfl: 0    metoprolol succinate (TOPROL-XL) 100 mg 24 hr tablet, Take 1 tablet (100 mg total) by mouth daily, Disp: 90 tablet, Rfl: 3    pantoprazole (PROTONIX) 40 mg tablet, Take 1 tablet (40 mg total) by mouth daily, Disp: 90 tablet, Rfl: 3    predniSONE 10 mg tablet, TAKE SIX TABS (60mg) )FOR 7 DAYS THEN 4 TABS (40 mg)  FOR 7 DAYS THEN 2 TABS (20 mg)  FOR 7 DAYS THEN ONE TAB ( 10 MG) FOR 7 DAYS, Disp: 94 tablet, Rfl: 0    predniSONE 10 mg tablet, Take 1 tablet (10 mg total) by mouth daily, Disp: 90 tablet, Rfl: 0    predniSONE 10 mg tablet, Take 1 tablet (10 mg total) by mouth daily, Disp: 7 tablet, Rfl: 0    tamsulosin (Flomax) 0.4 mg, Take 0.4 mg by mouth daily with dinner, Disp: , Rfl:     ferrous sulfate 325 (65 Fe) mg tablet, Take 1 tablet (325 mg total) by mouth daily with breakfast Do not start before July 3, 2023., Disp: 30 tablet, Rfl: 0    furosemide (LASIX) 20 mg tablet, Take 1 tablet (20 mg total) by mouth daily (Patient not taking: Reported on 12/6/2023), Disp: 30 tablet, Rfl: 1    senna-docusate sodium (SENOKOT S) 8.6-50 mg per tablet, Take 2 tablets by mouth daily at bedtime May hold for loose stools, Disp: 60 tablet, Rfl: 0      Physical Exam:  /86 (BP Location: Left  "arm, Patient Position: Sitting, Cuff Size: Adult)   Pulse (!) 128   Temp 97.6 °F (36.4 °C)   Resp 17   Ht 6' 1\" (1.854 m)   SpO2 97%   BMI 23.22 kg/m²     Physical Exam  Constitutional:       Appearance: He is well-developed.   HENT:      Head: Normocephalic and atraumatic.      Nose: Nose normal.   Eyes:      General: No scleral icterus.        Right eye: No discharge.         Left eye: No discharge.      Conjunctiva/sclera: Conjunctivae normal.      Pupils: Pupils are equal, round, and reactive to light.   Neck:      Thyroid: No thyromegaly.      Trachea: No tracheal deviation.   Cardiovascular:      Rate and Rhythm: Normal rate and regular rhythm.      Heart sounds: Normal heart sounds. No murmur heard.     No friction rub.   Pulmonary:      Effort: Pulmonary effort is normal. No respiratory distress.      Breath sounds: Normal breath sounds. No wheezing or rales.   Chest:      Chest wall: No tenderness.   Abdominal:      General: There is no distension.      Palpations: Abdomen is soft. There is no hepatomegaly or splenomegaly.      Tenderness: There is no abdominal tenderness. There is no guarding or rebound.   Musculoskeletal:         General: No tenderness or deformity. Normal range of motion.      Cervical back: Normal range of motion and neck supple.   Lymphadenopathy:      Cervical: No cervical adenopathy.   Skin:     General: Skin is warm and dry.      Coloration: Skin is not pale.      Findings: No erythema or rash.   Neurological:      Mental Status: He is alert and oriented to person, place, and time.      Cranial Nerves: No cranial nerve deficit.      Coordination: Coordination normal.      Deep Tendon Reflexes: Reflexes are normal and symmetric.   Psychiatric:         Behavior: Behavior normal.         Thought Content: Thought content normal.         Judgment: Judgment normal.           Labs:  Lab Results   Component Value Date    WBC 11.42 (H) 03/18/2024    HGB 8.9 (L) 03/18/2024    HCT 27.8 " (L) 03/18/2024    MCV 95 03/18/2024     (H) 03/18/2024     Lab Results   Component Value Date    K 4.3 03/18/2024    CL 99 03/18/2024    CO2 23 03/18/2024    BUN 25 03/18/2024    CREATININE 1.09 03/18/2024    GLUF 101 (H) 03/18/2024    CALCIUM 7.8 (L) 03/18/2024    CORRECTEDCA 9.0 03/18/2024    AST 13 03/18/2024    ALT 9 03/18/2024    ALKPHOS 68 03/18/2024    EGFR 60 03/18/2024     Lab Results   Component Value Date    TSH 1.10 01/13/2019       Patient voiced understanding and agreement in the above discussion. Aware to contact our office with questions/symptoms in the interim.

## 2024-04-04 ENCOUNTER — HOSPITAL ENCOUNTER (OUTPATIENT)
Dept: INFUSION CENTER | Facility: HOSPITAL | Age: 89
End: 2024-04-04
Attending: INTERNAL MEDICINE
Payer: MEDICARE

## 2024-04-04 ENCOUNTER — PATIENT OUTREACH (OUTPATIENT)
Dept: CASE MANAGEMENT | Facility: HOSPITAL | Age: 89
End: 2024-04-04

## 2024-04-04 VITALS
RESPIRATION RATE: 18 BRPM | HEART RATE: 130 BPM | TEMPERATURE: 96.6 F | SYSTOLIC BLOOD PRESSURE: 118 MMHG | DIASTOLIC BLOOD PRESSURE: 77 MMHG

## 2024-04-04 DIAGNOSIS — E83.42 HYPOMAGNESEMIA: Primary | ICD-10-CM

## 2024-04-04 LAB
ALBUMIN SERPL BCP-MCNC: 2.4 G/DL (ref 3.5–5)
ALP SERPL-CCNC: 69 U/L (ref 34–104)
ALT SERPL W P-5'-P-CCNC: 10 U/L (ref 7–52)
ANION GAP SERPL CALCULATED.3IONS-SCNC: 10 MMOL/L (ref 4–13)
AST SERPL W P-5'-P-CCNC: 14 U/L (ref 13–39)
BILIRUB SERPL-MCNC: 0.53 MG/DL (ref 0.2–1)
BUN SERPL-MCNC: 23 MG/DL (ref 5–25)
CALCIUM ALBUM COR SERPL-MCNC: 9 MG/DL (ref 8.3–10.1)
CALCIUM SERPL-MCNC: 7.7 MG/DL (ref 8.4–10.2)
CHLORIDE SERPL-SCNC: 101 MMOL/L (ref 96–108)
CO2 SERPL-SCNC: 19 MMOL/L (ref 21–32)
CREAT SERPL-MCNC: 0.93 MG/DL (ref 0.6–1.3)
GFR SERPL CREATININE-BSD FRML MDRD: 73 ML/MIN/1.73SQ M
GLUCOSE SERPL-MCNC: 108 MG/DL (ref 65–140)
POTASSIUM SERPL-SCNC: 3.8 MMOL/L (ref 3.5–5.3)
PROT SERPL-MCNC: 5.1 G/DL (ref 6.4–8.4)
SODIUM SERPL-SCNC: 130 MMOL/L (ref 135–147)

## 2024-04-04 PROCEDURE — 80053 COMPREHEN METABOLIC PANEL: CPT | Performed by: INTERNAL MEDICINE

## 2024-04-04 RX ADMIN — SODIUM CHLORIDE 1000 ML: 0.9 INJECTION, SOLUTION INTRAVENOUS at 12:45

## 2024-04-04 NOTE — PROGRESS NOTES
Candido Valladares Jr. tolerated IV hydration well with no complications. Post-hydration CMP drawn peripherally per physician's order. Pt reports decrease in frequency of his stools, and that not all stools are watery consistency - some stools are formed.    Candido Valladares Jr. is aware that no further infusion appointments are scheduled at this time. Pt and daughter both state that after the doctor receives blood work results, they will schedule additional hydration appointments.    AVS declined by Candido Valladares Jr..

## 2024-04-04 NOTE — PROGRESS NOTES
BHUPENDRAW met with patient and his dtr, Yen at the infusion center this afternoon for a supportive visit. Patient is here for hydration today. Patient does not experience pain or ill effects. Pt with complaints of being very weak. He and his family are hopeful that the hydration will help. BHUPENDRAW allowed time for pt to reflect on life. Pt served in the  and a then was a . Pt was always very independent. It is difficult for him not to have be as independent due to weakness. Emotional support provided.

## 2024-04-05 ENCOUNTER — OFFICE VISIT (OUTPATIENT)
Dept: GASTROENTEROLOGY | Facility: CLINIC | Age: 89
End: 2024-04-05
Payer: MEDICARE

## 2024-04-05 VITALS
TEMPERATURE: 97.3 F | BODY MASS INDEX: 23.22 KG/M2 | OXYGEN SATURATION: 99 % | HEART RATE: 100 BPM | DIASTOLIC BLOOD PRESSURE: 70 MMHG | SYSTOLIC BLOOD PRESSURE: 102 MMHG | HEIGHT: 73 IN

## 2024-04-05 DIAGNOSIS — R19.4 CHANGE IN BOWEL HABITS: Primary | ICD-10-CM

## 2024-04-05 DIAGNOSIS — C68.9 HIGH GRADE UROTHELIAL CARCINOMA PRESENT ON URINE CYTOLOGY (HCC): ICD-10-CM

## 2024-04-05 DIAGNOSIS — R63.4 WEIGHT LOSS, ABNORMAL: ICD-10-CM

## 2024-04-05 DIAGNOSIS — R94.8 ABNORMAL PET SCAN OF MEDIASTINUM: ICD-10-CM

## 2024-04-05 DIAGNOSIS — D64.9 NORMOCYTIC ANEMIA: ICD-10-CM

## 2024-04-05 DIAGNOSIS — R19.7 DIARRHEA, UNSPECIFIED TYPE: ICD-10-CM

## 2024-04-05 PROCEDURE — 99204 OFFICE O/P NEW MOD 45 MIN: CPT | Performed by: PHYSICIAN ASSISTANT

## 2024-04-05 RX ORDER — LANOLIN ALCOHOL/MO/W.PET/CERES
500 CREAM (GRAM) TOPICAL DAILY
Status: ON HOLD | COMMUNITY

## 2024-04-05 NOTE — PATIENT INSTRUCTIONS
I think you may benefit from starting on a fiber powder to help add some bulk, form, and regularity to your stool.  Continue to wean yourself off of the Imodium.  If you have recurrence of diarrhea, I would strongly recommend we complete some type of noninvasive investigation with blood work and stool studies.    You can utilize over-the-counter supplements like Gas-X or Beano.  You can try probiotic containing foods.  I think you can read about the low FODMAP diet, and generally this will list foods that tend to cause more gas, bloating, cramping, and loose stool because of the way they are broken down in our intestinal tract.

## 2024-04-05 NOTE — PROGRESS NOTES
[facility] - Outpatient Consultation  Candido Valladares Jr. 88 y.o. male MRN: [id number]  Encounter: [id number]    ASSESSMENT AND PLAN:      1. Change in bowel habits  2. Diarrhea, unspecified type  3. High grade urothelial carcinoma present on urine cytology (HCC)  4. Weight loss, abnormal    Pt with hx of high grade bladder cancer, started on Keytruda. He subsequently developed watery diarrhea, was treated with two course of steroid taper and most recently infliximab.   Oncologist most recently recommended holding immunotherapy given SE.     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   PET CT from 02/2024 with no colitis noted.   Currently symptoms have improved, though still with some soft stool and fecal incontinence.     This is a challenging situation in pt of advanced age with sig comorbidities.   We did review contributing etiol for his bowel habit change possible, if his diarrhea recurs, would recommend non-invasive investigation as below to ensure no other pathology contributing to symptoms.   Trial fiber supplement to help add bulk/form to stool.   We did review looking into low FODMAP diet, may help with gas/bloating.   Minimize use of anti-diarrheals at this time, particularly as he has not had stool studies checked, though if infectious etiol are negative would be okay to utilize anti-diarrheal/bile salt sequestrant in future for symptom control.   Encourage small frequent meals, high protein high calorie snacking.    During our discussion today, pt notes he would like to focus on symptom control to improve quality of life.   At present, I would not recommend a colonoscopy as I think this would be a challenging preparation/procedure for this pt.   We will continue to monitor pt's symptoms and troubleshoot symptoms. I will also confer with pt's Oncologist as well.     - Tissue transglutaminase, IgA; Future  - IgA; Future  -  Calprotectin,Fecal; Future  - Clostridium difficile toxin by PCR with EIA; Future  - Giardia/Cryptosporidium EIA; Future  - H. pylori antigen, stool; Future  - Stool Enteric Bacterial Panel by PCR; Future  - Ova and parasite examination; Future    5. Abnormal PET scan of mediastinum    PET CT from 02/2024 with uptake in esophagus which may be physiologic or inflammatory, uptake in small right distal paraesophageal lymph node. No sig UGI/reflux/dyspeptic symptoms. Continue to monitor at this time.     We will follow up in 3 months to reassess symptoms.   ______________________________________________________________________    HPI: Patient is a 88 y.o. male who presents today for a consultation regarding diarrhea. Pmhx sig for atrial fibrillation, high-grade urothelial carcinoma on Keytruda, SIADH, CKD3.    Pt is new to clinic. Here today with wife and daughter. They note over the past several months, he has had recurrent bouts of watery diarrhea, potentially related to Keytruda usage. He was treated with steroids x 2 by Oncologist. Had infliximab at some point. At its most severe, stool output was consistent with watery diarrhea, difficult to control, including nocturnal BM and incontinence. No BRBPR or melena. Has used anti-diarrheals as well. Initially was having some painful gas production, back pain, though this has abated. Currently off immunotherapy.    At present, pt shares diarrhea has improved.   Now having early AM bowel movements, at times anywhere from 1-4 AM. Stool now consistency of applesauce. No sig abd pain or rectal pain. Still no bleeding. No steatorrhea. No fevers. Sometimes excess flatus.     In addition to Keytruda, has had several abx courses over past 6 months.   Has been trying to eat BRAT diet.   Feels appetite is good, though weight loss of 30 lbs over past 6 months.   No nausea, emesis, dysphagia, odynophagia, or heartburn.    Has never had a colonoscopy.     02/2024:  PET CT: Overall  findings suggest partial response to therapy. Slightly smaller overall abnormal activity at the level of the inferior bladder and prostate as noted above suggesting partial therapy response. No significant FDG uptake at the previously noted small left common iliac chain lymph node. Resolution of previously seen right-sided hydroureteronephrosis. Chronic left-sided hydroureteronephrosis is stable. Mild linear radiotracer uptake noted in the esophagus may be physiologic or inflammatory. Now mild FDG uptake in a small right distal paraesophageal lymph node, nonspecific may be reactive. Otherwise no findings for hypermetabolic metastasis.  04/2024: , BUN 23, creatinine 0.93, AST 14, ALT 10, ALP 69, albumin 2.4, T. bili 0.53    Endoscopic history:   EGD: none   Colon: none     Review of Systems   Constitutional:  Negative for fever.   Gastrointestinal:  Positive for abdominal pain and diarrhea. Negative for constipation, nausea and vomiting.   Genitourinary:  Negative for dysuria, frequency and hematuria.   Musculoskeletal:  Positive for myalgias. Negative for arthralgias.   Neurological:  Negative for headaches.   Otherwise Per HPI    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)      Past Surgical History:   Procedure Laterality Date    APPENDECTOMY      [facility] TRURL ELECTROSURG RESCJ PROSTATE BLEED COMPLETE N/A 9/11/2023    Procedure: CYSTOSCOPY; TRANSURETHRAL RESECTION OF PROSTATE (TURP);  Surgeon: Franklyn Joseph MD;  Location: [facility];  Service: Urology     Social History   Social History     Substance and Sexual Activity   Alcohol Use Not Currently     Social History     Substance and Sexual Activity   Drug Use Not Currently     Social History     Tobacco Use   Smoking Status Never    Passive exposure:  "Never   Smokeless Tobacco Never     Family History   Problem Relation Age of Onset    Hypertension Mother        Meds/Allergies       Current Outpatient Medications:     aspirin 81 mg chewable tablet    ferrous sulfate 325 (65 Fe) mg tablet    finasteride (PROPECIA) 1 MG tablet    folic acid (FOLVITE) 1 mg tablet    folic acid (FOLVITE) 1 mg tablet    metoprolol succinate (TOPROL-XL) 100 mg 24 hr tablet    pantoprazole (PROTONIX) 40 mg tablet    vitamin B-12 (VITAMIN B-12) 1,000 mcg tablet    furosemide (LASIX) 20 mg tablet    predniSONE 10 mg tablet    predniSONE 10 mg tablet    predniSONE 10 mg tablet    senna-docusate sodium (SENOKOT S) 8.6-50 mg per tablet    tamsulosin (Flomax) 0.4 mg    No Known Allergies    Objective     Blood pressure 102/70, pulse 100, temperature (!) 97.3 °F (36.3 °C), height 6' 1\" (1.854 m), SpO2 99%. Body mass index is 23.22 kg/m².    Physical Exam  Vitals and nursing note reviewed.   Constitutional:       General: He is not in acute distress.     Appearance: He is well-developed.      Comments: Chronically ill elderly male   HENT:      Head: Normocephalic and atraumatic.   Eyes:      Conjunctiva/sclera: Conjunctivae normal.   Cardiovascular:      Rate and Rhythm: Tachycardia present.   Pulmonary:      Effort: Pulmonary effort is normal. No respiratory distress.   Abdominal:      General: There is no distension.      Palpations: Abdomen is soft.      Tenderness: There is no abdominal tenderness. There is no guarding or rebound.   Skin:     General: Skin is warm and dry.      Coloration: Skin is not jaundiced.   Neurological:      General: No focal deficit present.      Mental Status: He is alert and oriented to person, place, and time.   Psychiatric:         Mood and Affect: Mood normal.         Behavior: Behavior normal.          Lab Results:   No visits with results within 1 Day(s) from this visit.   Latest known visit with results is:   Hospital Outpatient Visit on 04/04/2024 "   Component Date Value    Sodium 04/04/2024 130 (L)     Potassium 04/04/2024 3.8     Chloride 04/04/2024 101     CO2 04/04/2024 19 (L)     ANION GAP 04/04/2024 10     BUN 04/04/2024 23     Creatinine 04/04/2024 0.93     Glucose 04/04/2024 108     Calcium 04/04/2024 7.7 (L)     Corrected Calcium 04/04/2024 9.0     AST 04/04/2024 14     ALT 04/04/2024 10     Alkaline Phosphatase 04/04/2024 69     Total Protein 04/04/2024 5.1 (L)     Albumin 04/04/2024 2.4 (L)     Total Bilirubin 04/04/2024 0.53     eGFR 04/04/2024 73        Radiology Results:   No results found.    Sarah Horne PA-C    **Please note:  Dictation voice to text software may have been used in the creation of this record.  Occasional wrong word or “sound alike” substitutions may have occurred due to the inherent limitations of voice recognition software.  Read the chart carefully and recognize, using context, where substitutions have occurred.**

## 2024-04-08 ENCOUNTER — TELEPHONE (OUTPATIENT)
Age: 89
End: 2024-04-08

## 2024-04-08 ENCOUNTER — TELEPHONE (OUTPATIENT)
Dept: HEMATOLOGY ONCOLOGY | Facility: CLINIC | Age: 89
End: 2024-04-08

## 2024-04-08 NOTE — TELEPHONE ENCOUNTER
I attempted to call patient's daughter to discuss urology's concern regarding a potential fistula?  I was hoping for more information regarding this.  I am not certain as to whether the urologist is concerned about a colovesical fistula or other?  I wanted to know if the patient was experiencing any stool particles (fecaluria) in the urine or air in his urine?

## 2024-04-08 NOTE — TELEPHONE ENCOUNTER
Lucila Seo, it's Solange Dwell, Candido Shaikh wife. We haven't heard anything about another hydration infusion during this week. Would you be kind enough to let us know if there would be one scheduled so we know how to plan? The number is [phone]. Thanks, amarjit Morales.    Returned call to patient/spouse.  Per patient, diarrhea has now stopped.  Patient feels he does not need hydration at this time.  States he is drinking his fluid requirements at this time and has adjusted his diet per GI recommendations.  Patient will reach out if he feels hydration is needed

## 2024-04-08 NOTE — TELEPHONE ENCOUNTER
Pt. Urologist told pt. he may have a fistula and pt. Daughter wanted to make GUME Smith aware, if you need to discuss this further please call Daughter Indira [phone]

## 2024-04-08 NOTE — TELEPHONE ENCOUNTER
"Pt. Daughter called back, pt. Has a catheter and daughter is unsure if he has stool or air in urine, nothing noticed in bag, pt. Will smell like urine at times and has \"wet\" gas sometimes which is brown in color, daughter unsure what type of fistula he has, pt. Daughter only ever noticed sediment in bag, pt. Apologizes for the missed call, her phone never rang but got an alert of a voicemail, pt. Daughter just wanted to make you aware because spouse wanted you to know, pt. Spouse helps with cath care, no home health  nurses in home, pt. Stopped cancer treatment due to having such terrible side effects daughter is not sure what else they can do for the patient and just wanted to make you aware, pt. Daughter asked to reach out to Dr. Joseph, urologist  if needed   "

## 2024-04-11 DIAGNOSIS — E53.8 FOLIC ACID DEFICIENCY: ICD-10-CM

## 2024-04-11 RX ORDER — FOLIC ACID 1 MG/1
1000 TABLET ORAL DAILY
Qty: 30 TABLET | Refills: 0 | Status: ON HOLD | OUTPATIENT
Start: 2024-04-11

## 2024-04-11 NOTE — TELEPHONE ENCOUNTER
I spoke to patient's daughter.  Patient was having some pain with Haley change as well as saturation of his brief and uncertainty as to whether this could be coming from urinary or rectal source.  His urologist reportedly mentioned a potential fistula.  We did review if there is concern for colovesical fistula, question as to whether patient could have fecaluria or pneumaturia, recurrent UTI, urosepsis, pt's daughter does not feel these are occurring.     Urology mentioned a procedure to potentially investigate into a fistula, they will have a follow-up appointment with him in 2 weeks to discuss further.  I did review we could potentially consider a CT scan, though I would avoid IV contrast given his CKD.  Daughter is going to wait for urology visit and contact me after that time.    Patient's diarrhea and stool output has improved with dietary modifications recommended at office visit.

## 2024-04-13 ENCOUNTER — APPOINTMENT (EMERGENCY)
Dept: RADIOLOGY | Facility: HOSPITAL | Age: 89
DRG: 981 | End: 2024-04-13
Payer: MEDICARE

## 2024-04-13 ENCOUNTER — ANESTHESIA EVENT (EMERGENCY)
Dept: PERIOP | Facility: HOSPITAL | Age: 89
DRG: 981 | End: 2024-04-13
Payer: MEDICARE

## 2024-04-13 ENCOUNTER — APPOINTMENT (EMERGENCY)
Dept: CT IMAGING | Facility: HOSPITAL | Age: 89
End: 2024-04-13
Payer: MEDICARE

## 2024-04-13 ENCOUNTER — APPOINTMENT (EMERGENCY)
Dept: RADIOLOGY | Facility: HOSPITAL | Age: 89
End: 2024-04-13
Payer: MEDICARE

## 2024-04-13 ENCOUNTER — HOSPITAL ENCOUNTER (EMERGENCY)
Facility: HOSPITAL | Age: 89
End: 2024-04-13
Attending: EMERGENCY MEDICINE
Payer: MEDICARE

## 2024-04-13 ENCOUNTER — HOSPITAL ENCOUNTER (INPATIENT)
Facility: HOSPITAL | Age: 89
LOS: 5 days | Discharge: NURSING, REHAB OR LONG-TERM CARE | DRG: 981 | End: 2024-04-18
Attending: SURGERY | Admitting: SURGERY
Payer: MEDICARE

## 2024-04-13 ENCOUNTER — ANESTHESIA (EMERGENCY)
Dept: PERIOP | Facility: HOSPITAL | Age: 89
DRG: 981 | End: 2024-04-13
Payer: MEDICARE

## 2024-04-13 VITALS
HEIGHT: 73 IN | HEART RATE: 107 BPM | BODY MASS INDEX: 23.32 KG/M2 | RESPIRATION RATE: 20 BRPM | SYSTOLIC BLOOD PRESSURE: 115 MMHG | WEIGHT: 175.93 LBS | TEMPERATURE: 97.2 F | DIASTOLIC BLOOD PRESSURE: 79 MMHG | OXYGEN SATURATION: 96 %
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
## 2024-04-13 DIAGNOSIS — N13.39 OTHER HYDRONEPHROSIS: ICD-10-CM

## 2024-04-13 DIAGNOSIS — N13.30 HYDROURETERONEPHROSIS: ICD-10-CM

## 2024-04-13 DIAGNOSIS — N32.1 COLOVESICAL FISTULA: ICD-10-CM

## 2024-04-13 DIAGNOSIS — C67.9 MALIGNANT NEOPLASM OF URINARY BLADDER, UNSPECIFIED SITE (HCC): Primary | ICD-10-CM

## 2024-04-13 DIAGNOSIS — C67.9 MALIGNANT NEOPLASM OF URINARY BLADDER, UNSPECIFIED SITE (HCC): ICD-10-CM

## 2024-04-13 DIAGNOSIS — C68.9 HIGH GRADE UROTHELIAL CARCINOMA PRESENT ON URINE CYTOLOGY (HCC): Primary | ICD-10-CM

## 2024-04-13 DIAGNOSIS — C61 PROSTATE CA (HCC): Chronic | ICD-10-CM

## 2024-04-13 LAB
2HR DELTA HS TROPONIN: 1 NG/L
4HR DELTA HS TROPONIN: 2 NG/L
ABO GROUP BLD: NORMAL
ALBUMIN SERPL BCP-MCNC: 2.3 G/DL (ref 3.5–5)
ALP SERPL-CCNC: 81 U/L (ref 34–104)
ALT SERPL W P-5'-P-CCNC: 12 U/L (ref 7–52)
ANION GAP SERPL CALCULATED.3IONS-SCNC: 10 MMOL/L (ref 4–13)
APTT PPP: 35 SECONDS (ref 23–37)
AST SERPL W P-5'-P-CCNC: 24 U/L (ref 13–39)
ATRIAL RATE: 141 BPM
BASOPHILS # BLD AUTO: 0.09 THOUSANDS/ÂΜL (ref 0–0.1)
BASOPHILS NFR BLD AUTO: 1 % (ref 0–1)
BILIRUB SERPL-MCNC: 0.58 MG/DL (ref 0.2–1)
BLD GP AB SCN SERPL QL: NEGATIVE
BNP SERPL-MCNC: 167 PG/ML (ref 0–100)
BUN SERPL-MCNC: 27 MG/DL (ref 5–25)
CALCIUM ALBUM COR SERPL-MCNC: 9.6 MG/DL (ref 8.3–10.1)
CALCIUM SERPL-MCNC: 8.2 MG/DL (ref 8.4–10.2)
CARDIAC TROPONIN I PNL SERPL HS: 13 NG/L
CARDIAC TROPONIN I PNL SERPL HS: 14 NG/L
CARDIAC TROPONIN I PNL SERPL HS: 15 NG/L
CHLORIDE SERPL-SCNC: 98 MMOL/L (ref 96–108)
CK SERPL-CCNC: 86 U/L (ref 39–308)
CO2 SERPL-SCNC: 22 MMOL/L (ref 21–32)
CREAT SERPL-MCNC: 1.11 MG/DL (ref 0.6–1.3)
EOSINOPHIL # BLD AUTO: 0.06 THOUSAND/ÂΜL (ref 0–0.61)
EOSINOPHIL NFR BLD AUTO: 0 % (ref 0–6)
ERYTHROCYTE [DISTWIDTH] IN BLOOD BY AUTOMATED COUNT: 15.6 % (ref 11.6–15.1)
FLUAV RNA RESP QL NAA+PROBE: NEGATIVE
FLUBV RNA RESP QL NAA+PROBE: NEGATIVE
GFR SERPL CREATININE-BSD FRML MDRD: 59 ML/MIN/1.73SQ M
GLUCOSE SERPL-MCNC: 157 MG/DL (ref 65–140)
GLUCOSE SERPL-MCNC: 92 MG/DL (ref 65–140)
HCT VFR BLD AUTO: 31 % (ref 36.5–49.3)
HGB BLD-MCNC: 9.7 G/DL (ref 12–17)
IMM GRANULOCYTES # BLD AUTO: 0.36 THOUSAND/UL (ref 0–0.2)
IMM GRANULOCYTES NFR BLD AUTO: 2 % (ref 0–2)
INR PPP: 1.15 (ref 0.84–1.19)
LACTATE SERPL-SCNC: 1 MMOL/L (ref 0.5–2)
LACTATE SERPL-SCNC: 4 MMOL/L (ref 0.5–2)
LYMPHOCYTES # BLD AUTO: 0.78 THOUSANDS/ÂΜL (ref 0.6–4.47)
LYMPHOCYTES NFR BLD AUTO: 4 % (ref 14–44)
MCH RBC QN AUTO: 29.7 PG (ref 26.8–34.3)
MCHC RBC AUTO-ENTMCNC: 31.3 G/DL (ref 31.4–37.4)
MCV RBC AUTO: 95 FL (ref 82–98)
MONOCYTES # BLD AUTO: 1.03 THOUSAND/ÂΜL (ref 0.17–1.22)
MONOCYTES NFR BLD AUTO: 5 % (ref 4–12)
NEUTROPHILS # BLD AUTO: 16.69 THOUSANDS/ÂΜL (ref 1.85–7.62)
NEUTS SEG NFR BLD AUTO: 88 % (ref 43–75)
NRBC BLD AUTO-RTO: 0 /100 WBCS
PLATELET # BLD AUTO: 620 THOUSANDS/UL (ref 149–390)
PMV BLD AUTO: 8.2 FL (ref 8.9–12.7)
POTASSIUM SERPL-SCNC: 4.5 MMOL/L (ref 3.5–5.3)
PROCALCITONIN SERPL-MCNC: 0.49 NG/ML
PROT SERPL-MCNC: 5.4 G/DL (ref 6.4–8.4)
PROTHROMBIN TIME: 14.9 SECONDS (ref 11.6–14.5)
QRS AXIS: 21 DEGREES
QRSD INTERVAL: 92 MS
QT INTERVAL: 320 MS
QTC INTERVAL: 442 MS
RBC # BLD AUTO: 3.27 MILLION/UL (ref 3.88–5.62)
RH BLD: POSITIVE
RSV RNA RESP QL NAA+PROBE: NEGATIVE
SARS-COV-2 RNA RESP QL NAA+PROBE: NEGATIVE
SODIUM SERPL-SCNC: 130 MMOL/L (ref 135–147)
SPECIMEN EXPIRATION DATE: NORMAL
T WAVE AXIS: 77 DEGREES
VENTRICULAR RATE: 115 BPM
WBC # BLD AUTO: 19.01 THOUSAND/UL (ref 4.31–10.16)

## 2024-04-13 PROCEDURE — 71045 X-RAY EXAM CHEST 1 VIEW: CPT

## 2024-04-13 PROCEDURE — 85730 THROMBOPLASTIN TIME PARTIAL: CPT

## 2024-04-13 PROCEDURE — 71275 CT ANGIOGRAPHY CHEST: CPT

## 2024-04-13 PROCEDURE — 72125 CT NECK SPINE W/O DYE: CPT

## 2024-04-13 PROCEDURE — 0T9330Z DRAINAGE OF RIGHT KIDNEY PELVIS WITH DRAINAGE DEVICE, PERCUTANEOUS APPROACH: ICD-10-PCS | Performed by: RADIOLOGY

## 2024-04-13 PROCEDURE — 83605 ASSAY OF LACTIC ACID: CPT

## 2024-04-13 PROCEDURE — 44320 COLOSTOMY: CPT | Performed by: SURGERY

## 2024-04-13 PROCEDURE — 99285 EMERGENCY DEPT VISIT HI MDM: CPT

## 2024-04-13 PROCEDURE — 96365 THER/PROPH/DIAG IV INF INIT: CPT

## 2024-04-13 PROCEDURE — 82550 ASSAY OF CK (CPK): CPT

## 2024-04-13 PROCEDURE — 86901 BLOOD TYPING SEROLOGIC RH(D): CPT

## 2024-04-13 PROCEDURE — NC001 PR NO CHARGE: Performed by: RADIOLOGY

## 2024-04-13 PROCEDURE — 50432 PLMT NEPHROSTOMY CATHETER: CPT | Performed by: RADIOLOGY

## 2024-04-13 PROCEDURE — 0T9430Z DRAINAGE OF LEFT KIDNEY PELVIS WITH DRAINAGE DEVICE, PERCUTANEOUS APPROACH: ICD-10-PCS | Performed by: RADIOLOGY

## 2024-04-13 PROCEDURE — 85025 COMPLETE CBC W/AUTO DIFF WBC: CPT

## 2024-04-13 PROCEDURE — 80053 COMPREHEN METABOLIC PANEL: CPT

## 2024-04-13 PROCEDURE — 84484 ASSAY OF TROPONIN QUANT: CPT

## 2024-04-13 PROCEDURE — 87040 BLOOD CULTURE FOR BACTERIA: CPT

## 2024-04-13 PROCEDURE — 36415 COLL VENOUS BLD VENIPUNCTURE: CPT

## 2024-04-13 PROCEDURE — 96361 HYDRATE IV INFUSION ADD-ON: CPT

## 2024-04-13 PROCEDURE — C1729 CATH, DRAINAGE: HCPCS

## 2024-04-13 PROCEDURE — 83880 ASSAY OF NATRIURETIC PEPTIDE: CPT

## 2024-04-13 PROCEDURE — C1729 CATH, DRAINAGE: HCPCS | Performed by: SURGERY

## 2024-04-13 PROCEDURE — 76937 US GUIDE VASCULAR ACCESS: CPT

## 2024-04-13 PROCEDURE — C1894 INTRO/SHEATH, NON-LASER: HCPCS

## 2024-04-13 PROCEDURE — 0D1L0Z4 BYPASS TRANSVERSE COLON TO CUTANEOUS, OPEN APPROACH: ICD-10-PCS | Performed by: SURGERY

## 2024-04-13 PROCEDURE — 74177 CT ABD & PELVIS W/CONTRAST: CPT

## 2024-04-13 PROCEDURE — 96367 TX/PROPH/DG ADDL SEQ IV INF: CPT

## 2024-04-13 PROCEDURE — 86900 BLOOD TYPING SEROLOGIC ABO: CPT

## 2024-04-13 PROCEDURE — 87086 URINE CULTURE/COLONY COUNT: CPT | Performed by: SURGERY

## 2024-04-13 PROCEDURE — 82948 REAGENT STRIP/BLOOD GLUCOSE: CPT

## 2024-04-13 PROCEDURE — 99223 1ST HOSP IP/OBS HIGH 75: CPT | Performed by: SURGERY

## 2024-04-13 PROCEDURE — 0241U HB NFCT DS VIR RESP RNA 4 TRGT: CPT

## 2024-04-13 PROCEDURE — 70450 CT HEAD/BRAIN W/O DYE: CPT

## 2024-04-13 PROCEDURE — 85610 PROTHROMBIN TIME: CPT

## 2024-04-13 PROCEDURE — 93005 ELECTROCARDIOGRAM TRACING: CPT

## 2024-04-13 PROCEDURE — 84145 PROCALCITONIN (PCT): CPT

## 2024-04-13 PROCEDURE — 86850 RBC ANTIBODY SCREEN: CPT

## 2024-04-13 PROCEDURE — 99283 EMERGENCY DEPT VISIT LOW MDM: CPT

## 2024-04-13 RX ORDER — METOPROLOL TARTRATE 50 MG/1
50 TABLET, FILM COATED ORAL EVERY 12 HOURS SCHEDULED
Status: DISCONTINUED | OUTPATIENT
Start: 2024-04-13 | End: 2024-04-18 | Stop reason: HOSPADM

## 2024-04-13 RX ORDER — SODIUM CHLORIDE 9 MG/ML
10 INJECTION, SOLUTION INTRAMUSCULAR; INTRAVENOUS; SUBCUTANEOUS DAILY
Qty: 300 ML | Refills: 3 | Status: SHIPPED | OUTPATIENT
Start: 2024-04-13 | End: 2024-04-17

## 2024-04-13 RX ORDER — PANTOPRAZOLE SODIUM 40 MG/1
40 TABLET, DELAYED RELEASE ORAL DAILY
Status: DISCONTINUED | OUTPATIENT
Start: 2024-04-14 | End: 2024-04-18 | Stop reason: HOSPADM

## 2024-04-13 RX ORDER — SODIUM CHLORIDE, SODIUM LACTATE, POTASSIUM CHLORIDE, CALCIUM CHLORIDE 600; 310; 30; 20 MG/100ML; MG/100ML; MG/100ML; MG/100ML
INJECTION, SOLUTION INTRAVENOUS CONTINUOUS PRN
Status: DISCONTINUED | OUTPATIENT
Start: 2024-04-13 | End: 2024-04-13

## 2024-04-13 RX ORDER — HYDROMORPHONE HCL/PF 1 MG/ML
0.2 SYRINGE (ML) INJECTION
Status: DISCONTINUED | OUTPATIENT
Start: 2024-04-13 | End: 2024-04-18 | Stop reason: HOSPADM

## 2024-04-13 RX ORDER — ONDANSETRON 2 MG/ML
INJECTION INTRAMUSCULAR; INTRAVENOUS AS NEEDED
Status: DISCONTINUED | OUTPATIENT
Start: 2024-04-13 | End: 2024-04-13

## 2024-04-13 RX ORDER — ONDANSETRON 2 MG/ML
4 INJECTION INTRAMUSCULAR; INTRAVENOUS EVERY 6 HOURS PRN
Status: DISCONTINUED | OUTPATIENT
Start: 2024-04-13 | End: 2024-04-18 | Stop reason: HOSPADM

## 2024-04-13 RX ORDER — OXYCODONE HYDROCHLORIDE 5 MG/1
5 TABLET ORAL EVERY 6 HOURS PRN
Status: DISCONTINUED | OUTPATIENT
Start: 2024-04-13 | End: 2024-04-18 | Stop reason: HOSPADM

## 2024-04-13 RX ORDER — METRONIDAZOLE 500 MG/100ML
500 INJECTION, SOLUTION INTRAVENOUS EVERY 8 HOURS
Status: DISCONTINUED | OUTPATIENT
Start: 2024-04-13 | End: 2024-04-18 | Stop reason: HOSPADM

## 2024-04-13 RX ORDER — PROPOFOL 10 MG/ML
INJECTION, EMULSION INTRAVENOUS AS NEEDED
Status: DISCONTINUED | OUTPATIENT
Start: 2024-04-13 | End: 2024-04-13

## 2024-04-13 RX ORDER — ACETAMINOPHEN 325 MG/1
650 TABLET ORAL EVERY 6 HOURS PRN
Status: DISCONTINUED | OUTPATIENT
Start: 2024-04-13 | End: 2024-04-18 | Stop reason: HOSPADM

## 2024-04-13 RX ORDER — HYDROMORPHONE HCL/PF 1 MG/ML
0.5 SYRINGE (ML) INJECTION
Status: DISCONTINUED | OUTPATIENT
Start: 2024-04-13 | End: 2024-04-13

## 2024-04-13 RX ORDER — OXYCODONE HYDROCHLORIDE 5 MG/1
2.5 TABLET ORAL EVERY 6 HOURS PRN
Status: DISCONTINUED | OUTPATIENT
Start: 2024-04-13 | End: 2024-04-18 | Stop reason: HOSPADM

## 2024-04-13 RX ORDER — HEPARIN SODIUM 5000 [USP'U]/ML
5000 INJECTION, SOLUTION INTRAVENOUS; SUBCUTANEOUS EVERY 8 HOURS SCHEDULED
Status: DISCONTINUED | OUTPATIENT
Start: 2024-04-13 | End: 2024-04-18 | Stop reason: HOSPADM

## 2024-04-13 RX ORDER — ONDANSETRON 2 MG/ML
4 INJECTION INTRAMUSCULAR; INTRAVENOUS ONCE AS NEEDED
Status: DISCONTINUED | OUTPATIENT
Start: 2024-04-13 | End: 2024-04-13 | Stop reason: HOSPADM

## 2024-04-13 RX ORDER — CEFTRIAXONE 2 G/50ML
2000 INJECTION, SOLUTION INTRAVENOUS ONCE
Status: COMPLETED | OUTPATIENT
Start: 2024-04-13 | End: 2024-04-13

## 2024-04-13 RX ORDER — FENTANYL CITRATE 50 UG/ML
INJECTION, SOLUTION INTRAMUSCULAR; INTRAVENOUS AS NEEDED
Status: DISCONTINUED | OUTPATIENT
Start: 2024-04-13 | End: 2024-04-13

## 2024-04-13 RX ORDER — SODIUM CHLORIDE, SODIUM LACTATE, POTASSIUM CHLORIDE, CALCIUM CHLORIDE 600; 310; 30; 20 MG/100ML; MG/100ML; MG/100ML; MG/100ML
100 INJECTION, SOLUTION INTRAVENOUS CONTINUOUS
Status: DISCONTINUED | OUTPATIENT
Start: 2024-04-13 | End: 2024-04-15

## 2024-04-13 RX ORDER — FENTANYL CITRATE/PF 50 MCG/ML
50 SYRINGE (ML) INJECTION
Status: DISCONTINUED | OUTPATIENT
Start: 2024-04-13 | End: 2024-04-13 | Stop reason: HOSPADM

## 2024-04-13 RX ORDER — ROCURONIUM BROMIDE 10 MG/ML
INJECTION, SOLUTION INTRAVENOUS AS NEEDED
Status: DISCONTINUED | OUTPATIENT
Start: 2024-04-13 | End: 2024-04-13

## 2024-04-13 RX ORDER — LIDOCAINE HYDROCHLORIDE 10 MG/ML
INJECTION, SOLUTION EPIDURAL; INFILTRATION; INTRACAUDAL; PERINEURAL AS NEEDED
Status: DISCONTINUED | OUTPATIENT
Start: 2024-04-13 | End: 2024-04-13 | Stop reason: HOSPADM

## 2024-04-13 RX ORDER — METOPROLOL TARTRATE 1 MG/ML
INJECTION, SOLUTION INTRAVENOUS AS NEEDED
Status: DISCONTINUED | OUTPATIENT
Start: 2024-04-13 | End: 2024-04-13

## 2024-04-13 RX ORDER — HYDROMORPHONE HCL/PF 1 MG/ML
0.5 SYRINGE (ML) INJECTION
Status: DISCONTINUED | OUTPATIENT
Start: 2024-04-13 | End: 2024-04-13 | Stop reason: HOSPADM

## 2024-04-13 RX ORDER — LIDOCAINE HYDROCHLORIDE 10 MG/ML
INJECTION, SOLUTION EPIDURAL; INFILTRATION; INTRACAUDAL; PERINEURAL AS NEEDED
Status: DISCONTINUED | OUTPATIENT
Start: 2024-04-13 | End: 2024-04-13

## 2024-04-13 RX ORDER — METRONIDAZOLE 500 MG/100ML
500 INJECTION, SOLUTION INTRAVENOUS EVERY 8 HOURS
Status: DISCONTINUED | OUTPATIENT
Start: 2024-04-13 | End: 2024-04-13 | Stop reason: HOSPADM

## 2024-04-13 RX ADMIN — FENTANYL CITRATE 25 MCG: 50 INJECTION INTRAMUSCULAR; INTRAVENOUS at 21:07

## 2024-04-13 RX ADMIN — SODIUM CHLORIDE 1000 ML: 0.9 INJECTION, SOLUTION INTRAVENOUS at 10:19

## 2024-04-13 RX ADMIN — FENTANYL CITRATE 50 MCG: 50 INJECTION INTRAMUSCULAR; INTRAVENOUS at 19:20

## 2024-04-13 RX ADMIN — METRONIDAZOLE 500 MG: 500 INJECTION, SOLUTION INTRAVENOUS at 18:27

## 2024-04-13 RX ADMIN — METOROPROLOL TARTRATE 2 MG: 5 INJECTION, SOLUTION INTRAVENOUS at 21:26

## 2024-04-13 RX ADMIN — SODIUM CHLORIDE, SODIUM LACTATE, POTASSIUM CHLORIDE, AND CALCIUM CHLORIDE: .6; .31; .03; .02 INJECTION, SOLUTION INTRAVENOUS at 19:13

## 2024-04-13 RX ADMIN — CEFTRIAXONE 2000 MG: 2 INJECTION, SOLUTION INTRAVENOUS at 10:29

## 2024-04-13 RX ADMIN — LIDOCAINE HYDROCHLORIDE 50 MG: 10 INJECTION, SOLUTION EPIDURAL; INFILTRATION; INTRACAUDAL; PERINEURAL at 19:20

## 2024-04-13 RX ADMIN — SODIUM CHLORIDE, SODIUM LACTATE, POTASSIUM CHLORIDE, AND CALCIUM CHLORIDE 100 ML/HR: .6; .31; .03; .02 INJECTION, SOLUTION INTRAVENOUS at 23:32

## 2024-04-13 RX ADMIN — ROCURONIUM BROMIDE 50 MG: 10 INJECTION, SOLUTION INTRAVENOUS at 20:09

## 2024-04-13 RX ADMIN — IOHEXOL 100 ML: 350 INJECTION, SOLUTION INTRAVENOUS at 09:33

## 2024-04-13 RX ADMIN — ROCURONIUM BROMIDE 50 MG: 10 INJECTION, SOLUTION INTRAVENOUS at 19:20

## 2024-04-13 RX ADMIN — SUGAMMADEX 200 MG: 100 INJECTION, SOLUTION INTRAVENOUS at 21:48

## 2024-04-13 RX ADMIN — METRONIDAZOLE 500 MG: 500 INJECTION, SOLUTION INTRAVENOUS at 10:49

## 2024-04-13 RX ADMIN — SODIUM CHLORIDE 1000 ML: 0.9 INJECTION, SOLUTION INTRAVENOUS at 10:43

## 2024-04-13 RX ADMIN — PROPOFOL 110 MG: 10 INJECTION, EMULSION INTRAVENOUS at 19:20

## 2024-04-13 RX ADMIN — METOPROLOL TARTRATE 50 MG: 50 TABLET, FILM COATED ORAL at 23:32

## 2024-04-13 RX ADMIN — FENTANYL CITRATE 25 MCG: 50 INJECTION INTRAMUSCULAR; INTRAVENOUS at 20:52

## 2024-04-13 RX ADMIN — ONDANSETRON 4 MG: 2 INJECTION INTRAMUSCULAR; INTRAVENOUS at 21:55

## 2024-04-13 RX ADMIN — HEPARIN SODIUM 5000 UNITS: 5000 INJECTION INTRAVENOUS; SUBCUTANEOUS at 23:34

## 2024-04-13 RX ADMIN — SODIUM CHLORIDE 400 ML: 0.9 INJECTION, SOLUTION INTRAVENOUS at 10:43

## 2024-04-13 NOTE — EMTALA/ACUTE CARE TRANSFER
[facility] EMERGENCY DEPARTMENT  [address] [place] [address]  Dept: [phone]      EMTALA TRANSFER CONSENT    NAME Candido Valladares Jr.                                         1935                              MRN [id number]    I have been informed of my rights regarding examination, treatment, and transfer   by Dr. Lucho Long, DO    Benefits: Specialized equipment and/or services available at the receiving facility (Include comment)________________________ (surgery, and oncology)    Risks:        Transfer Request   I acknowledge that my medical condition has been evaluated and explained to me by the emergency department physician or other qualified medical person and/or my attending physician who has recommended and offered to me further medical examination and treatment. I understand the Hospital's obligation with respect to the treatment and stabilization of my emergency medical condition. I nevertheless request to be transferred. I release the Hospital, the doctor, and any other persons caring for me from all responsibility or liability for any injury or ill effects that may result from my transfer and agree to accept all responsibility for the consequences of my choice to transfer, rather than receive stabilizing treatment at the Hospital. I understand that because the transfer is my request, my insurance may not provide reimbursement for the services.  The Hospital will assist and direct me and my family in how to make arrangements for transfer, but the hospital is not liable for any fees charged by the transport service.  In spite of this understanding, I refuse to consent to further medical examination and treatment which has been offered to me, and request transfer to  . I authorize the performance of emergency medical procedures and treatments upon me in both transit and upon arrival at the receiving facility.  Additionally, I authorize the release of any  and all medical records to the receiving facility and request they be transported with me, if possible.    I authorize the performance of emergency medical procedures and treatments upon me in both transit and upon arrival at the receiving facility.  Additionally, I authorize the release of any and all medical records to the receiving facility and request they be transported with me, if possible.  I understand that the safest mode of transportation during a medical emergency is an ambulance and that the Hospital advocates the use of this mode of transport. Risks of traveling to the receiving facility by car, including absence of medical control, life sustaining equipment, such as oxygen, and medical personnel has been explained to me and I fully understand them.    (NISHA CORRECT BOX BELOW)  [  ]  I consent to the stated transfer and to be transported by ambulance/helicopter.  [  ]  I consent to the stated transfer, but refuse transportation by ambulance and accept full responsibility for my transportation by car.  I understand the risks of non-ambulance transfers and I exonerate the Hospital and its staff from any deterioration in my condition that results from this refusal.    X___________________________________________    DATE  24  TIME________  Signature of patient or legally responsible individual signing on patient behalf           RELATIONSHIP TO PATIENT_________________________          Provider Certification    NAME Candido Valladares JrRosa Isela                                         1935                              MRN [id number]    A medical screening exam was performed on the above named patient.  Based on the examination:    Condition Necessitating Transfer The primary encounter diagnosis was Malignant neoplasm of urinary bladder, unspecified site (HCC). Diagnoses of Hydroureteronephrosis and Fall, initial encounter were also pertinent to this visit.    Patient Condition: The patient has been  stabilized such that within reasonable medical probability, no material deterioration of the patient condition or the condition of the unborn child(devika) is likely to result from the transfer    Reason for Transfer: Level of Care needed not available at this facility, Patient/Family request, No bed available at level of patient's needs    Transfer Requirements: Facility     Space available and qualified personnel available for treatment as acknowledged by    Agreed to accept transfer and to provide appropriate medical treatment as acknowledged by          Appropriate medical records of the examination and treatment of the patient are provided at the time of transfer   STAFF INITIAL WHEN COMPLETED _______  Transfer will be performed by qualified personnel from    and appropriate transfer equipment as required, including the use of necessary and appropriate life support measures.    Provider Certification: I have examined the patient and explained the following risks and benefits of being transferred/refusing transfer to the patient/family:         Based on these reasonable risks and benefits to the patient and/or the unborn child(devika), and based upon the information available at the time of the patient’s examination, I certify that the medical benefits reasonably to be expected from the provision of appropriate medical treatments at another medical facility outweigh the increasing risks, if any, to the individual’s medical condition, and in the case of labor to the unborn child, from effecting the transfer.    X____________________________________________ DATE 04/13/24        TIME_______      ORIGINAL - SEND TO MEDICAL RECORDS   COPY - SEND WITH PATIENT DURING TRANSFER

## 2024-04-13 NOTE — ANESTHESIA PREPROCEDURE EVALUATION
Procedure:  COLOSTOMY LOOP (Abdomen)    Relevant Problems   CARDIO   (+) Essential hypertension   (+) New onset a-fib (HCC)      /RENAL   (+) Prostate CA (HCC)   (+) Stage 3a chronic kidney disease (HCC)      HEMATOLOGY   (+) Anemia   (+) Iron deficiency anemia, unspecified      Other   (+) High grade urothelial carcinoma present on urine cytology (HCC)        Recent labs personally reviewed:  Lab Results   Component Value Date    WBC 19.01 (H) 04/13/2024    HGB 9.7 (L) 04/13/2024     (H) 04/13/2024     Lab Results   Component Value Date    K 4.5 04/13/2024    BUN 27 (H) 04/13/2024    CREATININE 1.11 04/13/2024     Lab Results   Component Value Date    PTT 35 04/13/2024      Lab Results   Component Value Date    INR 1.15 04/13/2024       Lab Results   Component Value Date    HGBA1C 5.4 06/30/2023       Type and Screen:  O    TTE 2023  History    AFIB, HTN, EZIO     Interpretation Summary         Left Ventricle: Left ventricular cavity size is normal. Wall thickness is increased. There is mild concentric hypertrophy. The left ventricular ejection fraction is 65%. Systolic function is normal. Wall motion is normal. Diastolic function is normal.    Right Ventricle: Systolic function is mildly reduced. Abnormal tricuspid annular plane systolic excursion (TAPSE) < 1.7 cm.    Left Atrium: The atrium is dilated.     Physical Exam    Airway    Mallampati score: II  TM Distance: >3 FB  Neck ROM: full     Dental    upper dentures    Cardiovascular  Rhythm: irregular, Rate: normal    Pulmonary  Pulmonary exam normal     Other Findings        Anesthesia Plan  ASA Score- 3 Emergent    Anesthesia Type- general with ASA Monitors.         Additional Monitors:     Airway Plan: ETT.           Plan Factors-Exercise tolerance (METS): <4 METS.    Chart reviewed. EKG reviewed. Imaging results reviewed. Existing labs reviewed. Patient summary reviewed.    Patient is not a current smoker.              Induction-  intravenous.    Postoperative Plan-     Informed Consent- Anesthetic plan and risks discussed with patient.  I personally reviewed this patient with the CRNA. Discussed and agreed on the Anesthesia Plan with the CRNA..

## 2024-04-13 NOTE — H&P
H&P Exam - General Surgery   Candido Valladares Jr. 88 y.o. 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 Fecal material likely seen in bladder. Tumor obstructs b/l ureterovesical junction. Gas and fluid in a multiloculated collected to the L of the rectum consistent w/ tumor erosion into the tan-rectal/perivescial space.     Plan:  -OR for diverting loop colostomy  -NPO  -Rocephin/Flagyl  -Urology c/s  -IR c/s for b/l PCN placement, timing TBD      History of Present Illness     HPI:  Candido Valladares Jr. is a 88 y.o. male who presented at outside hospital with 3 days of progressive weakness and noting darker flecs of debris in chronic dominique bag. Pt has hx of urothelial cancer treated w/ TURP 9/2023 and keytruda, now w/ chronic dominique. Pt denies fever, chills or night sweats but does endorse fatigue and generalized malaise. Her reportedly could walk 15 flights of stairs a week prior but his fatigue has made this more challenging, reports x2 recent falls in past 3 days. He denies n/v or abd pain. He notes 2 month hx of watery loose diarrhea and fecal incontinence. He notes dark urine with increased debris. He was seen by urology at the outside hospital which recommend B/l PCN. He was transferred to [facility] for further evaluation and specialty services. Pt last meal was day prior, no ETOH use, no tobacco use hx. Has Hx of Afib not on AC, SIADH, stool incontinence, HTN, hx of appendectomy. Pt denies cardiac hx, takes lasix for symptomatic b/l pedal edema. Pt was given high dose steroid taper w/ prednisone as pt have watery diarrhea believed to by 2/2 infliximab therapy. Pt and family present at bedside  discussed procedure, options and associated risk and benefits of diversion. Pt is DNR/DNI but would hold this for OR procedure.     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)      Past Surgical History:   Procedure Laterality Date    APPENDECTOMY      CO TRURL ELECTROSURG RESCJ PROSTATE BLEED COMPLETE N/A 9/11/2023    Procedure: CYSTOSCOPY; TRANSURETHRAL RESECTION OF PROSTATE (TURP);  Surgeon: Franklyn Joseph MD;  Location: [facility];  Service: Urology     Social History   Social History     Substance and Sexual Activity   Alcohol Use Not Currently     Social History     Substance and Sexual Activity   Drug Use Not Currently     Social History     Tobacco Use   Smoking Status Never    Passive exposure: Never   Smokeless Tobacco Never     E-Cigarette/Vaping    E-Cigarette Use Never User      E-Cigarette/Vaping Substances    Nicotine No     THC No     CBD No     Flavoring No     Other No     Unknown No      Family History: non-contributory    Meds/Allergies   PTA meds:   Prior to Admission Medications   Prescriptions Last Dose Informant Patient Reported? Taking?   aspirin 81 mg chewable tablet  Self Yes No   Sig: Chew 81 mg daily   ferrous sulfate 325 (65 Fe) mg tablet  Self No No   Sig: Take 1 tablet (325 mg total) by mouth daily with breakfast Do not start before July 3, 2023.   finasteride (PROPECIA) 1 MG tablet  Self Yes No   Sig: Take 1 mg by mouth daily   folic acid (FOLVITE) 1 mg tablet  Self No No   Sig: Take 1 tablet (1 mg total) by mouth daily   folic acid (FOLVITE) 1 mg tablet   No No   Sig: take 1 tablet by mouth daily   furosemide (LASIX) 20 mg tablet  Self No No   Sig: Take 1 tablet (20 mg total) by mouth daily   Patient not taking:  Reported on 12/6/2023   metoprolol succinate (TOPROL-XL) 100 mg 24 hr tablet  Self No No   Sig: Take 1 tablet (100 mg total) by mouth daily   pantoprazole (PROTONIX) 40 mg tablet  Self No No   Sig: Take 1 tablet (40 mg total) by mouth daily   predniSONE 10 mg tablet  Self No No   Sig: TAKE SIX TABS (60mg) )FOR 7 DAYS THEN 4 TABS (40 mg)  FOR 7 DAYS THEN 2 TABS (20 mg)  FOR 7 DAYS THEN ONE TAB ( 10 MG) FOR 7 DAYS   Patient not taking: Reported on 4/5/2024   predniSONE 10 mg tablet  Self No No   Sig: Take 1 tablet (10 mg total) by mouth daily   Patient not taking: Reported on 4/5/2024   predniSONE 10 mg tablet  Self No No   Sig: Take 1 tablet (10 mg total) by mouth daily   Patient not taking: Reported on 4/5/2024   senna-docusate sodium (SENOKOT S) 8.6-50 mg per tablet  Self No No   Sig: Take 2 tablets by mouth daily at bedtime May hold for loose stools   Patient not taking: Reported on 4/5/2024   tamsulosin (Flomax) 0.4 mg  Self Yes No   Sig: Take 0.4 mg by mouth daily with dinner   Patient not taking: Reported on 4/5/2024   vitamin B-12 (VITAMIN B-12) 1,000 mcg tablet   Yes No   Sig: Take 500 mcg by mouth daily Takes  1200mg in total      Facility-Administered Medications: None     No Known Allergies    Objective   First Vitals:   Blood Pressure: 151/95 (04/13/24 1505)  Pulse: 98 (04/13/24 1505)  Temperature: (!) 97.3 °F (36.3 °C) (04/13/24 1530)  Temp Source: Oral (04/13/24 1530)  Respirations: 16 (04/13/24 1505)  SpO2: 95 % (04/13/24 1505)    Current Vitals:   Blood Pressure: 152/76 (04/13/24 1715)  Pulse: 105 (04/13/24 1715)  Temperature: (!) 97.3 °F (36.3 °C) (04/13/24 1530)  Temp Source: Oral (04/13/24 1530)  Respirations: 20 (04/13/24 1715)  SpO2: 96 % (04/13/24 1715)    No intake or output data in the 24 hours ending 04/13/24 1848    Invasive Devices       Peripheral Intravenous Line  Duration             Peripheral IV 04/13/24 Left;Proximal;Ventral (anterior) Forearm <1 day              Drain  Duration        "      Urethral Catheter Latex;Three way 24 Fr. 215 days                    Physical Exam  Constitutional:       General: He is not in acute distress.     Appearance: Normal appearance. He is not ill-appearing or toxic-appearing.   HENT:      Head: Normocephalic.      Nose: Nose normal.      Mouth/Throat:      Mouth: Mucous membranes are moist.   Eyes:      Extraocular Movements: Extraocular movements intact.      Pupils: Pupils are equal, round, and reactive to light.   Cardiovascular:      Rate and Rhythm: Normal rate.      Pulses: Normal pulses.   Pulmonary:      Effort: Pulmonary effort is normal.   Abdominal:      General: Abdomen is flat. There is no distension.      Palpations: Abdomen is soft.      Tenderness: There is no abdominal tenderness. There is no guarding or rebound.   Musculoskeletal:         General: Normal range of motion.      Right lower leg: Edema present.      Left lower leg: Edema present.   Skin:     General: Skin is warm.   Neurological:      General: No focal deficit present.      Mental Status: He is alert and oriented to person, place, and time.         Lab Results: I have personally reviewed pertinent lab results.  , CBC:   Lab Results   Component Value Date    WBC 19.01 (H) 04/13/2024    HGB 9.7 (L) 04/13/2024    HCT 31.0 (L) 04/13/2024    MCV 95 04/13/2024     (H) 04/13/2024    RBC 3.27 (L) 04/13/2024    MCH 29.7 04/13/2024    MCHC 31.3 (L) 04/13/2024    RDW 15.6 (H) 04/13/2024    MPV 8.2 (L) 04/13/2024    NRBC 0 04/13/2024   , Urinalysis: No results found for: \"COLORU\", \"CLARITYU\", \"SPECGRAV\", \"PHUR\", \"LEUKOCYTESUR\", \"NITRITE\", \"PROTEINUA\", \"GLUCOSEU\", \"KETONESU\", \"BILIRUBINUR\", \"BLOODU\"  Imaging: I have personally reviewed pertinent reports.   and I have personally reviewed pertinent films in PACS  EKG, Pathology, and Other Studies: I have personally reviewed pertinent reports.   and I have personally reviewed pertinent films in PACS    Code Status: Prior  Advance Directive " and Living Will: Yes    Power of :    POLST:      Counseling / Coordination of Care  Total floor / unit time spent today 25 minutes.  Greater than 50% of total time was spent with the patient and / or family counseling and / or coordination of care.

## 2024-04-13 NOTE — CONSULTS
Consultation - Urology   Candido Valladares Jr. 88 y.o. 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 Gomes of general surgery regarding options and risks for further management.  The patient has elected for diverting colostomy and likely bilateral nephrostomy tubes for maximal urinary and fecal diversion, as well as further medical oncology follow-up.  The patient and family have elected transfer to the [facility] for higher level of care for these subspecialist consultations.  The [facility] does not have inpatient oncology or have interventional radiology on the weekend.  We also discussed the option of pelvic exenteration, but decided that this was less likely due to his age and medical comorbidities.    History of Present Illness   Attending: Lucho Long DO  Reason for Consult / Principal Problem: Progressive bladder cancer with colovesical fistula and bilateral hydronephrosis  HPI: Candido Valladares Jr. is a 88 y.o. year old male who presents with progressive weakness and stool per Haley catheter.  CT scan shows progression from his prior CT scan, currently with mass involving the urinary bladder and prostate appears to have eroded through the anterior wall of the rectum.  The bladder mass obstructs the distal bilateral ureterovesical junctions.  There is bilateral hydroureteronephrosis down to the ureterovesical junction more severe on the left than the right.  There is now gas and fluid within a multiloculated collection on the left side of the rectum but posterior to the bladder extending out laterally towards the left along the obturator musculature and upwards along the left lateral pelvic sidewall  consistent with bladder tumor erosion into the perirectal/perivesical space.    The patient has a history of prostate cancer treated with radiation therapy in the past with no evidence of recurrence.  He also underwent TURP September 2023 for urinary retention with final pathology showing urothelial carcinoma.  He has since been treated by medical oncology including Keytruda for this pathology.  He also has been managed with indwelling Haley catheter which recently has been showing brownish flecks and today is more brown-colored suggesting urine mixed with feces.    Inpatient consult to Urology  Consult performed by: Franklyn Joseph MD  Consult ordered by: KIERA Aguero          Review of Systems   Gastrointestinal:  Negative for abdominal distention and abdominal pain.   Genitourinary:  Negative for flank pain and hematuria.       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)      Past Surgical History:   Procedure Laterality Date    APPENDECTOMY      WY TRURL ELECTROSURG RESCJ PROSTATE BLEED COMPLETE N/A 9/11/2023    Procedure: CYSTOSCOPY; TRANSURETHRAL RESECTION OF PROSTATE (TURP);  Surgeon: Franklyn Joseph MD;  Location: [facility];  Service: Urology     Social History   Social History     Substance and Sexual Activity   Alcohol Use Not Currently     @DRUGHX  E-Cigarette/Vaping    E-Cigarette Use Never User      E-Cigarette/Vaping Substances    Nicotine No     THC No     CBD No     Flavoring No     Other No     Unknown No      Social History     Tobacco Use   Smoking Status Never    Passive exposure: Never   Smokeless Tobacco Never     Family History: non-contributory    Meds/Allergies   current meds:   Current Facility-Administered Medications   Medication Dose Route Frequency    metroNIDAZOLE  "(FLAGYL) IVPB (premix) 500 mg 100 mL  500 mg Intravenous Q8H     No Known Allergies    Objective   Vitals: Blood pressure 120/65, pulse (!) 107, temperature (!) 97.2 °F (36.2 °C), temperature source Rectal, resp. rate 21, height 6' 1\" (1.854 m), weight 79.8 kg (175 lb 14.8 oz), SpO2 98%.    I/O last 24 hours:  In: 1150 [IV Piggyback:1150]  Out: -     Invasive Devices       Peripheral Intravenous Line  Duration             Peripheral IV 04/13/24 Left;Proximal;Ventral (anterior) Forearm <1 day              Drain  Duration             Urethral Catheter Latex;Three way 24 Fr. 215 days                    Physical Exam  Abdominal:      General: There is no distension.      Tenderness: There is no abdominal tenderness. There is no right CVA tenderness or left CVA tenderness.     Haley with brownish urine    Lab Results: CBC:   Lab Results   Component Value Date    WBC 19.01 (H) 04/13/2024    HGB 9.7 (L) 04/13/2024    HCT 31.0 (L) 04/13/2024    MCV 95 04/13/2024     (H) 04/13/2024    RBC 3.27 (L) 04/13/2024    MCH 29.7 04/13/2024    MCHC 31.3 (L) 04/13/2024    RDW 15.6 (H) 04/13/2024    MPV 8.2 (L) 04/13/2024    NRBC 0 04/13/2024     CMP:   Lab Results   Component Value Date    SODIUM 130 (L) 04/13/2024    CL 98 04/13/2024    CO2 22 04/13/2024    BUN 27 (H) 04/13/2024    CREATININE 1.11 04/13/2024    CALCIUM 8.2 (L) 04/13/2024    AST 24 04/13/2024    ALT 12 04/13/2024    ALKPHOS 81 04/13/2024    EGFR 59 04/13/2024     Urinalysis: No results found for: \"COLORU\", \"CLARITYU\", \"SPECGRAV\", \"PHUR\", \"LEUKOCYTESUR\", \"NITRITE\", \"PROTEINUA\", \"GLUCOSEU\", \"KETONESU\", \"BILIRUBINUR\", \"BLOODU\"  Imaging Studies: I have personally reviewed pertinent reports.    EKG, Pathology, and Other Studies: I have personally reviewed pertinent reports.    VTE Prophylaxis: Sequential compression device (Venodyne)     Code Status: Prior  Advance Directive and Living Will: Yes    Power of :    POLST:      Counseling / Coordination of " Care  Total floor / unit time spent today 60 minutes. Greater than 50% of total time was spent with the patient and / or family counseling and / or coordination of care. A description of the counseling / coordination of care: I have discussed the case with the emergency department as well as with Dr. Gomes of general surgery.

## 2024-04-13 NOTE — ED NOTES
Received a tiger txt from Lanette Nation, from the lab. She stated she could not do a urinalysis on the urine sample due to it containing stool. I informed the lab that it was not a contaminated sample, and was informed they cannot proceed with a urinalysis.   Provider Notified     Danielle Jackson RN  04/13/24 [id number]

## 2024-04-13 NOTE — ED PROVIDER NOTES
Emergency Department Trauma Note  Candido Valladares Jr. 88 y.o. male MRN: [id number]  Unit/Bed#: ED 20/ED 20 Encounter: [id number]      Trauma Alert: Trauma Acuity: Trauma Evaluation  Model of Arrival: Mode of Arrival: ALS via    Trauma Team: Current Providers  Attending Provider: Lucho Long DO  Registered Nurse: Danielle Jackson, RN  Nurse Practitioner: KIERA Aguero  Consultants:     None      History of Present Illness     Chief Complaint:   Chief Complaint   Patient presents with    Syncope     Pt reports sleeping on the floor overnight due to not being able to get off the floor.     HPI:  Candido Valladares Jr. is a 88 y.o. male who presents with fall.  Mechanism:Details of Incident: Unknown of LOC, fell last night on the bathroom floor, slept on the floor over night Injury Date: 04/13/24 Injury Time: 0800      Patient is an 89 yo M pmhx of actively being treated for bladder cancer arriving for evaluation of syncope, and weakness. Patient states he started having weakness a few days ago. Patient states last night he was going upstairs and felt weak causing him to go to his knee, and then the floor. Patient states he had to crawl to his bedroom, but his wife was unable to get him off the floor. Patient states his wife got him a blanket and a pillow and he slept there all night. Patient's family was called this morning and helped him up. Patient states he felt lightheaded and had an actual syncope this morning with family. Patient unsure if his family caught him. Patient on aspirin. Patient has history of afib. Patient reports does not remember passing out. Patient arriving in copious amount of lose stool per RN staff. Patient is malodorous. Patient's urine is dark colored. Patient states his facial appearance is at baseline.       Review of Systems   Constitutional:  Positive for fatigue.   HENT: Negative.     Eyes: Negative.    Respiratory: Negative.     Cardiovascular: Negative.     Gastrointestinal: Negative.    Endocrine: Negative.    Genitourinary: Negative.    Musculoskeletal: Negative.    Skin: Negative.    Allergic/Immunologic: Negative.    Neurological:  Positive for weakness.   Hematological: Negative.    Psychiatric/Behavioral: Negative.     All other systems reviewed and are negative.      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)        Family History   Problem Relation Age of Onset    Hypertension Mother      Past Surgical History:   Procedure Laterality Date    APPENDECTOMY      HI TRURL ELECTROSURG RESCJ PROSTATE BLEED COMPLETE N/A 9/11/2023    Procedure: CYSTOSCOPY; TRANSURETHRAL RESECTION OF PROSTATE (TURP);  Surgeon: Franklyn Joseph MD;  Location: [facility] OR;  Service: Urology     Social History     Tobacco Use    Smoking status: Never     Passive exposure: Never    Smokeless tobacco: Never   Vaping Use    Vaping status: Never Used   Substance Use Topics    Alcohol use: Not Currently    Drug use: Not Currently     E-Cigarette/Vaping    E-Cigarette Use Never User      E-Cigarette/Vaping Substances    Nicotine No     THC No     CBD No     Flavoring No     Other No     Unknown No        Family History: non-contributory    Meds/Allergies   Prior to Admission Medications   Prescriptions Last Dose Informant Patient Reported? Taking?   aspirin 81 mg chewable tablet  Self Yes No   Sig: Chew 81 mg daily   ferrous sulfate 325 (65 Fe) mg tablet  Self No No   Sig: Take 1  tablet (325 mg total) by mouth daily with breakfast Do not start before July 3, 2023.   finasteride (PROPECIA) 1 MG tablet  Self Yes No   Sig: Take 1 mg by mouth daily   folic acid (FOLVITE) 1 mg tablet  Self No No   Sig: Take 1 tablet (1 mg total) by mouth daily   folic acid (FOLVITE) 1 mg tablet   No No   Sig: take 1 tablet by mouth daily   furosemide (LASIX) 20 mg tablet  Self No No   Sig: Take 1 tablet (20 mg total) by mouth daily   Patient not taking: Reported on 12/6/2023   metoprolol succinate (TOPROL-XL) 100 mg 24 hr tablet  Self No No   Sig: Take 1 tablet (100 mg total) by mouth daily   pantoprazole (PROTONIX) 40 mg tablet  Self No No   Sig: Take 1 tablet (40 mg total) by mouth daily   predniSONE 10 mg tablet  Self No No   Sig: TAKE SIX TABS (60mg) )FOR 7 DAYS THEN 4 TABS (40 mg)  FOR 7 DAYS THEN 2 TABS (20 mg)  FOR 7 DAYS THEN ONE TAB ( 10 MG) FOR 7 DAYS   Patient not taking: Reported on 4/5/2024   predniSONE 10 mg tablet  Self No No   Sig: Take 1 tablet (10 mg total) by mouth daily   Patient not taking: Reported on 4/5/2024   predniSONE 10 mg tablet  Self No No   Sig: Take 1 tablet (10 mg total) by mouth daily   Patient not taking: Reported on 4/5/2024   senna-docusate sodium (SENOKOT S) 8.6-50 mg per tablet  Self No No   Sig: Take 2 tablets by mouth daily at bedtime May hold for loose stools   Patient not taking: Reported on 4/5/2024   tamsulosin (Flomax) 0.4 mg  Self Yes No   Sig: Take 0.4 mg by mouth daily with dinner   Patient not taking: Reported on 4/5/2024   vitamin B-12 (VITAMIN B-12) 1,000 mcg tablet   Yes No   Sig: Take 500 mcg by mouth daily Takes  1200mg in total      Facility-Administered Medications: None       No Known Allergies    PHYSICAL EXAM    PE limited by:     Objective   Vitals:   First set: Temperature: 97.6 °F (36.4 °C) (04/13/24 0904)  Pulse: (!) 108 (04/13/24 0904)  Respirations: 20 (04/13/24 0904)  Blood Pressure: (!) 168/103 (04/13/24 0904)  SpO2: 99 % (04/13/24  0904)    Primary Survey:   (A) Airway: patent  (B) Breathing: lungs CTA b/l  (C) Circulation: Pulses:   normal  (D) Disabliity:  GCS Total:  15  (E) Expose:  Completed          Physical Exam  Vitals and nursing note reviewed.   Constitutional:       Appearance: Normal appearance. He is normal weight.   HENT:      Head: Normocephalic.      Right Ear: External ear normal.      Left Ear: External ear normal.      Nose: Nose normal.      Mouth/Throat:      Mouth: Mucous membranes are moist.   Eyes:      Extraocular Movements: Extraocular movements intact.      Conjunctiva/sclera: Conjunctivae normal.      Pupils: Pupils are equal, round, and reactive to light.   Cardiovascular:      Rate and Rhythm: Tachycardia present. Rhythm irregular.      Pulses: Normal pulses.      Heart sounds: Normal heart sounds.   Pulmonary:      Effort: Pulmonary effort is normal. No respiratory distress.      Breath sounds: Normal breath sounds. No stridor. No wheezing, rhonchi or rales.   Chest:      Chest wall: No tenderness.   Abdominal:      General: Abdomen is flat. Bowel sounds are normal. There is no distension.      Palpations: Abdomen is soft. There is no mass.      Tenderness: There is no abdominal tenderness. There is no right CVA tenderness, left CVA tenderness, guarding or rebound.      Hernia: No hernia is present.   Musculoskeletal:      Right shoulder: Normal.      Left shoulder: No swelling, deformity, effusion, laceration, tenderness, bony tenderness or crepitus. Normal range of motion. Normal strength. Normal pulse.      Right upper arm: Normal.      Left upper arm: Normal.      Right elbow: Normal.      Left elbow: Normal.        Arms:       Cervical back: Normal range of motion and neck supple.      Right lower leg: Edema present.      Left lower leg: Edema present.   Skin:     General: Skin is warm.      Capillary Refill: Capillary refill takes less than 2 seconds.   Neurological:      General: No focal deficit present.       Mental Status: He is alert and oriented to person, place, and time. Mental status is at baseline.      GCS: GCS eye subscore is 4. GCS verbal subscore is 5. GCS motor subscore is 6.      Cranial Nerves: Cranial nerves 2-12 are intact.      Sensory: Sensation is intact.      Motor: Motor function is intact.      Coordination: Coordination is intact.      Gait: Gait is intact.      Comments: 5/5 upper extremity strength, no numbness or tingling   5/5 lower extremity strength, no numbness or tingling    Psychiatric:         Mood and Affect: Mood normal.         Behavior: Behavior normal.         Thought Content: Thought content normal.         Judgment: Judgment normal.         Cervical spine cleared by clinical criteria? No (imaging required)      Invasive Devices       Peripheral Intravenous Line  Duration             Peripheral IV 04/13/24 Left;Proximal;Ventral (anterior) Forearm <1 day              Drain  Duration             Urethral Catheter Latex;Three way 24 Fr. 215 days                    Lab Results:   Results Reviewed       Procedure Component Value Units Date/Time    Blood culture #1 [[id number]] Collected: 04/13/24 0913    Lab Status: Preliminary result Specimen: Blood from Arm, Right Updated: 04/13/24 1601     Blood Culture Received in Microbiology Lab. Culture in Progress.    Blood culture #2 [[id number]] Collected: 04/13/24 0913    Lab Status: Preliminary result Specimen: Blood from Arm, Left Updated: 04/13/24 1601     Blood Culture Received in Microbiology Lab. Culture in Progress.    Lactic acid 2 Hours [[id number]]  (Normal) Collected: 04/13/24 1316    Lab Status: Final result Specimen: Blood from Arm, Left Updated: 04/13/24 1403     LACTIC ACID 1.0 mmol/L     Narrative:      Result may be elevated if tourniquet was used during collection.    HS Troponin I 4hr [[id number]]  (Normal) Collected: 04/13/24 1318    Lab Status: Final result Specimen: Blood from Arm, Left Updated: 04/13/24 1359     hs TnI  4hr 15 ng/L      Delta 4hr hsTnI 2 ng/L     HS Troponin I 2hr [[id number]]  (Normal) Collected: 04/13/24 1125    Lab Status: Final result Specimen: Blood from Arm, Left Updated: 04/13/24 1203     hs TnI 2hr 14 ng/L      Delta 2hr hsTnI 1 ng/L     UA w Reflex to Microscopic w Reflex to Culture [[id number]] Updated: 04/13/24 1055    Lab Status: No result Specimen: Urine, Indwelling Haley Catheter     FLU/RSV/COVID - if FLU/RSV clinically relevant [[id number]]  (Normal) Collected: 04/13/24 0913    Lab Status: Final result Specimen: Nares from Nose Updated: 04/13/24 1010     SARS-CoV-2 Negative     INFLUENZA A PCR Negative     INFLUENZA B PCR Negative     RSV PCR Negative    Narrative:      FOR PEDIATRIC PATIENTS - copy/paste COVID Guidelines URL to browser: [email or web address]    SARS-CoV-2 assay is a Nucleic Acid Amplification assay intended for the  qualitative detection of nucleic acid from SARS-CoV-2 in nasopharyngeal  swabs. Results are for the presumptive identification of SARS-CoV-2 RNA.    Positive results are indicative of infection with SARS-CoV-2, the virus  causing COVID-19, but do not rule out bacterial infection or co-infection  with other viruses. Laboratories within the [place] and its  territories are required to report all positive results to the appropriate  public health authorities. Negative results do not preclude SARS-CoV-2  infection and should not be used as the sole basis for treatment or other  patient management decisions. Negative results must be combined with  clinical observations, patient history, and epidemiological information.  This test has not been FDA cleared or approved.    This test has been authorized by FDA under an Emergency Use Authorization  (EUA). This test is only authorized for the duration of time the  declaration that circumstances exist justifying the authorization of the  emergency use of an in vitro diagnostic  tests for detection of SARS-CoV-2  virus and/or diagnosis of COVID-19 infection under section 564(b)(1) of  the Act, 21 U.S.C. 360bbb-3(b)(1), unless the authorization is terminated  or revoked sooner. The test has been validated but independent review by FDA  and CLIA is pending.    Test performed using [employer] GeneXpert: This RT-PCR assay targets N2,  a region unique to SARS-CoV-2. A conserved region in the E-gene was chosen  for pan-Sarbecovirus detection which includes SARS-CoV-2.    According to CMS-2020-01-R, this platform meets the definition of high-throughput technology.    B-Type Natriuretic Peptide(BNP) [[id number]]  (Abnormal) Collected: 04/13/24 0913    Lab Status: Final result Specimen: Blood from Arm, Left Updated: 04/13/24 1006      pg/mL     Lactic acid [[id number]]  (Abnormal) Collected: 04/13/24 0913    Lab Status: Final result Specimen: Blood from Arm, Left Updated: 04/13/24 1005     LACTIC ACID 4.0 mmol/L     Narrative:      Result may be elevated if tourniquet was used during collection.    Procalcitonin [[id number]]  (Abnormal) Collected: 04/13/24 0913    Lab Status: Final result Specimen: Blood from Arm, Left Updated: 04/13/24 1003     Procalcitonin 0.49 ng/ml     HS Troponin 0hr (reflex protocol) [[id number]]  (Normal) Collected: 04/13/24 0925    Lab Status: Final result Specimen: Blood from Arm, Left Updated: 04/13/24 1000     hs TnI 0hr 13 ng/L     CK [[id number]]  (Normal) Collected: 04/13/24 0913    Lab Status: Final result Specimen: Blood from Arm, Left Updated: 04/13/24 0953     Total CK 86 U/L     Comprehensive metabolic panel [[id number]]  (Abnormal) Collected: 04/13/24 0913    Lab Status: Final result Specimen: Blood from Arm, Left Updated: 04/13/24 0953     Sodium 130 mmol/L      Potassium 4.5 mmol/L      Chloride 98 mmol/L      CO2 22 mmol/L      ANION GAP 10 mmol/L      BUN 27 mg/dL      Creatinine 1.11 mg/dL      Glucose 157 mg/dL      Calcium 8.2 mg/dL      Corrected Calcium  9.6 mg/dL      AST 24 U/L      ALT 12 U/L      Alkaline Phosphatase 81 U/L      Total Protein 5.4 g/dL      Albumin 2.3 g/dL      Total Bilirubin 0.58 mg/dL      eGFR 59 ml/min/1.73sq m     Narrative:      National Kidney Disease Foundation guidelines for Chronic Kidney Disease (CKD):     Stage 1 with normal or high GFR (GFR > 90 mL/min/1.73 square meters)    Stage 2 Mild CKD (GFR = 60-89 mL/min/1.73 square meters)    Stage 3A Moderate CKD (GFR = 45-59 mL/min/1.73 square meters)    Stage 3B Moderate CKD (GFR = 30-44 mL/min/1.73 square meters)    Stage 4 Severe CKD (GFR = 15-29 mL/min/1.73 square meters)    Stage 5 End Stage CKD (GFR <15 mL/min/1.73 square meters)  Note: GFR calculation is accurate only with a steady state creatinine    APTT [[id number]]  (Normal) Collected: 04/13/24 0913    Lab Status: Final result Specimen: Blood from Arm, Left Updated: 04/13/24 0950     PTT 35 seconds     Protime-INR [[id number]]  (Abnormal) Collected: 04/13/24 0913    Lab Status: Final result Specimen: Blood from Arm, Left Updated: 04/13/24 0950     Protime 14.9 seconds      INR 1.15    CBC and differential [[id number]]  (Abnormal) Collected: 04/13/24 0913    Lab Status: Final result Specimen: Blood from Arm, Left Updated: 04/13/24 0938     WBC 19.01 Thousand/uL      RBC 3.27 Million/uL      Hemoglobin 9.7 g/dL      Hematocrit 31.0 %      MCV 95 fL      MCH 29.7 pg      MCHC 31.3 g/dL      RDW 15.6 %      MPV 8.2 fL      Platelets 620 Thousands/uL      nRBC 0 /100 WBCs      Segmented % 88 %      Immature Grans % 2 %      Lymphocytes % 4 %      Monocytes % 5 %      Eosinophils Relative 0 %      Basophils Relative 1 %      Absolute Neutrophils 16.69 Thousands/µL      Absolute Immature Grans 0.36 Thousand/uL      Absolute Lymphocytes 0.78 Thousands/µL      Absolute Monocytes 1.03 Thousand/µL      Eosinophils Absolute 0.06 Thousand/µL      Basophils Absolute 0.09 Thousands/µL                    Imaging Studies:   Direct to CT:  "No  TRAUMA - CT head wo contrast   Final Result by Jeramie Scruggs MD (04/13 0947)      No acute intracranial abnormality. Trace fluid right maxillary sinus. Mild atrophy and chronic microvascular ischemic changes.                  Workstation performed: [id number]         TRAUMA - CT spine cervical wo contrast   Final Result by Jeramie Scruggs MD (04/13 0948)      No cervical spine fracture or traumatic malalignment.      Moderate cervical spondylosis.      Partially visualized left pleural effusion.                  Workstation performed: [id number]         CT pe study w abdomen pelvis w contrast   Final Result by Zi Davalos MD (04/13 1032)      Bladder tumor mass, increasing with erosion through anterior wall of lower rectum with some fecal material in the bladder lumen.      Also, tumor erosion into perivesical pelvic fat with small collection of fluid and gas is extending upwards along the left pelvic sidewall.      Increasing obstruction of left ureterovesical junction with right hydroureteronephrosis. Reidentified severe left hydronephrosis due to ureterovesical junction obstruction with delayed left renal nephrogram and left renal cortical thinning.      Trace ascites and small left pleural effusion, new from prior examination.      No pulmonary embolus.      This examination was marked \"immediate notification\" in Epic in order to begin the standard process by which the radiology reading room liaison alerts the referring practitioner.         Workstation performed: [id number]         XR chest portable   Final Result by Mariah Carpio MD (04/13 1030)      Small left effusion better shown on subsequent CT.            Workstation performed: [id number]               Procedures  ECG 12 Lead Documentation Only    Date/Time: 4/13/2024 9:10 AM    Performed by: KIERA Aguero  Authorized by: KIERA Aguero    Indications / Diagnosis:  Syncope  Patient location:  ED  Previous ECG:     " "Previous ECG:  Compared to current    Similarity:  No change  Interpretation:     Interpretation: abnormal    Quality:     Tracing quality:  Limited by artifact  Rhythm:     Rhythm: atrial fibrillation    Ectopy:     Ectopy: none    QRS:     QRS axis:  Normal  Conduction:     Conduction: normal    ST segments:     ST segments:  Non-specific  POC FAST US    Date/Time: 4/13/2024 9:12 AM    Performed by: KIERA Aguero  Authorized by: KIERA Aguero    Patient location:  ED  Procedure details:     Exam Type:  Diagnostic    Indications: blunt abdominal trauma and blunt chest trauma      Assess for:  Hemothorax    Technique: FAST      Views obtained:  Heart - Pericardial sac, LUQ - Splenorenal space, RUQ - Trujillo's Pouch and Suprapubic - Pouch of Efe    Image quality: diagnostic      Image availability:  Images available in PACS  FAST Findings:     RUQ (Hepatorenal) free fluid: absent      LUQ (Splenorenal) free fluid: absent      Suprapubic free fluid: absent      Cardiac wall motion: identified    Interpretation:     Impressions: negative             ED Course  ED Course as of 04/13/24 1625   Sat Apr 13, 2024   1011 FLU/RSV/COVID - if FLU/RSV clinically relevant   1034 Updated information provided by wife was that patient and wife were trying to engage in sexual intercourse, in the bathroom, and patient lost his balance, falling to the ground. Wife told RN staff that she was embarrassed and they decided to wait until the morning to call the daughter until this morning.    1056 MEG Santos informed me that, \"There's stool in urine, and the lab will not run this.\" MEG Santos informed lab that this is an expected finding, that it's not contaminated, and stool is coming from bladder.            Medical Decision Making  DDx: ICH, visceral organ injury,    Patient arriving s/p fall where he slept the night on the floor with a blanket and pillow brought to him by his wife. Patient arriving with dark " colored liquid in his urinary catheter. Patient is tachycardic, no fever. Given history will evaluated for trauma and sepsis.   Heart score of 6 after three troponins with delta of 2. Patient has no EKG changes, remains in afib.   Patient noted to have a leukocytosis of 19. Sepsis labs are still pending. Monotherapy of rocephin chosen based on indwelling dominique and empiric treatment for urologic etiology. Lactic of 4, 30 mL/kg noted, EF of 65% in 6/23. Patient stable has no shortness of breath, and no hypoxia. Patient leg swollen but have been. Patient stable to received fluids at this time. Re-assessment completed as documented. Patient had no evidence of fluid overload, no respiratory distress, no oxygen requirement. HR improved.  Patient had slight increase to his creatinine, although no EZIO.  No acute finding on CT head/c-spine. CT c/a/p findings appreciated.  Patient is actively being treated for bladder cancer.  This was discussed with Dr. Gomes of general surgery, who recommends to speak with urology.  Dr. Joseph of urology has been treating the patient as well as oncology in the outpatient setting.  Discussed that patient would likely need a diverting colostomy.  Patient was evaluated by Dr. Gomes at this facility, however that patient is a oncology patient as well, would like to be transferred to a tertiary center with oncology available as well as surgery.  Did discuss with patient options for his care given his age, and comorbidities.  Patient is requesting surgical consultation at this time.  Did discuss that without surgical intervention would likely die of sepsis. Case was discussed with Dr. Day of surgery at [facility], and reports that patient can come to his service.  Patient is remained stable throughout his evaluation, with no hypotension, lactic acidosis resolved.  Patient's had improvement to his tachycardia.  Patient is stable for transfer.    Amount and/or Complexity of Data  Reviewed  Labs: ordered. Decision-making details documented in ED Course.  Radiology: ordered.    Risk  Prescription drug management.                Disposition  [facility] Transfer: No  Final diagnoses:   Malignant neoplasm of urinary bladder, unspecified site (HCC)   Hydroureteronephrosis   Fall, initial encounter     Time reflects when diagnosis was documented in both MDM as applicable and the Disposition within this note       Time User Action Codes Description Comment    4/13/2024 12:14 PM Shanta Bar [C67.9] Malignant neoplasm of urinary bladder, unspecified site (HCC)     4/13/2024 12:33 PM Shanta Bar Add [N13.30] Hydroureteronephrosis     4/13/2024 12:33 PM Shanta Bar Add [W19.XXXA] Fall, initial encounter           ED Disposition       ED Disposition   Transfer to Another Facility-In Network    Condition   --    Date/Time   Sat Apr 13, 2024 12:12 PM    Comment   Candido CASTRO Jacquelyn Pepe. should be transferred out to [facility].               MD Documentation      Flowsheet Row Most Recent Value   Patient Condition The patient has been stabilized such that within reasonable medical probability, no material deterioration of the patient condition or the condition of the unborn child(devika) is likely to result from the transfer   Reason for Transfer Level of Care needed not available at this facility, Patient/Family request, No bed available at level of patient's needs   Benefits of Transfer Specialized equipment and/or services available at the receiving facility (Include comment)________________________  [surgery, and oncology]          Follow-up Information    None       Discharge Medication List as of 4/13/2024  2:29 PM        CONTINUE these medications which have NOT CHANGED    Details   aspirin 81 mg chewable tablet Chew 81 mg daily, Historical Med      ferrous sulfate 325 (65 Fe) mg tablet Take 1 tablet (325 mg total) by mouth daily with breakfast Do not start before July 3, 2023., Starting  Mon 7/3/2023, Until Fri 4/5/2024, Normal      finasteride (PROPECIA) 1 MG tablet Take 1 mg by mouth daily, Historical Med      !! folic acid (FOLVITE) 1 mg tablet Take 1 tablet (1 mg total) by mouth daily, Starting Wed 12/6/2023, Normal      !! folic acid (FOLVITE) 1 mg tablet take 1 tablet by mouth daily, Starting Thu 4/11/2024, Normal      furosemide (LASIX) 20 mg tablet Take 1 tablet (20 mg total) by mouth daily, Starting Wed 9/27/2023, Normal      metoprolol succinate (TOPROL-XL) 100 mg 24 hr tablet Take 1 tablet (100 mg total) by mouth daily, Starting Thu 8/3/2023, Normal      pantoprazole (PROTONIX) 40 mg tablet Take 1 tablet (40 mg total) by mouth daily, Starting Mon 2/5/2024, Normal      !! predniSONE 10 mg tablet TAKE SIX TABS (60mg) )FOR 7 DAYS THEN 4 TABS (40 mg)  FOR 7 DAYS THEN 2 TABS (20 mg)  FOR 7 DAYS THEN ONE TAB ( 10 MG) FOR 7 DAYS, Normal      !! predniSONE 10 mg tablet Take 1 tablet (10 mg total) by mouth daily, Starting Fri 1/26/2024, Normal      !! predniSONE 10 mg tablet Take 1 tablet (10 mg total) by mouth daily, Starting Fri 2/2/2024, Normal      senna-docusate sodium (SENOKOT S) 8.6-50 mg per tablet Take 2 tablets by mouth daily at bedtime May hold for loose stools, Starting Sun 7/2/2023, Until Wed 12/6/2023, Normal      tamsulosin (Flomax) 0.4 mg Take 0.4 mg by mouth daily with dinner, Historical Med      vitamin B-12 (VITAMIN B-12) 1,000 mcg tablet Take 500 mcg by mouth daily Takes  1200mg in total, Historical Med       !! - Potential duplicate medications found. Please discuss with provider.        No discharge procedures on file.    PDMP Review       None            ED Provider  Electronically Signed by           KIERA Aguero  04/13/24 0451

## 2024-04-13 NOTE — SEPSIS NOTE
"  Sepsis Note   Candido Valladares Jr. 88 y.o. male MRN: [id number]  Unit/Bed#: ED 20 Encounter: [id number]      Physical Exam  Vitals reviewed.   Constitutional:       Appearance: Normal appearance. He is normal weight.   HENT:      Head: Normocephalic.      Right Ear: External ear normal.      Left Ear: External ear normal.      Nose: Nose normal.      Mouth/Throat:      Mouth: Mucous membranes are moist.   Eyes:      Extraocular Movements: Extraocular movements intact.      Pupils: Pupils are equal, round, and reactive to light.   Cardiovascular:      Rate and Rhythm: Normal rate and regular rhythm.      Pulses: Normal pulses.      Heart sounds: Normal heart sounds.   Pulmonary:      Effort: Pulmonary effort is normal. No respiratory distress.      Breath sounds: Normal breath sounds. No stridor. No wheezing, rhonchi or rales.   Abdominal:      General: Abdomen is flat. Bowel sounds are normal.      Palpations: Abdomen is soft.   Musculoskeletal:         General: Normal range of motion.      Cervical back: Normal range of motion and neck supple.   Skin:     General: Skin is warm.      Capillary Refill: Capillary refill takes less than 2 seconds.   Neurological:      General: No focal deficit present.      Mental Status: He is alert and oriented to person, place, and time.   Psychiatric:         Mood and Affect: Mood normal.         Behavior: Behavior normal.         Thought Content: Thought content normal.         Judgment: Judgment normal.            Default Flowsheet Data (last 720 hours)       Sepsis Reassess       Row Name 04/13/24 1618                   Repeat Volume Status and Tissue Perfusion Assessment Performed    Date of Reassessment: 04/13/24  -AK        Time of Reassessment: 1230  -AK        Sepsis Reassessment Note: Click \"NEXT\" below (NOT \"close\") to generate sepsis reassessment note. YES (proceed by clicking \"NEXT\")  -AK        Repeat Volume Status and Tissue Perfusion Assessment Performed --          "         User Key  (r) = Recorded By, (t) = Taken By, (c) = Cosigned By      Initials Name Provider Type    KIERA Cotton Nurse Practitioner                    Body mass index is 23.21 kg/m².  Wt Readings from Last 1 Encounters:   04/13/24 79.8 kg (175 lb 14.8 oz)     IBW (Ideal Body Weight): 79.9 kg    Ideal body weight: 79.9 kg (176 lb 2.4 oz)

## 2024-04-14 LAB
ANION GAP SERPL CALCULATED.3IONS-SCNC: 9 MMOL/L (ref 4–13)
ATRIAL RATE: 129 BPM
ATRIAL RATE: 144 BPM
BASOPHILS # BLD MANUAL: 0 THOUSAND/UL (ref 0–0.1)
BASOPHILS NFR MAR MANUAL: 0 % (ref 0–1)
BUN SERPL-MCNC: 23 MG/DL (ref 5–25)
CALCIUM SERPL-MCNC: 7.4 MG/DL (ref 8.4–10.2)
CHLORIDE SERPL-SCNC: 105 MMOL/L (ref 96–108)
CO2 SERPL-SCNC: 21 MMOL/L (ref 21–32)
CREAT SERPL-MCNC: 0.8 MG/DL (ref 0.6–1.3)
EOSINOPHIL # BLD MANUAL: 0 THOUSAND/UL (ref 0–0.4)
EOSINOPHIL NFR BLD MANUAL: 0 % (ref 0–6)
ERYTHROCYTE [DISTWIDTH] IN BLOOD BY AUTOMATED COUNT: 15.9 % (ref 11.6–15.1)
GFR SERPL CREATININE-BSD FRML MDRD: 80 ML/MIN/1.73SQ M
GLUCOSE SERPL-MCNC: 76 MG/DL (ref 65–140)
HCT VFR BLD AUTO: 26.7 % (ref 36.5–49.3)
HGB BLD-MCNC: 8.6 G/DL (ref 12–17)
LYMPHOCYTES # BLD AUTO: 0.33 THOUSAND/UL (ref 0.6–4.47)
LYMPHOCYTES # BLD AUTO: 2 % (ref 14–44)
MAGNESIUM SERPL-MCNC: 1.6 MG/DL (ref 1.9–2.7)
MCH RBC QN AUTO: 30.3 PG (ref 26.8–34.3)
MCHC RBC AUTO-ENTMCNC: 32.2 G/DL (ref 31.4–37.4)
MCV RBC AUTO: 94 FL (ref 82–98)
MONOCYTES # BLD AUTO: 0.33 THOUSAND/UL (ref 0–1.22)
MONOCYTES NFR BLD: 2 % (ref 4–12)
NEUTROPHILS # BLD MANUAL: 15.97 THOUSAND/UL (ref 1.85–7.62)
NEUTS BAND NFR BLD MANUAL: 8 % (ref 0–8)
NEUTS SEG NFR BLD AUTO: 88 % (ref 43–75)
PHOSPHATE SERPL-MCNC: 3.6 MG/DL (ref 2.3–4.1)
PLATELET # BLD AUTO: 447 THOUSANDS/UL (ref 149–390)
PLATELET # BLD AUTO: 512 THOUSANDS/UL (ref 149–390)
PLATELET BLD QL SMEAR: ABNORMAL
PMV BLD AUTO: 8.1 FL (ref 8.9–12.7)
PMV BLD AUTO: 8.3 FL (ref 8.9–12.7)
POIKILOCYTOSIS BLD QL SMEAR: PRESENT
POTASSIUM SERPL-SCNC: 3.7 MMOL/L (ref 3.5–5.3)
QRS AXIS: -27 DEGREES
QRS AXIS: 10 DEGREES
QRSD INTERVAL: 76 MS
QRSD INTERVAL: 84 MS
QT INTERVAL: 330 MS
QT INTERVAL: 344 MS
QTC INTERVAL: 450 MS
QTC INTERVAL: 502 MS
RBC # BLD AUTO: 2.84 MILLION/UL (ref 3.88–5.62)
RBC MORPH BLD: PRESENT
SODIUM SERPL-SCNC: 135 MMOL/L (ref 135–147)
T WAVE AXIS: 48 DEGREES
T WAVE AXIS: 58 DEGREES
VENTRICULAR RATE: 103 BPM
VENTRICULAR RATE: 139 BPM
WBC # BLD AUTO: 16.64 THOUSAND/UL (ref 4.31–10.16)

## 2024-04-14 PROCEDURE — 87493 C DIFF AMPLIFIED PROBE: CPT

## 2024-04-14 PROCEDURE — 83735 ASSAY OF MAGNESIUM: CPT | Performed by: STUDENT IN AN ORGANIZED HEALTH CARE EDUCATION/TRAINING PROGRAM

## 2024-04-14 PROCEDURE — 99223 1ST HOSP IP/OBS HIGH 75: CPT | Performed by: INTERNAL MEDICINE

## 2024-04-14 PROCEDURE — 93010 ELECTROCARDIOGRAM REPORT: CPT | Performed by: INTERNAL MEDICINE

## 2024-04-14 PROCEDURE — 85049 AUTOMATED PLATELET COUNT: CPT | Performed by: STUDENT IN AN ORGANIZED HEALTH CARE EDUCATION/TRAINING PROGRAM

## 2024-04-14 PROCEDURE — 80048 BASIC METABOLIC PNL TOTAL CA: CPT | Performed by: STUDENT IN AN ORGANIZED HEALTH CARE EDUCATION/TRAINING PROGRAM

## 2024-04-14 PROCEDURE — 99222 1ST HOSP IP/OBS MODERATE 55: CPT | Performed by: UROLOGY

## 2024-04-14 PROCEDURE — 85027 COMPLETE CBC AUTOMATED: CPT | Performed by: STUDENT IN AN ORGANIZED HEALTH CARE EDUCATION/TRAINING PROGRAM

## 2024-04-14 PROCEDURE — 99222 1ST HOSP IP/OBS MODERATE 55: CPT | Performed by: INTERNAL MEDICINE

## 2024-04-14 PROCEDURE — 84100 ASSAY OF PHOSPHORUS: CPT | Performed by: STUDENT IN AN ORGANIZED HEALTH CARE EDUCATION/TRAINING PROGRAM

## 2024-04-14 PROCEDURE — 85007 BL SMEAR W/DIFF WBC COUNT: CPT | Performed by: STUDENT IN AN ORGANIZED HEALTH CARE EDUCATION/TRAINING PROGRAM

## 2024-04-14 RX ADMIN — METRONIDAZOLE 500 MG: 500 INJECTION, SOLUTION INTRAVENOUS at 17:20

## 2024-04-14 RX ADMIN — METOPROLOL TARTRATE 50 MG: 50 TABLET, FILM COATED ORAL at 10:01

## 2024-04-14 RX ADMIN — METRONIDAZOLE 500 MG: 500 INJECTION, SOLUTION INTRAVENOUS at 02:00

## 2024-04-14 RX ADMIN — HEPARIN SODIUM 5000 UNITS: 5000 INJECTION INTRAVENOUS; SUBCUTANEOUS at 15:05

## 2024-04-14 RX ADMIN — SODIUM CHLORIDE, SODIUM LACTATE, POTASSIUM CHLORIDE, AND CALCIUM CHLORIDE 100 ML/HR: .6; .31; .03; .02 INJECTION, SOLUTION INTRAVENOUS at 20:13

## 2024-04-14 RX ADMIN — HEPARIN SODIUM 5000 UNITS: 5000 INJECTION INTRAVENOUS; SUBCUTANEOUS at 21:51

## 2024-04-14 RX ADMIN — SODIUM CHLORIDE, SODIUM LACTATE, POTASSIUM CHLORIDE, AND CALCIUM CHLORIDE 100 ML/HR: .6; .31; .03; .02 INJECTION, SOLUTION INTRAVENOUS at 09:56

## 2024-04-14 RX ADMIN — METRONIDAZOLE 500 MG: 500 INJECTION, SOLUTION INTRAVENOUS at 09:59

## 2024-04-14 RX ADMIN — CEFTRIAXONE 1000 MG: 1 INJECTION, POWDER, FOR SOLUTION INTRAMUSCULAR; INTRAVENOUS at 10:02

## 2024-04-14 RX ADMIN — METOPROLOL TARTRATE 50 MG: 50 TABLET, FILM COATED ORAL at 21:51

## 2024-04-14 RX ADMIN — HEPARIN SODIUM 5000 UNITS: 5000 INJECTION INTRAVENOUS; SUBCUTANEOUS at 05:21

## 2024-04-14 RX ADMIN — PANTOPRAZOLE SODIUM 40 MG: 40 TABLET, DELAYED RELEASE ORAL at 10:01

## 2024-04-14 NOTE — CONSULTS
Medical Oncology/Hematology Consult Note  Candido Valladares Jr., male, 88 y.o., 1935,  [facility] 8[facility], [phone]     Assessment and Plan  1.  High-grade bladder cancer (cT4 cN1 cM0)  Deemed not to be a candidate for surgery or concurrent chemo XRT.  Was treated with pembrolizumab with his last dose being in January 2024.  Thought to be complicated by immune mediated colitis/diarrhea, however with recent findings of colovesicular fistula, most likely urine mixed with stool, and not immnue mediated colitis/diarrhea.  Patient's family at the bedside and discussed wanting to focus on patient's comfort.  At this time discussed would not recommend any anticancer interventions while in the hospital.  He should recover from procedure and once in the outpatient setting, can follow back up with Dr. Britton for discussion for future interventions whether that is active treatment versus symptom management/palliative care.    Please reach out with any additional issues or concerns.    Reason for consultation: urine cytology positive for high-grade urothelial carcinoma    History of present illness:   Patient is an 88-year-old gentleman who was brought to the emergency room on 4/13/2024 for syncope.  Records report him sleeping on the floor overnight as he was not able to get up.  Unknown loss of consciousness fell on the bathroom floor the night before his admission.  He is actively being treated for bladder cancer and evaluated for syncope and weakness.  States that weakness occurred a few days prior to presentation.  Review of notes patient arrived in copious amounts of loose stool but did not remember passing out.    Head CT in the emergency room on 4/13/2024 demonstrated no acute intracranial abnormality.  Cervical spine CT was negative for cervical fracture or traumatic malalignment.  He did have a PE protocol chest CT as well as abdominal and pelvic CT.  Imaging demonstrates bladder tumor mass increasing with  erosion through anterior wall of lower rectum with some fecal material in the bladder lumen.  Tumor erosion into perivesicular pelvic fat with small collection of fluid and gas extending upwards along the left pelvic sidewall.  Increasing obstruction of the left ureterovesicular junction with right hydroureteronephrosis.  We identified severe left hydronephrosis due to ureterovesical junction obstruction with delayed left renal nephrogram and left renal cortical thinning.  Trace ascites and small left pleural effusion.  No PE.    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  Appears that patient has elected for a diverting colostomy and likely bilateral nephrostomy tubes for maximal urinary and fecal diversion as well as further medical oncology follow-up.  This all happened at the [facility] and he was transferred to [place] for higher level of care and subspecialists.    He was transferred to [place] on 4/13/2024.  Underwent placement of bilateral percutaneous nephrostomy tubes.  He went diverting colostomy loop.      From oncology standpoint he is followed by Dr. Britton.  Recently saw Dr. Britton in the office on 4/2/2024.  His estimated clinical stage was high-grade bladder cancer cT4, cN1, cM0.  He was found to not be a candidate for surgery or concurrent chemoradiation.  He was started on pembrolizumab on 10/31/2023.  He had a PET scan on 2/27/2020 5:24 cycles of pembrolizumab which showed partial response to immunotherapy.  He has had trials of high-dose steroids with prednisone with slow taper to treat watery diarrhea.  He also underwent treatment with infliximab.  He did not complain of watery diarrhea during the day and seem to have issues with stool incontinence at nighttime.  At the visit discussion was regarding holding immunotherapy.  From review appears that his  last infusion was on 1/23/2024.  Was referred to GI as well and underwent evaluation recently.  Given recent findings thought is that it is not really diarrhea and this was secondary to fistula and was urine mixed with stool.      Review of Systems:   Review of Systems   Constitutional:  Positive for activity change and fatigue. Negative for chills and fever.   HENT:  Negative for ear pain, sore throat, trouble swallowing and voice change.    Eyes:  Negative for pain and visual disturbance.   Respiratory:  Negative for cough and shortness of breath.    Cardiovascular:  Negative for chest pain and palpitations.   Gastrointestinal:  Positive for diarrhea. Negative for abdominal pain and vomiting.   Genitourinary:  Negative for dysuria and hematuria.   Musculoskeletal:  Negative for arthralgias and back pain.   Skin:  Negative for color change and rash.   Neurological:  Positive for syncope and weakness. Negative for seizures and light-headedness.   Hematological:  Negative for adenopathy.   All other systems reviewed and are negative.      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)        Past Surgical History:   Procedure Laterality Date    APPENDECTOMY      IR NEPHROSTOMY TUBE PLACEMENT  4/13/2024    CT TRURL ELECTROSURG RESCJ PROSTATE BLEED COMPLETE N/A 9/11/2023    Procedure: CYSTOSCOPY; TRANSURETHRAL RESECTION OF PROSTATE (TURP);  Surgeon: Franklyn Joseph MD;  Location: [facility];  Service: Urology       Family History   Problem Relation Age of Onset    Hypertension Mother        Social History     Socioeconomic History    Marital status: /Civil Union     Spouse name: None    Number of children: None    Years of education: None    Highest education level: None   Occupational History    Occupation: RETIRED   Tobacco Use     Smoking status: Never     Passive exposure: Never    Smokeless tobacco: Never   Vaping Use    Vaping status: Never Used   Substance and Sexual Activity    Alcohol use: Not Currently    Drug use: Not Currently    Sexual activity: Not Currently   Other Topics Concern    None   Social History Narrative        RETIRED     Social Determinants of Health     Financial Resource Strain: Low Risk  (11/28/2023)    Overall Financial Resource Strain (CARDIA)     Difficulty of Paying Living Expenses: Not hard at all   Food Insecurity: No Food Insecurity (6/28/2023)    Hunger Vital Sign     Worried About Running Out of Food in the Last Year: Never true     Ran Out of Food in the Last Year: Never true   Transportation Needs: No Transportation Needs (11/28/2023)    PRAPARE - Transportation     Lack of Transportation (Medical): No     Lack of Transportation (Non-Medical): No   Physical Activity: Not on file   Stress: Not on file   Social Connections: Not on file   Intimate Partner Violence: Not on file   Housing Stability: Low Risk  (6/28/2023)    Housing Stability Vital Sign     Unable to Pay for Housing in the Last Year: No     Number of Places Lived in the Last Year: 1     Unstable Housing in the Last Year: No         Current Facility-Administered Medications:     acetaminophen (TYLENOL) tablet 650 mg, 650 mg, Oral, Q6H PRN, Antonino Rocha MD    ceftriaxone (ROCEPHIN) 1 g/50 mL in dextrose IVPB, 1,000 mg, Intravenous, Q24H, Antonino Rocha MD, Last Rate: 100 mL/hr at 04/14/24 1002, 1,000 mg at 04/14/24 1002    heparin (porcine) subcutaneous injection 5,000 Units, 5,000 Units, Subcutaneous, Q8H WILLIAN, 5,000 Units at 04/14/24 0521 **AND** [COMPLETED] Platelet count, , , Once, Antonino Rocha MD    HYDROmorphone (DILAUDID) injection 0.2 mg, 0.2 mg, Intravenous, Q3H PRN, Antonino Rocha MD    lactated ringers infusion, 100 mL/hr, Intravenous, Continuous, Antonino Rocha MD, Last Rate: 100 mL/hr at 04/14/24  "0956, 100 mL/hr at 04/14/24 0956    metoprolol tartrate (LOPRESSOR) tablet 50 mg, 50 mg, Oral, Q12H WILLIAN, Antonino Rocha MD, 50 mg at 04/14/24 1001    metroNIDAZOLE (FLAGYL) IVPB (premix) 500 mg 100 mL, 500 mg, Intravenous, Q8H, Antonino Rocha MD, Last Rate: 200 mL/hr at 04/14/24 0959, 500 mg at 04/14/24 0959    ondansetron (ZOFRAN) injection 4 mg, 4 mg, Intravenous, Q6H PRN, Antonino Rocha MD    oxyCODONE (ROXICODONE) IR tablet 2.5 mg, 2.5 mg, Oral, Q6H PRN, Antonino Rocha MD    oxyCODONE (ROXICODONE) IR tablet 5 mg, 5 mg, Oral, Q6H PRN, Antonino Rocha MD    pantoprazole (PROTONIX) EC tablet 40 mg, 40 mg, Oral, Daily, Antonino Rocha MD, 40 mg at 04/14/24 1001    Medications Prior to Admission   Medication    aspirin 81 mg chewable tablet    ferrous sulfate 325 (65 Fe) mg tablet    finasteride (PROPECIA) 1 MG tablet    folic acid (FOLVITE) 1 mg tablet    folic acid (FOLVITE) 1 mg tablet    furosemide (LASIX) 20 mg tablet    metoprolol succinate (TOPROL-XL) 100 mg 24 hr tablet    pantoprazole (PROTONIX) 40 mg tablet    predniSONE 10 mg tablet    predniSONE 10 mg tablet    predniSONE 10 mg tablet    senna-docusate sodium (SENOKOT S) 8.6-50 mg per tablet    tamsulosin (Flomax) 0.4 mg    vitamin B-12 (VITAMIN B-12) 1,000 mcg tablet       No Known Allergies      Physical Exam:    /63   Pulse 73   Temp 97.9 °F (36.6 °C) (Oral)   Resp 16   Ht 6' 1\" (1.854 m)   Wt 79.8 kg (175 lb 14.8 oz)   SpO2 96%   BMI 23.21 kg/m²     Physical Exam  Constitutional:       General: He is not in acute distress.     Appearance: Normal appearance. He is not ill-appearing.   HENT:      Head: Normocephalic.      Right Ear: External ear normal.      Left Ear: External ear normal.      Nose: Nose normal.      Mouth/Throat:      Mouth: Mucous membranes are moist.      Pharynx: Oropharynx is clear. No oropharyngeal exudate.   Eyes:      General: No scleral icterus.        Right eye: No discharge.         Left " eye: No discharge.      Conjunctiva/sclera: Conjunctivae normal.   Cardiovascular:      Rate and Rhythm: Normal rate and regular rhythm.      Pulses: Normal pulses.      Heart sounds: No murmur heard.     No friction rub. No gallop.   Pulmonary:      Effort: Pulmonary effort is normal. No respiratory distress.      Breath sounds: Normal breath sounds. No wheezing or rales.   Abdominal:      Comments: S/p nephrostomy tubes bilaterally and ostomy   Musculoskeletal:      Cervical back: Normal range of motion. No rigidity.      Right lower leg: Edema present.      Left lower leg: Edema present.   Skin:     General: Skin is warm.      Coloration: Skin is not jaundiced.      Findings: No lesion.   Neurological:      General: No focal deficit present.      Mental Status: He is alert and oriented to person, place, and time.      Cranial Nerves: No cranial nerve deficit.   Psychiatric:         Mood and Affect: Mood normal.         Behavior: Behavior normal.         Thought Content: Thought content normal.         Judgment: Judgment normal.         Recent Results (from the past 48 hour(s))   ECG 12 lead    Collection Time: 04/13/24  9:09 AM   Result Value Ref Range    Ventricular Rate 139 BPM    Atrial Rate 129 BPM    WI Interval  ms    QRSD Interval 76 ms    QT Interval 330 ms    QTC Interval 502 ms    P Axis  degrees    QRS Axis 10 degrees    T Wave [place] 58 degrees   ECG 12 lead    Collection Time: 04/13/24  9:09 AM   Result Value Ref Range    Ventricular Rate 115 BPM    Atrial Rate 141 BPM    WI Interval  ms    QRSD Interval 92 ms    QT Interval 320 ms    QTC Interval 442 ms    P Axis  degrees    QRS Axis 21 degrees    T Wave Axis 77 degrees   CBC and differential    Collection Time: 04/13/24  9:13 AM   Result Value Ref Range    WBC 19.01 (H) 4.31 - 10.16 Thousand/uL    RBC 3.27 (L) 3.88 - 5.62 Million/uL    Hemoglobin 9.7 (L) 12.0 - 17.0 g/dL    Hematocrit 31.0 (L) 36.5 - 49.3 %    MCV 95 82 - 98 fL    MCH 29.7 26.8 -  34.3 pg    MCHC 31.3 (L) 31.4 - 37.4 g/dL    RDW 15.6 (H) 11.6 - 15.1 %    MPV 8.2 (L) 8.9 - 12.7 fL    Platelets 620 (H) 149 - 390 Thousands/uL    nRBC 0 /100 WBCs    Segmented % 88 (H) 43 - 75 %    Immature Grans % 2 0 - 2 %    Lymphocytes % 4 (L) 14 - 44 %    Monocytes % 5 4 - 12 %    Eosinophils Relative 0 0 - 6 %    Basophils Relative 1 0 - 1 %    Absolute Neutrophils 16.69 (H) 1.85 - 7.62 Thousands/µL    Absolute Immature Grans 0.36 (H) 0.00 - 0.20 Thousand/uL    Absolute Lymphocytes 0.78 0.60 - 4.47 Thousands/µL    Absolute Monocytes 1.03 0.17 - 1.22 Thousand/µL    Eosinophils Absolute 0.06 0.00 - 0.61 Thousand/µL    Basophils Absolute 0.09 0.00 - 0.10 Thousands/µL   Comprehensive metabolic panel    Collection Time: 04/13/24  9:13 AM   Result Value Ref Range    Sodium 130 (L) 135 - 147 mmol/L    Potassium 4.5 3.5 - 5.3 mmol/L    Chloride 98 96 - 108 mmol/L    CO2 22 21 - 32 mmol/L    ANION GAP 10 4 - 13 mmol/L    BUN 27 (H) 5 - 25 mg/dL    Creatinine 1.11 0.60 - 1.30 mg/dL    Glucose 157 (H) 65 - 140 mg/dL    Calcium 8.2 (L) 8.4 - 10.2 mg/dL    Corrected Calcium 9.6 8.3 - 10.1 mg/dL    AST 24 13 - 39 U/L    ALT 12 7 - 52 U/L    Alkaline Phosphatase 81 34 - 104 U/L    Total Protein 5.4 (L) 6.4 - 8.4 g/dL    Albumin 2.3 (L) 3.5 - 5.0 g/dL    Total Bilirubin 0.58 0.20 - 1.00 mg/dL    eGFR 59 ml/min/1.73sq m   Lactic acid    Collection Time: 04/13/24  9:13 AM   Result Value Ref Range    LACTIC ACID 4.0 (HH) 0.5 - 2.0 mmol/L   Procalcitonin    Collection Time: 04/13/24  9:13 AM   Result Value Ref Range    Procalcitonin 0.49 (H) <=0.25 ng/ml   Protime-INR    Collection Time: 04/13/24  9:13 AM   Result Value Ref Range    Protime 14.9 (H) 11.6 - 14.5 seconds    INR 1.15 0.84 - 1.19   APTT    Collection Time: 04/13/24  9:13 AM   Result Value Ref Range    PTT 35 23 - 37 seconds   Blood culture #1    Collection Time: 04/13/24  9:13 AM    Specimen: Arm, Right; Blood   Result Value Ref Range    Blood Culture Received in  "Microbiology Lab. Culture in Progress.    Blood culture #2    Collection Time: 04/13/24  9:13 AM    Specimen: Arm, Left; Blood   Result Value Ref Range    Blood Culture Received in Microbiology Lab. Culture in Progress.    CK    Collection Time: 04/13/24  9:13 AM   Result Value Ref Range    Total CK 86 39 - 308 U/L   FLU/RSV/COVID - if FLU/RSV clinically relevant    Collection Time: 04/13/24  9:13 AM    Specimen: Nose; Nares   Result Value Ref Range    SARS-CoV-2 Negative Negative    INFLUENZA A PCR Negative Negative    INFLUENZA B PCR Negative Negative    RSV PCR Negative Negative   B-Type Natriuretic Peptide(BNP)    Collection Time: 04/13/24  9:13 AM   Result Value Ref Range     (H) 0 - 100 pg/mL   HS Troponin 0hr (reflex protocol)    Collection Time: 04/13/24  9:25 AM   Result Value Ref Range    hs TnI 0hr 13 \"Refer to ACS Flowchart\"- see link ng/L   Type and screen    Collection Time: 04/13/24 10:03 AM   Result Value Ref Range    ABO Grouping O     Rh Factor Positive     Antibody Screen Negative     Specimen Expiration Date 20240416    HS Troponin I 2hr    Collection Time: 04/13/24 11:25 AM   Result Value Ref Range    hs TnI 2hr 14 \"Refer to ACS Flowchart\"- see link ng/L    Delta 2hr hsTnI 1 <20 ng/L   Lactic acid 2 Hours    Collection Time: 04/13/24  1:16 PM   Result Value Ref Range    LACTIC ACID 1.0 0.5 - 2.0 mmol/L   HS Troponin I 4hr    Collection Time: 04/13/24  1:18 PM   Result Value Ref Range    hs TnI 4hr 15 \"Refer to ACS Flowchart\"- see link ng/L    Delta 4hr hsTnI 2 <20 ng/L   ECG 12 lead    Collection Time: 04/13/24  1:20 PM   Result Value Ref Range    Ventricular Rate 103 BPM    Atrial Rate 144 BPM    [place] Interval  ms    QRSD Interval 84 ms    QT Interval 344 ms    QTC Interval 450 ms    P Axis  degrees    QRS Axis -27 degrees    T Wave Axis 48 degrees   Fingerstick Glucose (POCT)    Collection Time: 04/13/24 10:16 PM   Result Value Ref Range    POC Glucose 92 65 - 140 mg/dl   Platelet count "    Collection Time: 04/14/24 12:04 AM   Result Value Ref Range    Platelets 512 (H) 149 - 390 Thousands/uL    MPV 8.1 (L) 8.9 - 12.7 fL   Basic metabolic panel    Collection Time: 04/14/24  6:01 AM   Result Value Ref Range    Sodium 135 135 - 147 mmol/L    Potassium 3.7 3.5 - 5.3 mmol/L    Chloride 105 96 - 108 mmol/L    CO2 21 21 - 32 mmol/L    ANION GAP 9 4 - 13 mmol/L    BUN 23 5 - 25 mg/dL    Creatinine 0.80 0.60 - 1.30 mg/dL    Glucose 76 65 - 140 mg/dL    Calcium 7.4 (L) 8.4 - 10.2 mg/dL    eGFR 80 ml/min/1.73sq m   CBC and differential    Collection Time: 04/14/24  6:01 AM   Result Value Ref Range    WBC 16.64 (H) 4.31 - 10.16 Thousand/uL    RBC 2.84 (L) 3.88 - 5.62 Million/uL    Hemoglobin 8.6 (L) 12.0 - 17.0 g/dL    Hematocrit 26.7 (L) 36.5 - 49.3 %    MCV 94 82 - 98 fL    MCH 30.3 26.8 - 34.3 pg    MCHC 32.2 31.4 - 37.4 g/dL    RDW 15.9 (H) 11.6 - 15.1 %    MPV 8.3 (L) 8.9 - 12.7 fL    Platelets 447 (H) 149 - 390 Thousands/uL   Magnesium    Collection Time: 04/14/24  6:01 AM   Result Value Ref Range    Magnesium 1.6 (L) 1.9 - 2.7 mg/dL   Phosphorus    Collection Time: 04/14/24  6:01 AM   Result Value Ref Range    Phosphorus 3.6 2.3 - 4.1 mg/dL   Manual Differential(PHLEBS Do Not Order)    Collection Time: 04/14/24  6:01 AM   Result Value Ref Range    Segmented % 88 (H) 43 - 75 %    Bands % 8 0 - 8 %    Lymphocytes % 2 (L) 14 - 44 %    Monocytes % 2 (L) 4 - 12 %    Eosinophils % 0 0 - 6 %    Basophils % 0 0 - 1 %    Absolute Neutrophils 15.97 (H) 1.85 - 7.62 Thousand/uL    Absolute Lymphocytes 0.33 (L) 0.60 - 4.47 Thousand/uL    Absolute Monocytes 0.33 0.00 - 1.22 Thousand/uL    Absolute Eosinophils 0.00 0.00 - 0.40 Thousand/uL    Absolute Basophils 0.00 0.00 - 0.10 Thousand/uL    Total Counted      RBC Morphology Present     Platelet Estimate Increased (A) Adequate    Poikilocytes Present        IR nephrostomy tube placement    Result Date: 4/13/2024  Narrative: Bilateral percutaneous nephrostomy placement  4/13/2024 History: Prostate carcinoma; colovesical fistula and bilateral hydronephrosis. Pending loop colostomy. Diversion with bilateral percutaneous nephrostomy is desired Contrast: 20 cc Omnipaque 350 Fluoroscopy time: 4.3 MINUTES Number of images: Multiple Radiation dose: 69 mGy Conscious sedation time: Anesthesia Procedure: The patient was identified verbally and by wristband. Timeout was performed.  Informed consent was obtained. Following obtaining informed consent the patient was prepped and draped in the usual sterile fashion. Using ultrasound guidance access  was gained to the left collecting system using a 21-gauge AccuStick needle. Contrast was injected and the collecting system opacified. A 0.018 wire was advanced into the ureter. The needle was exchanged for a AccuStick dilator. A 0.035 wire was advanced  into the ureter. Following tract dilatation an 8 Citizen of the [place] APD was placed in the renal pelvis. Contrast was injected through the newly placed nephrostomy and image obtained. The catheter was secured in place and placed to gravity bag drainage. Using fluoroscopic guidance access was gained to the right collecting system using a 21-gauge AccuStick needle. Contrast was injected in the collecting system further opacified. A 0.018 wire was advanced into the collecting system. The needle was exchanged for the AccuStick dilator. A 4 Citizen of the [place] angled glide catheter was advanced through the AccuStick dilator and a 0.035 wire was advanced down the ureter. Following tract dilatation, an 8 Citizen of the [place] APD was placed in the renal pelvis. Contrast was injected through the newly placed nephrostomy and image obtained. The catheter was secured in place and placed to gravity bag drainage. The patient tolerated the procedure well without apparent immediate complication. The the procedure was then handed to Dr. Victoria for loop colostomy. Findings: Cursory ultrasound of the left kidney demonstrated hydronephrosis. There was  successful ultrasound-guided placement of a 21-gauge needle into the interpolar calyx. Contrast injection confirmed position within the collecting system. Contrast injection through the AccuStick dilator confirmed position within the collecting system. Contrast injection through the 8 [place] nephrostomy confirmed the pigtail within the renal pelvis. The right collecting system was persistently opacified from previous contrast CT. There was successful fluoroscopic guided placement of a 21-gauge needle into the inferior pole calyx using fluoroscopic guidance. Contrast injection confirmed position within the collecting system. Contrast injection through the AccuStick dilator reconfirmed position within the collecting system. Contrast injection through the 8 [place] APD confirmed position of the pigtail in the renal pelvis.     Impression: Impression: Successful placement of bilateral 8 [place] percutaneous nephrostomy Workstation performed: [id number]     CT pe study w abdomen pelvis w contrast    Result Date: 4/13/2024  Narrative: CT PULMONARY ANGIOGRAM OF THE CHEST AND CT ABDOMEN AND PELVIS WITH INTRAVENOUS CONTRAST INDICATION: syncope, on aspirin, afib history, cancer history, two falls in the past day. High-grade bladder cancer. COMPARISON: PET/CT scan performed February 27, 2024 TECHNIQUE: CT examination of the chest, abdomen and pelvis was performed. Thin section CT angiographic technique was used in the chest in order to evaluate for pulmonary embolus and coronal 3D MIP postprocessing was performed on the acquisition scanner. Multiplanar 2D reformatted images were created from the source data. This examination, like all CT scans performed in the [facility], was performed utilizing techniques to minimize radiation dose exposure, including the use of iterative reconstruction and automated exposure control. Radiation dose length product (DLP) for this visit: 1003 mGy-cm IV Contrast: 100 mL of  iohexol (OMNIPAQUE) Enteric Contrast: Not administered. FINDINGS: CHEST PULMONARY ARTERIAL TREE: No pulmonary embolus is seen. LUNGS: Right middle lobe pulmonary nodule in the perihilar region, 0.9 x 0.7 cm on image 124 of series 4, unchanged from June 27, 2023. No other pulmonary nodules. Few scattered punctate calcified granulomata. Mild atelectasis at the left base. PLEURA: Small left pleural effusion. HEART/AORTA: Heart is unremarkable for patient's age. No thoracic aortic aneurysm. MEDIASTINUM AND ABDIAZIZ: Unremarkable. CHEST WALL AND LOWER NECK: Unremarkable. ABDOMEN LIVER/BILIARY TREE: Unremarkable. GALLBLADDER: No calcified gallstones. Trace nonspecific pericholecystic fluid. SPLEEN: Unremarkable. PANCREAS: Fatty pancreatic atrophy. Cyst at the anterior margin of the proximal pancreatic tail on image 52, 9 mm. No pancreatic ductal enlargement. ADRENAL GLANDS: Unremarkable. KIDNEYS/URETERS: Hydroureteronephrosis bilaterally to region of ureterovesical junction where there is tumor invasion described in greater detail below. Hydronephrosis is more severe on the left than the right and there is relative delayed left renal nephrogram as well as fairly extensive left renal cortical thinning. STOMACH AND BOWEL: See below description of tumor involving lower rectum. Above the rectosigmoid colon, stomach and bowel loops are within normal limits. No evidence for bowel obstruction. APPENDIX: No findings to suggest appendicitis. ABDOMINOPELVIC CAVITY: Trace ascites, new from prior examination predominantly in perihepatic space and in the right hemipelvis. No lymphadenopathy. VESSELS: Unremarkable for patient's age. PELVIS REPRODUCTIVE ORGANS/URINARY BLADDER/RECTUM: Mass involving urinary bladder and prostate appears to have eroded through anterior wall of lower rectum, best demonstrated on image 159 of series 3. There is fecal type material within the profoundly thick-walled urinary bladder. Haley catheter balloon is  "again seen within the lower aspect of the urinary bladder. As on prior examination, bladder wall tumor mass obstructs the distal left ureterovesical junction, but there is now also obstruction of right distal ureterovesical junction. Gas and fluid is present within a multiloculated collection to the left of the rectum but posterior to the urinary bladder extending out laterally towards the left along the obturator musculature and upwards along the lateral left pelvic sidewall consistent with bladder tumor erosion into the perirectal/perivesical space. This is a new finding since February 2024. ABDOMINAL WALL/INGUINAL REGIONS: Body wall edema. BONES: No acute fracture or suspicious osseous lesion.     Impression: Bladder tumor mass, increasing with erosion through anterior wall of lower rectum with some fecal material in the bladder lumen. Also, tumor erosion into perivesical pelvic fat with small collection of fluid and gas is extending upwards along the left pelvic sidewall. Increasing obstruction of left ureterovesical junction with right hydroureteronephrosis. Reidentified severe left hydronephrosis due to ureterovesical junction obstruction with delayed left renal nephrogram and left renal cortical thinning. Trace ascites and small left pleural effusion, new from prior examination. No pulmonary embolus. This examination was marked \"immediate notification\" in Epic in order to begin the standard process by which the radiology reading room liaison alerts the referring practitioner. Workstation performed: [id number]     XR chest portable    Result Date: 4/13/2024  Narrative: XR CHEST PORTABLE INDICATION: trauma. COMPARISON: Chest CT 4/13/2024. FINDINGS: Lungs clear. Small left effusion better shown on subsequent CT. Bilateral skin folds with no pneumothorax. Normal cardiomediastinal silhouette. Skeleton normal for age.  No acute displaced fractures. Normal upper abdomen.     Impression: Small left effusion better " shown on subsequent CT. Workstation performed: [id number]     TRAUMA - CT spine cervical wo contrast    Result Date: 4/13/2024  Narrative: CT CERVICAL SPINE - WITHOUT CONTRAST INDICATION:   TRAUMA. COMPARISON:  None. TECHNIQUE:  CT examination of the cervical spine was performed without intravenous contrast.  Contiguous axial images were obtained. Multiplanar 2D reformatted images were created from the source data. Radiation dose length product (DLP) for this visit:  447 mGy-cm .  This examination, like all CT scans performed in the [facility], was performed utilizing techniques to minimize radiation dose exposure, including the use of iterative reconstruction and automated exposure control. IMAGE QUALITY:  Diagnostic. FINDINGS: ALIGNMENT:  Normal alignment of the cervical spine. No subluxation. VERTEBRAE:  No fracture. DEGENERATIVE CHANGES: Moderate multilevel cervical degenerative changes are noted. No critical central canal stenosis. Moderate multilevel facet arthropathy. PREVERTEBRAL AND PARASPINAL SOFT TISSUES: No prevertebral soft tissue edema unremarkable THORACIC INLET: Partially visualized left pleural effusion.     Impression: No cervical spine fracture or traumatic malalignment. Moderate cervical spondylosis. Partially visualized left pleural effusion. Workstation performed: [id number]     TRAUMA - CT head wo contrast    Result Date: 4/13/2024  Narrative: CT BRAIN - WITHOUT CONTRAST INDICATION:   TRAUMA. COMPARISON: 12/28/2017. TECHNIQUE:  CT examination of the brain was performed.  Multiplanar 2D reformatted images were created from the source data. Radiation dose length product (DLP) for this visit:  904 mGy-cm .  This examination, like all CT scans performed in the [facility], was performed utilizing techniques to minimize radiation dose exposure, including the use of iterative reconstruction and automated exposure control. IMAGE QUALITY:  Diagnostic. FINDINGS:  PARENCHYMA:  No intracranial mass, mass effect or midline shift. No CT signs of acute infarction.  No acute parenchymal hemorrhage. Mild chronic microvascular ischemic changes. VENTRICLES AND EXTRA-AXIAL SPACES: Mildly dilated compatible with mild atrophy. VISUALIZED ORBITS: Normal visualized orbits. PARANASAL SINUSES: Trace fluid in the right maxillary sinus. Mild mucosal thickening of the inferior left maxillary sinus. CALVARIUM AND EXTRACRANIAL SOFT TISSUES:  Normal.     Impression: No acute intracranial abnormality. Trace fluid right maxillary sinus. Mild atrophy and chronic microvascular ischemic changes. Workstation performed: [id number]       Labs and pertinent reports reviewed.      This note has been generated by voice recognition software system.  Therefore, there may be spelling, grammar, and or syntax errors. Please contact if questions arise.       Esperanza Stoll MD, PhD

## 2024-04-14 NOTE — CONSULTS
Consultation - Palliative and Supportive Care   Candido Valladares Jr. 88 y.o. 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 Symptom management -    - Current plan effective.  No acute symptoms at this time.  Encouraged PT/OT and good nutrition as tolerated.     2. Goals - Goal is to be home and independent as possible for as long as possible.  Reviewed with patient his advanced directive, which is ethically comprehensive, and consistent with his Jew denomination of Yarsanism. He is aware his condition is life-limiting and not curable.  Would like to pursue all reasonable cares to prolong quantity of life, as long as it is associated with reasonable quality.  He is aware of outpt Palliative Care services and will reach out if needed.  I will provide our contact on his d/c paperwork.    -     Code Status:  - Level 1   Decisional apparatus:  Patient is competent on my exam today.  If competence is lost, patient's substitute decision maker would default to spous by [place] Act 169.   Advance Directive / Living Will / POLST:  on file in EPIC     I have reviewed the patient's controlled substance dispensing history in the Prescription Drug Monitoring Program in compliance with the  [facility] regulations before prescribing any controlled substances.         We appreciate the invitation to be involved in this patient's care.  We will follow peripherally.  Please do not hesitate to reach our on call provider through our clinic answering service at [phone] should you have acute symptom control concerns.    Isai Iverson MD  Palliative and Supportive Care  Clinic/Answering Service: [phone]  You can find me on TigerConnect!       IDENTIFICATION:  Inpatient consult to Palliative Care  Consult performed by: Isai Iverson MD  Consult ordered by: Antonino Rocha MD      Physician Requesting Consult: Harish Day DO  Reason for Consult / Principal Problem: goals/symptoms  Hx and PE limited by: none    NARRATIVE AND INTERVAL HISTORY:       Candido Valladares Jr. is a 88 y.o. male with known urothelial CA who presents with weakness and fall.  Imaging showing progression of disease, with bilateral hydroureteronephrosis and concern for colovesicular fistula.  Patient is now s/p b/l neph tube placement and loop colostomy placement.  Given his advanced illness and concern for symptom mgmt needs PSC consulted.  Patient seen up in bed this AM.  Feeling well overall.  Denies pain.  Denies n/v.  Appetite is good.  Didn't sleep well in hospital 2/2 usual overnight interruptions.  Prior to admsision reports good nutrition.  Functionally, walked with a walker or unassisted depending on the circumstance.  Was able to do most of a flight of stairs until recently.  Well supported by family.      Regarding goals, patient has a comprehensive ACP doc that we reviewed as above.     Review of Systems   Constitutional: Positive for malaise/fatigue. Negative for decreased appetite.   Cardiovascular:  Negative for chest pain.   Respiratory:  Negative for shortness of breath.    Skin:  Negative for poor wound healing.   Musculoskeletal:  Negative for back pain and myalgias.   Gastrointestinal:  Positive for  diarrhea. Negative for constipation, nausea and vomiting.   Neurological:  Positive for weakness. Negative for excessive daytime sleepiness.   Psychiatric/Behavioral:  Negative for depression. The patient does not have insomnia and is not nervous/anxious.    All other systems reviewed and are negative.      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)      Past Surgical History:   Procedure Laterality Date    APPENDECTOMY      IR NEPHROSTOMY TUBE PLACEMENT  4/13/2024    DE TRURL ELECTROSURG RESCJ PROSTATE BLEED COMPLETE N/A 9/11/2023    Procedure: CYSTOSCOPY; TRANSURETHRAL RESECTION OF PROSTATE (TURP);  Surgeon: Franklyn Joseph MD;  Location: [facility];  Service: Urology     Social History     Socioeconomic History    Marital status: /Civil Union     Spouse name: Not on file    Number of children: Not on file    Years of education: Not on file    Highest education level: Not on file   Occupational History    Occupation: RETIRED   Tobacco Use    Smoking status: Never     Passive exposure: Never    Smokeless tobacco: Never   Vaping Use    Vaping status: Never Used   Substance and Sexual Activity    Alcohol use: Not Currently    Drug use: Not Currently    Sexual activity: Not Currently   Other Topics Concern    Not on file   Social History Narrative        RETIRED     Social Determinants of Health     Financial Resource Strain: Low Risk  (11/28/2023)    Overall Financial Resource Strain (CARDIA)     Difficulty of Paying Living Expenses: Not hard at all   Food Insecurity: No Food Insecurity (6/28/2023)    Hunger Vital Sign     Worried About Running Out of Food in the Last Year: Never true     Ran Out of Food in the Last Year: Never true   Transportation Needs: No Transportation Needs (11/28/2023)    PRAPARE -  Transportation     Lack of Transportation (Medical): No     Lack of Transportation (Non-Medical): No   Physical Activity: Not on file   Stress: Not on file   Social Connections: Not on file   Intimate Partner Violence: Not on file   Housing Stability: Low Risk  (6/28/2023)    Housing Stability Vital Sign     Unable to Pay for Housing in the Last Year: No     Number of Places Lived in the Last Year: 1     Unstable Housing in the Last Year: No     Family History   Problem Relation Age of Onset    Hypertension Mother        MEDICATIONS / ALLERGIES:    all current active meds have been reviewed and current meds:   Current Facility-Administered Medications   Medication Dose Route Frequency    acetaminophen (TYLENOL) tablet 650 mg  650 mg Oral Q6H PRN    ceftriaxone (ROCEPHIN) 1 g/50 mL in dextrose IVPB  1,000 mg Intravenous Q24H    heparin (porcine) subcutaneous injection 5,000 Units  5,000 Units Subcutaneous Q8H WILLIAN    HYDROmorphone (DILAUDID) injection 0.2 mg  0.2 mg Intravenous Q3H PRN    lactated ringers infusion  100 mL/hr Intravenous Continuous    metoprolol tartrate (LOPRESSOR) tablet 50 mg  50 mg Oral Q12H WILLIAN    metroNIDAZOLE (FLAGYL) IVPB (premix) 500 mg 100 mL  500 mg Intravenous Q8H    ondansetron (ZOFRAN) injection 4 mg  4 mg Intravenous Q6H PRN    oxyCODONE (ROXICODONE) IR tablet 2.5 mg  2.5 mg Oral Q6H PRN    oxyCODONE (ROXICODONE) IR tablet 5 mg  5 mg Oral Q6H PRN    pantoprazole (PROTONIX) EC tablet 40 mg  40 mg Oral Daily       No Known Allergies    OBJECTIVE:    Physical Exam  Physical Exam  Vitals and nursing note reviewed.   Constitutional:       General: He is not in acute distress.     Appearance: He is ill-appearing. He is not toxic-appearing or diaphoretic.   HENT:      Head: Normocephalic and atraumatic.      Right Ear: External ear normal.      Left Ear: External ear normal.      Nose: Nose normal.      Mouth/Throat:      Mouth: Mucous membranes are moist.   Eyes:      General:         Right  eye: No discharge.         Left eye: No discharge.   Cardiovascular:      Rate and Rhythm: Normal rate.   Pulmonary:      Effort: No respiratory distress.   Abdominal:      General: There is no distension.   Musculoskeletal:         General: Deformity (noted moderate muscle wasting on exam) present. No swelling.   Skin:     General: Skin is warm and dry.      Coloration: Skin is not jaundiced or pale.   Neurological:      General: No focal deficit present.      Mental Status: He is alert and oriented to person, place, and time.   Psychiatric:         Mood and Affect: Mood normal.         Behavior: Behavior normal.         Thought Content: Thought content normal.         Judgment: Judgment normal.         Lab Results: I have personally reviewed pertinent labs., CBC:   Lab Results   Component Value Date    WBC 16.64 (H) 04/14/2024    HGB 8.6 (L) 04/14/2024    HCT 26.7 (L) 04/14/2024    MCV 94 04/14/2024     (H) 04/14/2024    RBC 2.84 (L) 04/14/2024    MCH 30.3 04/14/2024    MCHC 32.2 04/14/2024    RDW 15.9 (H) 04/14/2024    MPV 8.3 (L) 04/14/2024   , CMP:   Lab Results   Component Value Date    SODIUM 135 04/14/2024    K 3.7 04/14/2024     04/14/2024    CO2 21 04/14/2024    BUN 23 04/14/2024    CREATININE 0.80 04/14/2024    CALCIUM 7.4 (L) 04/14/2024    EGFR 80 04/14/2024     Imaging Studies: I personally reviewed relevant reports  EKG, Pathology, and Other Studies: I personally reviewed relevant reports    Counseling / Coordination of Care    Total floor / unit time spent today 60 minutes. Greater than 50% of total time was spent with the patient and / or family counseling and / or coordination of care. A description of the counseling / coordination of care: patient assessment, extensive chart review, review of ACP docs, confirmation of code status, symptom assessment, med review.      Th

## 2024-04-14 NOTE — CONSULTS
[facility] Electronic Health Record Consult  IR has been consulted to evaluate the patient, determine the appropriate procedure, and whether or not a procedure can and should be performed regarding the care of Candido Valladares Jr..    We were consulted by red surgery service concerning Candido Valladares Jr, and to possibly perform a bilateral percutaneous nephrostomy cathetheres if medically appropriate for the patient. The patient is aware that a specialty consultation is taking place, and agrees to the IR Consult on their behalf.     Assessment/Plan:   Patient with colovescical fistula for pending loop colostomy.  Bilateral hydronephrosis for which diversion is desired.  This will be performed in the hybrid OR as discussed with Dr. Day    I spent 20 minutes in medical consultative time evaluating the medical record and imaging of Candido Valladares JrRosa Isela.    Thank you for allowing [facility] to participate in the care of Candido Valladares Jr.. Please don't hesitate to call or TigerText us with any questions.     Wilton Muñiz, DO

## 2024-04-14 NOTE — CONSULTS
Inpatient consult to Urology  Consult performed by: Esther Mclean PA-C  Consult ordered by: Jayesh Lizarraga MD      : UROLOGY  Candido Valladares Jr. 88 y.o. male [id number]   Unit/Bed #: [facility] 833-01  Encounter: [id number]        Assessment  & Plan  :  88-year-old male with a past urologic history of prostate cancer status post radiation, TURP September 2023 for urinary retention with pathology revealing urothelial carcinoma with CT findings of progression and mass involving urinary bladder and prostate which has eroded through the anterior wall of the rectum and bilateral hydronephrosis managed by Dr. Joseph private urologist:    -Now postop day 1 loop colostomy and diversion of urinary tract with bilateral percutaneous nephrostomy tubes with interventional radiology  -Maintain Haley catheter for now can consider removal prior to discharge as patient is now diverted with bilateral PCNs.  -Continue with medical optimization per primary team/post surgical care  -Encourage ambulation  -Diet and bowel regimen per primary team  -Daily flushes nephrostomy tubes 10 cc.  -Continue to monitor urinary output from nephrostomy tubes.  -Oncology management on outpatient, palliative care for goals of care discussions  -Recommend outpatient follow-up with primary urologist Dr. Joseph for further management of nephrostomy tubes.  Discussed with patient most likely will be chronic and require exchanges in 3-month intervals, was performed with interventional radiology.    Subjective :    Candido Valladares Jr.  is a 88 y.o. male with past urologic history of prostate cancer status post radiation, recent TURP in September 2023 with pathology revealing urothelial carcinoma follows and is managed by Dr. Joseph private urologist on outpatient.  CT scan revealing mass involving urinary bladder and prostate eroded through the anterior wall lower rectum, fecal matter likely seen in bladder, tumor obstructs bilateral UVJ ureterovesicular junctions  gas and fluid and a multiloculated collection to the left of the rectum consistent with tumor erosion into the perirectal/perivesical space.  Patient transferred to [facility] for general surgery diverting loop colostomy and IR bilateral PCN placement for diversion of urinary tract.  Evaluated this a.m. sitting comfortably in bed no acute distress.  Reports that his pain is a 1 out of 10.  Denies significant discomfort.  Denies any discomfort with nephrostomy tubes.  Denies any nausea or vomiting.  Has an appetite.    No Known Allergies   Current Outpatient Medications   Medication Instructions    aspirin 81 mg, Oral, Daily    ferrous sulfate 325 mg, Oral, Daily with breakfast    finasteride (PROPECIA) 1 mg, Oral, Daily    folic acid (FOLVITE) 1 mg, Oral, Daily    folic acid (FOLVITE) 1,000 mcg, Oral, Daily    furosemide (LASIX) 20 mg, Oral, Daily    metoprolol succinate (TOPROL-XL) 100 mg, Oral, Daily    pantoprazole (PROTONIX) 40 mg, Oral, Daily    predniSONE 10 mg tablet TAKE SIX TABS (60mg) )FOR 7 DAYS THEN 4 TABS (40 mg)  FOR 7 DAYS THEN 2 TABS (20 mg)  FOR 7 DAYS THEN ONE TAB ( 10 MG) FOR 7 DAYS    predniSONE 10 mg, Oral, Daily    predniSONE 10 mg, Oral, Daily    senna-docusate sodium (SENOKOT S) 8.6-50 mg per tablet 2 tablets, Oral, Daily at bedtime, May hold for loose stools    sodium chloride, PF, 0.9 % 10 mL, Intracatheter, Daily, Intracatheter flushing daily. May substitute prefilled syringe with normal saline 10 mL vials, 10 mL syringes, and 18 g blunt needles    sodium chloride, PF, 0.9 % 10 mL, Intracatheter, Daily, Intracatheter flushing daily. 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)      Past Surgical History:   Procedure Laterality Date    APPENDECTOMY      IR NEPHROSTOMY TUBE PLACEMENT  4/13/2024    ME TRURL ELECTROSURG RESCJ PROSTATE BLEED COMPLETE N/A 9/11/2023    Procedure: CYSTOSCOPY; TRANSURETHRAL RESECTION OF PROSTATE (TURP);  Surgeon: Franklyn Joseph MD;  Location: [facility];  Service: Urology     Family History   Problem Relation Age of Onset    Hypertension Mother      Social History     Socioeconomic History    Marital status: /Civil Union     Spouse name: None    Number of children: None    Years of education: None    Highest education level: None   Occupational History    Occupation: RETIRED   Tobacco Use    Smoking status: Never     Passive exposure: Never    Smokeless tobacco: Never   Vaping Use    Vaping status: Never Used   Substance and Sexual Activity    Alcohol use: Not Currently    Drug use: Not Currently    Sexual activity: Not Currently   Other Topics Concern    None   Social History Narrative        RETIRED     Social Determinants of Health     Financial Resource Strain: Low Risk  (11/28/2023)    Overall Financial Resource Strain (CARDIA)     Difficulty of Paying Living Expenses: Not hard at all   Food Insecurity: No Food Insecurity (6/28/2023)    Hunger Vital Sign     Worried About Running Out of Food in the Last Year: Never true     Ran Out of Food in the Last Year: Never true   Transportation Needs: No Transportation Needs (11/28/2023)    PRAPARE - Transportation     Lack of Transportation (Medical): No     Lack of Transportation (Non-Medical): No   Physical Activity: Not on file   Stress: Not on file   Social Connections: Not on file   Intimate Partner Violence: Not on file   Housing Stability: Low Risk  (6/28/2023)    Housing Stability Vital Sign     Unable to Pay for Housing in the Last Year: No     Number of Places Lived in the  Last Year: 1     Unstable Housing in the Last Year: No        Review of Systems     Objective     Physical Exam  Constitutional:       General: He is not in acute distress.     Appearance: He is normal weight. He is not ill-appearing, toxic-appearing or diaphoretic.   HENT:      Head: Normocephalic and atraumatic.      Right Ear: External ear normal.      Left Ear: External ear normal.      Nose: Nose normal.      Mouth/Throat:      Pharynx: Oropharynx is clear.   Eyes:      General: No scleral icterus.     Conjunctiva/sclera: Conjunctivae normal.   Cardiovascular:      Rate and Rhythm: Normal rate and regular rhythm.      Pulses: Normal pulses.      Heart sounds: No murmur heard.     No friction rub. No gallop.   Pulmonary:      Effort: Pulmonary effort is normal. No respiratory distress.      Breath sounds: No wheezing, rhonchi or rales.   Abdominal:      General: Bowel sounds are normal. There is distension.      Tenderness: There is no abdominal tenderness.      Comments: Colostomy in place without any stool present, abdomen is mildly distended, tympanic in upper quadrants, bowel sounds present throughout although decreased, abdomen is soft nontender   Genitourinary:     Comments: Bilateral nephrostomy tubes in place draining minimal output recently emptied, left nephrostomy pink-colored urine, right nephrostomy with clear urine  Musculoskeletal:      Cervical back: Normal range of motion.   Skin:     General: Skin is warm and dry.   Neurological:      Mental Status: He is alert.                Imaging:  CT PULMONARY ANGIOGRAM OF THE CHEST AND CT ABDOMEN AND PELVIS WITH INTRAVENOUS CONTRAST     INDICATION: syncope, on aspirin, afib history, cancer history, two falls in the past day. High-grade bladder cancer.     COMPARISON: PET/CT scan performed February 27, 2024     TECHNIQUE: CT examination of the chest, abdomen and pelvis was performed. Thin section CT angiographic technique was used in the chest in order to  evaluate for pulmonary embolus and coronal 3D MIP postprocessing was performed on the acquisition scanner.   Multiplanar 2D reformatted images were created from the source data.     This examination, like all CT scans performed in the [facility], was performed utilizing techniques to minimize radiation dose exposure, including the use of iterative reconstruction and automated exposure control. Radiation dose length   product (DLP) for this visit: 1003 mGy-cm     IV Contrast: 100 mL of iohexol (OMNIPAQUE)  Enteric Contrast: Not administered.     FINDINGS:     CHEST     PULMONARY ARTERIAL TREE: No pulmonary embolus is seen.     LUNGS: Right middle lobe pulmonary nodule in the perihilar region, 0.9 x 0.7 cm on image 124 of series 4, unchanged from June 27, 2023. No other pulmonary nodules. Few scattered punctate calcified granulomata. Mild atelectasis at the left base.     PLEURA: Small left pleural effusion.     HEART/AORTA: Heart is unremarkable for patient's age. No thoracic aortic aneurysm.     MEDIASTINUM AND ABDIAZIZ: Unremarkable.     CHEST WALL AND LOWER NECK: Unremarkable.     ABDOMEN     LIVER/BILIARY TREE: Unremarkable.     GALLBLADDER: No calcified gallstones. Trace nonspecific pericholecystic fluid.     SPLEEN: Unremarkable.     PANCREAS: Fatty pancreatic atrophy. Cyst at the anterior margin of the proximal pancreatic tail on image 52, 9 mm. No pancreatic ductal enlargement.     ADRENAL GLANDS: Unremarkable.     KIDNEYS/URETERS: Hydroureteronephrosis bilaterally to region of ureterovesical junction where there is tumor invasion described in greater detail below. Hydronephrosis is more severe on the left than the right and there is relative delayed left renal   nephrogram as well as fairly extensive left renal cortical thinning.     STOMACH AND BOWEL: See below description of tumor involving lower rectum. Above the rectosigmoid colon, stomach and bowel loops are within normal limits. No  evidence for bowel obstruction.     APPENDIX: No findings to suggest appendicitis.     ABDOMINOPELVIC CAVITY: Trace ascites, new from prior examination predominantly in perihepatic space and in the right hemipelvis. No lymphadenopathy.     VESSELS: Unremarkable for patient's age.     PELVIS     REPRODUCTIVE ORGANS/URINARY BLADDER/RECTUM: Mass involving urinary bladder and prostate appears to have eroded through anterior wall of lower rectum, best demonstrated on image 159 of series 3. There is fecal type material within the profoundly   thick-walled urinary bladder. Haley catheter balloon is again seen within the lower aspect of the urinary bladder. As on prior examination, bladder wall tumor mass obstructs the distal left ureterovesical junction, but there is now also obstruction of   right distal ureterovesical junction.     Gas and fluid is present within a multiloculated collection to the left of the rectum but posterior to the urinary bladder extending out laterally towards the left along the obturator musculature and upwards along the lateral left pelvic sidewall   consistent with bladder tumor erosion into the perirectal/perivesical space. This is a new finding since February 2024.     ABDOMINAL WALL/INGUINAL REGIONS: Body wall edema.     BONES: No acute fracture or suspicious osseous lesion.     IMPRESSION:     Bladder tumor mass, increasing with erosion through anterior wall of lower rectum with some fecal material in the bladder lumen.     Also, tumor erosion into perivesical pelvic fat with small collection of fluid and gas is extending upwards along the left pelvic sidewall.     Increasing obstruction of left ureterovesical junction with right hydroureteronephrosis. Reidentified severe left hydronephrosis due to ureterovesical junction obstruction with delayed left renal nephrogram and left renal cortical thinning.     Trace ascites and small left pleural effusion, new from prior examination.     No  "pulmonary embolus.     This examination was marked \"immediate notification\" in Epic in order to begin the standard process by which the radiology reading room liaison alerts the referring practitioner.        Workstation performed: [id number]    Labs:  Lab Results   Component Value Date    SODIUM 135 04/14/2024    K 3.7 04/14/2024     04/14/2024    CO2 21 04/14/2024    BUN 23 04/14/2024    CREATININE 0.80 04/14/2024    GLUC 76 04/14/2024    CALCIUM 7.4 (L) 04/14/2024         Lab Results   Component Value Date    WBC 16.64 (H) 04/14/2024    HGB 8.6 (L) 04/14/2024    HCT 26.7 (L) 04/14/2024    MCV 94 04/14/2024     (H) 04/14/2024         VTE Pharmacologic Prophylaxis: Heparin  VTE Mechanical Prophylaxis: sequential compression device     Esther Mclean PA-C     "

## 2024-04-14 NOTE — QUICK NOTE
Post Op Check:    Pt seen and examined, remains somnolent but responsive to commands. Moderate incisional soreness along ostomy. Appears pink and viable, no stool productive yet.     Temp:  [96.2 °F (35.7 °C)-97.7 °F (36.5 °C)] 97 °F (36.1 °C)  HR:  [] 74  Resp:  [14-27] 17  BP: (114-168)/() 132/82      Physical Exam:  General: No acute distress, oriented  CV: Well perfused, regular rate  Lungs: Normal work of breathing, no increased respiratory effort  Abdomen: Soft, appropriately tender, non-distended. Incision(s) clean, dry and intact, ostomy appears pink  and viable   Extremities: No edema, clubbing or cyanosis  Skin: Warm, dry      Jayesh Lizarraga MD  PGY-1   04/14/24

## 2024-04-14 NOTE — PROGRESS NOTES
"Progress Note - General Surgery   Candido Valladares Jr. 88 y.o. 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 Pt resting comfortably. Andrés CLD well without issue. Pain is controlled, but has not been oob. His ostomy is pink and viable w/ bowel sweat, no gas in the bag yet.    Objective: AVSS on RA    Blood pressure 117/63, pulse 73, temperature 97.9 °F (36.6 °C), temperature source Oral, resp. rate 16, height 6' 1\" (1.854 m), weight 79.8 kg (175 lb 14.8 oz), SpO2 96%.,Body mass index is 23.21 kg/m².    UOP: 210cc  Ostomy: 0 cc      Invasive Devices       Peripheral Intravenous Line  Duration             Peripheral IV 04/13/24 Left;Proximal;Ventral (anterior) Forearm 1 day    Peripheral IV 04/13/24 Right Hand <1 day              Drain  Duration             Urethral Catheter Latex;Three way 24 Fr. 216 days    Colostomy Transverse LUQ <1 day    Percutaneous Nephroureteral Tube (PCNU) Left 1 8.5 Fr <1 day    Percutaneous Nephroureteral Tube (PCNU) Right 2 8.5 Fr <1 day                    Physical Exam:  General: No acute distress, alert and oriented  CV: RRR  Lungs: Normal work of breathing   Abdomen: midline icision c/d/I, transverse loop pink and viable, bowel sweat, no feculant output  Skin: Warm, dry    Lab, Imaging and other studies:  Recent Labs     04/13/24  0913 04/14/24  0004 04/14/24  0601   WBC 19.01*  --  16.64*   HGB 9.7*  --  8.6*   * 512* 447*   SODIUM 130*  --  135   K 4.5  --  3.7   CL 98  --  105   CO2 22  --  21   BUN " 27*  --  23   CREATININE 1.11  --  0.80   GLUC 157*  --  76   CALCIUM 8.2*  --  7.4*   MG  --   --  1.6*   PHOS  --   --  3.6   AST 24  --   --    ALT 12  --   --    ALKPHOS 81  --   --    TBILI 0.58  --   --

## 2024-04-14 NOTE — ANESTHESIA POSTPROCEDURE EVALUATION
Post-Op Assessment Note    CV Status:  Stable  Pain Score: 0    Pain management: adequate       Mental Status:  Alert, awake and sleepy   Hydration Status:  Euvolemic   PONV Controlled:  Controlled   Airway Patency:  Patent     Post Op Vitals Reviewed: Yes    No anethesia notable event occurred.    Staff: CRNA               BP   143/64   Temp   97.7   Pulse  68   Resp   18   SpO2   98

## 2024-04-14 NOTE — OP NOTE
OPERATIVE REPORT  PATIENT NAME: Candido Valladares Jr.    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 Diff Stool Colon CLOSTRIDIUM DIFFICILE TOXIN BY PCR (Canceled) Harish Day DO 2024        Estimated Blood Loss:   Minimal    Drains:  Colostomy Transverse LUQ (Active)   Stomal Appliance 1 piece 24   Stoma Assessment Neihart 24   Stoma Shape Round 24   Peristomal Assessment Clean;Intact 24   Number of days: 0       Urethral Catheter Latex;Three way 24 Fr. (Active)   Number of days: 215       Percutaneous Nephroureteral Tube (PCNU) Left 1 8.5 Fr (Active)   Site Assessment BARRY 24   Dressing Status Clean;Dry;Intact 24   Dressing Type Dry dressing 24   Collection Container Standard drainage bag 24   Securement Method Tape 24   Output (mL) 50 mL 24   Number of days: 0       Percutaneous Nephroureteral Tube (PCNU) Right 2 8.5 Fr (Active)   Site Assessment BARRY 24   Dressing Status Clean;Dry;Intact 24   Dressing Type Dry dressing 24   Collection Container Standard  drainage bag 04/13/24 2201   Securement Method Securing device (Describe) 04/13/24 2201   Output (mL) 100 mL 04/13/24 2201   Number of days: 0       Anesthesia Type:   General    Operative Indications:  High grade urothelial carcinoma present on urine cytology (HCC) [C68.9]  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  The patient had already undergone general anesthesia with endotracheal tube in place.  He was placed in the supine position.  Regular monitoring devices had previously been connected. A timeout was performed prior to incision to ensure correct patient position, procedure, and site.    A left upper quadrant transverse incision was made.  Dissection carried down through the anterior and posterior fascia and the abdomen was carefully entered.  Inspection showed no injury to intra-abdominal contents.  The omentum was grasped and retracted out of the abdomen leading us to the transverse colon.  The tinea were identified on the transverse colon confirming our anatomy.  We proceeded to clear the colonic segment of pericolonic fat and omentum.     We inspected the greater omentum.  We ensured hemostasis and returned this back into the abdomen.      We placed 2 figure-of-eight Vicryl sutures to close our fascial defect to a more appropriate size.    We were then ready to begin ostomy.  We created a transverse incision using electrocautery on the anterior wall of the transverse colon making sure not to injure the posterior wall.  Samples of C. difficile were obtained at this point and sent.  We created a mesenteric defect and placed a stoma bar through this to isolate this segment.  We then brooked the ostomy using chromic sutures in the standard fashion.    A stoma appliance was applied around the stoma  bar and ostomy.    The patient tolerated procedure well was taken to the post anesthesia care unit in stable condition. All lap, needle, and instrument counts were correct.      Dr. Day was present for the entire procedure.     Patient Disposition:  PACU     This procedure was not performed to treat colon cancer through resection      SIGNATURE: Antonino Rocha MD  DATE: April 13, 2024  TIME: 10:24 PM

## 2024-04-14 NOTE — OP NOTE
Bilateral percutaneous nephrostomy placement 4/13/2024    History: Prostate carcinoma; colovesical fistula and bilateral hydronephrosis. Pending loop colostomy. Diversion with bilateral percutaneous nephrostomy is desired    Contrast: 20 cc Omnipaque 350    Fluoroscopy time: 4.3 MINUTES    Number of images: Multiple    Radiation dose: 69 mGy     Conscious sedation time: Anesthesia    Procedure: The patient was identified verbally and by wristband. Timeout was performed.  Informed consent was obtained. Following obtaining informed consent the patient was prepped and draped in the usual sterile fashion. Using ultrasound guidance access was gained to the left collecting system using a 21-gauge AccuStick needle. Contrast was injected and the collecting system opacified. A 0.018 wire was advanced into the ureter. The needle was exchanged for a AccuStick dilator. A 0.035 wire was advanced into the ureter. Following tract dilatation an 8 [place] APD was placed in the renal pelvis. Contrast was injected through the newly placed nephrostomy and image obtained. The catheter was secured in place and placed to gravity bag drainage.    Using fluoroscopic guidance access was gained to the right collecting system using a 21-gauge AccuStick needle. Contrast was injected in the collecting system further opacified. A 0.018 wire was advanced into the collecting system. The needle was exchanged for the AccuStick dilator. A 4 [place]an angled glide catheter was advanced through the AccuStick dilator and a 0.035 wire was advanced down the ureter. Following tract dilatation, an 8 [place] APD was placed in the renal pelvis. Contrast was injected through the newly placed nephrostomy and image obtained. The catheter was secured in place and placed to gravity bag drainage.    The patient tolerated the procedure well without apparent immediate complication. The the procedure was then handed to Dr. Victoria for loop colostomy.    Findings: Cursory  ultrasound of the left kidney demonstrated hydronephrosis. There was successful ultrasound-guided placement of a 21-gauge needle into the interpolar calyx. Contrast injection confirmed position within the collecting system. Contrast injection through the AccuStick dilator confirmed position within the collecting system. Contrast injection through the 8 [place] nephrostomy confirmed the pigtail within the renal pelvis.    The right collecting system was persistently opacified from previous contrast CT. There was successful fluoroscopic guided placement of a 21-gauge needle into the inferior pole calyx using fluoroscopic guidance. Contrast injection confirmed position within the collecting system. Contrast injection through the AccuStick dilator reconfirmed position within the collecting system. Contrast injection through the 8 [place] APD confirmed position of the pigtail in the renal pelvis. A urine specimen from the right collecting system was obtained and sent to the lab for culture and sensitivity.    Impression: Successful placement of bilateral 8 [place] percutaneous nephrostomy

## 2024-04-15 PROBLEM — N32.1 COLOVESICAL FISTULA: Status: ACTIVE | Noted: 2024-04-15

## 2024-04-15 PROBLEM — N13.30 HYDRONEPHROSIS: Status: ACTIVE | Noted: 2024-04-15

## 2024-04-15 LAB
ANION GAP SERPL CALCULATED.3IONS-SCNC: 7 MMOL/L (ref 4–13)
BASOPHILS # BLD AUTO: 0.02 THOUSANDS/ÂΜL (ref 0–0.1)
BASOPHILS NFR BLD AUTO: 0 % (ref 0–1)
BUN SERPL-MCNC: 20 MG/DL (ref 5–25)
C DIFF TOX GENS STL QL NAA+PROBE: NEGATIVE
CALCIUM SERPL-MCNC: 7.1 MG/DL (ref 8.4–10.2)
CHLORIDE SERPL-SCNC: 105 MMOL/L (ref 96–108)
CO2 SERPL-SCNC: 22 MMOL/L (ref 21–32)
CREAT SERPL-MCNC: 0.91 MG/DL (ref 0.6–1.3)
EOSINOPHIL # BLD AUTO: 0.07 THOUSAND/ÂΜL (ref 0–0.61)
EOSINOPHIL NFR BLD AUTO: 1 % (ref 0–6)
ERYTHROCYTE [DISTWIDTH] IN BLOOD BY AUTOMATED COUNT: 15.9 % (ref 11.6–15.1)
GFR SERPL CREATININE-BSD FRML MDRD: 75 ML/MIN/1.73SQ M
GLUCOSE SERPL-MCNC: 133 MG/DL (ref 65–140)
HCT VFR BLD AUTO: 24 % (ref 36.5–49.3)
HGB BLD-MCNC: 7.3 G/DL (ref 12–17)
IMM GRANULOCYTES # BLD AUTO: 0.19 THOUSAND/UL (ref 0–0.2)
IMM GRANULOCYTES NFR BLD AUTO: 2 % (ref 0–2)
LYMPHOCYTES # BLD AUTO: 0.72 THOUSANDS/ÂΜL (ref 0.6–4.47)
LYMPHOCYTES NFR BLD AUTO: 8 % (ref 14–44)
MAGNESIUM SERPL-MCNC: 2.4 MG/DL (ref 1.9–2.7)
MCH RBC QN AUTO: 29.2 PG (ref 26.8–34.3)
MCHC RBC AUTO-ENTMCNC: 30.4 G/DL (ref 31.4–37.4)
MCV RBC AUTO: 96 FL (ref 82–98)
MONOCYTES # BLD AUTO: 0.45 THOUSAND/ÂΜL (ref 0.17–1.22)
MONOCYTES NFR BLD AUTO: 5 % (ref 4–12)
NEUTROPHILS # BLD AUTO: 7.08 THOUSANDS/ÂΜL (ref 1.85–7.62)
NEUTS SEG NFR BLD AUTO: 84 % (ref 43–75)
NRBC BLD AUTO-RTO: 0 /100 WBCS
PHOSPHATE SERPL-MCNC: 3.8 MG/DL (ref 2.3–4.1)
PLATELET # BLD AUTO: 366 THOUSANDS/UL (ref 149–390)
PMV BLD AUTO: 8.1 FL (ref 8.9–12.7)
POTASSIUM SERPL-SCNC: 3.4 MMOL/L (ref 3.5–5.3)
RBC # BLD AUTO: 2.5 MILLION/UL (ref 3.88–5.62)
SODIUM SERPL-SCNC: 134 MMOL/L (ref 135–147)
WBC # BLD AUTO: 8.53 THOUSAND/UL (ref 4.31–10.16)

## 2024-04-15 PROCEDURE — 84100 ASSAY OF PHOSPHORUS: CPT

## 2024-04-15 PROCEDURE — 80048 BASIC METABOLIC PNL TOTAL CA: CPT

## 2024-04-15 PROCEDURE — 99024 POSTOP FOLLOW-UP VISIT: CPT | Performed by: SURGERY

## 2024-04-15 PROCEDURE — 99232 SBSQ HOSP IP/OBS MODERATE 35: CPT | Performed by: UROLOGY

## 2024-04-15 PROCEDURE — 97163 PT EVAL HIGH COMPLEX 45 MIN: CPT

## 2024-04-15 PROCEDURE — 97167 OT EVAL HIGH COMPLEX 60 MIN: CPT

## 2024-04-15 PROCEDURE — 97535 SELF CARE MNGMENT TRAINING: CPT

## 2024-04-15 PROCEDURE — 99232 SBSQ HOSP IP/OBS MODERATE 35: CPT | Performed by: PHYSICIAN ASSISTANT

## 2024-04-15 PROCEDURE — 83735 ASSAY OF MAGNESIUM: CPT

## 2024-04-15 PROCEDURE — 97530 THERAPEUTIC ACTIVITIES: CPT

## 2024-04-15 PROCEDURE — 85025 COMPLETE CBC W/AUTO DIFF WBC: CPT

## 2024-04-15 RX ORDER — MAGNESIUM SULFATE HEPTAHYDRATE 40 MG/ML
4 INJECTION, SOLUTION INTRAVENOUS ONCE
Status: COMPLETED | OUTPATIENT
Start: 2024-04-15 | End: 2024-04-15

## 2024-04-15 RX ADMIN — SODIUM CHLORIDE, SODIUM LACTATE, POTASSIUM CHLORIDE, AND CALCIUM CHLORIDE 500 ML: .6; .31; .03; .02 INJECTION, SOLUTION INTRAVENOUS at 01:24

## 2024-04-15 RX ADMIN — HEPARIN SODIUM 5000 UNITS: 5000 INJECTION INTRAVENOUS; SUBCUTANEOUS at 05:11

## 2024-04-15 RX ADMIN — PANTOPRAZOLE SODIUM 40 MG: 40 TABLET, DELAYED RELEASE ORAL at 08:27

## 2024-04-15 RX ADMIN — METRONIDAZOLE 500 MG: 500 INJECTION, SOLUTION INTRAVENOUS at 10:18

## 2024-04-15 RX ADMIN — METOPROLOL TARTRATE 50 MG: 50 TABLET, FILM COATED ORAL at 21:35

## 2024-04-15 RX ADMIN — METRONIDAZOLE 500 MG: 500 INJECTION, SOLUTION INTRAVENOUS at 01:22

## 2024-04-15 RX ADMIN — MAGNESIUM SULFATE HEPTAHYDRATE 4 G: 40 INJECTION, SOLUTION INTRAVENOUS at 01:24

## 2024-04-15 RX ADMIN — SODIUM PHOSPHATE, MONOBASIC, MONOHYDRATE AND SODIUM PHOSPHATE, DIBASIC, ANHYDROUS 21 MMOL: 142; 276 INJECTION, SOLUTION INTRAVENOUS at 02:20

## 2024-04-15 RX ADMIN — HEPARIN SODIUM 5000 UNITS: 5000 INJECTION INTRAVENOUS; SUBCUTANEOUS at 21:35

## 2024-04-15 RX ADMIN — METOPROLOL TARTRATE 50 MG: 50 TABLET, FILM COATED ORAL at 08:27

## 2024-04-15 RX ADMIN — METRONIDAZOLE 500 MG: 500 INJECTION, SOLUTION INTRAVENOUS at 18:17

## 2024-04-15 RX ADMIN — HEPARIN SODIUM 5000 UNITS: 5000 INJECTION INTRAVENOUS; SUBCUTANEOUS at 15:19

## 2024-04-15 RX ADMIN — CEFTRIAXONE 1000 MG: 1 INJECTION, POWDER, FOR SOLUTION INTRAMUSCULAR; INTRAVENOUS at 11:56

## 2024-04-15 NOTE — PROGRESS NOTES
"Progress Note - General Surgery   Candido Valladares Jr. 88 y.o. male MRN: [id number]  Unit/Bed#: [facility] 833-01 Encounter: [id number]    Assessment:  89 yo M w/ progressive and locally advanced urothelial cancer, colovesical fistula and bilateral obstructive hydrouteronephrosis now requiring fecal diversion s/p diverting loop colostomy and b/l IR placed PCN on 4/13    Intermittent tachycardia 100's, Tmax 100.1 at 3:30pm, other vitals normal on room air  Dominique 85cc  R PCN 300cc  L PCN 25cc    WBC 8.53 from 16.64  Hemoglobin 7.3 from 8.6  Creatinine pending from 0.80    4/13 blood cultures: No growth at 24 hours  4/14 C. difficile: Pending    Plan:  Renal restrictive diet, Fluid restriction 1800mL, await ostomy function  D/C IVF  DC stoma bar today  Follow up C diff  Appreciate Urology recommendations, maintain dominique, follow up recommendations for removal  Maintain bilateral nephrostomies, monitor output  Appreciate Palliative care recommendations, Level 1  Appreciate Medical Oncology recommendations, no anticancer treatment at this time, follow up with Dr. Britton as an outpatient  PRN analgesia  DVT PPX  OOB/ambulate  PT/OT    Subjective/Objective     Subjective: No acute events overnight, tolerating renal restricted diet without nausea or vomiting, no stoma output yet just bowel sweat, pain controlled    Objective:     Blood pressure 115/71, pulse 98, temperature 99.3 °F (37.4 °C), resp. rate 18, height 6' 1\" (1.854 m), weight 79.8 kg (175 lb 14.8 oz), SpO2 97%.,Body mass index is 23.21 kg/m².      Intake/Output Summary (Last 24 hours) at 4/15/2024 0554  Last data filed at 4/15/2024 0519  Gross per 24 hour   Intake 2523.33 ml   Output 460 ml   Net 2063.33 ml       Invasive Devices       Peripheral Intravenous Line  Duration             Peripheral IV 04/13/24 Left;Proximal;Ventral (anterior) Forearm 1 day    Peripheral IV 04/13/24 Right Hand 1 day              Drain  Duration             Urethral Catheter Latex;Three way " 24 Fr. 216 days    Colostomy Transverse LUQ 1 day    Percutaneous Nephroureteral Tube (PCNU) Left 1 8.5 Fr 1 day    Percutaneous Nephroureteral Tube (PCNU) Right 2 8.5 Fr 1 day                    Physical Exam:   Gen:    NAD  CV:      warm, well-perfused  Lungs: No respiratory distress  Abd:     soft, appropriately tender, nondistended, stoma pink and healthy with just bowel sweat in appliance, bilateral nephrostomies in place  Ext:      no CCE  Neuro: A&Ox3

## 2024-04-15 NOTE — PLAN OF CARE
Problem: OCCUPATIONAL THERAPY ADULT  Goal: Performs self-care activities at highest level of function for planned discharge setting.  See evaluation for individualized goals.  Description: Treatment Interventions: ADL retraining, Functional transfer training, UE strengthening/ROM, Endurance training, Cognitive reorientation, Patient/family training, Equipment evaluation/education, Fine motor coordination activities, Compensatory technique education, Continued evaluation, Energy conservation, Activityengagement          See flowsheet documentation for full assessment, interventions and recommendations.   Note: Limitation: Decreased ADL status, Decreased Safe judgement during ADL, Decreased cognition, Decreased endurance, Decreased self-care trans, Decreased high-level ADLs  Prognosis: Fair  Assessment: 88 year old pt seen today for an OT evaluation following admission to [facility] 2/2 Prostate CA, bladder cancer, colovesical fistula, now s/p diverting loop colostomy and b/l IR placeed PCN 4/13 with present symptoms significant for fatigue, weakness, decreased ADL status, decreased functional mobility. Pt has a past medical history of Atrial fibrillation (HCC), Bladder cancer ([facility]), Cancer (HCC), Hypertension, Irregular heart beat, and SIADH (syndrome of inappropriate ADH production) ([facility]). Pt reported he lives with his wife in a 3SH, FFOS to bed/bath with 2-3STE. Pt reports being IND With all ADLs/IADLs PTA with RW used PRN in home and at all times in community. +. Pt very pleasant and cooperative t/o session. Pt completed functional bed mobility with mod Ax2. Mod Ax2 for functional STS txfs with RW. Mod Ax2 for functional mobility with RW to take steps from bed>chair. Pt was Mod A for UB ADLs and Max A for LB ADLs 2/2 fatigue/weakness. The patient's raw score on the AM-PAC Daily Activity Inpatient Short Form is 16. A raw score of less than 19 suggests the patient may benefit from discharge  to post-acute rehabilitation services. Please refer to the recommendation of the Occupational Therapist for safe discharge planning. Pt is functioning below baseline level of function and will continue to benefit from skilled acute OT to promote increased independence and return to PLOF. At this time, current OT recommendation is Level 2 resources.     Rehab Resource Intensity Level, OT: II (Moderate Resource Intensity)

## 2024-04-15 NOTE — PHYSICAL THERAPY NOTE
Physical Therapy Evaluation     Patient's Name: Candido Valladares Jr.    Admitting Diagnosis  Bladder cancer (HCC) [C67.9]  Colovesical fistula [N32.1]  High grade urothelial carcinoma present on urine cytology (HCC) [C68.9]    Problem List  Patient Active Problem List   Diagnosis    Generalized weakness    Prostate CA (HCC)    Essential hypertension    Stage 3a chronic kidney disease (HCC)    Hyponatremia    Anemia    New onset a-fib (HCC)    Hospital discharge follow-up    Abnormal CT of the chest    History of echocardiogram    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)    Syncope and collapse    Preop examination    Bilateral lower extremity edema    High grade urothelial carcinoma present on urine cytology (HCC)    Urothelial carcinoma of bladder (HCC)    Iron deficiency anemia, unspecified    Hypomagnesemia    Diarrhea due to drug    Hydronephrosis    Colovesical fistula       Past Medical History  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)        Past Surgical History  Past Surgical History:   Procedure Laterality Date    APPENDECTOMY      COLOSTOMY N/A 4/13/2024    Procedure: COLOSTOMY LOOP;  Surgeon: Harish Day DO;  Location: [facility];  Service: General    IR NEPHROSTOMY TUBE PLACEMENT  4/13/2024    PERCUTANEOUS NEPHROSTOMY Bilateral 4/13/2024    Procedure: INSERTION NEPHROSTOMY TUBE;  Surgeon: Wilton Muñiz DO;  Location: [facility];  Service: Interventional Radiology    IL TRURL ELECTROSURG RESCJ PROSTATE BLEED COMPLETE N/A 9/11/2023    Procedure: CYSTOSCOPY; TRANSURETHRAL RESECTION OF PROSTATE (TURP);  Surgeon: Franklyn Joseph MD;  Location: [facility];  Service: Urology          04/15/24 0939   PT Last Visit   PT Visit Date 04/15/24   Note Type   Note type Evaluation   Pain Assessment   Pain  Assessment Tool 0-10   Pain Score No Pain   Restrictions/Precautions   Weight Bearing Precautions Per Order No   Other Precautions Chair Alarm;Bed Alarm;Multiple lines;Fall Risk;Fluid restriction;Contact/isolation  (Nephrostomy tubes X 2, Ostomy, Haley , IV)   Home Living   Type of Home House   Home Layout Multi-level;Bed/bath upstairs;Stairs to enter with rails   Bathroom Shower/Tub Walk-in shower   Bathroom Toilet Raised   Bathroom Equipment Grab bars in shower   Home Equipment Walker  (RW used prn in home, at all time)   Additional Comments 3STH, 2-3STE   Prior Function   Level of [place] Independent with ADLs;Independent with functional mobility;Needs assistance with IADLS   Lives With Spouse   Receives Help From Family   IADLs Independent with driving;Independent with medication management;Family/Friend/Other provides meals   Falls in the last 6 months (S)  1 to 4   Vocational Retired   Comments Spouse assists with IADLs   General   Family/Caregiver Present No   Cognition   Overall Cognitive Status WFL   Arousal/Participation Alert   Attention Attends with cues to redirect   Orientation Level Oriented X4   Following Commands Follows one step commands with increased time or repetition   Comments Pt cooperative during session .   RLE Assessment   RLE Assessment   (grossly 3+/5)   LLE Assessment   LLE Assessment   (grossly 3+/5)   Bed Mobility   Supine to Sit 3  Moderate assistance   Additional items Assist x 2;HOB elevated;Bedrails;Increased time required;Verbal cues;LE management   Sit to Supine Unable to assess   Additional Comments Pt in bed upon arrival.   Transfers   Sit to Stand 3  Moderate assistance   Additional items Assist x 2;Increased time required;Verbal cues   Stand to Sit 3  Moderate assistance   Additional items Assist x 2;Increased time required;Verbal cues   Additional Comments w/ RW - 2 STS   Ambulation/Elevation   Gait pattern Forward Flexion;Excessively slow;Short stride   Gait  Assistance 3  Moderate assist   Additional items Assist x 2;Tactile cues;Verbal cues   Assistive Device Rolling walker   Distance 2 ft   Ambulation/Elevation Additional Comments Pt limited in mobility 2* fatigue, weakness and diarrhea   Balance   Static Sitting Fair   Dynamic Sitting Fair -   Static Standing Poor +   Dynamic Standing Poor   Ambulatory Poor   Activity Tolerance   Activity Tolerance Patient limited by fatigue;Other (Comment)  (Bowel incontinence)   Medical Staff Made Aware OT Laron   Nurse Made Aware RN cleared pt , updated   Assessment   Prognosis Fair   Problem List Decreased strength;Decreased endurance;Impaired balance;Decreased mobility;Pain   Assessment Pt is a 88 y.o. male seen for PT evaluation s/p admit to [facility] on 4/13/2024. Pt was admitted with a primary dx of: Progressive and locally advanced urothelial cancer, colovesical fistula and bilateral obstructive hydrouteronephrosis now requiring fecal diversion s/p diverting loop colostomy and b/l IR placed PCN on 4/13 .  PT now consulted for assessment of mobility and d/c needs. Pt with Up as tolerated orders. Pts current clinical presentation is Unstable/ Unpredictable (high complexity) due to Ongoing medical management for primary dx, Increased reliance on more restrictive AD compared to baseline, Decreased activity tolerance compared to baseline, Fall risk, Increased assistance needed from caregiver at current time, s/p surgical intervention. Prior to admission, pt was Independent for mobility and ADLs. Upon evaluation, pt currently is requiring Ax 2 for all mobility aspects at this time as detailed in above flowsheet, limiting factors being weakness, fatigue and incontinence. Pt presents at PT eval functioning below baseline and currently w/ overall mobility deficits 2* to: BLE weakness, impaired balance, decreased endurance, gait deviations, pain, decreased activity tolerance compared to baseline, decreased functional  mobility tolerance compared to baseline, fall risk. Pt currently at a fall risk 2* to impairments listed above.  Pt will continue to benefit from skilled acute PT interventions to address stated impairments; to maximize functional mobility; for ongoing pt/ family training; and DME needs. At conclusion of PT session chair alarm engaged, all needs in reach, RN notified of session findings/recommendations, and pt returned back in recliner chair with phone and call bell within reach. Pt denies any further questions at this time. The patient's AM-PAC Basic Mobility Inpatient Short Form Raw Score is 12. A Raw score of less than or equal to 16 suggests the patient may benefit from discharge to post-acute rehabilitation services. Please also refer to the recommendation of the Physical Therapist for safe discharge planning. Recommend Level 2 upon hospital D/C.   Goals   Patient Goals to go home   STG Expiration Date 04/29/24   Short Term Goal #1 STG 1. Pt will be able to perform bed mobility tasks with Supervision in order to improve overall functional mobility and assist in safe d/c. STG 2. Pt with sit EOB for at least 25 minutes at Ind level in order to strengthen abdominal musculature and assist in future transfers/ ambulation. STG 3. Pt will be able to perform functional transfer with supervision in order to improve overall functional mobility and assist in safe d/c. STG 4. Pt will be able to ambulate at least 100 feet with least restrictive device with supervision A in order to improve overall functional mobility and assist in safe d/c. STG 5. Pt will improve sitting/standing static/dynamic balance 1/2 grade in order to improve functional mobility and assist in safe d/c. STG 6. Pt will improve LE strength by 1/2 grade in order to improve functional mobility and assist in safe d/c. STG 7. Pt will be able to negotiate at least 10 stairs with least restrictive device with supervision A in order to improve overall  functional mobility and assist in safe d/c.   PT Treatment Day 0   Plan   Treatment/Interventions Functional transfer training;Therapeutic exercise;Bed mobility;Gait training;Spoke to nursing;OT   PT Frequency 2-3x/wk   Discharge Recommendation   Rehab Resource Intensity Level, PT II (Moderate Resource Intensity)   Equipment Recommended Walker   AM-PAC Basic Mobility Inpatient   Turning in Flat Bed Without Bedrails 3   Lying on Back to Sitting on Edge of Flat Bed Without Bedrails 2   Moving Bed to Chair 2   Standing Up From Chair Using Arms 2   Walk in Room 2   Climb 3-5 Stairs With Railing 1   Basic Mobility Inpatient Raw Score 12   Basic Mobility Standardized Score 32.23   [facility] Highest Level Of Mobility   -[facility] Goal 4: Move to chair/commode   -HL Achieved 5: Stand (1 or more minutes)   Modified Larue Scale   Modified Simran Scale 4   End of Consult   Patient Position at End of Consult All needs within reach;Bed/Chair alarm activated;Bedside chair         Jenni Bianchi, PT DPT

## 2024-04-15 NOTE — ASSESSMENT & PLAN NOTE
CT: Increasing obstruction of left UVJ with right hydroureteronephrosis and severe left hydronephrosis due to ureteral vesicle junction obstruction with delayed left renal nephrogram and left renal cortical thinning  S/p bilateral percutaneous nephrostomy tube placement by interventional radiology 4/13  Continue to trend labs  Medical optimization per primary team  Manually irrigate bilateral nephrostomy tubes per IR recommendations  24-hour urine output left PCN: 175 mL, right PCN: 450 mL, Haley catheter 0 mL  Creat 0.83  Cultures from bilateral nephrostomy tubes negative

## 2024-04-15 NOTE — PROGRESS NOTES
Progress Note - Urology  Candido Valladares Jr. 1935, 88 y.o. male MRN: [id number]    Unit/Bed#: [facility] 833-01 Encounter: [id number]    Colovesical fistula  Assessment & Plan  S/p diverting colostomy 4/13  Medical optimization per primary team      Hydronephrosis  Assessment & Plan  CT: Increasing obstruction of left UVJ with right hydroureteronephrosis and severe left hydronephrosis due to ureteral vesicle junction obstruction with delayed left renal nephrogram and left renal cortical thinning  S/p bilateral percutaneous nephrostomy tube placement by interventional radiology 4/13  Continue to trend labs  Medical optimization per primary team  Manually irrigate bilateral nephrostomy tubes per IR recommendations  Creat 0.91  Cultures from bilateral nephrostomy tubes are pending      Urothelial carcinoma of bladder (HCC)  Assessment & Plan  cT4, cN1, CM0 high-grade urothelial carcinoma  CT: Bladder tumor mass, tumor erosion into perivesical pelvic fat with small collection of fluid and gas extending to the pelvic sidewall.    Continue to follow-up with medical oncology and palliative care  Follow-up with Dr. Joseph after discharge    * Prostate CA (HCC)  Assessment & Plan  History of prostate cancer status post radiation  Follows with Dr. Joseph as outpatient          Subjective: 88-year-old male with history of prostate cancer status post radiation.  Had TURP September 2023 with pathology positive for invasive high-grade urothelial carcinoma involving prostatic parenchyma extensively (cT4, cN1, CM0).  Patient follows with Dr. Joseph.  CT scan was concerning for colovesical fistula.  Imaging also revealed bladder tumor mass, increasing with erosion through anterior wall of the lower rectum with some fecal material in the bladder lumen.  Also tumor erosion into perivesical pelvic fat with small collection of fluid and gas extending upwards towards the left pelvic sidewall.  Increasing obstruction of left UVJ with right  hydroureteronephrosis and severe left hydronephrosis due to ureteral vesicle junction obstruction with delayed left renal nephrogram and left renal cortical thinning.  Patient underwent diverting colostomy.  Patient has bilateral PCNs placed 4/13.  Palliative care and medical oncology following patient.  He received pembrolizumab and was deemed not to be a candidate for surgery or concurrent chemoradiation.    Patient's wife and daughters at bedside.  Patient's family is considering transitioning care to [facility] urology as they are only a couple of blocks from their home and [place].  Family will have discussion and decide if they plan to transition care continue with Dr. Joseph.  I recommended they obtain records from Dr. Joseph if they plan to transition care as we are unable to see his records.    24 HR EVENTS:   no significant events.      Patient has  complaints of feeling tired and not getting enough sleep.       Review of Systems   Constitutional:  Positive for fatigue. Negative for activity change, appetite change, chills, fever and unexpected weight change.   HENT:  Negative for facial swelling.    Eyes:  Negative for discharge.   Respiratory: Negative.  Negative for cough and shortness of breath.    Cardiovascular:  Negative for chest pain and leg swelling.   Gastrointestinal:  Positive for abdominal pain. Negative for abdominal distention, constipation, diarrhea, nausea and vomiting.   Endocrine: Negative.    Genitourinary:  Positive for hematuria. Negative for decreased urine volume, dysuria, enuresis, flank pain and genital sores.   Musculoskeletal:  Negative for back pain and myalgias.   Skin:  Negative for pallor and rash.   Allergic/Immunologic: Negative.  Negative for immunocompromised state.   Neurological:  Negative for facial asymmetry and speech difficulty.   Psychiatric/Behavioral:  Negative for agitation and confusion.        Objective:  Nursing Rounds: Michelle  Vitals: Blood pressure  "119/71, pulse 67, temperature 98.1 °F (36.7 °C), resp. rate 18, height 6' 1\" (1.854 m), weight 79.8 kg (175 lb 14.8 oz), SpO2 97%.,Body mass index is 23.21 kg/m².  INS & OUTS:  I/O last 24 hours:  In: 3626.7 [P.O.:580; I.V.:3046.7]  Out: 560 [Urine:510; Stool:50]    Physical Exam  Vitals and nursing note reviewed.   Constitutional:       General: He is not in acute distress.     Appearance: Normal appearance. He is not ill-appearing or toxic-appearing.   HENT:      Head: Normocephalic.   Cardiovascular:      Rate and Rhythm: Normal rate.   Pulmonary:      Effort: Pulmonary effort is normal. No respiratory distress.   Abdominal:      General: Abdomen is flat. There is no distension.      Palpations: Abdomen is soft.      Tenderness: There is no abdominal tenderness. There is no guarding or rebound.      Comments: Colostomy    Genitourinary:     Comments: Haley catheter draining light pennie urine.  Left nephrostomy tube draining pink-colored urine.  Right nephrostomy tube draining pennie urine  Musculoskeletal:         General: No swelling.   Skin:     General: Skin is warm and dry.      Coloration: Skin is pale.   Neurological:      General: No focal deficit present.      Mental Status: He is alert and oriented to person, place, and time.   Psychiatric:         Mood and Affect: Mood normal.         Behavior: Behavior normal.         Thought Content: Thought content normal.         Judgment: Judgment normal.         Imaging:  CT PULMONARY ANGIOGRAM OF THE CHEST AND CT ABDOMEN AND PELVIS WITH INTRAVENOUS CONTRAST     INDICATION: syncope, on aspirin, afib history, cancer history, two falls in the past day. High-grade bladder cancer.     COMPARISON: PET/CT scan performed February 27, 2024     TECHNIQUE: CT examination of the chest, abdomen and pelvis was performed. Thin section CT angiographic technique was used in the chest in order to evaluate for pulmonary embolus and coronal 3D MIP postprocessing was performed on the " acquisition scanner.   Multiplanar 2D reformatted images were created from the source data.     This examination, like all CT scans performed in the [facility], was performed utilizing techniques to minimize radiation dose exposure, including the use of iterative reconstruction and automated exposure control. Radiation dose length   product (DLP) for this visit: 1003 mGy-cm     IV Contrast: 100 mL of iohexol (OMNIPAQUE)  Enteric Contrast: Not administered.     FINDINGS:     CHEST     PULMONARY ARTERIAL TREE: No pulmonary embolus is seen.     LUNGS: Right middle lobe pulmonary nodule in the perihilar region, 0.9 x 0.7 cm on image 124 of series 4, unchanged from June 27, 2023. No other pulmonary nodules. Few scattered punctate calcified granulomata. Mild atelectasis at the left base.     PLEURA: Small left pleural effusion.     HEART/AORTA: Heart is unremarkable for patient's age. No thoracic aortic aneurysm.     MEDIASTINUM AND ABDIAZIZ: Unremarkable.     CHEST WALL AND LOWER NECK: Unremarkable.     ABDOMEN     LIVER/BILIARY TREE: Unremarkable.     GALLBLADDER: No calcified gallstones. Trace nonspecific pericholecystic fluid.     SPLEEN: Unremarkable.     PANCREAS: Fatty pancreatic atrophy. Cyst at the anterior margin of the proximal pancreatic tail on image 52, 9 mm. No pancreatic ductal enlargement.     ADRENAL GLANDS: Unremarkable.     KIDNEYS/URETERS: Hydroureteronephrosis bilaterally to region of ureterovesical junction where there is tumor invasion described in greater detail below. Hydronephrosis is more severe on the left than the right and there is relative delayed left renal   nephrogram as well as fairly extensive left renal cortical thinning.     STOMACH AND BOWEL: See below description of tumor involving lower rectum. Above the rectosigmoid colon, stomach and bowel loops are within normal limits. No evidence for bowel obstruction.     APPENDIX: No findings to suggest appendicitis.    "  ABDOMINOPELVIC CAVITY: Trace ascites, new from prior examination predominantly in perihepatic space and in the right hemipelvis. No lymphadenopathy.     VESSELS: Unremarkable for patient's age.     PELVIS     REPRODUCTIVE ORGANS/URINARY BLADDER/RECTUM: Mass involving urinary bladder and prostate appears to have eroded through anterior wall of lower rectum, best demonstrated on image 159 of series 3. There is fecal type material within the profoundly   thick-walled urinary bladder. Haley catheter balloon is again seen within the lower aspect of the urinary bladder. As on prior examination, bladder wall tumor mass obstructs the distal left ureterovesical junction, but there is now also obstruction of   right distal ureterovesical junction.     Gas and fluid is present within a multiloculated collection to the left of the rectum but posterior to the urinary bladder extending out laterally towards the left along the obturator musculature and upwards along the lateral left pelvic sidewall   consistent with bladder tumor erosion into the perirectal/perivesical space. This is a new finding since February 2024.     ABDOMINAL WALL/INGUINAL REGIONS: Body wall edema.     BONES: No acute fracture or suspicious osseous lesion.     IMPRESSION:     Bladder tumor mass, increasing with erosion through anterior wall of lower rectum with some fecal material in the bladder lumen.     Also, tumor erosion into perivesical pelvic fat with small collection of fluid and gas is extending upwards along the left pelvic sidewall.     Increasing obstruction of left ureterovesical junction with right hydroureteronephrosis. Reidentified severe left hydronephrosis due to ureterovesical junction obstruction with delayed left renal nephrogram and left renal cortical thinning.     Trace ascites and small left pleural effusion, new from prior examination.     No pulmonary embolus.     This examination was marked \"immediate notification\" in Epic in " order to begin the standard process by which the radiology reading room liaison alerts the referring practitioner.        Workstation performed: [id number]  Imaging reviewed - both report and images personally reviewed.     Labs:  Recent Labs     04/13/24  0913 04/14/24  0601 04/15/24  0511   WBC 19.01* 16.64* 8.53       Recent Labs     04/13/24  0913 04/14/24  0601 04/15/24  0511   HGB 9.7* 8.6* 7.3*     Recent Labs     04/13/24  0913 04/14/24  0601 04/15/24  0511   HCT 31.0* 26.7* 24.0*     Recent Labs     04/13/24  0913 04/14/24  0601 04/15/24  0511   CREATININE 1.11 0.80 0.91     Lab Results   Component Value Date    HGB 7.3 (L) 04/15/2024    HCT 24.0 (L) 04/15/2024    WBC 8.53 04/15/2024     04/15/2024     Lab Results   Component Value Date    K 3.4 (L) 04/15/2024     04/15/2024    CO2 22 04/15/2024    BUN 20 04/15/2024    CREATININE 0.91 04/15/2024    CALCIUM 7.1 (L) 04/15/2024     Urine Culture: Pending    History:    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)      Past Surgical History:   Procedure Laterality Date    APPENDECTOMY      IR NEPHROSTOMY TUBE PLACEMENT  4/13/2024    NC TRURL ELECTROSURG RESCJ PROSTATE BLEED COMPLETE N/A 9/11/2023    Procedure: CYSTOSCOPY; TRANSURETHRAL RESECTION OF PROSTATE (TURP);  Surgeon: Franklyn Joseph MD;  Location: [facility];  Service: Urology     Family History   Problem Relation Age of Onset    Hypertension Mother      Social History     Socioeconomic History    Marital status: /Civil Union     Spouse name: None    Number of children: None    Years of education: None    Highest education level: None   Occupational History    Occupation: RETIRED   Tobacco Use    Smoking status: Never     Passive exposure: Never    Smokeless tobacco: Never   Vaping Use    Vaping  status: Never Used   Substance and Sexual Activity    Alcohol use: Not Currently    Drug use: Not Currently    Sexual activity: Not Currently   Other Topics Concern    None   Social History Narrative        RETIRED     Social Determinants of Health     Financial Resource Strain: Low Risk  (11/28/2023)    Overall Financial Resource Strain (CARDIA)     Difficulty of Paying Living Expenses: Not hard at all   Food Insecurity: No Food Insecurity (6/28/2023)    Hunger Vital Sign     Worried About Running Out of Food in the Last Year: Never true     Ran Out of Food in the Last Year: Never true   Transportation Needs: No Transportation Needs (11/28/2023)    PRAPARE - Transportation     Lack of Transportation (Medical): No     Lack of Transportation (Non-Medical): No   Physical Activity: Not on file   Stress: Not on file   Social Connections: Not on file   Intimate Partner Violence: Not on file   Housing Stability: Low Risk  (6/28/2023)    Housing Stability Vital Sign     Unable to Pay for Housing in the Last Year: No     Number of Places Lived in the Last Year: 1     Unstable Housing in the Last Year: No       The following portions of the patient's history were reviewed and updated as appropriate: allergies, current medications, past family history, past medical history, past social history, past surgical history and problem list    KIERA Kirkland  Date: 4/15/2024 Time: 12:43 PM

## 2024-04-15 NOTE — PLAN OF CARE
Problem: PHYSICAL THERAPY ADULT  Goal: Performs mobility at highest level of function for planned discharge setting.  See evaluation for individualized goals.  Description: Treatment/Interventions: Functional transfer training, Therapeutic exercise, Bed mobility, Gait training, Spoke to nursing, OT  Equipment Recommended: Walker       See flowsheet documentation for full assessment, interventions and recommendations.  Note: Prognosis: Fair  Problem List: Decreased strength, Decreased endurance, Impaired balance, Decreased mobility, Pain  Assessment: Pt is a 88 y.o. male seen for PT evaluation s/p admit to [facility] on 4/13/2024. Pt was admitted with a primary dx of: Progressive and locally advanced urothelial cancer, colovesical fistula and bilateral obstructive hydrouteronephrosis now requiring fecal diversion s/p diverting loop colostomy and b/l IR placed PCN on 4/13 .  PT now consulted for assessment of mobility and d/c needs. Pt with Up as tolerated orders. Pts current clinical presentation is Unstable/ Unpredictable (high complexity) due to Ongoing medical management for primary dx, Increased reliance on more restrictive AD compared to baseline, Decreased activity tolerance compared to baseline, Fall risk, Increased assistance needed from caregiver at current time, s/p surgical intervention. Prior to admission, pt was Independent for mobility and ADLs. Upon evaluation, pt currently is requiring Ax 2 for all mobility aspects at this time as detailed in above flowsheet, limiting factors being weakness, fatigue and incontinence. Pt presents at PT eval functioning below baseline and currently w/ overall mobility deficits 2* to: BLE weakness, impaired balance, decreased endurance, gait deviations, pain, decreased activity tolerance compared to baseline, decreased functional mobility tolerance compared to baseline, fall risk. Pt currently at a fall risk 2* to impairments listed above.  Pt will continue  to benefit from skilled acute PT interventions to address stated impairments; to maximize functional mobility; for ongoing pt/ family training; and DME needs. At conclusion of PT session chair alarm engaged, all needs in reach, RN notified of session findings/recommendations, and pt returned back in recliner chair with phone and call bell within reach. Pt denies any further questions at this time. The patient's AM-PAC Basic Mobility Inpatient Short Form Raw Score is 12. A Raw score of less than or equal to 16 suggests the patient may benefit from discharge to post-acute rehabilitation services. Please also refer to the recommendation of the Physical Therapist for safe discharge planning. Recommend Level 2 upon hospital D/C.        Rehab Resource Intensity Level, PT: II (Moderate Resource Intensity)    See flowsheet documentation for full assessment.

## 2024-04-15 NOTE — OCCUPATIONAL THERAPY NOTE
Occupational Therapy Evaluation     Patient Name: Candido Valladares Jr.  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)      Past Surgical History  Past Surgical History:   Procedure Laterality Date    APPENDECTOMY      COLOSTOMY N/A 4/13/2024    Procedure: COLOSTOMY LOOP;  Surgeon: Harish Day DO;  Location: [facility];  Service: General    IR NEPHROSTOMY TUBE PLACEMENT  4/13/2024    PERCUTANEOUS NEPHROSTOMY Bilateral 4/13/2024    Procedure: INSERTION NEPHROSTOMY TUBE;  Surgeon: Wilotn Muñiz DO;  Location: [facility];  Service: Interventional Radiology    MO TRURL ELECTROSURG RESCJ PROSTATE BLEED COMPLETE N/A 9/11/2023    Procedure: CYSTOSCOPY; TRANSURETHRAL RESECTION OF PROSTATE (TURP);  Surgeon: Franklyn Joseph MD;  Location: [facility];  Service: Urology             04/15/24 0940   OT Last Visit   OT Visit Date 04/15/24   Note Type   Note type Evaluation  (+tx)   Pain Assessment   Pain Assessment Tool 0-10   Pain Score No Pain   Restrictions/Precautions   Weight Bearing Precautions Per Order No   Other Precautions Contact/isolation;Chair Alarm;Bed Alarm;Multiple lines;Fall Risk;Pain  (Strict contact and hand hygiene)   Home Living   Type of Home House   Home Layout Multi-level;Bed/bath upstairs;Stairs to enter with rails   Bathroom Shower/Tub Walk-in shower   Bathroom Toilet Raised   Bathroom Equipment Grab bars in shower   Home Equipment Walker  (RW used PRN in home, and always in community)   Additional Comments 3SH, 2-3STE, RW used PRN in home, always in community.   Prior Function   Level of [place] Independent  "with ADLs;Independent with functional mobility;Needs assistance with IADLS   Lives With Spouse   Receives Help From Family   IADLs Independent with driving;Independent with medication management;Family/Friend/Other provides meals   Falls in the last 6 months (S)  1 to 4  (2 falls)   Vocational Retired   Comments Spouse assists IADLs.   Lifestyle   Autonomy IND with all ADLs, wife assists PRN for IADLs. RW used PRN.   Reciprocal Relationships Lives with supportive spouse - able to assist with IADLs. Pt reports she is unable to help him up when he falls.   Service to Others Retired   Intrinsic Gratification Enjoys doing yard work   Subjective   Subjective \"I just feel so weak\"   ADL   Where Assessed Edge of bed   Eating Assistance 5  Supervision/Setup   Grooming Assistance 5  Supervision/Setup   UB Bathing Assistance 3  Moderate Assistance   LB Bathing Assistance 2  Maximal Assistance   UB Dressing Assistance 3  Moderate Assistance   LB Dressing Assistance 2  Maximal Assistance   Toileting Assistance  2  Maximal Assistance   Functional Assistance 2  Maximal Assistance   Bed Mobility   Supine to Sit 3  Moderate assistance   Additional items Assist x 2;HOB elevated;Bedrails;Increased time required;Verbal cues;LE management   Sit to Supine Unable to assess   Additional Comments Pt OOB in recliner post session, all needs met, call bell within reach. +chair alarm   Transfers   Sit to Stand 3  Moderate assistance   Additional items Assist x 2;Increased time required;Verbal cues   Stand to Sit 3  Moderate assistance   Additional items Assist x 2;Increased time required;Verbal cues   Additional Comments Ax2, RW in stance.   Functional Mobility   Functional Mobility 3  Moderate assistance   Additional Comments Ax2, steps from bed to chair. Pt noted to be incontinent during functional mobility. Deferred further mobility 2/2 incontinence.   Additional items Rolling walker   Balance   Static Sitting Fair   Dynamic Sitting Fair - "   Static Standing Poor +   Dynamic Standing Poor   Ambulatory Poor -   Activity Tolerance   Activity Tolerance Patient limited by fatigue;Other (Comment)  (Limited by incontinence)   Medical Staff Made Aware PT Jenni, co-jose armando 2/2 increased medical complexity and comorbidities   Nurse Made Aware RN cleared for therapy   RUE Assessment   RUE Assessment WFL   LUE Assessment   LUE Assessment WFL   Hand Function   Gross Motor Coordination Functional   Fine Motor Coordination Functional   Cognition   Overall Cognitive Status WFL   Arousal/Participation Alert;Cooperative   Attention Attends with cues to redirect   Orientation Level Oriented X4   Memory Within functional limits   Following Commands Follows one step commands with increased time or repetition   Comments Pt cooperative and pleasant. Motivated to participate.   Assessment   Limitation Decreased ADL status;Decreased Safe judgement during ADL;Decreased cognition;Decreased endurance;Decreased self-care trans;Decreased high-level ADLs   Prognosis Fair   Assessment 88 year old pt seen today for an OT evaluation following admission to [facility] 2/2 Prostate CA, bladder cancer, colovesical fistula, now s/p diverting loop colostomy and b/l IR placeed PCN 4/13 with present symptoms significant for fatigue, weakness, decreased ADL status, decreased functional mobility. 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 Pt reported he lives with his wife in a 3SH, FFOS to bed/bath with 2-3STE. Pt reports being IND With all ADLs/IADLs PTA with RW used PRN in home and at all times in community. +. Pt very pleasant and cooperative t/o session. Pt completed functional bed mobility with mod Ax2. Mod Ax2 for functional STS txfs with RW. Mod Ax2 for functional mobility with RW to take steps from bed>chair. Pt was Mod  A for UB ADLs and Max A for LB ADLs 2/2 fatigue/weakness. The patient's raw score on the AM-PAC Daily Activity Inpatient Short Form is 16. A raw score of less than 19 suggests the patient may benefit from discharge to post-acute rehabilitation services. Please refer to the recommendation of the Occupational Therapist for safe discharge planning. Pt is functioning below baseline level of function and will continue to benefit from skilled acute OT to promote increased independence and return to PLOF. At this time, current OT recommendation is Level 2 resources.   Goals   Patient Goals to go home   LTG Time Frame 10-14   Long Term Goal #1 See below for goals   Plan   Treatment Interventions ADL retraining;Functional transfer training;UE strengthening/ROM;Endurance training;Cognitive reorientation;Patient/family training;Equipment evaluation/education;Fine motor coordination activities;Compensatory technique education;Continued evaluation;Energy conservation;Activityengagement   Goal Expiration Date 04/29/24   OT Frequency 2-3x/wk   Discharge Recommendation   Rehab Resource Intensity Level, OT II (Moderate Resource Intensity)   -PAC Daily Activity Inpatient   Lower Body Dressing 2   Bathing 2   Toileting 2   Upper Body Dressing 3   Grooming 3   Eating 4   Daily Activity Raw Score 16   Daily Activity Standardized Score (Calc for Raw Score >=11) 35.96   AM-PAC Applied Cognition Inpatient   Following a Speech/Presentation 3   Understanding Ordinary Conversation 4   Taking Medications 4   Remembering Where Things Are Placed or Put Away 4   Remembering List of 4-5 Errands 4   Taking Care of Complicated Tasks 3   Applied Cognition Raw Score 22   Applied Cognition Standardized Score 47.83   Additional Treatment Session   Start Time 0926   End Time 0940   Treatment Assessment Pt participated in an additional tx session focused on ADL completion and functional mobility. Pt completed STS txfs with mod Ax2. While moving, pt  noted to be incontinent of bowel. Required max A for tan hygiene and LB bathing to clean up. Pt will continue to benefit from skilled acute OT to promote increased independence and return to PLOF. Continue to recommend Level 2 resources.   Additional Treatment Day 1   End of Consult   Education Provided Yes   Patient Position at End of Consult Bedside chair;Bed/Chair alarm activated;All needs within reach   Nurse Communication Nurse aware of consult       Occupational Therapy Goals    Pt will be Mod I with bed mobility with good sitting balance/tolerance to engage in self care tasks within this plan of care.      Pt will be Mod I for UB dressing and bathing with use of assistive devices PRN within this plan of care.     Pt will be Mod I for LB dressing and bathing with use of assistive devices PRN within this plan of care.     Pt will demonstrate standing tolerance of 2-3 minutes increase independence for toileting ADLs/tasks with use of assistive devices PRN within this plan of care.     Pt will demonstrate good carryover of safety awareness with use of RW during functional tasks within this plan of care.     Pt will demonstrated increased activity tolerance to 30 mins for participation in ADLs and functional tasks within this plan of care.     Pt will complete functional cognitive assessment with good attention/participation to assist with safe d/c planning recommendations.        Laron Comer MS, OTR/L

## 2024-04-15 NOTE — QUICK NOTE
4/15/2024 10:54 AM -  Candido CASTRO Jacquelyn Son's chart and case were reviewed by Isai Iverson MD.  Mode of review included electronic chart check.    Patient continues to make clinical progress towards discharge and does not appear to have any unmet symptom mgmt needs. Please see my consultation note for any consideration on goals of care.      At this time Palliative Medicine will sign off.  Please do not hesitate to reach out to our team with any questions or concerns or if patient's clinical condition deteriorates.         Patient is appropriate for outpatient PSC follow-up.  We will make arrangements through our office.        For urgent issues or any questions/concerns, please notify on-call provider via [employer].  You may also call our answering service 24/7 at [phone].    Isai Iverson MD  Palliative and Supportive Care  Clinic/Answering Service: [phone]  You can find me on [employer]!  n

## 2024-04-15 NOTE — PLAN OF CARE
Problem: PAIN - ADULT  Goal: Verbalizes/displays adequate comfort level or baseline comfort level  Description: Interventions:  - Encourage patient to monitor pain and request assistance  - Assess pain using appropriate pain scale  - Administer analgesics based on type and severity of pain and evaluate response  - Implement non-pharmacological measures as appropriate and evaluate response  - Consider cultural and social influences on pain and pain management  - Notify physician/advanced practitioner if interventions unsuccessful or patient reports new pain  Outcome: Progressing     Problem: INFECTION - ADULT  Goal: Absence or prevention of progression during hospitalization  Description: INTERVENTIONS:  - Assess and monitor for signs and symptoms of infection  - Monitor lab/diagnostic results  - Monitor all insertion sites, i.e. indwelling lines, tubes, and drains  - Monitor endotracheal if appropriate and nasal secretions for changes in amount and color  - [place] appropriate cooling/warming therapies per order  - Administer medications as ordered  - Instruct and encourage patient and family to use good hand hygiene technique  - Identify and instruct in appropriate isolation precautions for identified infection/condition  Outcome: Progressing  Goal: Absence of fever/infection during neutropenic period  Description: INTERVENTIONS:  - Monitor WBC    Outcome: Progressing     Problem: SAFETY ADULT  Goal: Patient will remain free of falls  Description: INTERVENTIONS:  - Educate patient/family on patient safety including physical limitations  - Instruct patient to call for assistance with activity   - Consult OT/PT to assist with strengthening/mobility   - Keep Call bell within reach  - Keep bed low and locked with side rails adjusted as appropriate  - Keep care items and personal belongings within reach  - Initiate and maintain comfort rounds  - Make Fall Risk Sign visible to staff  - Fall alarm on for safety  -  Apply yellow socks and bracelet for high fall risk patients  - Consider moving patient to room near nurses station  Outcome: Progressing  Goal: Maintain or return to baseline ADL function  Description: INTERVENTIONS:  -  Assess patient's ability to carry out ADLs; assess patient's baseline for ADL function and identify physical deficits which impact ability to perform ADLs (bathing, care of mouth/teeth, toileting, grooming, dressing, etc.)  - Assess/evaluate cause of self-care deficits   - Assess range of motion  - Assess patient's mobility; develop plan if impaired  - Assess patient's need for assistive devices and provide as appropriate  - Encourage maximum independence but intervene and supervise when necessary  - Involve family in performance of ADLs  - Assess for home care needs following discharge   - Consider OT consult to assist with ADL evaluation and planning for discharge  - Provide patient education as appropriate  Outcome: Progressing  Goal: Maintains/Returns to pre admission functional level  Description: INTERVENTIONS:  - Perform AM-PAC 6 Click Basic Mobility/ Daily Activity assessment daily.  - Set and communicate daily mobility goal to care team and patient/family/caregiver.   - Collaborate with rehabilitation services on mobility goals if consulted  - Out of bed with assistance x1  - Out of bed for toileting  - Record patient progress and toleration of activity level   Outcome: Progressing     Problem: DISCHARGE PLANNING  Goal: Discharge to home or other facility with appropriate resources  Description: INTERVENTIONS:  - Identify barriers to discharge w/patient and caregiver  - Arrange for needed discharge resources and transportation as appropriate  - Identify discharge learning needs (meds, wound care, etc.)  - Arrange for interpretive services to assist at discharge as needed  - Refer to Case Management Department for coordinating discharge planning if the patient needs post-hospital services  based on physician/advanced practitioner order or complex needs related to functional status, cognitive ability, or social support system  Outcome: Progressing     Problem: Knowledge Deficit  Goal: Patient/family/caregiver demonstrates understanding of disease process, treatment plan, medications, and discharge instructions  Description: Complete learning assessment and assess knowledge base.  Interventions:  - Provide teaching at level of understanding  - Provide teaching via preferred learning methods  Outcome: Progressing     Problem: Prexisting or High Potential for Compromised Skin Integrity  Goal: Skin integrity is maintained or improved  Description: INTERVENTIONS:  - Identify patients at risk for skin breakdown  - Assess and monitor skin integrity  - Assess and monitor nutrition and hydration status  - Monitor labs   - Assess for incontinence   - Turn and reposition patient  - Assist with mobility/ambulation  - Relieve pressure over bony prominences  - Avoid friction and shearing  - Provide appropriate hygiene as needed including keeping skin clean and dry  - Evaluate need for skin moisturizer/barrier cream  - Collaborate with interdisciplinary team   - Patient/family teaching  - Consider wound care consult   Outcome: Progressing

## 2024-04-15 NOTE — ASSESSMENT & PLAN NOTE
cT4, cN1, CM0 high-grade urothelial carcinoma  CT: Bladder tumor mass, tumor erosion into perivesical pelvic fat with small collection of fluid and gas extending to the pelvic sidewall.    Continue to follow-up with medical oncology and palliative care  Follow-up with Dr. Joseph after discharge or [facility]'s urology if he plans to transition care

## 2024-04-15 NOTE — PROGRESS NOTES
Progress Note -Interventional Radiology  Candido Valladares Jr. 88 y.o. male MRN: [id number]  Unit/Bed#: [facility] 833-01 Encounter: [id number]    Assessment/Plan:  Patient is an 88-year-old male with a history of prostate cancer s/p radiation, TURP who presented to the ED on 4/13 with progressive weakness and stool in the Haley catheter. CT scan concerning for colovesicular fistula and bilateral obstructive hydroureteronephrosis s/p diverting loop colostomy for fecal diversion and bilateral PCN placement by IR on 4/13.    -Patient reports feeling well today and offers no complaints.  -Bilateral PCNs in place with dressings, CDI.  No surrounding swelling, erythema or skin changes noted.  Nurse reports both flushed easily without pain or leaking.  No tenderness to palpation bilaterally.  Dorinda fluid noted in the right nephrostomy bag, blood-tinged urine on the left with no clots.  -Patient had borderline fever yesterday afternoon of 100.1, intermittent tachycardia, otherwise VSS, afebrile.  WBC improved from 16.64->8.53, hemoglobin from 8.6->7.3.  Urine culture still pending.  Output from left PCN 25 mL, right 390 mL since yesterday  -Continue drain management as ordered, including regular flushing  -Continue to monitor PCN output  -Appreciate surgery and urology recommendations  -Remainder of care per primary team  -Patient will be seen in 3 months outpatient for routine exchange of bilateral PCNs  -Please reach out to IR with any questions/concerns      Patient Active Problem List   Diagnosis    Generalized weakness    Prostate CA (HCC)    Essential hypertension    Stage 3a chronic kidney disease (HCC)    Hyponatremia    Anemia    New onset a-fib (HCC)    Hospital discharge follow-up    Abnormal CT of the chest    History of echocardiogram    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 is a 88 y.o. male who presented with progressive weakness and stool in the Haley catheter. CT scan concerning for colovesicular fistula and bilateral obstructive hydroureteronephrosis s/p diverting loop colostomy for fecal diversion and bilateral PCN placement by IR on 4/13. Patient reports feeling well today and offers no complaints.    Objective:    Vitals:  /71   Pulse 67   Temp 98.1 °F (36.7 °C)   Resp 18   Ht 6' 1\" (1.854 m)   Wt 79.8 kg (175 lb 14.8 oz)   SpO2 97%   BMI 23.21 kg/m²   Body mass index is 23.21 kg/m².  Weight (last 2 days)       Date/Time Weight    04/14/24 00:00:54 79.8 (175.93)            I/Os:    Intake/Output Summary (Last 24 hours) at 4/15/2024 1553  Last data filed at 4/15/2024 1501  Gross per 24 hour   Intake 2968.33 ml   Output 420 ml   Net 2548.33 ml       Invasive Devices       Peripheral Intravenous Line  Duration             Peripheral IV 04/13/24 Left;Proximal;Ventral (anterior) Forearm 2 days    Peripheral IV 04/13/24 Right Hand 1 day              Drain  Duration             Urethral Catheter Latex;Three way 24 Fr. 217 days    Colostomy Transverse LUQ 1 day    Percutaneous Nephroureteral Tube (PCNU) Left 1 8.5 Fr 1 day    Percutaneous Nephroureteral Tube (PCNU) Right 2 8.5 Fr 1 day                    Physical Exam:  /71   Pulse 67   Temp 98.1 °F (36.7 °C)   Resp 18   Ht 6' 1\" (1.854 m)   Wt 79.8 kg (175 lb 14.8 oz)   SpO2 97%   BMI 23.21 kg/m²   General appearance: alert and pleasant  Head: Normocephalic, without obvious abnormality  Lungs:  Equal chest rise bilaterally, no work of breathing noted  Heart: regular rate and rhythm  Bilateral PCNs in place with dressings, CDI.  No surrounding swelling, erythema or skin changes noted.  Nurse " "reports both flushed easily without pain or leaking.  No tenderness to palpation bilaterally.  Dorinda fluid noted in the right nephrostomy bag, blood-tinged urine on the left with no clots.            Lab Results and Cultures:   CBC with diff:   Lab Results   Component Value Date    WBC 8.53 04/15/2024    HGB 7.3 (L) 04/15/2024    HCT 24.0 (L) 04/15/2024    MCV 96 04/15/2024     04/15/2024    RBC 2.50 (L) 04/15/2024    MCH 29.2 04/15/2024    MCHC 30.4 (L) 04/15/2024    RDW 15.9 (H) 04/15/2024    MPV 8.1 (L) 04/15/2024    NRBC 0 04/15/2024      BMP/CMP:  Lab Results   Component Value Date    K 3.4 (L) 04/15/2024    K 4.0 01/13/2019     04/15/2024     01/13/2019    CO2 22 04/15/2024    CO2 23 01/13/2019    BUN 20 04/15/2024    BUN 15 01/13/2019    CREATININE 0.91 04/15/2024    CREATININE 0.93 01/13/2019    CALCIUM 7.1 (L) 04/15/2024    CALCIUM 8.5 01/13/2019    AST 24 04/13/2024    AST 31 01/12/2019    ALT 12 04/13/2024    ALT 26 01/12/2019    ALKPHOS 81 04/13/2024    ALKPHOS 47 01/12/2019    EGFR 75 04/15/2024    EGFR 76 01/13/2019   ,     Coags:   Lab Results   Component Value Date    PT 13.2 01/12/2019    PTT 35 04/13/2024    INR 1.15 04/13/2024    INR 1.1 01/12/2019   ,   Results from last 7 days   Lab Units 04/13/24  0913   PTT seconds 35   INR  1.15        Lipid Panel: No results found for: \"CHOL\"  Lab Results   Component Value Date    HDL 66 05/15/2023     Lab Results   Component Value Date    HDL 66 05/15/2023     Lab Results   Component Value Date    LDLCALC 55 05/15/2023     Lab Results   Component Value Date    TRIG 56 05/15/2023       HgbA1c:   Lab Results   Component Value Date    HGBA1C 5.4 06/30/2023    HGBA1C 5.4 01/13/2019       Blood Culture:   Lab Results   Component Value Date    BLOODCX No Growth at 48 hrs. 04/13/2024    BLOODCX No Growth at 48 hrs. 04/13/2024   ,   Urinalysis:   Lab Results   Component Value Date    COLORU Yellow 11/23/2023    COLORU YELLOW 04/04/2018    " "CLARITYU Cloudy (A) 11/23/2023    CLARITYU CLEAR 04/04/2018    SPECGRAV 1.020 11/23/2023    SPECGRAV 1.015 04/04/2018    PHUR 8.0 (A) 11/23/2023    PHUR 7.5 04/04/2018    LEUKOCYTESUR 2+ (A) 11/23/2023    LEUKOCYTESUR NEGATIVE 04/04/2018    NITRITE Positive (A) 11/23/2023    NITRITE NEGATIVE 04/04/2018    PROTEINUA NEGATIVE 04/04/2018    GLUCOSEU Negative 11/23/2023    GLUCOSEU NEGATIVE 04/04/2018    KETONESU Negative 11/23/2023    KETONESU NEGATIVE 04/04/2018    BILIRUBINUR Negative 11/23/2023    BILIRUBINUR NEGATIVE 04/04/2018    BLOODU 2+ (A) 11/23/2023    BLOODU TRACE (A) 04/04/2018   ,   Urine Culture:   Lab Results   Component Value Date    URINECX Culture too young- will reincubate 04/13/2024   ,   Wound Culure:  No results found for: \"WOUNDCULT\"      Thank you for allowing me to participate in the care of Candido Servinantoinette Son. Please don't hesitate to call, text, email, or TigerText with any questions.     This text is generated with voice recognition software. There may be translation, syntax,  or grammatical errors. If you have any questions, please contact the dictating provider.    "

## 2024-04-15 NOTE — PLAN OF CARE
Problem: PAIN - ADULT  Goal: Verbalizes/displays adequate comfort level or baseline comfort level  Description: Interventions:  - Encourage patient to monitor pain and request assistance  - Assess pain using appropriate pain scale  - Administer analgesics based on type and severity of pain and evaluate response  - Implement non-pharmacological measures as appropriate and evaluate response  - Consider cultural and social influences on pain and pain management  - Notify physician/advanced practitioner if interventions unsuccessful or patient reports new pain  Outcome: Progressing     Problem: INFECTION - ADULT  Goal: Absence or prevention of progression during hospitalization  Description: INTERVENTIONS:  - Assess and monitor for signs and symptoms of infection  - Monitor lab/diagnostic results  - Monitor all insertion sites, i.e. indwelling lines, tubes, and drains  - Monitor endotracheal if appropriate and nasal secretions for changes in amount and color  - [place] appropriate cooling/warming therapies per order  - Administer medications as ordered  - Instruct and encourage patient and family to use good hand hygiene technique  - Identify and instruct in appropriate isolation precautions for identified infection/condition  Outcome: Progressing  Goal: Absence of fever/infection during neutropenic period  Description: INTERVENTIONS:  - Monitor WBC    Outcome: Progressing     Problem: SAFETY ADULT  Goal: Patient will remain free of falls  Description: INTERVENTIONS:  - Educate patient/family on patient safety including physical limitations  - Instruct patient to call for assistance with activity   - Consult OT/PT to assist with strengthening/mobility   - Keep Call bell within reach  - Keep bed low and locked with side rails adjusted as appropriate  - Keep care items and personal belongings within reach  - Initiate and maintain comfort rounds  - Make Fall Risk Sign visible to staff  - Apply yellow socks and bracelet  for high fall risk patients  - Consider moving patient to room near nurses station  Outcome: Progressing     Problem: Knowledge Deficit  Goal: Patient/family/caregiver demonstrates understanding of disease process, treatment plan, medications, and discharge instructions  Description: Complete learning assessment and assess knowledge base.  Interventions:  - Provide teaching at level of understanding  - Provide teaching via preferred learning methods  Outcome: Progressing

## 2024-04-16 LAB
ANION GAP SERPL CALCULATED.3IONS-SCNC: 5 MMOL/L (ref 4–13)
BACTERIA UR CULT: NORMAL
BACTERIA UR CULT: NORMAL
BASOPHILS # BLD AUTO: 0.02 THOUSANDS/ÂΜL (ref 0–0.1)
BASOPHILS NFR BLD AUTO: 0 % (ref 0–1)
BUN SERPL-MCNC: 18 MG/DL (ref 5–25)
CALCIUM SERPL-MCNC: 7.4 MG/DL (ref 8.4–10.2)
CHLORIDE SERPL-SCNC: 104 MMOL/L (ref 96–108)
CO2 SERPL-SCNC: 22 MMOL/L (ref 21–32)
CREAT SERPL-MCNC: 0.9 MG/DL (ref 0.6–1.3)
EOSINOPHIL # BLD AUTO: 0.08 THOUSAND/ÂΜL (ref 0–0.61)
EOSINOPHIL NFR BLD AUTO: 1 % (ref 0–6)
ERYTHROCYTE [DISTWIDTH] IN BLOOD BY AUTOMATED COUNT: 15.7 % (ref 11.6–15.1)
GFR SERPL CREATININE-BSD FRML MDRD: 76 ML/MIN/1.73SQ M
GLUCOSE SERPL-MCNC: 94 MG/DL (ref 65–140)
HCT VFR BLD AUTO: 24.9 % (ref 36.5–49.3)
HGB BLD-MCNC: 7.8 G/DL (ref 12–17)
IMM GRANULOCYTES # BLD AUTO: 0.2 THOUSAND/UL (ref 0–0.2)
IMM GRANULOCYTES NFR BLD AUTO: 2 % (ref 0–2)
LYMPHOCYTES # BLD AUTO: 0.58 THOUSANDS/ÂΜL (ref 0.6–4.47)
LYMPHOCYTES NFR BLD AUTO: 7 % (ref 14–44)
MCH RBC QN AUTO: 29.7 PG (ref 26.8–34.3)
MCHC RBC AUTO-ENTMCNC: 31.3 G/DL (ref 31.4–37.4)
MCV RBC AUTO: 95 FL (ref 82–98)
MONOCYTES # BLD AUTO: 0.35 THOUSAND/ÂΜL (ref 0.17–1.22)
MONOCYTES NFR BLD AUTO: 4 % (ref 4–12)
NEUTROPHILS # BLD AUTO: 7.47 THOUSANDS/ÂΜL (ref 1.85–7.62)
NEUTS SEG NFR BLD AUTO: 86 % (ref 43–75)
NRBC BLD AUTO-RTO: 0 /100 WBCS
PLATELET # BLD AUTO: 361 THOUSANDS/UL (ref 149–390)
PMV BLD AUTO: 8.2 FL (ref 8.9–12.7)
POTASSIUM SERPL-SCNC: 3.3 MMOL/L (ref 3.5–5.3)
RBC # BLD AUTO: 2.63 MILLION/UL (ref 3.88–5.62)
SODIUM SERPL-SCNC: 131 MMOL/L (ref 135–147)
WBC # BLD AUTO: 8.7 THOUSAND/UL (ref 4.31–10.16)

## 2024-04-16 PROCEDURE — 85025 COMPLETE CBC W/AUTO DIFF WBC: CPT | Performed by: NURSE PRACTITIONER

## 2024-04-16 PROCEDURE — 80048 BASIC METABOLIC PNL TOTAL CA: CPT | Performed by: NURSE PRACTITIONER

## 2024-04-16 PROCEDURE — 99232 SBSQ HOSP IP/OBS MODERATE 35: CPT | Performed by: UROLOGY

## 2024-04-16 PROCEDURE — 99024 POSTOP FOLLOW-UP VISIT: CPT | Performed by: SURGERY

## 2024-04-16 RX ORDER — POTASSIUM CHLORIDE 20 MEQ/1
40 TABLET, EXTENDED RELEASE ORAL ONCE
Status: COMPLETED | OUTPATIENT
Start: 2024-04-16 | End: 2024-04-16

## 2024-04-16 RX ORDER — FINASTERIDE 1 MG/1
1 TABLET, FILM COATED ORAL DAILY
Status: DISCONTINUED | OUTPATIENT
Start: 2024-04-16 | End: 2024-04-16 | Stop reason: CLARIF

## 2024-04-16 RX ORDER — POTASSIUM CHLORIDE 20 MEQ/1
20 TABLET, EXTENDED RELEASE ORAL ONCE
Status: COMPLETED | OUTPATIENT
Start: 2024-04-16 | End: 2024-04-16

## 2024-04-16 RX ORDER — POTASSIUM CHLORIDE 14.9 MG/ML
20 INJECTION INTRAVENOUS
Status: COMPLETED | OUTPATIENT
Start: 2024-04-16 | End: 2024-04-16

## 2024-04-16 RX ORDER — FOLIC ACID 1 MG/1
1 TABLET ORAL DAILY
Status: DISCONTINUED | OUTPATIENT
Start: 2024-04-17 | End: 2024-04-18 | Stop reason: HOSPADM

## 2024-04-16 RX ORDER — FINASTERIDE 1 MG/1
1 TABLET, FILM COATED ORAL DAILY
Status: DISCONTINUED | OUTPATIENT
Start: 2024-04-17 | End: 2024-04-16 | Stop reason: CLARIF

## 2024-04-16 RX ADMIN — HEPARIN SODIUM 5000 UNITS: 5000 INJECTION INTRAVENOUS; SUBCUTANEOUS at 13:29

## 2024-04-16 RX ADMIN — POTASSIUM CHLORIDE 20 MEQ: 14.9 INJECTION, SOLUTION INTRAVENOUS at 12:01

## 2024-04-16 RX ADMIN — PANTOPRAZOLE SODIUM 40 MG: 40 TABLET, DELAYED RELEASE ORAL at 08:33

## 2024-04-16 RX ADMIN — METOPROLOL TARTRATE 50 MG: 50 TABLET, FILM COATED ORAL at 08:33

## 2024-04-16 RX ADMIN — METOPROLOL TARTRATE 50 MG: 50 TABLET, FILM COATED ORAL at 22:17

## 2024-04-16 RX ADMIN — METRONIDAZOLE 500 MG: 500 INJECTION, SOLUTION INTRAVENOUS at 10:24

## 2024-04-16 RX ADMIN — METRONIDAZOLE 500 MG: 500 INJECTION, SOLUTION INTRAVENOUS at 02:49

## 2024-04-16 RX ADMIN — POTASSIUM CHLORIDE 20 MEQ: 1500 TABLET, EXTENDED RELEASE ORAL at 13:29

## 2024-04-16 RX ADMIN — POTASSIUM CHLORIDE 20 MEQ: 14.9 INJECTION, SOLUTION INTRAVENOUS at 10:18

## 2024-04-16 RX ADMIN — METRONIDAZOLE 500 MG: 500 INJECTION, SOLUTION INTRAVENOUS at 17:10

## 2024-04-16 RX ADMIN — HEPARIN SODIUM 5000 UNITS: 5000 INJECTION INTRAVENOUS; SUBCUTANEOUS at 22:17

## 2024-04-16 RX ADMIN — CEFTRIAXONE 1000 MG: 1 INJECTION, POWDER, FOR SOLUTION INTRAMUSCULAR; INTRAVENOUS at 11:25

## 2024-04-16 RX ADMIN — HEPARIN SODIUM 5000 UNITS: 5000 INJECTION INTRAVENOUS; SUBCUTANEOUS at 05:32

## 2024-04-16 RX ADMIN — POTASSIUM CHLORIDE 40 MEQ: 1500 TABLET, EXTENDED RELEASE ORAL at 10:17

## 2024-04-16 NOTE — PROGRESS NOTES
"Progress Note - General Surgery   Candido Valladares Jr. 88 y.o. male MRN: [id number]  Unit/Bed#: [facility] 833-01 Encounter: [id number]    Assessment:  89 yo M w/ progressive and locally advanced urothelial cancer, colovesical fistula and bilateral obstructive hydrouteronephrosis now requiring fecal diversion s/p diverting loop colostomy and b/l IR placed PCN on 4/13    Intermittent tachycardia 100's, Tmax 100.1 at 3:30pm, other vitals normal on room air  Dominique 20 cc  R  cc  L PCN 35 cc    WBC 8.7 from 8.5  Hemoglobin 7.8 from 7.3  Creatinine 0.9 from 0.91    4/13 blood cultures: No growth at 24 hours  4/14 C. difficile: Negative    Plan:  Renal restrictive diet, Fluid restriction 1800mL, await ostomy function  Appreciate Urology recommendations, maintain dominique, follow up recommendations for removal  Maintain bilateral nephrostomies, monitor output  Appreciate Palliative care recommendations, Level 1  Appreciate Medical Oncology recommendations, no anticancer treatment at this time, follow up with Dr. Britton as an outpatient  PRN analgesia  DVT PPX  OOB/ambulate  PT/OT  Wound care for new ostomy  Case management; begin dispo planning    Subjective/Objective     Subjective: No acute events overnight, tolerating renal restricted diet without nausea or vomiting, no stoma output yet just bowel sweat, pain controlled.     Objective:     Blood pressure 121/68, pulse 81, temperature 98 °F (36.7 °C), resp. rate 12, height 6' 1\" (1.854 m), weight 79.8 kg (175 lb 14.8 oz), SpO2 95%.,Body mass index is 23.21 kg/m².      Intake/Output Summary (Last 24 hours) at 4/16/2024 0950  Last data filed at 4/16/2024 0900  Gross per 24 hour   Intake 738 ml   Output 570 ml   Net 168 ml       Invasive Devices       Peripheral Intravenous Line  Duration             Peripheral IV 04/13/24 Left;Proximal;Ventral (anterior) Forearm 3 days              Drain  Duration             Urethral Catheter Latex;Three way 24 Fr. 218 days    Colostomy " Transverse LUQ 2 days    Percutaneous Nephroureteral Tube (PCNU) Left 1 8.5 Fr 2 days    Percutaneous Nephroureteral Tube (PCNU) Right 2 8.5 Fr 2 days                    Physical Exam:   Gen:    NAD  CV:      warm, well-perfused  Lungs: No respiratory distress  Abd:     soft, appropriately tender, nondistended, stoma pink and healthy with just bowel sweat in appliance, bilateral nephrostomies in place with output as above  Ext:      no CCE  Neuro: A&Ox3

## 2024-04-16 NOTE — RESTORATIVE TECHNICIAN NOTE
Restorative Technician Note      Patient Name: Candido Valladares JrRosa Isela     Restorative Tech Visit Date: 04/16/24  Note Type: Mobility  Patient Position Upon Consult: Bedside chair  Activity Performed: Transferred; Other (Comment) (steps forwards and backwards; sit to stands; steps to bed)  Assistive Device: Standard walker  Education Provided: Yes  Patient Position at End of Consult: All needs within reach; Supine; Other (comment) (left in the care of RN)    Luma Mcfadden  DPT, Restorative Technician

## 2024-04-16 NOTE — CASE MANAGEMENT
Case Management Assessment & Discharge Planning Note    Patient name Candido Valladares Jr.  Location [facility] 833/[facility] 833-01 MRN [id number]  : 1935 Date 2024       Current Admission Date: 2024  Current Admission Diagnosis:Prostate CA (HCC)   Patient Active Problem List    Diagnosis Date Noted    Hydronephrosis 04/15/2024    Colovesical fistula 04/15/2024    Diarrhea due to drug 2024    Hypomagnesemia 2023    Iron deficiency anemia, unspecified 2023    Urothelial carcinoma of bladder (HCC) 2023    High grade urothelial carcinoma present on urine cytology (HCC) 10/11/2023    Bilateral lower extremity edema 2023    Preop examination 2023    Hospital discharge follow-up 2023    Abnormal CT of the chest 2023    History of echocardiogram 2023    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    Syncope and collapse 2023    New onset a-fib (HCC) 2023    Hyponatremia 2023    Anemia 2023    Stage 3a chronic kidney disease (HCC) 2021    Prostate CA (HCC) 01/15/2020    Essential hypertension 01/15/2020    Generalized weakness 2019      LOS (days): 3  Geometric Mean LOS (GMLOS) (days): 5.2  Days to GMLOS:2.7     OBJECTIVE:    Risk of Unplanned Readmission Score: 26.83         Current admission status: Inpatient       Preferred Pharmacy:   [facility] - [place] [address]  Phone: [phone] Fax: [phone]    [place]  [facility] - [place] [address]  Phone: [phone] Fax: [phone]    [id number] [place] - [address]  [address]  [place] [address]  Phone: [phone] Fax: [phone]    [facility] [place] - [place] - [address]  [address]  [place]  [address]  Phone: [phone] Fax: [phone]    Primary Care Provider: Zi Cespedes DO    Primary Insurance: MEDICARE  Secondary Insurance:  FOR LIFE    ASSESSMENT:  Active Health Care Proxies       charly castro [facility] Care Representative - Spouse   Primary Phone: [phone] (Home)                           Readmission Root Cause  30 Day Readmission: No    Patient Information  Admitted from:: Home  Mental Status: Alert  During Assessment patient was accompanied by: Spouse  Assessment information provided by:: Patient  Primary Caregiver: Self  Support Systems: Self, Spouse/significant other, Daughter, Children, Family members  County of Residence: [facility] do you live in?: [place]  Home entry access options. Select all that apply.: Stairs  Number of steps to enter home.: 3  Do the steps have railings?: Yes  Type of Current Residence: [address] home  Upon entering residence, is there a bedroom on the main floor (no further steps)?: Yes  Upon entering residence, is there a bathroom on the main floor (no further steps)?: Yes  Living Arrangements: Lives w/ Spouse/significant other  Is patient a ?: Yes    Activities of Daily Living Prior to Admission  Functional Status: Independent  Completes ADLs independently?: Yes  Ambulates independently?: Yes  Does patient use assisted devices?: Yes  Assisted Devices (DME) used: Walker, Rollator, Shower Chair  Does patient currently own DME?: Yes  What DME does the patient currently own?: Rollator, Shower Chair, Walker  Does patient have a history of Outpatient Therapy (PT/OT)?: No  Does the patient have a history of Short-Term Rehab?: Yes  Does patient have a history of HHC?: Yes (pt reports he pays privately for a PT)         Patient Information Continued  Income Source: Pension/MCFP  Does patient have prescription coverage?: Yes  Does patient receive dialysis treatments?: No  Does patient have a history of substance abuse?: No  Does patient have  a history of Mental Health Diagnosis?: No         Means of Transportation  Means [facility] to [facility]:: Family transport      Social Determinants of Health (SDOH)      Flowsheet Row Most Recent Value   Housing Stability    In the last 12 months, was there a time when you were not able to pay the mortgage or rent on time? N   In the last 12 months, was there a time when you did not have a steady place to sleep or slept in a shelter (including now)? N   Transportation Needs    In the past 12 months, has lack of transportation kept you from meetings, work, or from getting things needed for daily living? No   Food Insecurity    Within the past 12 months, you worried that your food would run out before you got the money to buy more. Never true   Within the past 12 months, the food you bought just didn't last and you didn't have money to get more. Never true   Utilities    In the past 12 months has the electric, gas, oil, or water company threatened to shut off services in your home? No            DISCHARGE DETAILS:    Discharge planning discussed with:: pt and spouseMckenna   [phone]  Freedom of Choice: Yes  Comments - Freedom of Choice: Reviewed IP rec at this time, pt/ spouse open to blanket referrals  CM contacted family/caregiver?: Yes  Were Treatment Team discharge recommendations reviewed with patient/caregiver?: Yes  Did patient/caregiver verbalize understanding of patient care needs?: Yes  Were patient/caregiver advised of the risks associated with not following Treatment Team discharge recommendations?: Yes    Contacts  Patient Contacts: Mckenna Valladares  Relationship to Patient:: Family  Contact Method: Phone  Phone Number: [phone]  Reason/Outcome: Continuity of Care, Emergency Contact, Discharge Planning          Met with patient and spouseMckenna at bedside to introduce self and role  Patient resides in 2[address], first floor set up. Utilizes walker, also owns rollator and shower chair  Reports he  pays a PT privately to come to his home and work with him. Otherwise, denies STR or OPPT   Discussed IP rehab rec. Patient/ spouse agreeable to blanket referrals.   CM to follow     Patient/caregiver received discharge checklist.  Content reviewed.  Patient/caregiver encouraged to participate in discharge plan of care prior to discharge home.  CM reviewed d/c planning process including the following: identifying help at home, patient preference for d/c planning needs, Discharge Lounge, [facility] Meds to Bed program, availability of treatment team to discuss questions or concerns patient and/or family may have regarding understanding medications and recognizing signs and symptoms once discharged.  CM also encouraged patient to follow up with all recommended appointments after discharge. Patient advised of importance for patient and family to participate in managing patient’s medical well being.

## 2024-04-16 NOTE — WOUND OSTOMY CARE
"Progress Note- Ostomy  Candido Valladares Jr. 88 y.o. male  [id number]  [facility] 833-[facility] 833-01    Assessment:  Wound care consulted for ostomy teaching - patient underwent loop colostomy formation on 2024. Wife and daughter present at bedside. Patient seen today for ostomy teaching. Patient, wife and daughter educated on ostomy RN purpose and were given educational materials, \"What to expect after colostomy surgery\" by [facility] to read through during the week and were asked to write down any questions or concerns. All three of them actively watched and listened to 100% of ostomy teaching. Wife and daughter asked questions throughout session- patient did not ask any questions throughout session. All questions and concerns answered - patient, wife, and daughter reported understanding of teaching but reports that they are overwhelmed with the amount of information. Emotional support provided. Total time spent performing ostomy teachin minutes.     Topics reviewed with patient, wife and daughter: normal & abdormal stoma appearance, how and when to empty the pouch (1/3- 1/2 full) to prevent pulling/lifting of the barrier, importance & how to clean the end of the pouch to prevent odor after pouch emptying, frequency of changing of the pouch (every 3-4 days on a schedule), burping, one piece/two piece pouching systems differences, gas valve functionality of one piece, Clothing- including avoiding placing belt-line/seat belts over the stoma, bathing, tan-stomal skin care, how to measure the stoma/ cut the wafer to the correct size, how to close the pouch, and how to obtain supplies.     Step-by-step pouch change done using coloplast one piece pouch- patient will benefit from 4 inch pouch- 4 inch pouches called to patient's room from storeroom. Reviewed with them how and when to measure the stoma (weekly). Demonstrated how to mold two piece barrier/ trace & cut one piece barrier, and how to apply it to the skin using " gentle pressure / warmth of the hands. Good seal obtained.    Patient, wife and daughter verbalized understanding of education and teaching. All are active learner. Patient and wife gave verbal permission to order sample kits from [employer], and [employer]. Additional discharge supplies and pouches provided to patient- left at beside.     Stoma appearance:     Stoma: Red, oval, budded stoma with proximal and distal os, peristomal skin is intact. Stoma attached to skin junction, no separation noted. Scant serous drainage noted. STOMA MEASUREMENT: 1.75 by 2.5 inches     Ostomy Care:  -Stoma Measurement: 1.75 by 2.5 inches (Measure stoma weekly for next 6-8 weeks- stoma will decrease in size due to decrease in swelling)     -Appliance Used During Bag Change: Convatec 4 Inch Pouch  -Accessories Used: [employer] no sting skin prep    *Can shower with pouch on or off. Make sure to dry off pouch after shower.     Bag Change Steps:  1. Peel back pouch using push-pull method, may use non-alcohol adhesive remover. Remove pouch from top to bottom.   2. Use warm water only to cleanse skin around the stoma (tan-stomal skin).   3. Make sure all adhesive residue is removed and skin is dry and not oily.  4. Measure stoma size using measuring guide and trace correct measurements onto back of pouch.  5. Then cut or mold the backing of pouch out to correct shape/size. Apply skin prep tp tan-stomal skin and allow to dry.   6. Place pouch over stoma and onto skin.  7. Use warmth of hand to apply gentle pressure to help backing of pouch to adhere well to skin.      **If the skin is open, moist or fragile, Crusting can be done prior to pouch application (before step 6) to create dry skin and assist with pouch adherence- steps are listed below.      TIPS:  Empty pouch when 1/3 -1/2 full.  Change pouch every 4-5 days or if signs of leaking.    Crusting as needed for moist, red, open skin around the stoma: (Done prior to pouch  "application)   Apply stoma powder to excoriation, move excess powder away with hand  Use no sting barrier to pat area to form \"crust\"  Repeat X2 before placing new ostomy pouch        Orders listed below and wound care will continue to follow, call or tiger text with questions. Bedside nurse updated of findings and orders. Flowsheets below with pictures and measurements.      Juliann Ramirez RN, BSN, CWOCN   "

## 2024-04-16 NOTE — PLAN OF CARE
Problem: PAIN - ADULT  Goal: Verbalizes/displays adequate comfort level or baseline comfort level  Description: Interventions:  - Encourage patient to monitor pain and request assistance  - Assess pain using appropriate pain scale  - Administer analgesics based on type and severity of pain and evaluate response  - Implement non-pharmacological measures as appropriate and evaluate response  - Consider cultural and social influences on pain and pain management  - Notify physician/advanced practitioner if interventions unsuccessful or patient reports new pain  Outcome: Progressing     Problem: INFECTION - ADULT  Goal: Absence or prevention of progression during hospitalization  Description: INTERVENTIONS:  - Assess and monitor for signs and symptoms of infection  - Monitor lab/diagnostic results  - Monitor all insertion sites, i.e. indwelling lines, tubes, and drains  - Monitor endotracheal if appropriate and nasal secretions for changes in amount and color  - [place] appropriate cooling/warming therapies per order  - Administer medications as ordered  - Instruct and encourage patient and family to use good hand hygiene technique  - Identify and instruct in appropriate isolation precautions for identified infection/condition  Outcome: Progressing  Goal: Absence of fever/infection during neutropenic period  Description: INTERVENTIONS:  - Monitor WBC    Outcome: Progressing     Problem: SAFETY ADULT  Goal: Patient will remain free of falls  Description: INTERVENTIONS:  - Educate patient/family on patient safety including physical limitations  - Instruct patient to call for assistance with activity   - Consult OT/PT to assist with strengthening/mobility   - Keep Call bell within reach  - Keep bed low and locked with side rails adjusted as appropriate  - Keep care items and personal belongings within reach  - Initiate and maintain comfort rounds  - Make Fall Risk Sign visible to staff  - Apply yellow socks and bracelet  for high fall risk patients  - Consider moving patient to room near nurses station  Outcome: Progressing  Goal: Maintain or return to baseline ADL function  Description: INTERVENTIONS:  -  Assess patient's ability to carry out ADLs; assess patient's baseline for ADL function and identify physical deficits which impact ability to perform ADLs (bathing, care of mouth/teeth, toileting, grooming, dressing, etc.)  - Assess/evaluate cause of self-care deficits   - Assess range of motion  - Assess patient's mobility; develop plan if impaired  - Assess patient's need for assistive devices and provide as appropriate  - Encourage maximum independence but intervene and supervise when necessary  - Involve family in performance of ADLs  - Assess for home care needs following discharge   - Consider OT consult to assist with ADL evaluation and planning for discharge  - Provide patient education as appropriate  Outcome: Progressing  Goal: Maintains/Returns to pre admission functional level  Description: INTERVENTIONS:  - Perform AM-PAC 6 Click Basic Mobility/ Daily Activity assessment daily.  - Set and communicate daily mobility goal to care team and patient/family/caregiver.   - Collaborate with rehabilitation services on mobility goals if consulted  - Out of bed for toileting  - Record patient progress and toleration of activity level   Outcome: Progressing     Problem: DISCHARGE PLANNING  Goal: Discharge to home or other facility with appropriate resources  Description: INTERVENTIONS:  - Identify barriers to discharge w/patient and caregiver  - Arrange for needed discharge resources and transportation as appropriate  - Identify discharge learning needs (meds, wound care, etc.)  - Arrange for interpretive services to assist at discharge as needed  - Refer to Case Management Department for coordinating discharge planning if the patient needs post-hospital services based on physician/advanced practitioner order or complex needs  related to functional status, cognitive ability, or social support system  Outcome: Progressing     Problem: Knowledge Deficit  Goal: Patient/family/caregiver demonstrates understanding of disease process, treatment plan, medications, and discharge instructions  Description: Complete learning assessment and assess knowledge base.  Interventions:  - Provide teaching at level of understanding  - Provide teaching via preferred learning methods  Outcome: Progressing     Problem: Prexisting or High Potential for Compromised Skin Integrity  Goal: Skin integrity is maintained or improved  Description: INTERVENTIONS:  - Identify patients at risk for skin breakdown  - Assess and monitor skin integrity  - Assess and monitor nutrition and hydration status  - Monitor labs   - Assess for incontinence   - Turn and reposition patient  - Assist with mobility/ambulation  - Relieve pressure over bony prominences  - Avoid friction and shearing  - Provide appropriate hygiene as needed including keeping skin clean and dry  - Evaluate need for skin moisturizer/barrier cream  - Collaborate with interdisciplinary team   - Patient/family teaching  - Consider wound care consult   Outcome: Progressing

## 2024-04-16 NOTE — DISCHARGE INSTR - OTHER ORDERS
Skin Care Plan:  1-Cleanse sacro-buttocks with soap and water. Apply Small Sacral Silicone Bordered Foam Dressing to B/L Sacro-Buttocks. Jeramie with T for Treatment and change every other day or PRN soilage/displacement.   2-Turn/reposition q2h or when medically stable for pressure re-distribution on skin .  3-Elevate heels to offload pressure.  4-Moisturize skin daily with skin nourishing cream  5-Ehob cushion in chair when out of bed.  6-Preventative Hydraguard to bilateral heels BID and PRN.       Ostomy Care:  -Stoma Measurement: 1.75 by 2.5 inches (Measure stoma weekly for next 6-8 weeks- stoma will decrease in size due to decrease in swelling)     -Appliance Used During Bag Change: Convatec 4 Inch Pouch  -Accessories Used: 3m no sting skin prep    *Can shower with pouch on or off. Make sure to dry off pouch after shower.     Bag Change Steps:  1. Peel back pouch using push-pull method, may use non-alcohol adhesive remover. Remove pouch from top to bottom.   2. Use warm water only to cleanse skin around the stoma (tan-stomal skin).   3. Make sure all adhesive residue is removed and skin is dry and not oily.  4. Measure stoma size using measuring guide and trace correct measurements onto back of pouch.  5. Then cut or mold the backing of pouch out to correct shape/size. Apply skin prep tp tan-stomal skin and allow to dry.   6. Place pouch over stoma and onto skin.  7. Use warmth of hand to apply gentle pressure to help backing of pouch to adhere well to skin.      **If the skin is open, moist or fragile, Crusting can be done prior to pouch application (before step 6) to create dry skin and assist with pouch adherence- steps are listed below.      TIPS:  Empty pouch when 1/3 -1/2 full.  Change pouch every 4-5 days or if signs of leaking.    Crusting as needed for moist, red, open skin around the stoma: (Done prior to pouch application)   Apply stoma powder to excoriation, move excess powder away with hand  Use no  "sting barrier to pat area to form \"crust\"  Repeat X2 before placing new ostomy pouch        Schedule Follow-up Outpatient Wound Appointment. Ambulatory Referral Placed.   Call Outpatient Wound Center @ [phone]   Option 1: [place], [place], [place], [place]  Option 2: [facility], [place]  "

## 2024-04-16 NOTE — WOUND OSTOMY CARE
Consult Note - Wound   Candido Valladares Jr. 88 y.o. male MRN: [id number]  Unit/Bed#: [facility] 833-01 Encounter: [id number]        History and Present Illness:  Patient is an 89 yo male that was admitted to [facility] for treatment of prostate cancer. Patient has a PMH of bladder cancer, a-fib, HTN. Patient is a min assist for turning and repositioning. Patient's bowel is managed via LUQ colostomy and bladder is managed via internal urinary catheter. On assessment, patient is seen lying on regular mattress. P-500 low air loss mattress ordered for patient. Wife present at bedside for assessment.     Wound Care was consulted for sacro-buttocks wound and ostomy teaching     Assessment Findings:   B/L heels are dry intact and polina with no skin loss or wounds present. Recommend preventative Hydraguard Cream and proper offloading/ repositioning.      POA B/L Sacro-Buttocks Evolving Deep Tissue Pressure Injury: wife reports that patient had wound at home and reports that wound appears the same in size as it did at home. Irregular in shape area of intact and partial thickness skin loss measured together. Wound bed is mix of 10% pink tissue and 90% dark purple non-blanchable tissue. Ginger-wound is fragile. Scant serosanguineous drainage noted. Recommend foam dressing to area for treatment. Deep Tissue Pressure Injury wounds have the potential to evolve into unstageable, stage III or stage IV wounds.      No induration, fluctuance, odor, warmth/temperature differences, redness, or purulence noted to the above noted wounds and skin areas assessed. New dressings applied per orders listed below. Patient tolerated well- no s/s of non-verbal pain or discomfort observed during the encounter. Bedside nurse aware of plan of care. See flow sheets for more detailed assessment findings.      Orders listed below and wound care will continue to follow, call or tiger text with questions.     Skin Care Plan:  1-Cleanse sacro-buttocks with soap  and water. Apply Small Sacral Silicone Bordered Foam Dressing to B/L Sacro-Buttocks. Jeramie with T for Treatment and change every other day or PRN soilage/displacement.   2-Turn/reposition q2h or when medically stable for pressure re-distribution on skin .  3-Elevate heels to offload pressure.  4-Moisturize skin daily with skin nourishing cream  5-Ehob cushion in chair when out of bed.  6-Preventative Hydraguard to bilateral heels BID and PRN.       Wounds:  Wound 04/16/24 Pressure Injury Buttocks (Active)   Wound Image   04/16/24 1054   Wound Description Light purple;Non-blanchable erythema;Pink 04/16/24 1054   Pressure Injury Stage DTPI 04/16/24 1054   Ginger-wound Assessment Fragile 04/16/24 1054   Wound Length (cm) 4.5 cm 04/16/24 1054   Wound Width (cm) 6 cm 04/16/24 1054   Wound Depth (cm) 0.1 cm 04/16/24 1054   Wound Surface Area (cm^2) 27 cm^2 04/16/24 1054   Wound Volume (cm^3) 2.7 cm^3 04/16/24 1054   Calculated Wound Volume (cm^3) 2.7 cm^3 04/16/24 1054   Drainage Amount Scant 04/16/24 1054   Drainage Description Serosanguineous 04/16/24 1054   Non-staged Wound Description Partial thickness 04/16/24 1054   Treatments Cleansed;Irrigation with NSS;Site care 04/16/24 1054   Dressing Foam, Silicon (eg. Allevyn, etc) 04/16/24 1054   Dressing Changed New 04/16/24 1054   Patient Tolerance Tolerated well 04/16/24 1054   Dressing Status Clean;Intact;Dry 04/16/24 1054               Juliann Ramirez RN, BSN, CWOCN

## 2024-04-16 NOTE — PLAN OF CARE
Problem: PAIN - ADULT  Goal: Verbalizes/displays adequate comfort level or baseline comfort level  Description: Interventions:  - Encourage patient to monitor pain and request assistance  - Assess pain using appropriate pain scale  - Administer analgesics based on type and severity of pain and evaluate response  - Implement non-pharmacological measures as appropriate and evaluate response  - Consider cultural and social influences on pain and pain management  - Notify physician/advanced practitioner if interventions unsuccessful or patient reports new pain  Outcome: Progressing     Problem: INFECTION - ADULT  Goal: Absence or prevention of progression during hospitalization  Description: INTERVENTIONS:  - Assess and monitor for signs and symptoms of infection  - Monitor lab/diagnostic results  - Monitor all insertion sites, i.e. indwelling lines, tubes, and drains  - Monitor endotracheal if appropriate and nasal secretions for changes in amount and color  - [place] appropriate cooling/warming therapies per order  - Administer medications as ordered  - Instruct and encourage patient and family to use good hand hygiene technique  - Identify and instruct in appropriate isolation precautions for identified infection/condition  Outcome: Progressing  Goal: Absence of fever/infection during neutropenic period  Description: INTERVENTIONS:  - Monitor WBC    Outcome: Progressing     Problem: SAFETY ADULT  Goal: Patient will remain free of falls  Description: INTERVENTIONS:  - Educate patient/family on patient safety including physical limitations  - Instruct patient to call for assistance with activity   - Consult OT/PT to assist with strengthening/mobility   - Keep Call bell within reach  - Keep bed low and locked with side rails adjusted as appropriate  - Keep care items and personal belongings within reach  - Initiate and maintain comfort rounds  - Make Fall Risk Sign visible to staff  - Offer Toileting every 2 Hours,  in advance of need  - Initiate/Maintain bed alarm  - Obtain necessary fall risk management equipment: yellow socks  - Apply yellow socks and bracelet for high fall risk patients  - Consider moving patient to room near nurses station  Outcome: Progressing  Goal: Maintain or return to baseline ADL function  Description: INTERVENTIONS:  -  Assess patient's ability to carry out ADLs; assess patient's baseline for ADL function and identify physical deficits which impact ability to perform ADLs (bathing, care of mouth/teeth, toileting, grooming, dressing, etc.)  - Assess/evaluate cause of self-care deficits   - Assess range of motion  - Assess patient's mobility; develop plan if impaired  - Assess patient's need for assistive devices and provide as appropriate  - Encourage maximum independence but intervene and supervise when necessary  - Involve family in performance of ADLs  - Assess for home care needs following discharge   - Consider OT consult to assist with ADL evaluation and planning for discharge  - Provide patient education as appropriate  Outcome: Progressing  Goal: Maintains/Returns to pre admission functional level  Description: INTERVENTIONS:  - Perform AM-PAC 6 Click Basic Mobility/ Daily Activity assessment daily.  - Set and communicate daily mobility goal to care team and patient/family/caregiver.   - Collaborate with rehabilitation services on mobility goals if consulted  - Perform Range of Motion 3 times a day.  - Reposition patient every 3 hours.  - Dangle patient 3 times a day  - Stand patient 3 times a day  - Ambulate patient 3 times a day  - Out of bed to chair 3 times a day   - Out of bed for meals 3 times a day  - Out of bed for toileting  - Record patient progress and toleration of activity level   Outcome: Progressing     Problem: DISCHARGE PLANNING  Goal: Discharge to home or other facility with appropriate resources  Description: INTERVENTIONS:  - Identify barriers to discharge w/patient and  caregiver  - Arrange for needed discharge resources and transportation as appropriate  - Identify discharge learning needs (meds, wound care, etc.)  - Arrange for interpretive services to assist at discharge as needed  - Refer to Case Management Department for coordinating discharge planning if the patient needs post-hospital services based on physician/advanced practitioner order or complex needs related to functional status, cognitive ability, or social support system  Outcome: Progressing     Problem: Knowledge Deficit  Goal: Patient/family/caregiver demonstrates understanding of disease process, treatment plan, medications, and discharge instructions  Description: Complete learning assessment and assess knowledge base.  Interventions:  - Provide teaching at level of understanding  - Provide teaching via preferred learning methods  Outcome: Progressing     Problem: Prexisting or High Potential for Compromised Skin Integrity  Goal: Skin integrity is maintained or improved  Description: INTERVENTIONS:  - Identify patients at risk for skin breakdown  - Assess and monitor skin integrity  - Assess and monitor nutrition and hydration status  - Monitor labs   - Assess for incontinence   - Turn and reposition patient  - Assist with mobility/ambulation  - Relieve pressure over bony prominences  - Avoid friction and shearing  - Provide appropriate hygiene as needed including keeping skin clean and dry  - Evaluate need for skin moisturizer/barrier cream  - Collaborate with interdisciplinary team   - Patient/family teaching  - Consider wound care consult   Outcome: Progressing

## 2024-04-16 NOTE — PROGRESS NOTES
Progress Note - Urology  Candido Valladares Jr. 1935, 88 y.o. male MRN: [id number]    Unit/Bed#: [facility] 833-01 Encounter: [id number]    Colovesical fistula  Assessment & Plan  S/p diverting colostomy 4/13  Medical optimization per primary team      Hydronephrosis  Assessment & Plan  CT: Increasing obstruction of left UVJ with right hydroureteronephrosis and severe left hydronephrosis due to ureteral vesicle junction obstruction with delayed left renal nephrogram and left renal cortical thinning  S/p bilateral percutaneous nephrostomy tube placement by interventional radiology 4/13  Continue to trend labs  Medical optimization per primary team  Manually irrigate bilateral nephrostomy tubes per IR recommendations  24-hour urine output left PCN: 35 mL, right PCN: 440 mL, Haley catheter 20 mL  Creat 0.90  Cultures from bilateral nephrostomy tubes negative      Urothelial carcinoma of bladder (HCC)  Assessment & Plan  cT4, cN1, CM0 high-grade urothelial carcinoma  CT: Bladder tumor mass, tumor erosion into perivesical pelvic fat with small collection of fluid and gas extending to the pelvic sidewall.    Continue to follow-up with medical oncology and palliative care  [facility]    * Prostate CA (HCC)  Assessment & Plan  History of prostate cancer status post radiation  Follows with Dr. Joseph as outpatient          Subjective: Patient resting in bed.  Tolerating diet.  Reports that he has been passing gas through his colostomy bag.  He is undecided if he plans to follow-up with Dr. Joseph or switch care to [facility] urology due to proximity of offices.  His urine cultures from bilateral nephrostomy tubes were negative.  His left nephrostomy tube is draining less than the right    24 HR EVENTS:   no significant events.      Patient has  no complaints.      Review of Systems   Constitutional:  Negative for activity change, appetite change,  "chills, fatigue, fever and unexpected weight change.   HENT:  Negative for facial swelling.    Eyes:  Negative for discharge.   Respiratory: Negative.  Negative for cough and shortness of breath.    Cardiovascular:  Negative for chest pain and leg swelling.   Gastrointestinal: Negative.  Negative for abdominal distention, abdominal pain, nausea and vomiting.   Endocrine: Negative.    Genitourinary:  Positive for hematuria. Negative for decreased urine volume, difficulty urinating, enuresis, flank pain and genital sores.   Musculoskeletal:  Negative for back pain and myalgias.   Skin:  Negative for pallor and rash.   Allergic/Immunologic: Negative.  Negative for immunocompromised state.   Neurological:  Negative for facial asymmetry and speech difficulty.   Psychiatric/Behavioral:  Negative for agitation and confusion.        Objective:    Vitals: Blood pressure 121/68, pulse 81, temperature 98 °F (36.7 °C), resp. rate 12, height 6' 1\" (1.854 m), weight 79.8 kg (175 lb 14.8 oz), SpO2 95%.,Body mass index is 23.21 kg/m².  INS & OUTS:  I/O last 24 hours:  In: 858 [P.O.:858]  Out: 570 [Urine:570]    Physical Exam  Vitals and nursing note reviewed.   Constitutional:       General: He is not in acute distress.     Appearance: Normal appearance. He is not ill-appearing or toxic-appearing.   HENT:      Head: Normocephalic.   Pulmonary:      Effort: Pulmonary effort is normal. No respiratory distress.   Abdominal:      General: Abdomen is flat. There is no distension.      Comments: Colostomy   Genitourinary:     Comments: Minimal output from penile catheter.  Left nephrostomy tube with pink-colored urine.  Right nephrostomy tube with pennie-colored urine.  Urine output greater from left nephrostomy tube than right nephrostomy tube.  Musculoskeletal:         General: No swelling.   Skin:     General: Skin is warm and dry.   Neurological:      General: No focal deficit present.      Mental Status: He is alert and oriented to " person, place, and time.   Psychiatric:         Mood and Affect: Mood normal.         Behavior: Behavior normal.         Imaging:  CT PULMONARY ANGIOGRAM OF THE CHEST AND CT ABDOMEN AND PELVIS WITH INTRAVENOUS CONTRAST     INDICATION: syncope, on aspirin, afib history, cancer history, two falls in the past day. High-grade bladder cancer.     COMPARISON: PET/CT scan performed February 27, 2024     TECHNIQUE: CT examination of the chest, abdomen and pelvis was performed. Thin section CT angiographic technique was used in the chest in order to evaluate for pulmonary embolus and coronal 3D MIP postprocessing was performed on the acquisition scanner.   Multiplanar 2D reformatted images were created from the source data.     This examination, like all CT scans performed in the [facility], was performed utilizing techniques to minimize radiation dose exposure, including the use of iterative reconstruction and automated exposure control. Radiation dose length   product (DLP) for this visit: 1003 mGy-cm     IV Contrast: 100 mL of iohexol (OMNIPAQUE)  Enteric Contrast: Not administered.     FINDINGS:     CHEST     PULMONARY ARTERIAL TREE: No pulmonary embolus is seen.     LUNGS: Right middle lobe pulmonary nodule in the perihilar region, 0.9 x 0.7 cm on image 124 of series 4, unchanged from June 27, 2023. No other pulmonary nodules. Few scattered punctate calcified granulomata. Mild atelectasis at the left base.     PLEURA: Small left pleural effusion.     HEART/AORTA: Heart is unremarkable for patient's age. No thoracic aortic aneurysm.     MEDIASTINUM AND ABDIAZIZ: Unremarkable.     CHEST WALL AND LOWER NECK: Unremarkable.     ABDOMEN     LIVER/BILIARY TREE: Unremarkable.     GALLBLADDER: No calcified gallstones. Trace nonspecific pericholecystic fluid.     SPLEEN: Unremarkable.     PANCREAS: Fatty pancreatic atrophy. Cyst at the anterior margin of the proximal pancreatic tail on image 52, 9 mm. No pancreatic  ductal enlargement.     ADRENAL GLANDS: Unremarkable.     KIDNEYS/URETERS: Hydroureteronephrosis bilaterally to region of ureterovesical junction where there is tumor invasion described in greater detail below. Hydronephrosis is more severe on the left than the right and there is relative delayed left renal   nephrogram as well as fairly extensive left renal cortical thinning.     STOMACH AND BOWEL: See below description of tumor involving lower rectum. Above the rectosigmoid colon, stomach and bowel loops are within normal limits. No evidence for bowel obstruction.     APPENDIX: No findings to suggest appendicitis.     ABDOMINOPELVIC CAVITY: Trace ascites, new from prior examination predominantly in perihepatic space and in the right hemipelvis. No lymphadenopathy.     VESSELS: Unremarkable for patient's age.     PELVIS     REPRODUCTIVE ORGANS/URINARY BLADDER/RECTUM: Mass involving urinary bladder and prostate appears to have eroded through anterior wall of lower rectum, best demonstrated on image 159 of series 3. There is fecal type material within the profoundly   thick-walled urinary bladder. Haley catheter balloon is again seen within the lower aspect of the urinary bladder. As on prior examination, bladder wall tumor mass obstructs the distal left ureterovesical junction, but there is now also obstruction of   right distal ureterovesical junction.     Gas and fluid is present within a multiloculated collection to the left of the rectum but posterior to the urinary bladder extending out laterally towards the left along the obturator musculature and upwards along the lateral left pelvic sidewall   consistent with bladder tumor erosion into the perirectal/perivesical space. This is a new finding since February 2024.     ABDOMINAL WALL/INGUINAL REGIONS: Body wall edema.     BONES: No acute fracture or suspicious osseous lesion.     IMPRESSION:     Bladder tumor mass, increasing with erosion through anterior wall  "of lower rectum with some fecal material in the bladder lumen.     Also, tumor erosion into perivesical pelvic fat with small collection of fluid and gas is extending upwards along the left pelvic sidewall.     Increasing obstruction of left ureterovesical junction with right hydroureteronephrosis. Reidentified severe left hydronephrosis due to ureterovesical junction obstruction with delayed left renal nephrogram and left renal cortical thinning.     Trace ascites and small left pleural effusion, new from prior examination.     No pulmonary embolus.     This examination was marked \"immediate notification\" in Epic in order to begin the standard process by which the radiology reading room liaison alerts the referring practitioner.        Workstation performed: [id number]     Imaging reviewed - both report and images personally reviewed.     Labs:  Recent Labs     04/14/24  0601 04/15/24  0511 04/16/24  0634   WBC 16.64* 8.53 8.70       Recent Labs     04/14/24  0601 04/15/24  0511 04/16/24  0634   HGB 8.6* 7.3* 7.8*     Recent Labs     04/14/24  0601 04/15/24  0511 04/16/24  0634   HCT 26.7* 24.0* 24.9*     Recent Labs     04/14/24  0601 04/15/24  0511 04/16/24  0634   CREATININE 0.80 0.91 0.90     Lab Results   Component Value Date    HGB 7.8 (L) 04/16/2024    HCT 24.9 (L) 04/16/2024    WBC 8.70 04/16/2024     04/16/2024     Lab Results   Component Value Date    K 3.3 (L) 04/16/2024     04/16/2024    CO2 22 04/16/2024    BUN 18 04/16/2024    CREATININE 0.90 04/16/2024    CALCIUM 7.4 (L) 04/16/2024     Urine Culture: Growth: None from bilateral nephrostomy tubes    History:    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)      Past Surgical History:   Procedure Laterality Date    APPENDECTOMY      COLOSTOMY N/A 4/13/2024 "    Procedure: COLOSTOMY LOOP;  Surgeon: Harish Day DO;  Location: [facility];  Service: General    IR NEPHROSTOMY TUBE PLACEMENT  4/13/2024    PERCUTANEOUS NEPHROSTOMY Bilateral 4/13/2024    Procedure: INSERTION NEPHROSTOMY TUBE;  Surgeon: Wilton Muñiz DO;  Location: [facility];  Service: Interventional Radiology    AK TRURL ELECTROSURG RESCJ PROSTATE BLEED COMPLETE N/A 9/11/2023    Procedure: CYSTOSCOPY; TRANSURETHRAL RESECTION OF PROSTATE (TURP);  Surgeon: Franklyn Joseph MD;  Location: [facility];  Service: Urology     Family History   Problem Relation Age of Onset    Hypertension Mother      Social History     Socioeconomic History    Marital status: /Civil Union     Spouse name: None    Number of children: None    Years of education: None    Highest education level: None   Occupational History    Occupation: RETIRED   Tobacco Use    Smoking status: Never     Passive exposure: Never    Smokeless tobacco: Never   Vaping Use    Vaping status: Never Used   Substance and Sexual Activity    Alcohol use: Not Currently    Drug use: Not Currently    Sexual activity: Not Currently   Other Topics Concern    None   Social History Narrative        RETIRED     Social Determinants of Health     Financial Resource Strain: Low Risk  (11/28/2023)    Overall Financial Resource Strain (CARDIA)     Difficulty of Paying Living Expenses: Not hard at all   Food Insecurity: No Food Insecurity (4/16/2024)    Hunger Vital Sign     Worried About Running Out of Food in the Last Year: Never true     Ran Out of Food in the Last Year: Never true   Transportation Needs: No Transportation Needs (4/16/2024)    PRAPARE - Transportation     Lack of Transportation (Medical): No     Lack of Transportation (Non-Medical): No   Physical Activity: Not on file   Stress: Not on file   Social Connections: Not on file   Intimate Partner Violence: Not on file   Housing Stability: Low Risk  (4/16/2024)    Housing Stability Vital Sign      Unable to Pay for Housing in the Last Year: No     Number of Places Lived in the Last Year: 1     Unstable Housing in the Last Year: No       The following portions of the patient's history were reviewed and updated as appropriate: allergies, current medications, past family history, past medical history, past social history, past surgical history and problem list    KIERA Kirkland  Date: 4/16/2024 Time: 11:10 AM

## 2024-04-17 LAB
ANION GAP SERPL CALCULATED.3IONS-SCNC: 4 MMOL/L (ref 4–13)
BASOPHILS # BLD AUTO: 0.02 THOUSANDS/ÂΜL (ref 0–0.1)
BASOPHILS NFR BLD AUTO: 0 % (ref 0–1)
BUN SERPL-MCNC: 15 MG/DL (ref 5–25)
CALCIUM SERPL-MCNC: 7.1 MG/DL (ref 8.4–10.2)
CHLORIDE SERPL-SCNC: 104 MMOL/L (ref 96–108)
CO2 SERPL-SCNC: 23 MMOL/L (ref 21–32)
CREAT SERPL-MCNC: 0.83 MG/DL (ref 0.6–1.3)
EOSINOPHIL # BLD AUTO: 0.07 THOUSAND/ÂΜL (ref 0–0.61)
EOSINOPHIL NFR BLD AUTO: 1 % (ref 0–6)
ERYTHROCYTE [DISTWIDTH] IN BLOOD BY AUTOMATED COUNT: 15.6 % (ref 11.6–15.1)
GFR SERPL CREATININE-BSD FRML MDRD: 79 ML/MIN/1.73SQ M
GLUCOSE SERPL-MCNC: 88 MG/DL (ref 65–140)
HCT VFR BLD AUTO: 25.3 % (ref 36.5–49.3)
HGB BLD-MCNC: 7.9 G/DL (ref 12–17)
IMM GRANULOCYTES # BLD AUTO: 0.23 THOUSAND/UL (ref 0–0.2)
IMM GRANULOCYTES NFR BLD AUTO: 2 % (ref 0–2)
LYMPHOCYTES # BLD AUTO: 0.52 THOUSANDS/ÂΜL (ref 0.6–4.47)
LYMPHOCYTES NFR BLD AUTO: 5 % (ref 14–44)
MCH RBC QN AUTO: 29.4 PG (ref 26.8–34.3)
MCHC RBC AUTO-ENTMCNC: 31.2 G/DL (ref 31.4–37.4)
MCV RBC AUTO: 94 FL (ref 82–98)
MONOCYTES # BLD AUTO: 0.42 THOUSAND/ÂΜL (ref 0.17–1.22)
MONOCYTES NFR BLD AUTO: 4 % (ref 4–12)
NEUTROPHILS # BLD AUTO: 8.37 THOUSANDS/ÂΜL (ref 1.85–7.62)
NEUTS SEG NFR BLD AUTO: 88 % (ref 43–75)
NRBC BLD AUTO-RTO: 0 /100 WBCS
PLATELET # BLD AUTO: 346 THOUSANDS/UL (ref 149–390)
PMV BLD AUTO: 8.1 FL (ref 8.9–12.7)
POTASSIUM SERPL-SCNC: 3.9 MMOL/L (ref 3.5–5.3)
RBC # BLD AUTO: 2.69 MILLION/UL (ref 3.88–5.62)
SODIUM SERPL-SCNC: 131 MMOL/L (ref 135–147)
WBC # BLD AUTO: 9.63 THOUSAND/UL (ref 4.31–10.16)

## 2024-04-17 PROCEDURE — 85025 COMPLETE CBC W/AUTO DIFF WBC: CPT | Performed by: NURSE PRACTITIONER

## 2024-04-17 PROCEDURE — 80048 BASIC METABOLIC PNL TOTAL CA: CPT | Performed by: NURSE PRACTITIONER

## 2024-04-17 PROCEDURE — 99232 SBSQ HOSP IP/OBS MODERATE 35: CPT | Performed by: UROLOGY

## 2024-04-17 PROCEDURE — 99024 POSTOP FOLLOW-UP VISIT: CPT | Performed by: SURGERY

## 2024-04-17 RX ORDER — SODIUM CHLORIDE 9 MG/ML
10 INJECTION, SOLUTION INTRAMUSCULAR; INTRAVENOUS; SUBCUTANEOUS DAILY
Qty: 300 ML | Refills: 3 | Status: SHIPPED | OUTPATIENT
Start: 2024-04-17 | End: 2024-08-15

## 2024-04-17 RX ORDER — FINASTERIDE 5 MG/1
5 TABLET, FILM COATED ORAL DAILY
Status: DISCONTINUED | OUTPATIENT
Start: 2024-04-18 | End: 2024-04-18 | Stop reason: HOSPADM

## 2024-04-17 RX ADMIN — HEPARIN SODIUM 5000 UNITS: 5000 INJECTION INTRAVENOUS; SUBCUTANEOUS at 13:06

## 2024-04-17 RX ADMIN — METRONIDAZOLE 500 MG: 500 INJECTION, SOLUTION INTRAVENOUS at 17:04

## 2024-04-17 RX ADMIN — PANTOPRAZOLE SODIUM 40 MG: 40 TABLET, DELAYED RELEASE ORAL at 08:15

## 2024-04-17 RX ADMIN — METOPROLOL TARTRATE 50 MG: 50 TABLET, FILM COATED ORAL at 08:15

## 2024-04-17 RX ADMIN — FOLIC ACID 1 MG: 1 TABLET ORAL at 08:15

## 2024-04-17 RX ADMIN — METRONIDAZOLE 500 MG: 500 INJECTION, SOLUTION INTRAVENOUS at 09:12

## 2024-04-17 RX ADMIN — HEPARIN SODIUM 5000 UNITS: 5000 INJECTION INTRAVENOUS; SUBCUTANEOUS at 20:45

## 2024-04-17 RX ADMIN — METRONIDAZOLE 500 MG: 500 INJECTION, SOLUTION INTRAVENOUS at 02:30

## 2024-04-17 RX ADMIN — CEFTRIAXONE 1000 MG: 1 INJECTION, POWDER, FOR SOLUTION INTRAMUSCULAR; INTRAVENOUS at 10:12

## 2024-04-17 RX ADMIN — HEPARIN SODIUM 5000 UNITS: 5000 INJECTION INTRAVENOUS; SUBCUTANEOUS at 05:28

## 2024-04-17 RX ADMIN — METOPROLOL TARTRATE 50 MG: 50 TABLET, FILM COATED ORAL at 20:45

## 2024-04-17 NOTE — PROGRESS NOTES
"Progress Note - General Surgery   Candido Valladares Jr. 88 y.o. male MRN: [id number]  Unit/Bed#: [facility] 833-01 Encounter: [id number]    Assessment:  87 yo M w/ progressive and locally advanced urothelial cancer, colovesical fistula and bilateral obstructive hydrouteronephrosis now requiring fecal diversion s/p diverting loop colostomy and b/l IR placed PCN on 4/13    Vitals normal on room air  L PCN 100cc  R PCN 475cc  Dominique 0cc recorded  Stoma 50cc bowel sweat    WBC 8.43 (from 9.63)  Hb 7.6 (from 7.9)  Cr 0.76 (from 0.83)    4/13 Blood cultures: No growth at 4 days    Plan:  Renal restrictive diet, Fluid restriction 1800mL  Appreciate Urology recommendations, maintain dominique, follow up final recommendations regarding removal  Maintain bilateral nephrostomies, monitor output  Continue antibiotics, Rocephin/flagyl through 4/18  Appreciate Palliative care recommendations, Level 1  Appreciate Medical Oncology recommendations, no anticancer treatment at this time, follow up with Dr. Britton as an outpatient  PRN analgesia  DVT PPX  OOB/ambulate  PT/OT: Level 2  Case management, dispo planning, stable for discharge when bed available    Subjective/Objective     Subjective: No acute events overnight, tolerating renal diet without nausea or vomiting, had some gas output from stoma yesterday but no stool, pain controlled    Objective:     Blood pressure 132/73, pulse 89, temperature 98.7 °F (37.1 °C), resp. rate 18, height 6' 1\" (1.854 m), weight 79.8 kg (175 lb 14.8 oz), SpO2 96%.,Body mass index is 23.21 kg/m².      Intake/Output Summary (Last 24 hours) at 4/18/2024 0539  Last data filed at 4/18/2024 0222  Gross per 24 hour   Intake 120 ml   Output 625 ml   Net -505 ml       Invasive Devices       Peripheral Intravenous Line  Duration             Peripheral IV 04/16/24 Left;Ventral (anterior) Forearm 1 day              Drain  Duration             Urethral Catheter Latex;Three way 24 Fr. 219 days    Colostomy Transverse LUQ 4 " days    Percutaneous Nephroureteral Tube (PCNU) Left 1 8.5 Fr 4 days    Percutaneous Nephroureteral Tube (PCNU) Right 2 8.5 Fr 4 days                    Physical Exam:   Gen:    NAD  CV:      warm, well-perfused  Lungs: No respiratory distress  Abd:     soft, non-tender, non-distended, stoma with bowel sweat in appliance, bilateral PCN's in place  Ext:      no CCE  Neuro: A&Ox3

## 2024-04-17 NOTE — RESTORATIVE TECHNICIAN NOTE
Restorative Technician Note      Patient Name: Candido Valladares      Restorative Tech Visit Date: 04/17/24  Note Type: Mobility  Patient Position Upon Consult: Supine  Activity Performed: Ambulated; Stood; Dangled; Other (Comment) (ADLs EOB; steps forwards, backwards, lateral; will attempt ambulating in PM with chair follow)  Assistive Device: Standard walker  Education Provided: Yes  Patient Position at End of Consult: Bedside chair; All needs within reach    Luma Mcfadden  DPT, Restorative Technician

## 2024-04-17 NOTE — PROGRESS NOTES
"Progress Note - General Surgery   Candido Valladares Jr. 88 y.o. male MRN: [id number]  Unit/Bed#: [facility] 833-01 Encounter: [id number]    Assessment:  87 yo M w/ progressive and locally advanced urothelial cancer, colovesical fistula and bilateral obstructive hydrouteronephrosis now requiring fecal diversion s/p diverting loop colostomy and b/l IR placed PCN on 4/13    AFVSS  Haley 0 cc  R  cc  L PCN 75 cc    WBC 9.6 from 8.7  Hemoglobin 7.9 from 7.8  Creatinine 0.83 from 0.9    Plan:  Renal restrictive diet, Fluid restriction 1800mL, await ostomy function  Appreciate Urology recommendations; will plan for removal today   Maintain bilateral nephrostomies, monitor output  Appreciate Palliative care recommendations, Level 1  Appreciate Medical Oncology recommendations, no anticancer treatment at this time, follow up with Dr. Britton as an outpatient  PRN analgesia  DVT PPX  OOB/ambulate  PT/OT  Wound ostomy teaching provided  Patient medically stable for discharge  Case management; dispo planning pending    Subjective/Objective     Subjective: No acute events overnight, tolerating renal restricted diet without nausea or vomiting, stoma productive of gas and bowel sweat, pain controlled.     Objective:     Blood pressure 131/71, pulse 89, temperature 98.8 °F (37.1 °C), resp. rate 16, height 6' 1\" (1.854 m), weight 79.8 kg (175 lb 14.8 oz), SpO2 96%.,Body mass index is 23.21 kg/m².      Intake/Output Summary (Last 24 hours) at 4/17/2024 1004  Last data filed at 4/17/2024 0819  Gross per 24 hour   Intake --   Output 750 ml   Net -750 ml       Invasive Devices       Peripheral Intravenous Line  Duration             Peripheral IV 04/16/24 Left;Ventral (anterior) Forearm <1 day              Drain  Duration             Urethral Catheter Latex;Three way 24 Fr. 219 days    Colostomy Transverse LUQ 3 days    Percutaneous Nephroureteral Tube (PCNU) Left 1 8.5 Fr 3 days    Percutaneous Nephroureteral Tube (PCNU) Right 2 8.5 Fr 3 " days                    Physical Exam:   Gen:    NAD  CV:      warm, well-perfused  Lungs: No respiratory distress  Abd:     soft, appropriately tender, nondistended, stoma pink and healthy with just bowel sweat and gas in appliance, bilateral nephrostomies in place with output as above  Ext:      no CCE  Neuro: A&Ox3

## 2024-04-17 NOTE — PROGRESS NOTES
Progress Note - Urology  Candido Valladares Jr. 1935, 88 y.o. male MRN: [id number]    Unit/Bed#: [facility] 833-01 Encounter: [id number]    Colovesical fistula  Assessment & Plan  S/p diverting colostomy 4/13  Medical optimization per primary team      Hydronephrosis  Assessment & Plan  CT: Increasing obstruction of left UVJ with right hydroureteronephrosis and severe left hydronephrosis due to ureteral vesicle junction obstruction with delayed left renal nephrogram and left renal cortical thinning  S/p bilateral percutaneous nephrostomy tube placement by interventional radiology 4/13  Continue to trend labs  Medical optimization per primary team  Manually irrigate bilateral nephrostomy tubes per IR recommendations  24-hour urine output left PCN: 175 mL, right PCN: 450 mL, Halye catheter 0 mL  Creat 0.83  Cultures from bilateral nephrostomy tubes negative      Urothelial carcinoma of bladder (HCC)  Assessment & Plan  cT4, cN1, CM0 high-grade urothelial carcinoma  CT: Bladder tumor mass, tumor erosion into perivesical pelvic fat with small collection of fluid and gas extending to the pelvic sidewall.    Continue to follow-up with medical oncology and palliative care  Follow-up with Dr. Joseph after discharge or [facility] urology if he plans to transition care    * Prostate CA (HCC)  Assessment & Plan  History of prostate cancer status post radiation  Follows with Dr. Joseph as outpatient          Subjective: pt is concerned about sleeping on his side due to nephrostomy tubes/drainage bags. He is doing well otherwise and is not bothered by his penile catheter. He is going to rehab after hospitalization.    24 HR EVENTS:   no significant events.      Patient has  complaints of poor sleep.       Review of Systems   Constitutional:  Negative for chills and fever.   Respiratory:  Negative for cough and shortness of breath.    Gastrointestinal:  Negative for abdominal distention.   Genitourinary:  Negative for flank pain and  "hematuria.       Objective:    Vitals: Blood pressure 131/71, pulse 89, temperature 98.8 °F (37.1 °C), resp. rate 16, height 6' 1\" (1.854 m), weight 79.8 kg (175 lb 14.8 oz), SpO2 96%.,Body mass index is 23.21 kg/m².  INS & OUTS:  I/O last 24 hours:  In: 118 [P.O.:118]  Out: 825 [Urine:825]    Physical Exam  Vitals and nursing note reviewed.   Constitutional:       General: He is not in acute distress.     Appearance: Normal appearance. He is not ill-appearing or toxic-appearing.   HENT:      Head: Normocephalic.   Pulmonary:      Effort: Pulmonary effort is normal. No respiratory distress.   Abdominal:      General: Abdomen is flat. There is no distension.      Comments: colostomy   Genitourinary:     Comments: Haley catheter with zero output in 24 hours. Left nephrostomy tube with increased output, pink in color. Right nephrostomy tube with pennie colored urine.  Musculoskeletal:         General: No swelling.   Skin:     General: Skin is warm and dry.      Coloration: Skin is pale.   Neurological:      General: No focal deficit present.      Mental Status: He is alert and oriented to person, place, and time.   Psychiatric:         Mood and Affect: Mood normal.         Behavior: Behavior normal.         Thought Content: Thought content normal.         Judgment: Judgment normal.             Labs:  Recent Labs     04/15/24  0511 04/16/24  0634 04/17/24  0620   WBC 8.53 8.70 9.63       Recent Labs     04/15/24  0511 04/16/24  0634 04/17/24  0620   HGB 7.3* 7.8* 7.9*     Recent Labs     04/15/24  0511 04/16/24  0634 04/17/24  0620   HCT 24.0* 24.9* 25.3*     Recent Labs     04/15/24  0511 04/16/24  0634 04/17/24  0620   CREATININE 0.91 0.90 0.83     Lab Results   Component Value Date    HGB 7.9 (L) 04/17/2024    HCT 25.3 (L) 04/17/2024    WBC 9.63 04/17/2024     04/17/2024     Lab Results   Component Value Date    K 3.9 04/17/2024     04/17/2024    CO2 23 04/17/2024    BUN 15 04/17/2024    CREATININE 0.83 " 04/17/2024    CALCIUM 7.1 (L) 04/17/2024     Urine Culture: Growth: none    History:    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)      Past Surgical History:   Procedure Laterality Date    APPENDECTOMY      COLOSTOMY N/A 4/13/2024    Procedure: COLOSTOMY LOOP;  Surgeon: Harish Day DO;  Location: [facility];  Service: General    IR NEPHROSTOMY TUBE PLACEMENT  4/13/2024    PERCUTANEOUS NEPHROSTOMY Bilateral 4/13/2024    Procedure: INSERTION NEPHROSTOMY TUBE;  Surgeon: Wilton Muñiz DO;  Location: [facility];  Service: Interventional Radiology    LA TRURL ELECTROSURG RESCJ PROSTATE BLEED COMPLETE N/A 9/11/2023    Procedure: CYSTOSCOPY; TRANSURETHRAL RESECTION OF PROSTATE (TURP);  Surgeon: Franklyn Joseph MD;  Location: [facility];  Service: Urology     Family History   Problem Relation Age of Onset    Hypertension Mother      Social History     Socioeconomic History    Marital status: /Civil Union     Spouse name: None    Number of children: None    Years of education: None    Highest education level: None   Occupational History    Occupation: RETIRED   Tobacco Use    Smoking status: Never     Passive exposure: Never    Smokeless tobacco: Never   Vaping Use    Vaping status: Never Used   Substance and Sexual Activity    Alcohol use: Not Currently    Drug use: Not Currently    Sexual activity: Not Currently   Other Topics Concern    None   Social History Narrative        RETIRED     Social Determinants of Health     Financial Resource Strain: Low Risk  (11/28/2023)    Overall Financial Resource Strain (CARDIA)     Difficulty of Paying Living Expenses: Not hard at all   Food Insecurity: No Food Insecurity (4/16/2024)    Hunger Vital Sign     Worried About Running Out of Food in the Last Year: Never true     Ran  Out of Food in the Last Year: Never true   Transportation Needs: No Transportation Needs (4/16/2024)    PRAPARE - Transportation     Lack of Transportation (Medical): No     Lack of Transportation (Non-Medical): No   Physical Activity: Not on file   Stress: Not on file   Social Connections: Not on file   Intimate Partner Violence: Not on file   Housing Stability: Low Risk  (4/16/2024)    Housing Stability Vital Sign     Unable to Pay for Housing in the Last Year: No     Number of Places Lived in the Last Year: 1     Unstable Housing in the Last Year: No       The following portions of the patient's history were reviewed and updated as appropriate: allergies, current medications, past family history, past medical history, past social history, past surgical history and problem list    KIERA Kirkland  Date: 4/17/2024 Time: 10:54 AM

## 2024-04-17 NOTE — PLAN OF CARE
Problem: PAIN - ADULT  Goal: Verbalizes/displays adequate comfort level or baseline comfort level  Description: Interventions:  - Encourage patient to monitor pain and request assistance  - Assess pain using appropriate pain scale  - Administer analgesics based on type and severity of pain and evaluate response  - Implement non-pharmacological measures as appropriate and evaluate response  - Consider cultural and social influences on pain and pain management  - Notify physician/advanced practitioner if interventions unsuccessful or patient reports new pain  Outcome: Progressing     Problem: INFECTION - ADULT  Goal: Absence or prevention of progression during hospitalization  Description: INTERVENTIONS:  - Assess and monitor for signs and symptoms of infection  - Monitor lab/diagnostic results  - Monitor all insertion sites, i.e. indwelling lines, tubes, and drains  - Monitor endotracheal if appropriate and nasal secretions for changes in amount and color  - [place]wood appropriate cooling/warming therapies per order  - Administer medications as ordered  - Instruct and encourage patient and family to use good hand hygiene technique  - Identify and instruct in appropriate isolation precautions for identified infection/condition  Outcome: Progressing  Goal: Absence of fever/infection during neutropenic period  Description: INTERVENTIONS:  - Monitor WBC    Outcome: Progressing

## 2024-04-17 NOTE — CASE MANAGEMENT
Case Management Discharge Planning Note    Patient name Candido Valladares Jr.  Location [facility] 833/[facility] 833-01 MRN [id number]  : 1935 Date 2024       Current Admission Date: 2024  Current Admission Diagnosis:Prostate CA (HCC)   Patient Active Problem List    Diagnosis Date Noted    Hydronephrosis 04/15/2024    Colovesical fistula 04/15/2024    Diarrhea due to drug 2024    Hypomagnesemia 2023    Iron deficiency anemia, unspecified 2023    Urothelial carcinoma of bladder (HCC) 2023    High grade urothelial carcinoma present on urine cytology (HCC) 10/11/2023    Bilateral lower extremity edema 2023    Preop examination 2023    Hospital discharge follow-up 2023    Abnormal CT of the chest 2023    History of echocardiogram 2023    SIADH (syndrome of inappropriate ADH production) ([facility]) 2023    Syncope and collapse 2023    New onset a-fib (HCC) 2023    Hyponatremia 2023    Anemia 2023    Stage 3a chronic kidney disease (HCC) 2021    Prostate CA (HCC) 01/15/2020    Essential hypertension 01/15/2020    Generalized weakness 2019      LOS (days): 4  Geometric Mean LOS (GMLOS) (days): 5.2  Days to GMLOS:1.6     OBJECTIVE:  Risk of Unplanned Readmission Score: 27.98         Current admission status: Inpatient   Preferred Pharmacy:   [facility] - [place] [address]  Phone: [phone] Fax: [phone]    [facility]  for [place] [address]  Phone: [phone] Fax: [phone]    [id number] [place] - [address]  [address]  [place] [address]  Phone: [phone] Fax: [phone]    [facility] [place] - [address]  [address]  [place] [address]  Phone: [phone]  Fax: [phone]    Primary Care Provider: Zi Cespedes,     Primary Insurance: MEDICARE  Secondary Insurance:  FOR LIFE    DISCHARGE DETAILS:        Spoke with family regarding choice list. Pt and family express preference for [facility], inquired if there would be a private bed. Advised that there are no private rooms available at the facility, but at this time, pt would not have a roommate. Family understanding.    Will follow for medical clearance. Does not require auth.

## 2024-04-18 ENCOUNTER — TELEPHONE (OUTPATIENT)
Dept: PALLIATIVE MEDICINE | Facility: CLINIC | Age: 89
End: 2024-04-18

## 2024-04-18 ENCOUNTER — TRANSITIONAL CARE MANAGEMENT (OUTPATIENT)
Dept: INTERNAL MEDICINE CLINIC | Facility: CLINIC | Age: 89
End: 2024-04-18

## 2024-04-18 VITALS
BODY MASS INDEX: 23.32 KG/M2 | TEMPERATURE: 98.1 F | OXYGEN SATURATION: 93 % | SYSTOLIC BLOOD PRESSURE: 132 MMHG | WEIGHT: 175.93 LBS | HEIGHT: 73 IN | RESPIRATION RATE: 16 BRPM | DIASTOLIC BLOOD PRESSURE: 75 MMHG | HEART RATE: 79 BPM

## 2024-04-18 LAB
ANION GAP SERPL CALCULATED.3IONS-SCNC: 6 MMOL/L (ref 4–13)
ANISOCYTOSIS BLD QL SMEAR: PRESENT
BACTERIA BLD CULT: NORMAL
BACTERIA BLD CULT: NORMAL
BASOPHILS # BLD MANUAL: 0 THOUSAND/UL (ref 0–0.1)
BASOPHILS NFR MAR MANUAL: 0 % (ref 0–1)
BUN SERPL-MCNC: 14 MG/DL (ref 5–25)
CALCIUM SERPL-MCNC: 7 MG/DL (ref 8.4–10.2)
CHLORIDE SERPL-SCNC: 103 MMOL/L (ref 96–108)
CO2 SERPL-SCNC: 22 MMOL/L (ref 21–32)
CREAT SERPL-MCNC: 0.76 MG/DL (ref 0.6–1.3)
EOSINOPHIL # BLD MANUAL: 0 THOUSAND/UL (ref 0–0.4)
EOSINOPHIL NFR BLD MANUAL: 0 % (ref 0–6)
ERYTHROCYTE [DISTWIDTH] IN BLOOD BY AUTOMATED COUNT: 15.7 % (ref 11.6–15.1)
GFR SERPL CREATININE-BSD FRML MDRD: 82 ML/MIN/1.73SQ M
GLUCOSE SERPL-MCNC: 98 MG/DL (ref 65–140)
HCT VFR BLD AUTO: 24 % (ref 36.5–49.3)
HGB BLD-MCNC: 7.6 G/DL (ref 12–17)
LYMPHOCYTES # BLD AUTO: 0 % (ref 14–44)
LYMPHOCYTES # BLD AUTO: 0 THOUSAND/UL (ref 0.6–4.47)
MCH RBC QN AUTO: 29.9 PG (ref 26.8–34.3)
MCHC RBC AUTO-ENTMCNC: 31.7 G/DL (ref 31.4–37.4)
MCV RBC AUTO: 95 FL (ref 82–98)
METAMYELOCYTE ABSOLUTE CT: 0.08 THOUSAND/UL (ref 0–0.1)
METAMYELOCYTES NFR BLD MANUAL: 1 % (ref 0–1)
MONOCYTES # BLD AUTO: 0.34 THOUSAND/UL (ref 0–1.22)
MONOCYTES NFR BLD: 4 % (ref 4–12)
NEUTROPHILS # BLD MANUAL: 8.01 THOUSAND/UL (ref 1.85–7.62)
NEUTS BAND NFR BLD MANUAL: 1 % (ref 0–8)
NEUTS SEG NFR BLD AUTO: 94 % (ref 43–75)
OVALOCYTES BLD QL SMEAR: PRESENT
PLATELET # BLD AUTO: 307 THOUSANDS/UL (ref 149–390)
PLATELET BLD QL SMEAR: ADEQUATE
PMV BLD AUTO: 8.2 FL (ref 8.9–12.7)
POIKILOCYTOSIS BLD QL SMEAR: PRESENT
POTASSIUM SERPL-SCNC: 3.6 MMOL/L (ref 3.5–5.3)
RBC # BLD AUTO: 2.54 MILLION/UL (ref 3.88–5.62)
RBC MORPH BLD: PRESENT
SARS-COV-2 RNA RESP QL NAA+PROBE: NEGATIVE
SODIUM SERPL-SCNC: 131 MMOL/L (ref 135–147)
WBC # BLD AUTO: 8.43 THOUSAND/UL (ref 4.31–10.16)

## 2024-04-18 PROCEDURE — 85007 BL SMEAR W/DIFF WBC COUNT: CPT

## 2024-04-18 PROCEDURE — 85027 COMPLETE CBC AUTOMATED: CPT

## 2024-04-18 PROCEDURE — 80048 BASIC METABOLIC PNL TOTAL CA: CPT

## 2024-04-18 PROCEDURE — NC001 PR NO CHARGE: Performed by: SURGERY

## 2024-04-18 PROCEDURE — 87635 SARS-COV-2 COVID-19 AMP PRB: CPT | Performed by: PHYSICIAN ASSISTANT

## 2024-04-18 PROCEDURE — 99024 POSTOP FOLLOW-UP VISIT: CPT | Performed by: PHYSICIAN ASSISTANT

## 2024-04-18 RX ORDER — ACETAMINOPHEN 325 MG/1
650 TABLET ORAL EVERY 6 HOURS PRN
Start: 2024-04-18

## 2024-04-18 RX ORDER — METOPROLOL TARTRATE 50 MG/1
50 TABLET, FILM COATED ORAL EVERY 12 HOURS SCHEDULED
Start: 2024-04-18

## 2024-04-18 RX ADMIN — CEFTRIAXONE 1000 MG: 1 INJECTION, POWDER, FOR SOLUTION INTRAMUSCULAR; INTRAVENOUS at 10:22

## 2024-04-18 RX ADMIN — FOLIC ACID 1 MG: 1 TABLET ORAL at 08:26

## 2024-04-18 RX ADMIN — PANTOPRAZOLE SODIUM 40 MG: 40 TABLET, DELAYED RELEASE ORAL at 08:26

## 2024-04-18 RX ADMIN — FINASTERIDE 5 MG: 5 TABLET, FILM COATED ORAL at 08:26

## 2024-04-18 RX ADMIN — METOPROLOL TARTRATE 50 MG: 50 TABLET, FILM COATED ORAL at 08:26

## 2024-04-18 RX ADMIN — METRONIDAZOLE 500 MG: 500 INJECTION, SOLUTION INTRAVENOUS at 02:22

## 2024-04-18 RX ADMIN — HEPARIN SODIUM 5000 UNITS: 5000 INJECTION INTRAVENOUS; SUBCUTANEOUS at 05:37

## 2024-04-18 RX ADMIN — METRONIDAZOLE 500 MG: 500 INJECTION, SOLUTION INTRAVENOUS at 09:30

## 2024-04-18 NOTE — TELEPHONE ENCOUNTER
I called spoke to daughter Indira who stated he is in rehab. I informed her we can give her a call back in 2 wees to see if he is out or needs more time in rehab so we can schedule a hfu.

## 2024-04-18 NOTE — WOUND OSTOMY CARE
Progress Note- Ostomy  Candido Valladares Jr. 88 y.o. male  [id number]  [facility] 833-[facility] 833-01        Assessment:  Patient is POD # 5 creation of transverse loop colostomy. Seen for ostomy education and teaching. Introduced self and role to patient. Patient is agreeable to visit. Patient and family (Wife and daughter) are at bedside today for pouch change and teaching; all questions and concerns answered. Patient's wife assisted with pouch change today, was able to remove the old pouch, cleanse the skin with warm water and assist with cutting out the pouch barrier to correct shape and size. This RN assisted the wife with measuring the stoma size and tracing the measurements onto the back of the pouch.     Topics reviewed: normal stoma appearance, how and when to empty (1/3 full) to prevent pulling/lifting of the barrier, importance & how to clean the end of the pouch to prevent odor, gas/odor producing foods, frequency of changing of the pouch (every 3-4 days on a schedule), burping, one piece/two piece pouching systems differences, gas valve functionality of one piece, bathing, and how to obtain supplies.    Step-by-step pouch change done using 1 piece 4 inch [employer] pouch. Reviewed with patient how and when to measure the stoma (weekly). Demonstrated how to trace & cut one piece barrier, and how to apply it to the skin using gentle pressure / warmth of the hands. Good seal obtained.      LUQ loop colostomy: Stoma measures 2.5 inches side to side and 1.75 inches top to bottom, os is noted toward 9 o'clock, oval in shape, well budded, beefy red in color, mucocutaneous junction is intact. Ginger-stomal skin is intact with no redness or wounds. Effluent is small amount of liquid brown stool.    Plan is for patient to have VNA at home.    Patient verbalized understanding of education and teaching. Patient is active learner. All questions and concerns answered. Encouraged patient to continue to read the educational materials  "provided - \"Life after colostomy Surgery\" by [facility].    Patient gave verbal permission to order sample kits from [employer], "[employer] and [employer].    Ostomy Care:  1. Peel back pouch using push-pull method, may use non-alcohol adhesive remover(Purple and white package).  2. Use warm water only to cleanse skin around the stoma (tan-stomal skin).  3. Make sure all adhesive residue is removed and skin is dry and not oily.  4. Measure stoma size using measuring guide and trace correct measurements onto back of pouch.  5. Then cut backing of pouch out to correct shape/size.  6. Use 3M no sting barrier film to prep the skin around the stoma.  7. Place pouch over stoma and onto skin.  8. Use warmth of hand to apply gentle pressure to help backing of pouch to adhere well to skin.      Colostomy Transverse LUQ (Active)   Stomal Appliance 1 piece;Changed 04/18/24 1100   Stoma Assessment Budded;Red;McIntyre 04/18/24 1100   Stoma size (in) 2.5 in 04/18/24 1100   Stoma Shape Oval;Budded 04/18/24 1100   Peristomal Assessment Clean;Intact 04/18/24 1100   Treatment Bag change;Site care 04/18/24 1100   Output (mL) 20 mL 04/18/24 1100   Number of days: 5     Call or tigertext with any questions  Wound Care will continue to follow    Cherelle OMALLEYN RN CWON  Wound and Ostomy care            "

## 2024-04-18 NOTE — DISCHARGE INSTR - AVS FIRST PAGE
Acute Care Surgery Discharge Instructions    Please follow-up as instructed. If you do not already have a follow-up appointment, please call the office when you leave to schedule an appointment to be seen in 2-3 weeks for post-operative re-evaluation.    Activity:  - PT and OT evaluation and treatment as indicated.  - No lifting greater than 20 pounds or strenuous physical activity or exercise for 4 weeks.  - Otherwise, activity per rehab recommendations.    Diet:    - You should continue a renal restricted diet with a 1.8 L fluid restriction daily.    Wound Care:  - May shower daily. No tub baths or swimming until cleared by your surgeon.  - Wash incision gently with soap and water and pat dry.  - Do not apply any creams or ointments unless instructed to do so by your surgeon.  - Continue routine ostomy care and teaching.  - Continue routine management of nephrostomy tubes and urinary catheter.    Medications:    - You may resume all of your regular medications after discharge unless otherwise instructed. Please refer to your discharge medication list for further details.  - Please take the pain medications as directed.  - You may become constipated, especially if taking pain medications. You may take any over the counter stool softeners or laxatives as needed. Examples: Milk of Magnesia, Colace, Senna.    Additional Instructions:  - If you have any questions or concerns after discharge please call the office.  - Call office or return to ER if fever greater than 101, chills, persistent nausea/vomiting, worsening/uncontrollable pain, and/or increasing redness or purulent/foul smelling drainage from incision(s).

## 2024-04-18 NOTE — CASE MANAGEMENT
Case Management Discharge Planning Note    Patient name Candido Valladares Jr.  Location [facility] 833/[facility] 833-01 MRN [id number]  : 1935 Date 2024       Current Admission Date: 2024  Current Admission Diagnosis:Prostate CA (HCC)   Patient Active Problem List    Diagnosis Date Noted    Hydronephrosis 04/15/2024    Colovesical fistula 04/15/2024    Diarrhea due to drug 2024    Hypomagnesemia 2023    Iron deficiency anemia, unspecified 2023    Urothelial carcinoma of bladder (HCC) 2023    High grade urothelial carcinoma present on urine cytology (HCC) 10/11/2023    Bilateral lower extremity edema 2023    Preop examination 2023    Hospital discharge follow-up 2023    Abnormal CT of the chest 2023    History of echocardiogram 2023    SIADH (syndrome of inappropriate ADH production) ([facility]) 2023    Syncope and collapse 2023    New onset a-fib (HCC) 2023    Hyponatremia 2023    Anemia 2023    Stage 3a chronic kidney disease (HCC) 2021    Prostate CA (HCC) 01/15/2020    Essential hypertension 01/15/2020    Generalized weakness 2019      LOS (days): 5  Geometric Mean LOS (GMLOS) (days): 5.2  Days to GMLOS:0.8     OBJECTIVE:  Risk of Unplanned Readmission Score: 25.89         Current admission status: Inpatient   Preferred Pharmacy:   [facility] - [place] [address]  Phone: [phone] Fax: [phone]    [facility]  for [place] [address]  Phone: [phone] Fax: [phone]    [id number] - [place] - [address]  [address]  [place] [address]  Phone: [phone] Fax: [phone]    [facility] - [place] - [address]  [address]  [place] [address]  Phone: [phone]  Fax: [phone]    Primary Care Provider: Zi Cespedes DO    Primary Insurance: MEDICARE  Secondary Insurance:  FOR LIFE    DISCHARGE DETAILS:                Accepting Facility Name, City & State : [facility]  Receiving Facility/Agency Phone Number: [phone]  Facility/Agency Fax Number: fax [phone].

## 2024-04-18 NOTE — PLAN OF CARE
Problem: PAIN - ADULT  Goal: Verbalizes/displays adequate comfort level or baseline comfort level  Description: Interventions:  - Encourage patient to monitor pain and request assistance  - Assess pain using appropriate pain scale  - Administer analgesics based on type and severity of pain and evaluate response  - Implement non-pharmacological measures as appropriate and evaluate response  - Consider cultural and social influences on pain and pain management  - Notify physician/advanced practitioner if interventions unsuccessful or patient reports new pain  Outcome: Progressing     Problem: INFECTION - ADULT  Goal: Absence or prevention of progression during hospitalization  Description: INTERVENTIONS:  - Assess and monitor for signs and symptoms of infection  - Monitor lab/diagnostic results  - Monitor all insertion sites, i.e. indwelling lines, tubes, and drains  - Monitor endotracheal if appropriate and nasal secretions for changes in amount and color  - [place] appropriate cooling/warming therapies per order  - Administer medications as ordered  - Instruct and encourage patient and family to use good hand hygiene technique  - Identify and instruct in appropriate isolation precautions for identified infection/condition  Outcome: Progressing  Goal: Absence of fever/infection during neutropenic period  Description: INTERVENTIONS:  - Monitor WBC    Outcome: Progressing     Problem: SAFETY ADULT  Goal: Patient will remain free of falls  Description: INTERVENTIONS:  - Educate patient/family on patient safety including physical limitations  - Instruct patient to call for assistance with activity   - Consult OT/PT to assist with strengthening/mobility   - Keep Call bell within reach  - Keep bed low and locked with side rails adjusted as appropriate  - Keep care items and personal belongings within reach  - Initiate and maintain comfort rounds  - Make Fall Risk Sign visible to staff  - Offer Toileting every 2 Hours,  in advance of need  - Initiate/Maintain alarm  - Obtain necessary fall risk management equipment:   - Apply yellow socks and bracelet for high fall risk patients  - Consider moving patient to room near nurses station  Outcome: Progressing  Goal: Maintain or return to baseline ADL function  Description: INTERVENTIONS:  -  Assess patient's ability to carry out ADLs; assess patient's baseline for ADL function and identify physical deficits which impact ability to perform ADLs (bathing, care of mouth/teeth, toileting, grooming, dressing, etc.)  - Assess/evaluate cause of self-care deficits   - Assess range of motion  - Assess patient's mobility; develop plan if impaired  - Assess patient's need for assistive devices and provide as appropriate  - Encourage maximum independence but intervene and supervise when necessary  - Involve family in performance of ADLs  - Assess for home care needs following discharge   - Consider OT consult to assist with ADL evaluation and planning for discharge  - Provide patient education as appropriate  Outcome: Progressing  Goal: Maintains/Returns to pre admission functional level  Description: INTERVENTIONS:  - Perform AM-PAC 6 Click Basic Mobility/ Daily Activity assessment daily.  - Set and communicate daily mobility goal to care team and patient/family/caregiver.   - Collaborate with rehabilitation services on mobility goals if consulted  - Out of bed for toileting  - Record patient progress and toleration of activity level   Outcome: Progressing     Problem: DISCHARGE PLANNING  Goal: Discharge to home or other facility with appropriate resources  Description: INTERVENTIONS:  - Identify barriers to discharge w/patient and caregiver  - Arrange for needed discharge resources and transportation as appropriate  - Identify discharge learning needs (meds, wound care, etc.)  - Arrange for interpretive services to assist at discharge as needed  - Refer to Case Management Department for  coordinating discharge planning if the patient needs post-hospital services based on physician/advanced practitioner order or complex needs related to functional status, cognitive ability, or social support system  Outcome: Progressing     Problem: Knowledge Deficit  Goal: Patient/family/caregiver demonstrates understanding of disease process, treatment plan, medications, and discharge instructions  Description: Complete learning assessment and assess knowledge base.  Interventions:  - Provide teaching at level of understanding  - Provide teaching via preferred learning methods  Outcome: Progressing     Problem: Prexisting or High Potential for Compromised Skin Integrity  Goal: Skin integrity is maintained or improved  Description: INTERVENTIONS:  - Identify patients at risk for skin breakdown  - Assess and monitor skin integrity  - Assess and monitor nutrition and hydration status  - Monitor labs   - Assess for incontinence   - Turn and reposition patient  - Assist with mobility/ambulation  - Relieve pressure over bony prominences  - Avoid friction and shearing  - Provide appropriate hygiene as needed including keeping skin clean and dry  - Evaluate need for skin moisturizer/barrier cream  - Collaborate with interdisciplinary team   - Patient/family teaching  - Consider wound care consult   Outcome: Progressing

## 2024-04-18 NOTE — DISCHARGE SUMMARY
"Discharge Summary - General Surgery   Candido Valladares Jr. 88 y.o. 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  New colovesical fistula.  Sepsis, present on presentation, now resolved.  Bilateral obstructive hydroureteronephrosis.    Attending and Service: Dr. Jarquin, Acute Care Surgical Services.    Consulting Physician(s):     Neurology.  Interventional radiology.  Palliative care.  Medical oncology.    Imaging and Procedures Performed:     IR nephrostomy tube placement    Result Date: 4/13/2024  Impression: Impression: Successful placement of bilateral 8 Pashto percutaneous nephrostomy Workstation performed: [id number]     CT pe study w abdomen pelvis w contrast    Result Date: 4/13/2024  Impression: Bladder tumor mass, increasing with erosion through anterior wall of lower rectum with some fecal material in the bladder lumen. Also, tumor erosion into perivesical pelvic fat with small collection of fluid and gas is extending upwards along the left pelvic sidewall. Increasing obstruction of left ureterovesical junction with right hydroureteronephrosis. Reidentified severe left hydronephrosis due to ureterovesical junction obstruction with delayed left renal nephrogram and left renal cortical thinning. Trace ascites and small left pleural effusion, new from prior examination. No pulmonary embolus. This examination was marked \"immediate notification\" in Epic in order to begin the standard process by which the radiology reading room liaison alerts the referring practitioner. Workstation performed: [id number]     XR chest portable    Result Date: 4/13/2024  Impression: Small left effusion better shown on " subsequent CT. Workstation performed: [id number]     TRAUMA - CT spine cervical wo contrast    Result Date: 4/13/2024  Impression: No cervical spine fracture or traumatic malalignment. Moderate cervical spondylosis. Partially visualized left pleural effusion. Workstation performed: [id number]     TRAUMA - CT head wo contrast    Result Date: 4/13/2024  Impression: No acute intracranial abnormality. Trace fluid right maxillary sinus. Mild atrophy and chronic microvascular ischemic changes. Workstation performed: [id number]     Open diverting loop transverse colostomy on 4/13/2024.    Bilateral percutaneous nephrostomy tube insertion on 4/13/2024.    Hospital Course: Candido Valladares Jr. is a 88-year-old male who presented with 3-day history of progressive weakness and noting darker flecks of debris in his chronic urinary catheter bag.  He denied any associated fever, chills or night sweats, but endorses associated fatigue and generalized malaise.  Due to his increasing fatigue, he has had 2 recent falls over the 3 preceding days.  He denied any associated abdominal pain, nausea or vomiting.  He has had watery loose diarrhea and fecal incontinence over the the preceding 2 months.  On his initial evaluation by the surgical service this encounter, he was afebrile with normal vital signs; he did have edema of the bilateral lower legs.  His initial workup included labs and the above-noted imaging studies.    He was admitted to the acute care surgery service with worsening/progressing urothelial cancer with the formation of a colovesical fistula and associated sepsis, present on presentation, in setting of the above-noted issues and the patient having tachycardia and leukocytosis.  Urology was consulted and medical oncology as well as palliative medicine were consulted with all of the services assisting in his care and management throughout his hospital encounter.  Operative intervention was discussed with the patient to  perform diverting loop transverse colostomy as well as IR consultation for insertion of bilateral nephrostomy tubes for diversion of his urinary flow.  Patient ultimately opted for proceeding with operative intervention.  He was kept n.p.o., started on IV fluids and IV antibiotics and provided fluid resuscitation.  He went on to have an open diverting loop transverse colostomy on 4/13/2024.  He also underwent bilateral percutaneous nephrostomy tube insertion by IR on 4/13/2024.  Over the subsequent days, he did complete a course of IV antibiotics.  His diet was advanced as tolerated when appropriate.  Once tolerating oral intake, he was transition to an oral analgesic regimen and his IV fluids were discontinued.  PT and OT evaluated him and recommended rehab.  Case management assisted with disposition planning.  He was deemed stable for discharge with appropriate available placement on 4/18/2024.  For further details of his hospital encounter, please see his complete medical records.    On discharge, the patient is instructed to follow-up with the patient's primary care provider within the next 2 weeks to review the events of the recent hospitalization. The patient is instructed to follow-up with the Acute Care Surgical Services as scheduled on 4/29/2024 at 10 AM.  The patient is instructed to follow-up with urology to review his hospital encounter and for ongoing management.  The patient is instructed to follow-up with medical oncology on 5/9/2024 at 9:40 AM.  The patient may follow-up with the palliative medicine team as needed. The patient is instructed to follow the provided discharge instructions.     Condition at Discharge: stable     Discharge instructions/Information to patient and family:   See after visit summary for information provided to patient and family.      Provisions for Follow-Up Care:  See after visit summary for information related to follow-up care and any pertinent home health orders.       Disposition: See After Visit Summary for discharge disposition information.    Planned Readmission: No    Discharge Statement   I spent 30 minutes discharging the patient. This time was spent on the day of discharge. I had direct contact with the patient on the day of discharge. Additional documentation is required if more than 30 minutes were spent on discharge.     Discharge Medications:  See after visit summary for reconciled discharge medications provided to patient and family.    Julito Ivy PA-C  4/18/2024  08:17 AM

## 2024-04-19 ENCOUNTER — TELEPHONE (OUTPATIENT)
Age: 89
End: 2024-04-19

## 2024-04-19 ENCOUNTER — TELEPHONE (OUTPATIENT)
Dept: HEMATOLOGY ONCOLOGY | Facility: CLINIC | Age: 89
End: 2024-04-19

## 2024-04-19 ENCOUNTER — PATIENT OUTREACH (OUTPATIENT)
Dept: CASE MANAGEMENT | Facility: OTHER | Age: 89
End: 2024-04-19

## 2024-04-19 NOTE — PROGRESS NOTES
Outpatient Care Management [facility]. Discharged 4/18/24 to  [facility].  Email sent to facility to inform them the patient is on the EZ[facility] Pathway and I will be following them during their skilled stay.  This Admin Coordinator will continue to monitor via chart review.

## 2024-04-19 NOTE — TELEPHONE ENCOUNTER
Wilton from the [place] at [facility] was calling in regards to the patient needing a follow up. She stated that his discharge papers listed to follow up with his Urologist. Advised her that he is currently not a patient.    She said she will reach out to Dr. Joseph's office

## 2024-04-19 NOTE — TELEPHONE ENCOUNTER
Spoke with my attending physician, Dr. Quinn, and it appears that his nephrostomy tubes are malfunctioning and not draining adequately. He should report to the ER for further evaluation.

## 2024-04-19 NOTE — TELEPHONE ENCOUNTER
Wilton from [facility] called on pt stating they are having issues with neph tubing. Pt did arrive at their facility yesterday with urine output in neph bag and then this morning hematuria in strawberry color in left side and flushed easily. On the right not much output and very sluggish only able to flush 5 cc but should be flushed with 10 cc due to very sluggish output. Wilton is requesting call back from Dr Anderson to further discuss the issue with neph tubing    Call back-[phone] [place] ([facility])

## 2024-04-22 ENCOUNTER — LAB REQUISITION (OUTPATIENT)
Dept: LAB | Facility: HOSPITAL | Age: 89
End: 2024-04-22

## 2024-04-22 DIAGNOSIS — I12.9 HYPERTENSIVE CHRONIC KIDNEY DISEASE WITH STAGE 1 THROUGH STAGE 4 CHRONIC KIDNEY DISEASE, OR UNSPECIFIED CHRONIC KIDNEY DISEASE: ICD-10-CM

## 2024-04-22 DIAGNOSIS — Z93.6 NEPHROSTOMY STATUS (HCC): Primary | ICD-10-CM

## 2024-04-22 LAB
ANION GAP SERPL CALCULATED.3IONS-SCNC: 3 MMOL/L (ref 4–13)
ANISOCYTOSIS BLD QL SMEAR: PRESENT
BASOPHILS # BLD MANUAL: 0 THOUSAND/UL (ref 0–0.1)
BASOPHILS NFR MAR MANUAL: 0 % (ref 0–1)
BUN SERPL-MCNC: 23 MG/DL (ref 5–25)
CALCIUM SERPL-MCNC: 7.9 MG/DL (ref 8.4–10.2)
CHLORIDE SERPL-SCNC: 99 MMOL/L (ref 96–108)
CO2 SERPL-SCNC: 26 MMOL/L (ref 21–32)
CREAT SERPL-MCNC: 0.8 MG/DL (ref 0.6–1.3)
EOSINOPHIL # BLD MANUAL: 0.09 THOUSAND/UL (ref 0–0.4)
EOSINOPHIL NFR BLD MANUAL: 1 % (ref 0–6)
ERYTHROCYTE [DISTWIDTH] IN BLOOD BY AUTOMATED COUNT: 16.6 % (ref 11.6–15.1)
GFR SERPL CREATININE-BSD FRML MDRD: 80 ML/MIN/1.73SQ M
GLUCOSE SERPL-MCNC: 88 MG/DL (ref 65–140)
HCT VFR BLD AUTO: 28.2 % (ref 36.5–49.3)
HGB BLD-MCNC: 9.1 G/DL (ref 12–17)
LYMPHOCYTES # BLD AUTO: 1.03 THOUSAND/UL (ref 0.6–4.47)
LYMPHOCYTES # BLD AUTO: 11 % (ref 14–44)
MCH RBC QN AUTO: 30.7 PG (ref 26.8–34.3)
MCHC RBC AUTO-ENTMCNC: 32.3 G/DL (ref 31.4–37.4)
MCV RBC AUTO: 95 FL (ref 82–98)
MONOCYTES # BLD AUTO: 0.66 THOUSAND/UL (ref 0–1.22)
MONOCYTES NFR BLD: 7 % (ref 4–12)
NEUTROPHILS # BLD MANUAL: 7.6 THOUSAND/UL (ref 1.85–7.62)
NEUTS BAND NFR BLD MANUAL: 3 % (ref 0–8)
NEUTS SEG NFR BLD AUTO: 78 % (ref 43–75)
NRBC BLD AUTO-RTO: 1 /100 WBC (ref 0–2)
OVALOCYTES BLD QL SMEAR: PRESENT
PLATELET # BLD AUTO: 400 THOUSANDS/UL (ref 149–390)
PLATELET BLD QL SMEAR: ADEQUATE
PMV BLD AUTO: 8.5 FL (ref 8.9–12.7)
POIKILOCYTOSIS BLD QL SMEAR: PRESENT
POTASSIUM SERPL-SCNC: 4.1 MMOL/L (ref 3.5–5.3)
RBC # BLD AUTO: 2.96 MILLION/UL (ref 3.88–5.62)
RBC MORPH BLD: PRESENT
SODIUM SERPL-SCNC: 128 MMOL/L (ref 135–147)
WBC # BLD AUTO: 9.38 THOUSAND/UL (ref 4.31–10.16)

## 2024-04-22 PROCEDURE — 85027 COMPLETE CBC AUTOMATED: CPT | Performed by: INTERNAL MEDICINE

## 2024-04-22 PROCEDURE — 80048 BASIC METABOLIC PNL TOTAL CA: CPT | Performed by: INTERNAL MEDICINE

## 2024-04-22 PROCEDURE — 85007 BL SMEAR W/DIFF WBC COUNT: CPT | Performed by: INTERNAL MEDICINE

## 2024-04-22 NOTE — TELEPHONE ENCOUNTER
Spoke to Wilton from [facility] and advised IR is the department that places the Nephrostomy tubes. Advised patient was seen by the [facility] that does not see patients in the office. Advised Dr. Anderson & Dr. Wagner were the covering providers, even though the family mentioned following with Dr. Peterson. Advised do not see notes from Dr. Peterson, but he is at the [place] office, as patient does live there. She advises she will consult with the family to ensure if she will continue to follow with Dr. Joseph or [facility].

## 2024-04-22 NOTE — TELEPHONE ENCOUNTER
Wilton from [facility] in [place] calling to find out the name of the urologist who assisted in the 4/13/24 procedure as they would like the pt to follow up with that specific urologist. Please review.     Pt now experiencing issues with his left nephrostomy tube, it is draining but leaking urine at the insertion site.     Wilton call back: [phone]

## 2024-04-23 ENCOUNTER — TELEPHONE (OUTPATIENT)
Age: 89
End: 2024-04-23

## 2024-04-23 ENCOUNTER — HOSPITAL ENCOUNTER (OUTPATIENT)
Dept: INTERVENTIONAL RADIOLOGY/VASCULAR | Facility: HOSPITAL | Age: 89
Discharge: HOME | End: 2024-04-23
Admitting: STUDENT IN AN ORGANIZED HEALTH CARE EDUCATION/TRAINING PROGRAM
Payer: MEDICARE

## 2024-04-23 DIAGNOSIS — Z93.6 NEPHROSTOMY STATUS (HCC): ICD-10-CM

## 2024-04-23 PROCEDURE — 50431 NJX PX NFROSGRM &/URTRGRM: CPT

## 2024-04-23 PROCEDURE — 50431 NJX PX NFROSGRM &/URTRGRM: CPT | Performed by: STUDENT IN AN ORGANIZED HEALTH CARE EDUCATION/TRAINING PROGRAM

## 2024-04-23 NOTE — TELEPHONE ENCOUNTER
Phone call from Lesley at [facility]  If advanced practitioner can see patient at facility. Please contact Lesley at [phone]. Patient scheduled with Dr. Topete in [place]

## 2024-04-23 NOTE — SEDATION DOCUMENTATION
Procedure completed by Dr Bergeron. Per his assessment, no need to exchange tubes, current tubes placed 4/13 and working as expected. Education provided to pt and family post-procedure, questions answered as offered. Dry dressings to site, new bags attached.

## 2024-04-23 NOTE — DISCHARGE INSTRUCTIONS
Nephrostomy Tube Care     WHAT YOU NEED TO KNOW:   A nephrostomy tube is a catheter (thin plastic tube) that is inserted through your skin and into your kidney. The nephrostomy tube drains urine from your kidney into a collecting bag outside your body. You may need a nephrostomy tube when something is blocking the normal flow of urine. A nephrostomy tube may be used for a short or a long period of time. The nephrostomy tube comes out of your back, so you will need someone to help care for your nephrostomy tube.        DISCHARGE INSTRUCTIONS:      How to clean the skin around the nephrostomy tube and change the bandage:  Since the nephrostomy tube comes out of your back, you will not be able to care for it by yourself. Ask someone to follow the general directions below to check and care for your nephrostomy tube.   Gather the items you will need.          Disposable (single use) under-pad, and a clean washcloth  Plain soap, warm water, and new medical gloves  Sterile gauze bandages  Clear adhesive dressing or medical tape  Skin barrier  Protective skin film  Trash bag  Remove the old bandage, and check the tube entry site.    Have the patient lie on his side with the nephrostomy tube entry site facing up. Place the under-pad where it will catch drainage as you are working with the nephrostomy tube.   Wash your hands with soap and water. Put on new medical gloves.  Gently remove the old bandage, without pulling on the tube. Do this by holding the skin beside the tube with one hand. With the other hand, gently remove sticky tape and the skin barrier by pulling in the same direction as hair growth. Do not touch the side of the bandage that is placed over or around the tube. Throw the bandage and skin barrier away in a trash bag.  Look for signs of infection, such as skin redness and swelling. Report any skin changes to healthcare providers.  Clean the tube entry site.    Hold the tube in place to keep it from being  pulled out while you are cleaning around it.  You will need to clean the area twice. For the first cleaning, wet a new gauze bandage with soap and water.  Begin at the entry site of the tube. Wipe the skin in circles, moving away from the entry site. Remove blood and any other material with the gauze. Do this as often as needed. Use a new gauze bandage each time you clean the area, moving away from the entry site.   For the second cleaning, wet a new gauze bandage with water. Begin at the entry site of the tube. Wipe the skin in circles, moving away from the entry site. Use a new gauze bandage each time you clean the area, moving away from the entry site.   Gently pat the skin with a clean washcloth to dry it.    Apply the skin barrier and bandages.    Roll up a bandage to make it thick, and place it under  the place where the tube enters the skin. Place it to support the tube, and stop it from kinking or bending. Tape the bandage in place, and apply more bandages if directed by a healthcare provider.   Bring the tubing forward to the front and tape it to the skin. Do not stretch the tube tight, because this may pull the nephrostomy tube out.  How often to change the bandage.  Change the bandage around the tube, every other day. If your bandages  get dirty or wet, change them right away, and as often as needed. If your nephrostomy tube is to be used for a long period of time, the tube needs to be changed every 2 to 3 months. Healthcare providers will tell you when you need to make an appointment to have your tube changed.     How to care for the urine drainage bag:   Ask if you need to measure and write down how much urine is in the bag before you empty it. Drain urine out of the drainage bag when it is ½ to ? full. Open the spout at the bottom of the bag to empty the urine into the toilet.   You may need to detach the drainage bag from the nephrostomy tube to change it.. If so, attach a new drainage bag tightly to  the nephrostomy tube.     How to prevent problems with your nephrostomy tube:   Change bandages, directed.  This helps to prevent infection. Throw away or clean your drainage bag as directed by your healthcare provider.    Wipe the connecting ends of the drainage bag with alcohol before you reconnect the bag to the tube.  This helps prevent infection.     Keep the tube taped to your skin and connected to a drainage bag placed below the level of your kidneys.  This helps prevent urine from backing up into your kidneys. You may wear a small drainage bag strapped to your leg to let you move around more easily.    Check the catheter to be sure it is in place after you change your clothes or do other activities.  Do not wear tight clothing over the tube. Place the tubing over your thigh rather than under it when you are sitting down. Be sure that nothing is pulling on the nephrostomy tube when you move around.    Change positions if you see little or no urine in your drainage bag.  Check to see if the urine tube is twisted or bent. Be sure that you are not sitting or lying on the tube. If there are no kinks and there is little or no urine in the drainage bag, tell your healthcare provider.    Flush out the tube as directed. Some tubes get flushed one time a day with 10 mls of NSS You will be given a prescription for the flushes.  To flush the nephrostomy tube, clean both connections with alcohol swap. Twist off the drainage bag tube and twist the saline syringe into the nephrostomy tube and flush briskly. Remove the syringe and twist the drainage bag tube back into the nephrostomy tube.  Keep the site covered while you shower.  Tape a piece of clear adhesive plastic over the dressing to keep it dry while you shower. Do not take tub baths.    Contact Interventional Radiology at [phone] ([facility] PATIENTS: Contact Interventional Radiology at [phone]) ([facility] PATIENTS: Contact Interventional Radiology at  [phone]) if:  The skin around the nephrostomy tube is red, swollen, itches, or has a rash.   You have a fever greater than 101 or chills.  You have lower back or hip pain.  There are changes in how your urine looks or smells.  You have little or no urine draining from the nephrostomy tube.   You have nausea and are vomiting.  The black shane on your tube has moved, or the tube is longer than when it was put in.   You have questions or concerns about your condition or care.  The nephrostomy tube comes out completely.   There is blood, pus, or a bad smell coming from the place where the tube enters your skin.  Urine is leaking around the tube.        The following pharmacies carry the flush syringes.       [facility]                     [address]                     [address] [facility]                    [phone]  [place]                       [place]  Phone [phone]            Phone [phone]                 [facility]                                                                                                   [phone]  [facility]             [facility]                             [address] [place], [place] [address] [facility]   [place]                                 [phone]  Phone [phone]            Phone [phone]    [facility]                                                                         [facility] [phone]  Marlo Raymond.  [place]   Phone [phone]

## 2024-04-24 ENCOUNTER — TELEPHONE (OUTPATIENT)
Dept: HEMATOLOGY ONCOLOGY | Facility: CLINIC | Age: 89
End: 2024-04-24

## 2024-04-24 NOTE — TELEPHONE ENCOUNTER
Patient Call    Who are you speaking with? Physician Office    If it is not the patient, are they listed on an active communication consent form? N/A   What is the reason for this call? [facility] is calling needing to know if the both Dr Britton appts are right   Does this require a call back? Yes   If a call back is required, please list best call back number [phone]   If a call back is required, advise that a message will be forwarded to their care team and someone will return their call as soon as possible.   Did you relay this information to the patient? Yes

## 2024-04-24 NOTE — TELEPHONE ENCOUNTER
Returned a call to [facility] and let them know that Candido Valladares does not need the F/U appt on 5/9/24 with Dr hickman

## 2024-04-24 NOTE — TELEPHONE ENCOUNTER
Phone call from Lesley at [place] for an update on request for patient to be seen at [place] - Informed that patient will be seen as Per Dr. Topete Note.

## 2024-04-25 ENCOUNTER — PATIENT OUTREACH (OUTPATIENT)
Dept: CASE MANAGEMENT | Facility: OTHER | Age: 89
End: 2024-04-25

## 2024-04-25 ENCOUNTER — PATIENT OUTREACH (OUTPATIENT)
Dept: CASE MANAGEMENT | Facility: HOSPITAL | Age: 89
End: 2024-04-25

## 2024-04-25 NOTE — PROGRESS NOTES
"Initial /71 (BP Location: Right arm, Patient Position: Sitting, Cuff Size: Adult Large)   Pulse 61   Temp 98.3  F (36.8  C) (Tympanic)   Ht 1.778 m (5' 10\")   Wt 72.6 kg (160 lb)   BMI 22.96 kg/m   Estimated body mass index is 22.96 kg/m  as calculated from the following:    Height as of this encounter: 1.778 m (5' 10\").    Weight as of this encounter: 72.6 kg (160 lb). .    Thao Lovelace MA    " Chart review complete update obtained from [facility] the patient is currently admitted to SNF for STR. This Admin Coordinator will continue to monitor via chart review.

## 2024-04-25 NOTE — PROGRESS NOTES
LSW reviewed pt's chart to make a supportive call. Patient is currently admitted to [facility]. OSW will follow up with pt and family once discharged home.

## 2024-04-26 ENCOUNTER — LAB REQUISITION (OUTPATIENT)
Dept: LAB | Facility: HOSPITAL | Age: 89
End: 2024-04-26

## 2024-04-26 DIAGNOSIS — I12.9 HYPERTENSIVE CHRONIC KIDNEY DISEASE WITH STAGE 1 THROUGH STAGE 4 CHRONIC KIDNEY DISEASE, OR UNSPECIFIED CHRONIC KIDNEY DISEASE: ICD-10-CM

## 2024-04-26 LAB
ANION GAP SERPL CALCULATED.3IONS-SCNC: 4 MMOL/L (ref 4–13)
BASOPHILS # BLD AUTO: 0.05 THOUSANDS/ÂΜL (ref 0–0.1)
BASOPHILS NFR BLD AUTO: 1 % (ref 0–1)
BUN SERPL-MCNC: 26 MG/DL (ref 5–25)
CALCIUM SERPL-MCNC: 8.3 MG/DL (ref 8.4–10.2)
CHLORIDE SERPL-SCNC: 98 MMOL/L (ref 96–108)
CO2 SERPL-SCNC: 27 MMOL/L (ref 21–32)
CREAT SERPL-MCNC: 0.8 MG/DL (ref 0.6–1.3)
EOSINOPHIL # BLD AUTO: 0.11 THOUSAND/ÂΜL (ref 0–0.61)
EOSINOPHIL NFR BLD AUTO: 2 % (ref 0–6)
ERYTHROCYTE [DISTWIDTH] IN BLOOD BY AUTOMATED COUNT: 16.7 % (ref 11.6–15.1)
GFR SERPL CREATININE-BSD FRML MDRD: 80 ML/MIN/1.73SQ M
GLUCOSE SERPL-MCNC: 88 MG/DL (ref 65–140)
HCT VFR BLD AUTO: 28.6 % (ref 36.5–49.3)
HGB BLD-MCNC: 9.2 G/DL (ref 12–17)
IMM GRANULOCYTES # BLD AUTO: 0.13 THOUSAND/UL (ref 0–0.2)
IMM GRANULOCYTES NFR BLD AUTO: 2 % (ref 0–2)
LYMPHOCYTES # BLD AUTO: 0.67 THOUSANDS/ÂΜL (ref 0.6–4.47)
LYMPHOCYTES NFR BLD AUTO: 9 % (ref 14–44)
MCH RBC QN AUTO: 30.5 PG (ref 26.8–34.3)
MCHC RBC AUTO-ENTMCNC: 32.2 G/DL (ref 31.4–37.4)
MCV RBC AUTO: 95 FL (ref 82–98)
MONOCYTES # BLD AUTO: 0.57 THOUSAND/ÂΜL (ref 0.17–1.22)
MONOCYTES NFR BLD AUTO: 8 % (ref 4–12)
NEUTROPHILS # BLD AUTO: 5.86 THOUSANDS/ÂΜL (ref 1.85–7.62)
NEUTS SEG NFR BLD AUTO: 78 % (ref 43–75)
NRBC BLD AUTO-RTO: 0 /100 WBCS
PLATELET # BLD AUTO: 435 THOUSANDS/UL (ref 149–390)
PMV BLD AUTO: 8.3 FL (ref 8.9–12.7)
POTASSIUM SERPL-SCNC: 4 MMOL/L (ref 3.5–5.3)
RBC # BLD AUTO: 3.02 MILLION/UL (ref 3.88–5.62)
SODIUM SERPL-SCNC: 129 MMOL/L (ref 135–147)
WBC # BLD AUTO: 7.39 THOUSAND/UL (ref 4.31–10.16)

## 2024-04-26 PROCEDURE — 85025 COMPLETE CBC W/AUTO DIFF WBC: CPT | Performed by: INTERNAL MEDICINE

## 2024-04-26 PROCEDURE — 80048 BASIC METABOLIC PNL TOTAL CA: CPT | Performed by: INTERNAL MEDICINE

## 2024-04-29 ENCOUNTER — TELEPHONE (OUTPATIENT)
Age: 89
End: 2024-04-29

## 2024-04-29 NOTE — TELEPHONE ENCOUNTER
Sodium level reviewed, looks good.  No need to keep appointment in June for now, continue to monitor.

## 2024-04-29 NOTE — TELEPHONE ENCOUNTER
Pt is asking does he need to do labs? He is in rehab now they do a lot of labs, also if pt is still in rehab in June does he really need to keep this appt.?

## 2024-05-01 ENCOUNTER — TELEPHONE (OUTPATIENT)
Age: 89
End: 2024-05-01

## 2024-05-02 ENCOUNTER — PATIENT OUTREACH (OUTPATIENT)
Dept: CASE MANAGEMENT | Facility: OTHER | Age: 89
End: 2024-05-02

## 2024-05-02 ENCOUNTER — OFFICE VISIT (OUTPATIENT)
Dept: SURGERY | Facility: CLINIC | Age: 89
End: 2024-05-02

## 2024-05-02 VITALS — BODY MASS INDEX: 23.21 KG/M2 | TEMPERATURE: 96.7 F | HEIGHT: 73 IN

## 2024-05-02 DIAGNOSIS — N32.1 COLOVESICAL FISTULA: Primary | ICD-10-CM

## 2024-05-02 PROCEDURE — 99024 POSTOP FOLLOW-UP VISIT: CPT | Performed by: SURGERY

## 2024-05-02 NOTE — TELEPHONE ENCOUNTER
I called and left a VM asking for a return call to confirm he got my message about cancelling the June appt.

## 2024-05-02 NOTE — PROGRESS NOTES
Office Visit - General Surgery  Candido Valladares Jr. MRN: [id number]  Encounter: [id number]  Assessment/Plan    Problem List Items Addressed This Visit          Digestive    Colovesical fistula - Primary     S/p diverting colostomy   - doing well   - colostomy functioning  - f/u prn            Subjective    Candido Valladares Jr. is a 88 y.o. male who presents for follow up evaluation s/p diverting colostomy for colovesicle fistula. Doing well. Pain controlled. Ostomy functioning. Tolerating diet.       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    Pt  has a past surgical history that includes Appendectomy; pr trurl electrosurg rescj prostate bleed complete (N/A, 9/11/2023); IR nephrostomy tube placement (4/13/2024); Colostomy (N/A, 4/13/2024); Percutaneous nephrostomy (Bilateral, 4/13/2024); and IR nephrostomy tube check/change/reposition/reinsertion/upsize (4/23/2024).    family history includes Hypertension in his mother.    Candido Valladares Jr. reports that he has never smoked. He has never been exposed to tobacco smoke. He has never used smokeless tobacco. He reports that he does not currently use alcohol. He reports that he does not currently use drugs.      Current Outpatient Medications on File Prior to Visit   Medication Sig Dispense Refill    acetaminophen (TYLENOL) 325 mg tablet Take 2 tablets (650 mg total) by mouth every 6 (six) hours as needed for mild pain      aspirin 81 mg chewable tablet Chew 81 mg daily      finasteride (PROPECIA) 1 MG tablet Take 1 mg by mouth daily      folic acid (FOLVITE) 1 mg tablet Take 1 tablet (1 mg total) by mouth daily 30 tablet 5    folic acid (FOLVITE) 1 mg tablet take 1 tablet by mouth daily 30 tablet 0    metoprolol tartrate (LOPRESSOR) 50 mg tablet Take 1 tablet (50 mg total) by mouth every 12 (twelve) hours      pantoprazole  (PROTONIX) 40 mg tablet Take 1 tablet (40 mg total) by mouth daily 90 tablet 3    sodium chloride, PF, 0.9 % 10 mL by Intracatheter route daily for 120 doses Intracatheter flushing daily. May substitute prefilled syringe with normal saline 10 mL vials, 10 mL syringes, and 18 g blunt needles 300 mL 3    sodium chloride, PF, 0.9 % 10 mL by Intracatheter route daily for 120 doses Intracatheter flushing daily. May substitute prefilled syringe with normal saline 10 mL vials, 10 mL syringes, and 18 g blunt needles 300 mL 3    vitamin B-12 (VITAMIN B-12) 1,000 mcg tablet Take 500 mcg by mouth daily Takes  1200mg in total      ferrous sulfate 325 (65 Fe) mg tablet Take 1 tablet (325 mg total) by mouth daily with breakfast Do not start before July 3, 2023. 30 tablet 0     No current facility-administered medications on file prior to visit.     No Known Allergies    Review of Systems   Constitutional: Negative.    Respiratory: Negative.     Cardiovascular: Negative.    Gastrointestinal: Negative.    Skin: Negative.    All other systems reviewed and are negative.      Objective    Vitals:    05/02/24 1604   Temp: (!) 96.7 °F (35.9 °C)       Physical Exam  Constitutional:       Appearance: Normal appearance.   Cardiovascular:      Rate and Rhythm: Normal rate.      Pulses: Normal pulses.   Pulmonary:      Effort: Pulmonary effort is normal.   Abdominal:      General: There is no distension.      Palpations: Abdomen is soft.      Tenderness: There is no abdominal tenderness. There is no guarding or rebound.      Comments: Ostomy pink/viable - functioning   Neurological:      Mental Status: He is alert.

## 2024-05-03 ENCOUNTER — HOME CARE VISIT (OUTPATIENT)
Dept: HOME HEALTH SERVICES | Facility: HOME HEALTHCARE | Age: 89
End: 2024-05-03
Payer: MEDICARE

## 2024-05-06 ENCOUNTER — HOME CARE VISIT (OUTPATIENT)
Dept: HOME HOSPICE | Facility: HOSPICE | Age: 89
End: 2024-05-06
Payer: MEDICARE

## 2024-05-06 ENCOUNTER — TELEPHONE (OUTPATIENT)
Age: 89
End: 2024-05-06

## 2024-05-06 NOTE — TELEPHONE ENCOUNTER
Called and spoke to Wilton from the [facility] she is aware that since Candido is going on hospice that he does not need to follow up in the office.

## 2024-05-06 NOTE — TELEPHONE ENCOUNTER
"Phone call from Karrie at [facility] ([phone]) to inform that patient will be discharged from [facility]. Patient has appt on 6/5/24 with Dr. Topete, and patient's wife would like to know \"if she should keep or cancel appt\". Please contact patients wife. In addition Karrie at [facility] would also like a call to confirm if appt should be cancelled or reschedule.   "

## 2024-05-07 NOTE — TELEPHONE ENCOUNTER
I called and left a VM for Candido to call the office back to give consent to cancelling appt in June.

## 2024-05-09 ENCOUNTER — PATIENT OUTREACH (OUTPATIENT)
Dept: CASE MANAGEMENT | Facility: OTHER | Age: 89
End: 2024-05-09

## 2024-05-09 ENCOUNTER — TELEPHONE (OUTPATIENT)
Age: 89
End: 2024-05-09

## 2024-05-09 ENCOUNTER — HOME CARE VISIT (OUTPATIENT)
Dept: HOME HEALTH SERVICES | Facility: HOME HEALTHCARE | Age: 89
End: 2024-05-09
Payer: MEDICARE

## 2024-05-09 VITALS
SYSTOLIC BLOOD PRESSURE: 102 MMHG | DIASTOLIC BLOOD PRESSURE: 60 MMHG | TEMPERATURE: 97.8 F | HEART RATE: 86 BPM | RESPIRATION RATE: 18 BRPM

## 2024-05-09 PROCEDURE — G0299 HHS/HOSPICE OF RN EA 15 MIN: HCPCS

## 2024-05-09 NOTE — PROGRESS NOTES
[facility] email alert received the patient discharged 5/9/24 to Home with [facility]. I have removed myself from the care team, updated the Care Coordination note, and closed the episode.     
no

## 2024-05-10 ENCOUNTER — HOME CARE VISIT (OUTPATIENT)
Dept: HOME HOSPICE | Facility: HOSPICE | Age: 89
End: 2024-05-10
Payer: MEDICARE

## 2024-05-11 ENCOUNTER — HOME CARE VISIT (OUTPATIENT)
Dept: HOME HOSPICE | Facility: HOSPICE | Age: 89
End: 2024-05-11
Payer: MEDICARE

## 2024-05-12 ENCOUNTER — HOME CARE VISIT (OUTPATIENT)
Dept: HOME HEALTH SERVICES | Facility: HOME HEALTHCARE | Age: 89
End: 2024-05-12
Payer: MEDICARE

## 2024-05-12 ENCOUNTER — HOME CARE VISIT (OUTPATIENT)
Dept: HOME HOSPICE | Facility: HOSPICE | Age: 89
End: 2024-05-12
Payer: MEDICARE

## 2024-05-12 VITALS — SYSTOLIC BLOOD PRESSURE: 118 MMHG | RESPIRATION RATE: 16 BRPM | DIASTOLIC BLOOD PRESSURE: 64 MMHG

## 2024-05-12 PROCEDURE — G0299 HHS/HOSPICE OF RN EA 15 MIN: HCPCS

## 2024-05-13 ENCOUNTER — HOME CARE VISIT (OUTPATIENT)
Dept: HOME HOSPICE | Facility: HOSPICE | Age: 89
End: 2024-05-13
Payer: MEDICARE

## 2024-05-13 ENCOUNTER — HOME CARE VISIT (OUTPATIENT)
Dept: HOME HEALTH SERVICES | Facility: HOME HEALTHCARE | Age: 89
End: 2024-05-13
Payer: MEDICARE

## 2024-05-13 PROCEDURE — G0156 HHCP-SVS OF AIDE,EA 15 MIN: HCPCS

## 2024-05-13 PROCEDURE — G0299 HHS/HOSPICE OF RN EA 15 MIN: HCPCS

## 2024-05-13 PROCEDURE — 10330063 VN DURABLE MEDICAL EQUIPMENT, SUPPLIES/MEDS

## 2024-05-14 ENCOUNTER — HOME CARE VISIT (OUTPATIENT)
Dept: HOME HOSPICE | Facility: HOSPICE | Age: 89
End: 2024-05-14
Payer: MEDICARE

## 2024-05-14 ENCOUNTER — HOME CARE VISIT (OUTPATIENT)
Dept: HOME HEALTH SERVICES | Facility: HOME HEALTHCARE | Age: 89
End: 2024-05-14
Payer: MEDICARE

## 2024-05-14 PROCEDURE — G0155 HHCP-SVS OF CSW,EA 15 MIN: HCPCS

## 2024-05-14 PROCEDURE — G0299 HHS/HOSPICE OF RN EA 15 MIN: HCPCS

## 2024-05-14 PROCEDURE — G0156 HHCP-SVS OF AIDE,EA 15 MIN: HCPCS

## 2024-05-15 ENCOUNTER — HOME CARE VISIT (OUTPATIENT)
Dept: HOME HOSPICE | Facility: HOSPICE | Age: 89
End: 2024-05-15
Payer: MEDICARE

## 2024-05-15 ENCOUNTER — HOME CARE VISIT (OUTPATIENT)
Dept: HOME HEALTH SERVICES | Facility: HOME HEALTHCARE | Age: 89
End: 2024-05-15
Payer: MEDICARE

## 2024-05-15 PROCEDURE — G0156 HHCP-SVS OF AIDE,EA 15 MIN: HCPCS

## 2024-05-16 ENCOUNTER — HOME CARE VISIT (OUTPATIENT)
Dept: HOME HOSPICE | Facility: HOSPICE | Age: 89
End: 2024-05-16
Payer: MEDICARE

## 2024-05-16 ENCOUNTER — HOME CARE VISIT (OUTPATIENT)
Dept: HOME HEALTH SERVICES | Facility: HOME HEALTHCARE | Age: 89
End: 2024-05-16
Payer: MEDICARE

## 2024-05-16 VITALS
RESPIRATION RATE: 18 BRPM | HEART RATE: 78 BPM | SYSTOLIC BLOOD PRESSURE: 123 MMHG | HEART RATE: 73 BPM | SYSTOLIC BLOOD PRESSURE: 107 MMHG | RESPIRATION RATE: 18 BRPM | DIASTOLIC BLOOD PRESSURE: 54 MMHG | DIASTOLIC BLOOD PRESSURE: 78 MMHG

## 2024-05-16 PROCEDURE — G0156 HHCP-SVS OF AIDE,EA 15 MIN: HCPCS

## 2024-05-17 ENCOUNTER — HOME CARE VISIT (OUTPATIENT)
Dept: HOME HEALTH SERVICES | Facility: HOME HEALTHCARE | Age: 89
End: 2024-05-17
Payer: MEDICARE

## 2024-05-17 VITALS — RESPIRATION RATE: 20 BRPM | HEART RATE: 100 BPM

## 2024-05-17 DIAGNOSIS — Z51.5 HOSPICE CARE PATIENT: Primary | ICD-10-CM

## 2024-05-17 PROCEDURE — G0299 HHS/HOSPICE OF RN EA 15 MIN: HCPCS

## 2024-05-17 PROCEDURE — G0156 HHCP-SVS OF AIDE,EA 15 MIN: HCPCS

## 2024-05-17 RX ORDER — LORAZEPAM 0.5 MG/1
0.5 TABLET ORAL EVERY 4 HOURS PRN
Qty: 12 TABLET | Refills: 0 | Status: SHIPPED | OUTPATIENT
Start: 2024-05-17 | End: 2024-05-20

## 2024-05-18 ENCOUNTER — HOME CARE VISIT (OUTPATIENT)
Dept: HOME HEALTH SERVICES | Facility: HOME HEALTHCARE | Age: 89
End: 2024-05-18
Payer: MEDICARE

## 2024-05-18 ENCOUNTER — HOME CARE VISIT (OUTPATIENT)
Dept: HOME HOSPICE | Facility: HOSPICE | Age: 89
End: 2024-05-18
Payer: MEDICARE

## 2024-05-18 VITALS — RESPIRATION RATE: 17 BRPM | HEART RATE: 111 BPM | TEMPERATURE: 99.3 F

## 2024-05-18 PROCEDURE — G0299 HHS/HOSPICE OF RN EA 15 MIN: HCPCS

## 2024-05-24 ENCOUNTER — HOME CARE VISIT (OUTPATIENT)
Dept: HOME HOSPICE | Facility: HOSPICE | Age: 89
End: 2024-05-24
Payer: MEDICARE

## 2024-12-04 NOTE — TRAUMA DOCUMENTATION
Pt arrived with stool incontinence. Urinary Leg Bag changed, Pt cleaned and changed into hospital gown.    Pt called asking if any prep required for today's office procedure cysto-informed comfortable full bladder.

## (undated) DEVICE — PREMIUM DRY TRAY LF: Brand: MEDLINE INDUSTRIES, INC.

## (undated) DEVICE — GLOVE SRG BIOGEL 7.5

## (undated) DEVICE — HF-RESECTION ELECTRODE PLASMALOOP LOOP, MEDIUM, 24 FR., 12°-30°, ESG TURIS: Brand: OLYMPUS

## (undated) DEVICE — SYRINGE,TOOMEY,IRRIGATION,70CC,STERILE: Brand: MEDLINE

## (undated) DEVICE — 4 F TEMPO AQUA 0.038  65CM VER: Brand: TEMPO AQUA

## (undated) DEVICE — POOLE SUCTION HANDLE: Brand: CARDINAL HEALTH

## (undated) DEVICE — ANTIBACTERIAL UNDYED BRAIDED (POLYGLACTIN 910), SYNTHETIC ABSORBABLE SUTURE: Brand: COATED VICRYL

## (undated) DEVICE — GLOVE INDICATOR PI UNDERGLOVE SZ 9 BLUE

## (undated) DEVICE — FIRST STEP BEDSIDE KIT - STAND-UP POUCH, ENDOSCOPIC CLEANING PAD - 1 POUCH: Brand: FIRST STEP BEDSIDE KIT - STAND-UP POUCH, ENDOSCOPIC CLEANING PAD

## (undated) DEVICE — MULTIPURPOSE DRAINAGE CATHETER ULTRATHANE: Brand: COOK

## (undated) DEVICE — EVACUATOR BLADDER ELLIK DISP STRL

## (undated) DEVICE — 1071 S-DRP URO STLE-GAMA 10/BX,4X/C: Brand: STERI-DRAPE™

## (undated) DEVICE — BAG DRAINAGE UROCATCHER 4 SKYTRON

## (undated) DEVICE — STERILE SURGICAL LUBRICANT,  TUBE: Brand: SURGILUBE

## (undated) DEVICE — SET,IRRIGATION,CYSTO,Y-TYPE,90": Brand: MEDLINE

## (undated) DEVICE — SYRINGE 20ML LL

## (undated) DEVICE — DECANTER: Brand: UNBRANDED

## (undated) DEVICE — GLOVE SRG BIOGEL ECLIPSE 8.5

## (undated) DEVICE — TOWEL SURG XR DETECT GREEN STRL RFD

## (undated) DEVICE — GAUZE SPONGES,16 PLY: Brand: CURITY

## (undated) DEVICE — LEGGINGS: Brand: CONVERTORS

## (undated) DEVICE — CATH FOLEY 24FR 30ML 3 WAY SILICONE ELASTIMER

## (undated) DEVICE — SPECIMEN CONTAINER STERILE PEEL PACK

## (undated) DEVICE — ASTOUND STANDARD SURGICAL GOWN, XXL: Brand: CONVERTORS

## (undated) DEVICE — BETHLEHEM MAJOR GENERAL PACK: Brand: CARDINAL HEALTH

## (undated) DEVICE — POV-IOD SOLUTION 4OZ BT

## (undated) DEVICE — GUIDEWIRE STRGHT TIP 0.038 IN SOLO PLUS

## (undated) DEVICE — MEDI-VAC NON-CONDUCTIVE SUCTION TUBING 6MM X 1.8M (6FT.) L: Brand: CARDINAL HEALTH

## (undated) DEVICE — PACK CUSTOM GU CYSTO PACK RF

## (undated) DEVICE — 3M™ IOBAN™ 2 ANTIMICROBIAL INCISE DRAPE 6650EZ: Brand: IOBAN™ 2

## (undated) DEVICE — ADAPTOR CATH

## (undated) DEVICE — INTENDED FOR TISSUE SEPARATION, AND OTHER PROCEDURES THAT REQUIRE A SHARP SURGICAL BLADE TO PUNCTURE OR CUT.: Brand: BARD-PARKER SAFETY BLADES SIZE 15, STERILE

## (undated) DEVICE — COBAN 4 IN STERILE

## (undated) DEVICE — PLUMEPEN PRO 10FT

## (undated) DEVICE — BAG URINE DRAINAGE 4000ML CONTINUOUS IRR

## (undated) DEVICE — SUT VICRYL 2-0 REEL 54 IN J286G